# Patient Record
Sex: MALE | Race: WHITE | Employment: OTHER | URBAN - METROPOLITAN AREA
[De-identification: names, ages, dates, MRNs, and addresses within clinical notes are randomized per-mention and may not be internally consistent; named-entity substitution may affect disease eponyms.]

---

## 2018-09-17 ENCOUNTER — HOSPITAL ENCOUNTER (OUTPATIENT)
Age: 78
Discharge: HOME | End: 2018-09-19
Payer: MEDICARE

## 2018-09-17 ENCOUNTER — NURSE ONLY (OUTPATIENT)
Dept: FAMILY MEDICINE CLINIC | Age: 78
End: 2018-09-17

## 2018-09-17 DIAGNOSIS — I77.1: ICD-10-CM

## 2018-09-17 LAB
ALBUMIN SERPL-MCNC: 4.2 G/DL (ref 3.5–5.2)
ALP BLD-CCNC: 60 U/L (ref 40–129)
ALT SERPL-CCNC: 11 U/L (ref 0–40)
ANION GAP SERPL CALCULATED.3IONS-SCNC: 19 MMOL/L (ref 7–16)
AST SERPL-CCNC: 22 U/L (ref 0–39)
BILIRUB SERPL-MCNC: 0.4 MG/DL (ref 0–1.2)
BUN BLDV-MCNC: 27 MG/DL (ref 8–23)
CALCIUM SERPL-MCNC: 9.1 MG/DL (ref 8.6–10.2)
CHLORIDE BLD-SCNC: 101 MMOL/L (ref 98–107)
CHOLESTEROL, TOTAL: 148 MG/DL (ref 0–199)
CO2: 21 MMOL/L (ref 22–29)
CREAT SERPL-MCNC: 1.7 MG/DL (ref 0.7–1.2)
CREATININE URINE: 42 MG/DL (ref 40–278)
GFR AFRICAN AMERICAN: 47
GFR NON-AFRICAN AMERICAN: 39 ML/MIN/1.73
GLUCOSE BLD-MCNC: 163 MG/DL (ref 74–109)
HDLC SERPL-MCNC: 54 MG/DL
LDL CHOLESTEROL CALCULATED: 80 MG/DL (ref 0–99)
MICROALBUMIN UR-MCNC: 28.5 MG/L
MICROALBUMIN/CREAT UR-RTO: 67.9 (ref 0–30)
PARATHYROID HORMONE INTACT: 91 PG/ML (ref 15–65)
POTASSIUM SERPL-SCNC: 4.9 MMOL/L (ref 3.5–5)
SODIUM BLD-SCNC: 141 MMOL/L (ref 132–146)
TOTAL PROTEIN: 7.3 G/DL (ref 6.4–8.3)
TRIGL SERPL-MCNC: 68 MG/DL (ref 0–149)
VLDLC SERPL CALC-MCNC: 14 MG/DL

## 2018-09-17 PROCEDURE — 82044 UR ALBUMIN SEMIQUANTITATIVE: CPT

## 2018-09-17 PROCEDURE — 80053 COMPREHEN METABOLIC PANEL: CPT

## 2018-09-17 PROCEDURE — 83970 ASSAY OF PARATHORMONE: CPT

## 2018-09-17 PROCEDURE — 82570 ASSAY OF URINE CREATININE: CPT

## 2018-09-17 PROCEDURE — 80061 LIPID PANEL: CPT

## 2018-09-17 PROCEDURE — 83036 HEMOGLOBIN GLYCOSYLATED A1C: CPT

## 2018-09-21 LAB — HBA1C MFR BLD: 7.7 % (ref 4–5.6)

## 2019-05-13 ENCOUNTER — HOSPITAL ENCOUNTER (OUTPATIENT)
Age: 79
Discharge: HOME | End: 2019-05-15
Payer: MEDICARE

## 2019-05-13 ENCOUNTER — NURSE ONLY (OUTPATIENT)
Dept: FAMILY MEDICINE CLINIC | Age: 79
End: 2019-05-13
Payer: MEDICARE

## 2019-05-13 DIAGNOSIS — E13.8 DIABETES MELLITUS OF OTHER TYPE WITH COMPLICATION, UNSPECIFIED WHETHER LONG TERM INSULIN USE: ICD-10-CM

## 2019-05-13 DIAGNOSIS — I25.10 DISEASE OF CARDIOVASCULAR SYSTEM: ICD-10-CM

## 2019-05-13 DIAGNOSIS — I11.9 BENIGN HYPERTENSIVE HEART DISEASE WITHOUT HEART FAILURE: ICD-10-CM

## 2019-05-13 DIAGNOSIS — N18.30 CHRONIC KIDNEY DISEASE, STAGE III (MODERATE) (HCC): ICD-10-CM

## 2019-05-13 DIAGNOSIS — E78.41 ELEVATED LIPOPROTEIN A LEVEL: ICD-10-CM

## 2019-05-13 LAB
ALBUMIN SERPL-MCNC: 4.3 G/DL (ref 3.5–5.2)
ALP BLD-CCNC: 76 U/L (ref 40–129)
ALT SERPL-CCNC: 15 U/L (ref 0–40)
ANION GAP SERPL CALCULATED.3IONS-SCNC: 15 MMOL/L (ref 7–16)
AST SERPL-CCNC: 19 U/L (ref 0–39)
BILIRUB SERPL-MCNC: 0.3 MG/DL (ref 0–1.2)
BUN BLDV-MCNC: 40 MG/DL (ref 8–23)
CALCIUM SERPL-MCNC: 8.8 MG/DL (ref 8.6–10.2)
CHLORIDE BLD-SCNC: 98 MMOL/L (ref 98–107)
CHOLESTEROL, TOTAL: 140 MG/DL (ref 0–199)
CO2: 22 MMOL/L (ref 22–29)
CREAT SERPL-MCNC: 1.9 MG/DL (ref 0.7–1.2)
CREATININE URINE: 55 MG/DL (ref 40–278)
GFR AFRICAN AMERICAN: 42
GFR NON-AFRICAN AMERICAN: 34 ML/MIN/1.73
GLUCOSE BLD-MCNC: 290 MG/DL (ref 74–99)
HBA1C MFR BLD: 7.5 % (ref 4–5.6)
HDLC SERPL-MCNC: 54 MG/DL
LDL CHOLESTEROL CALCULATED: 72 MG/DL (ref 0–99)
MICROALBUMIN UR-MCNC: 78.6 MG/L
MICROALBUMIN/CREAT UR-RTO: 142.9 (ref 0–30)
PARATHYROID HORMONE INTACT: 93 PG/ML (ref 15–65)
PHOSPHORUS: 3.1 MG/DL (ref 2.5–4.5)
POTASSIUM SERPL-SCNC: 4.9 MMOL/L (ref 3.5–5)
PROSTATE SPECIFIC ANTIGEN: 3.05 NG/ML (ref 0–4)
SODIUM BLD-SCNC: 135 MMOL/L (ref 132–146)
TOTAL PROTEIN: 7.2 G/DL (ref 6.4–8.3)
TRIGL SERPL-MCNC: 69 MG/DL (ref 0–149)
VLDLC SERPL CALC-MCNC: 14 MG/DL

## 2019-05-13 PROCEDURE — 84100 ASSAY OF PHOSPHORUS: CPT

## 2019-05-13 PROCEDURE — 83970 ASSAY OF PARATHORMONE: CPT

## 2019-05-13 PROCEDURE — 80053 COMPREHEN METABOLIC PANEL: CPT

## 2019-05-13 PROCEDURE — 82044 UR ALBUMIN SEMIQUANTITATIVE: CPT

## 2019-05-13 PROCEDURE — 82570 ASSAY OF URINE CREATININE: CPT

## 2019-05-13 PROCEDURE — 80061 LIPID PANEL: CPT

## 2019-05-13 PROCEDURE — 83036 HEMOGLOBIN GLYCOSYLATED A1C: CPT

## 2019-05-13 PROCEDURE — 36415 COLL VENOUS BLD VENIPUNCTURE: CPT | Performed by: INTERNAL MEDICINE

## 2019-05-13 PROCEDURE — G0103 PSA SCREENING: HCPCS

## 2019-12-16 ENCOUNTER — HOSPITAL ENCOUNTER (OUTPATIENT)
Age: 79
Discharge: HOME | End: 2019-12-18
Payer: MEDICARE

## 2019-12-16 LAB
ALBUMIN SERPL-MCNC: 4.7 G/DL (ref 3.5–5.2)
ALP BLD-CCNC: 63 U/L (ref 40–129)
ALT SERPL-CCNC: 13 U/L (ref 0–40)
ANION GAP SERPL CALCULATED.3IONS-SCNC: 20 MMOL/L (ref 7–16)
AST SERPL-CCNC: 19 U/L (ref 0–39)
BILIRUB SERPL-MCNC: 0.4 MG/DL (ref 0–1.2)
BUN BLDV-MCNC: 36 MG/DL (ref 8–23)
CALCIUM SERPL-MCNC: 9.9 MG/DL (ref 8.6–10.2)
CHLORIDE BLD-SCNC: 102 MMOL/L (ref 98–107)
CHOLESTEROL, TOTAL: 164 MG/DL (ref 0–199)
CO2: 21 MMOL/L (ref 22–29)
CREAT SERPL-MCNC: 1.9 MG/DL (ref 0.7–1.2)
GFR AFRICAN AMERICAN: 42
GFR NON-AFRICAN AMERICAN: 34 ML/MIN/1.73
GLUCOSE BLD-MCNC: 148 MG/DL (ref 74–99)
HBA1C MFR BLD: 8.5 % (ref 4–5.6)
HDLC SERPL-MCNC: 62 MG/DL
LDL CHOLESTEROL CALCULATED: 87 MG/DL (ref 0–99)
POTASSIUM SERPL-SCNC: 4.5 MMOL/L (ref 3.5–5)
SODIUM BLD-SCNC: 143 MMOL/L (ref 132–146)
TOTAL PROTEIN: 8 G/DL (ref 6.4–8.3)
TRIGL SERPL-MCNC: 75 MG/DL (ref 0–149)
VLDLC SERPL CALC-MCNC: 15 MG/DL

## 2019-12-16 PROCEDURE — 80061 LIPID PANEL: CPT

## 2019-12-16 PROCEDURE — 83036 HEMOGLOBIN GLYCOSYLATED A1C: CPT

## 2019-12-16 PROCEDURE — 80053 COMPREHEN METABOLIC PANEL: CPT

## 2020-01-29 ENCOUNTER — HOSPITAL ENCOUNTER (OUTPATIENT)
Age: 80
Discharge: HOME | End: 2020-01-29
Payer: MEDICARE

## 2020-01-29 LAB — PROSTATE SPECIFIC ANTIGEN: 4.25 NG/ML (ref 0–4)

## 2020-01-29 PROCEDURE — 84153 ASSAY OF PSA TOTAL: CPT

## 2020-01-29 PROCEDURE — 36415 COLL VENOUS BLD VENIPUNCTURE: CPT

## 2020-02-11 ENCOUNTER — HOSPITAL ENCOUNTER (OUTPATIENT)
Age: 80
Discharge: HOME | End: 2020-02-13
Payer: MEDICARE

## 2020-02-11 LAB
ANION GAP SERPL CALCULATED.3IONS-SCNC: 19 MMOL/L (ref 7–16)
BUN BLDV-MCNC: 43 MG/DL (ref 8–23)
CALCIUM SERPL-MCNC: 9.5 MG/DL (ref 8.6–10.2)
CHLORIDE BLD-SCNC: 102 MMOL/L (ref 98–107)
CO2: 18 MMOL/L (ref 22–29)
CREAT SERPL-MCNC: 1.6 MG/DL (ref 0.7–1.2)
CREATININE URINE: 105 MG/DL (ref 40–278)
GFR AFRICAN AMERICAN: 51
GFR NON-AFRICAN AMERICAN: 42 ML/MIN/1.73
GLUCOSE BLD-MCNC: 284 MG/DL (ref 74–99)
MICROALBUMIN UR-MCNC: 322.5 MG/L
MICROALBUMIN/CREAT UR-RTO: 307.1 (ref 0–30)
PARATHYROID HORMONE INTACT: 103 PG/ML (ref 15–65)
POTASSIUM SERPL-SCNC: 5.1 MMOL/L (ref 3.5–5)
SODIUM BLD-SCNC: 139 MMOL/L (ref 132–146)
VITAMIN D 25-HYDROXY: 36 NG/ML (ref 30–100)

## 2020-02-11 PROCEDURE — 82570 ASSAY OF URINE CREATININE: CPT

## 2020-02-11 PROCEDURE — 87088 URINE BACTERIA CULTURE: CPT

## 2020-02-11 PROCEDURE — 83970 ASSAY OF PARATHORMONE: CPT

## 2020-02-11 PROCEDURE — 82044 UR ALBUMIN SEMIQUANTITATIVE: CPT

## 2020-02-11 PROCEDURE — 82306 VITAMIN D 25 HYDROXY: CPT

## 2020-02-11 PROCEDURE — 80048 BASIC METABOLIC PNL TOTAL CA: CPT

## 2020-02-13 LAB — URINE CULTURE, ROUTINE: NORMAL

## 2021-03-16 ENCOUNTER — OFFICE VISIT (OUTPATIENT)
Dept: NEUROSURGERY | Age: 81
End: 2021-03-16
Payer: MEDICARE

## 2021-03-16 VITALS
WEIGHT: 210 LBS | TEMPERATURE: 97.6 F | DIASTOLIC BLOOD PRESSURE: 74 MMHG | BODY MASS INDEX: 29.4 KG/M2 | HEART RATE: 104 BPM | SYSTOLIC BLOOD PRESSURE: 118 MMHG | HEIGHT: 71 IN

## 2021-03-16 DIAGNOSIS — Z98.1 HISTORY OF LUMBAR FUSION: ICD-10-CM

## 2021-03-16 DIAGNOSIS — M54.16 LUMBAR RADICULOPATHY: Primary | ICD-10-CM

## 2021-03-16 PROCEDURE — G8417 CALC BMI ABV UP PARAM F/U: HCPCS | Performed by: PHYSICIAN ASSISTANT

## 2021-03-16 PROCEDURE — 1036F TOBACCO NON-USER: CPT | Performed by: PHYSICIAN ASSISTANT

## 2021-03-16 PROCEDURE — G8427 DOCREV CUR MEDS BY ELIG CLIN: HCPCS | Performed by: PHYSICIAN ASSISTANT

## 2021-03-16 PROCEDURE — 99203 OFFICE O/P NEW LOW 30 MIN: CPT | Performed by: PHYSICIAN ASSISTANT

## 2021-03-16 PROCEDURE — 1123F ACP DISCUSS/DSCN MKR DOCD: CPT | Performed by: PHYSICIAN ASSISTANT

## 2021-03-16 PROCEDURE — G8484 FLU IMMUNIZE NO ADMIN: HCPCS | Performed by: PHYSICIAN ASSISTANT

## 2021-03-16 PROCEDURE — 4040F PNEUMOC VAC/ADMIN/RCVD: CPT | Performed by: PHYSICIAN ASSISTANT

## 2021-03-16 RX ORDER — TAMSULOSIN HYDROCHLORIDE 0.4 MG/1
0.4 CAPSULE ORAL DAILY
COMMUNITY

## 2021-03-16 ASSESSMENT — ENCOUNTER SYMPTOMS
SHORTNESS OF BREATH: 0
BACK PAIN: 1
ABDOMINAL PAIN: 0
TROUBLE SWALLOWING: 0
PHOTOPHOBIA: 0

## 2021-03-16 NOTE — PROGRESS NOTES
Subjective:      Patient ID: Curtis Schwartz is a [de-identified] y.o. male. Maty Amezquita is an [de-identified]year old male with a past medical history of DM, HTN, HLD, GERD, CAD, left parotid gland cancer, left sacral screw placement, and hx L2-L4 lumbar fusion 20 years ago by Dr. Isela Lara. He presents to the office today as a new patient c/o low back pain with radiation down his right leg. Describes the pain as sharp and burning. Pain has been presents for 10 years, but has recently been worsening this past year. Any type of movement and ambulation makes the pain worse. He has tried several sessions of physical therapy, lumbar epidural steroid injections, and SI joint injections without adequate relief. Denies loss of bowel or bladder, saddle anesthesia, numbness, tingling, fever, chills, N/V, SOB, or chest pain. Review of Systems   Constitutional: Negative for fever. HENT: Negative for trouble swallowing. Eyes: Negative for photophobia. Respiratory: Negative for shortness of breath. Cardiovascular: Negative for chest pain. Gastrointestinal: Negative for abdominal pain. Endocrine: Negative for heat intolerance. Genitourinary: Negative for flank pain. Musculoskeletal: Positive for arthralgias, back pain and gait problem. Skin: Negative for wound. Neurological: Negative for numbness and headaches. Psychiatric/Behavioral: Negative for confusion. Objective:   Physical Exam  Constitutional:       Appearance: Normal appearance. He is well-developed. HENT:      Head: Normocephalic and atraumatic. Eyes:      Extraocular Movements: Extraocular movements intact. Conjunctiva/sclera: Conjunctivae normal.      Pupils: Pupils are equal, round, and reactive to light. Neck:      Musculoskeletal: Normal range of motion and neck supple. Cardiovascular:      Rate and Rhythm: Normal rate. Pulmonary:      Effort: Pulmonary effort is normal.   Abdominal:      General: There is no distension.    Musculoskeletal: Comments: Tenderness to palpation of right sided lumbar spine   Skin:     General: Skin is warm and dry. Neurological:      Mental Status: He is alert. Comments: Alert and oriented x3  CN3-12 intact  Motor strength full  Sensation intact to light touch   Psychiatric:         Thought Content: Thought content normal.         Assessment: This is an [de-identified]year old male presenting with back pain and right leg pain. Hx L2-L4 lumbar fusion.        Plan:      -Obtain lumbar CT myelogram to further evaluate fusion and for stenosis  -Call/return to Neurosurgery clinic after completion of imaging to discuss results and further treatment plan  -Call/return sooner if symptoms worsen or new issues arise in the interim           Lynette Power PA-C

## 2021-04-13 DIAGNOSIS — Z01.818 PRE-OP TESTING: Primary | ICD-10-CM

## 2021-04-27 ENCOUNTER — HOSPITAL ENCOUNTER (OUTPATIENT)
Age: 81
Discharge: HOME | End: 2021-04-29
Payer: MEDICARE

## 2021-04-27 DIAGNOSIS — Z01.818 PRE-OP TESTING: ICD-10-CM

## 2021-04-27 PROCEDURE — U0003 INFECTIOUS AGENT DETECTION BY NUCLEIC ACID (DNA OR RNA); SEVERE ACUTE RESPIRATORY SYNDROME CORONAVIRUS 2 (SARS-COV-2) (CORONAVIRUS DISEASE [COVID-19]), AMPLIFIED PROBE TECHNIQUE, MAKING USE OF HIGH THROUGHPUT TECHNOLOGIES AS DESCRIBED BY CMS-2020-01-R: HCPCS

## 2021-04-27 PROCEDURE — U0005 INFEC AGEN DETEC AMPLI PROBE: HCPCS

## 2021-04-28 LAB — SARS-COV-2, PCR: NOT DETECTED

## 2021-05-07 ENCOUNTER — HOSPITAL ENCOUNTER (OUTPATIENT)
Dept: CT IMAGING | Age: 81
Discharge: HOME | End: 2021-05-09
Payer: MEDICARE

## 2021-05-07 ENCOUNTER — HOSPITAL ENCOUNTER (OUTPATIENT)
Dept: GENERAL RADIOLOGY | Age: 81
Discharge: HOME | End: 2021-05-09
Payer: MEDICARE

## 2021-05-07 VITALS
TEMPERATURE: 97.7 F | WEIGHT: 218 LBS | HEIGHT: 72 IN | SYSTOLIC BLOOD PRESSURE: 188 MMHG | BODY MASS INDEX: 29.53 KG/M2 | RESPIRATION RATE: 16 BRPM | DIASTOLIC BLOOD PRESSURE: 82 MMHG | HEART RATE: 80 BPM | OXYGEN SATURATION: 96 %

## 2021-05-07 DIAGNOSIS — Z98.1 HISTORY OF LUMBAR FUSION: ICD-10-CM

## 2021-05-07 DIAGNOSIS — M54.16 LUMBAR RADICULOPATHY: ICD-10-CM

## 2021-05-07 LAB
INR BLD: 1.1
PLATELET # BLD: 246 E9/L (ref 130–450)
PROTHROMBIN TIME: 11.8 SEC (ref 9.3–12.4)

## 2021-05-07 PROCEDURE — 7100000011 HC PHASE II RECOVERY - ADDTL 15 MIN

## 2021-05-07 PROCEDURE — 7100000010 HC PHASE II RECOVERY - FIRST 15 MIN

## 2021-05-07 PROCEDURE — 36415 COLL VENOUS BLD VENIPUNCTURE: CPT

## 2021-05-07 PROCEDURE — 85049 AUTOMATED PLATELET COUNT: CPT

## 2021-05-07 PROCEDURE — 85610 PROTHROMBIN TIME: CPT

## 2021-05-07 PROCEDURE — 6360000004 HC RX CONTRAST MEDICATION: Performed by: RADIOLOGY

## 2021-05-07 PROCEDURE — 72132 CT LUMBAR SPINE W/DYE: CPT

## 2021-05-07 PROCEDURE — 72265 MYELOGRAPHY L-S SPINE: CPT

## 2021-05-07 RX ADMIN — IOPAMIDOL 15 ML: 408 INJECTION, SOLUTION INTRATHECAL at 09:30

## 2021-05-07 ASSESSMENT — PAIN - FUNCTIONAL ASSESSMENT: PAIN_FUNCTIONAL_ASSESSMENT: 0-10

## 2021-05-07 ASSESSMENT — PAIN DESCRIPTION - DESCRIPTORS: DESCRIPTORS: ACHING;DISCOMFORT;NAGGING

## 2021-05-07 NOTE — PROGRESS NOTES
Patient tolerated procedure well, VSS, BP elevated but patient said he didn't take BP meds this am.  He will take them with food. Discharge instructions given, all questions answered and no further questions at this time.

## 2021-05-21 ENCOUNTER — TELEPHONE (OUTPATIENT)
Dept: NEUROSURGERY | Age: 81
End: 2021-05-21

## 2021-11-24 ENCOUNTER — INITIAL CONSULT (OUTPATIENT)
Dept: NEUROSURGERY | Age: 81
End: 2021-11-24
Payer: MEDICARE

## 2021-11-24 VITALS
TEMPERATURE: 97.7 F | DIASTOLIC BLOOD PRESSURE: 90 MMHG | BODY MASS INDEX: 29.53 KG/M2 | WEIGHT: 218 LBS | HEART RATE: 77 BPM | RESPIRATION RATE: 20 BRPM | HEIGHT: 72 IN | OXYGEN SATURATION: 97 % | SYSTOLIC BLOOD PRESSURE: 152 MMHG

## 2021-11-24 DIAGNOSIS — M54.41 CHRONIC MIDLINE LOW BACK PAIN WITH BILATERAL SCIATICA: Primary | ICD-10-CM

## 2021-11-24 DIAGNOSIS — M54.42 CHRONIC MIDLINE LOW BACK PAIN WITH BILATERAL SCIATICA: Primary | ICD-10-CM

## 2021-11-24 DIAGNOSIS — G89.29 CHRONIC MIDLINE LOW BACK PAIN WITH BILATERAL SCIATICA: Primary | ICD-10-CM

## 2021-11-24 PROCEDURE — G8417 CALC BMI ABV UP PARAM F/U: HCPCS | Performed by: NEUROLOGICAL SURGERY

## 2021-11-24 PROCEDURE — G8484 FLU IMMUNIZE NO ADMIN: HCPCS | Performed by: NEUROLOGICAL SURGERY

## 2021-11-24 PROCEDURE — 1123F ACP DISCUSS/DSCN MKR DOCD: CPT | Performed by: NEUROLOGICAL SURGERY

## 2021-11-24 PROCEDURE — 4040F PNEUMOC VAC/ADMIN/RCVD: CPT | Performed by: NEUROLOGICAL SURGERY

## 2021-11-24 PROCEDURE — 99212 OFFICE O/P EST SF 10 MIN: CPT | Performed by: NEUROLOGICAL SURGERY

## 2021-11-24 PROCEDURE — G8427 DOCREV CUR MEDS BY ELIG CLIN: HCPCS | Performed by: NEUROLOGICAL SURGERY

## 2021-11-24 PROCEDURE — 1036F TOBACCO NON-USER: CPT | Performed by: NEUROLOGICAL SURGERY

## 2022-12-15 ENCOUNTER — HOSPITAL ENCOUNTER (OUTPATIENT)
Age: 82
Discharge: HOME | End: 2022-12-17

## 2022-12-15 LAB
ABO/RH: NORMAL
ANTIBODY SCREEN: NORMAL
INR BLD: 1.1
PROTHROMBIN TIME: 11.9 SEC (ref 9.3–12.4)

## 2022-12-15 PROCEDURE — 86850 RBC ANTIBODY SCREEN: CPT

## 2022-12-15 PROCEDURE — 87081 CULTURE SCREEN ONLY: CPT

## 2022-12-15 PROCEDURE — 85610 PROTHROMBIN TIME: CPT

## 2022-12-15 PROCEDURE — 86901 BLOOD TYPING SEROLOGIC RH(D): CPT

## 2022-12-15 PROCEDURE — 86900 BLOOD TYPING SEROLOGIC ABO: CPT

## 2022-12-17 LAB — MRSA CULTURE ONLY: NORMAL

## 2022-12-19 ENCOUNTER — HOSPITAL ENCOUNTER (OUTPATIENT)
Age: 82
Discharge: HOME | End: 2022-12-21

## 2022-12-20 ENCOUNTER — HOSPITAL ENCOUNTER (OUTPATIENT)
Age: 82
Discharge: HOME | End: 2022-12-22

## 2022-12-20 LAB
ANION GAP SERPL CALCULATED.3IONS-SCNC: 10 MMOL/L (ref 7–16)
BUN BLDV-MCNC: 46 MG/DL (ref 6–23)
CALCIUM SERPL-MCNC: 8.4 MG/DL (ref 8.6–10.2)
CHLORIDE BLD-SCNC: 103 MMOL/L (ref 98–107)
CO2: 22 MMOL/L (ref 22–29)
CREAT SERPL-MCNC: 2 MG/DL (ref 0.7–1.2)
GFR SERPL CREATININE-BSD FRML MDRD: 33 ML/MIN/1.73
GLUCOSE BLD-MCNC: 250 MG/DL (ref 74–99)
HCT VFR BLD CALC: 29.4 % (ref 37–54)
HEMOGLOBIN: 9.4 G/DL (ref 12.5–16.5)
MCH RBC QN AUTO: 32.9 PG (ref 26–35)
MCHC RBC AUTO-ENTMCNC: 32 % (ref 32–34.5)
MCV RBC AUTO: 102.8 FL (ref 80–99.9)
PDW BLD-RTO: 12.5 FL (ref 11.5–15)
PLATELET # BLD: 234 E9/L (ref 130–450)
PMV BLD AUTO: 10.4 FL (ref 7–12)
POTASSIUM SERPL-SCNC: 4.9 MMOL/L (ref 3.5–5)
RBC # BLD: 2.86 E12/L (ref 3.8–5.8)
SODIUM BLD-SCNC: 135 MMOL/L (ref 132–146)
WBC # BLD: 9.9 E9/L (ref 4.5–11.5)

## 2022-12-20 PROCEDURE — 80048 BASIC METABOLIC PNL TOTAL CA: CPT

## 2022-12-20 PROCEDURE — 85027 COMPLETE CBC AUTOMATED: CPT

## 2022-12-21 ENCOUNTER — HOSPITAL ENCOUNTER (OUTPATIENT)
Age: 82
Discharge: HOME | End: 2022-12-23

## 2022-12-21 LAB
ANION GAP SERPL CALCULATED.3IONS-SCNC: 11 MMOL/L (ref 7–16)
BUN BLDV-MCNC: 37 MG/DL (ref 6–23)
CALCIUM SERPL-MCNC: 8.8 MG/DL (ref 8.6–10.2)
CHLORIDE BLD-SCNC: 103 MMOL/L (ref 98–107)
CO2: 21 MMOL/L (ref 22–29)
CREAT SERPL-MCNC: 1.8 MG/DL (ref 0.7–1.2)
GFR SERPL CREATININE-BSD FRML MDRD: 37 ML/MIN/1.73
GLUCOSE BLD-MCNC: 168 MG/DL (ref 74–99)
HCT VFR BLD CALC: 27.9 % (ref 37–54)
HEMOGLOBIN: 9.1 G/DL (ref 12.5–16.5)
MCH RBC QN AUTO: 32.9 PG (ref 26–35)
MCHC RBC AUTO-ENTMCNC: 32.6 % (ref 32–34.5)
MCV RBC AUTO: 100.7 FL (ref 80–99.9)
PDW BLD-RTO: 12.6 FL (ref 11.5–15)
PLATELET # BLD: 237 E9/L (ref 130–450)
PMV BLD AUTO: 10.6 FL (ref 7–12)
POTASSIUM SERPL-SCNC: 4.9 MMOL/L (ref 3.5–5)
RBC # BLD: 2.77 E12/L (ref 3.8–5.8)
SODIUM BLD-SCNC: 135 MMOL/L (ref 132–146)
WBC # BLD: 11 E9/L (ref 4.5–11.5)

## 2022-12-21 PROCEDURE — 80048 BASIC METABOLIC PNL TOTAL CA: CPT

## 2022-12-21 PROCEDURE — 85027 COMPLETE CBC AUTOMATED: CPT

## 2022-12-25 ENCOUNTER — APPOINTMENT (OUTPATIENT)
Dept: ULTRASOUND IMAGING | Age: 82
End: 2022-12-25
Payer: MEDICARE

## 2022-12-25 ENCOUNTER — HOSPITAL ENCOUNTER (INPATIENT)
Age: 82
LOS: 5 days | Discharge: NURSING, REHAB OR LONG-TERM CARE | End: 2022-12-30
Attending: STUDENT IN AN ORGANIZED HEALTH CARE EDUCATION/TRAINING PROGRAM | Admitting: INTERNAL MEDICINE
Payer: MEDICARE

## 2022-12-25 ENCOUNTER — APPOINTMENT (OUTPATIENT)
Dept: CT IMAGING | Age: 82
End: 2022-12-25
Payer: MEDICARE

## 2022-12-25 DIAGNOSIS — R55 SYNCOPE AND COLLAPSE: Primary | ICD-10-CM

## 2022-12-25 DIAGNOSIS — S22.49XA CLOSED FRACTURE OF MULTIPLE RIBS, UNSPECIFIED LATERALITY, INITIAL ENCOUNTER: ICD-10-CM

## 2022-12-25 DIAGNOSIS — R53.83 OTHER FATIGUE: ICD-10-CM

## 2022-12-25 DIAGNOSIS — U07.1 COVID: ICD-10-CM

## 2022-12-25 PROBLEM — S22.32XA CLOSED FRACTURE OF ONE RIB OF LEFT SIDE, INITIAL ENCOUNTER: Status: ACTIVE | Noted: 2022-12-25

## 2022-12-25 LAB
ALBUMIN SERPL-MCNC: 3.3 G/DL (ref 3.5–5.2)
ALBUMIN SERPL-MCNC: 3.6 G/DL (ref 3.5–5.2)
ALP BLD-CCNC: 66 U/L (ref 40–129)
ALP BLD-CCNC: 74 U/L (ref 40–129)
ALT SERPL-CCNC: 8 U/L (ref 0–40)
ALT SERPL-CCNC: 8 U/L (ref 0–40)
ANION GAP SERPL CALCULATED.3IONS-SCNC: 11 MMOL/L (ref 7–16)
ANION GAP SERPL CALCULATED.3IONS-SCNC: 13 MMOL/L (ref 7–16)
AST SERPL-CCNC: 19 U/L (ref 0–39)
AST SERPL-CCNC: 21 U/L (ref 0–39)
BASOPHILS ABSOLUTE: 0 E9/L (ref 0–0.2)
BASOPHILS RELATIVE PERCENT: 0 % (ref 0–2)
BILIRUB SERPL-MCNC: 0.4 MG/DL (ref 0–1.2)
BILIRUB SERPL-MCNC: 0.4 MG/DL (ref 0–1.2)
BUN BLDV-MCNC: 49 MG/DL (ref 6–23)
BUN BLDV-MCNC: 50 MG/DL (ref 6–23)
C-REACTIVE PROTEIN: 11.4 MG/DL (ref 0–0.4)
CALCIUM SERPL-MCNC: 8.8 MG/DL (ref 8.6–10.2)
CALCIUM SERPL-MCNC: 9.2 MG/DL (ref 8.6–10.2)
CHLORIDE BLD-SCNC: 101 MMOL/L (ref 98–107)
CHLORIDE BLD-SCNC: 98 MMOL/L (ref 98–107)
CHP ED QC CHECK: NORMAL
CO2: 20 MMOL/L (ref 22–29)
CO2: 21 MMOL/L (ref 22–29)
CREAT SERPL-MCNC: 2.1 MG/DL (ref 0.7–1.2)
CREAT SERPL-MCNC: 2.1 MG/DL (ref 0.7–1.2)
D DIMER: 1810 NG/ML DDU
EOSINOPHILS ABSOLUTE: 0.12 E9/L (ref 0.05–0.5)
EOSINOPHILS RELATIVE PERCENT: 0.9 % (ref 0–6)
GFR SERPL CREATININE-BSD FRML MDRD: 31 ML/MIN/1.73
GFR SERPL CREATININE-BSD FRML MDRD: 31 ML/MIN/1.73
GLUCOSE BLD-MCNC: 214 MG/DL (ref 74–99)
GLUCOSE BLD-MCNC: 247 MG/DL (ref 74–99)
GLUCOSE BLD-MCNC: 274 MG/DL
HCT VFR BLD CALC: 26.6 % (ref 37–54)
HEMOGLOBIN: 8.7 G/DL (ref 12.5–16.5)
LYMPHOCYTES ABSOLUTE: 0.4 E9/L (ref 1.5–4)
LYMPHOCYTES RELATIVE PERCENT: 2.6 % (ref 20–42)
MCH RBC QN AUTO: 32.8 PG (ref 26–35)
MCHC RBC AUTO-ENTMCNC: 32.7 % (ref 32–34.5)
MCV RBC AUTO: 100.4 FL (ref 80–99.9)
METER GLUCOSE: 208 MG/DL (ref 74–99)
METER GLUCOSE: 217 MG/DL (ref 74–99)
METER GLUCOSE: 274 MG/DL (ref 74–99)
MONOCYTES ABSOLUTE: 0.8 E9/L (ref 0.1–0.95)
MONOCYTES RELATIVE PERCENT: 6.1 % (ref 2–12)
MYELOCYTE PERCENT: 1.8 % (ref 0–0)
NEUTROPHILS ABSOLUTE: 12.06 E9/L (ref 1.8–7.3)
NEUTROPHILS RELATIVE PERCENT: 88.6 % (ref 43–80)
NUCLEATED RED BLOOD CELLS: 0 /100 WBC
PDW BLD-RTO: 12.5 FL (ref 11.5–15)
PLATELET # BLD: 284 E9/L (ref 130–450)
PMV BLD AUTO: 9.6 FL (ref 7–12)
POLYCHROMASIA: ABNORMAL
POTASSIUM REFLEX MAGNESIUM: 4.6 MMOL/L (ref 3.5–5)
POTASSIUM SERPL-SCNC: 5.4 MMOL/L (ref 3.5–5)
RBC # BLD: 2.65 E12/L (ref 3.8–5.8)
SARS-COV-2, NAAT: DETECTED
SODIUM BLD-SCNC: 131 MMOL/L (ref 132–146)
SODIUM BLD-SCNC: 133 MMOL/L (ref 132–146)
TOTAL PROTEIN: 6.6 G/DL (ref 6.4–8.3)
TOTAL PROTEIN: 7.2 G/DL (ref 6.4–8.3)
TROPONIN, HIGH SENSITIVITY: 74 NG/L (ref 0–11)
TROPONIN, HIGH SENSITIVITY: 87 NG/L (ref 0–11)
TROPONIN, HIGH SENSITIVITY: 90 NG/L (ref 0–11)
WBC # BLD: 13.4 E9/L (ref 4.5–11.5)

## 2022-12-25 PROCEDURE — 82962 GLUCOSE BLOOD TEST: CPT

## 2022-12-25 PROCEDURE — 51702 INSERT TEMP BLADDER CATH: CPT

## 2022-12-25 PROCEDURE — 6370000000 HC RX 637 (ALT 250 FOR IP): Performed by: INTERNAL MEDICINE

## 2022-12-25 PROCEDURE — 99285 EMERGENCY DEPT VISIT HI MDM: CPT

## 2022-12-25 PROCEDURE — 6360000002 HC RX W HCPCS: Performed by: INTERNAL MEDICINE

## 2022-12-25 PROCEDURE — 2060000000 HC ICU INTERMEDIATE R&B

## 2022-12-25 PROCEDURE — 2580000003 HC RX 258: Performed by: INTERNAL MEDICINE

## 2022-12-25 PROCEDURE — 85378 FIBRIN DEGRADE SEMIQUANT: CPT

## 2022-12-25 PROCEDURE — 85025 COMPLETE CBC W/AUTO DIFF WBC: CPT

## 2022-12-25 PROCEDURE — 86140 C-REACTIVE PROTEIN: CPT

## 2022-12-25 PROCEDURE — 71250 CT THORAX DX C-: CPT

## 2022-12-25 PROCEDURE — 36415 COLL VENOUS BLD VENIPUNCTURE: CPT

## 2022-12-25 PROCEDURE — 93005 ELECTROCARDIOGRAM TRACING: CPT | Performed by: STUDENT IN AN ORGANIZED HEALTH CARE EDUCATION/TRAINING PROGRAM

## 2022-12-25 PROCEDURE — 76705 ECHO EXAM OF ABDOMEN: CPT

## 2022-12-25 PROCEDURE — 70450 CT HEAD/BRAIN W/O DYE: CPT

## 2022-12-25 PROCEDURE — 87635 SARS-COV-2 COVID-19 AMP PRB: CPT

## 2022-12-25 PROCEDURE — 80053 COMPREHEN METABOLIC PANEL: CPT

## 2022-12-25 PROCEDURE — 84484 ASSAY OF TROPONIN QUANT: CPT

## 2022-12-25 RX ORDER — ASPIRIN 81 MG/1
81 TABLET, CHEWABLE ORAL DAILY
Status: DISCONTINUED | OUTPATIENT
Start: 2022-12-25 | End: 2022-12-30 | Stop reason: HOSPADM

## 2022-12-25 RX ORDER — SODIUM CHLORIDE 9 MG/ML
INJECTION, SOLUTION INTRAVENOUS CONTINUOUS
Status: DISCONTINUED | OUTPATIENT
Start: 2022-12-25 | End: 2022-12-29

## 2022-12-25 RX ORDER — TAMSULOSIN HYDROCHLORIDE 0.4 MG/1
0.4 CAPSULE ORAL DAILY
Status: DISCONTINUED | OUTPATIENT
Start: 2022-12-25 | End: 2022-12-30 | Stop reason: HOSPADM

## 2022-12-25 RX ORDER — LOSARTAN POTASSIUM 50 MG/1
100 TABLET ORAL DAILY
Status: DISCONTINUED | OUTPATIENT
Start: 2022-12-25 | End: 2022-12-29

## 2022-12-25 RX ORDER — ACETAMINOPHEN 500 MG
500 TABLET ORAL EVERY 6 HOURS PRN
Status: DISCONTINUED | OUTPATIENT
Start: 2022-12-25 | End: 2022-12-30 | Stop reason: HOSPADM

## 2022-12-25 RX ORDER — HEPARIN SODIUM 10000 [USP'U]/ML
5000 INJECTION, SOLUTION INTRAVENOUS; SUBCUTANEOUS EVERY 8 HOURS SCHEDULED
Status: DISCONTINUED | OUTPATIENT
Start: 2022-12-25 | End: 2022-12-30 | Stop reason: HOSPADM

## 2022-12-25 RX ORDER — PANTOPRAZOLE SODIUM 40 MG/1
40 TABLET, DELAYED RELEASE ORAL
Status: DISCONTINUED | OUTPATIENT
Start: 2022-12-26 | End: 2022-12-30 | Stop reason: HOSPADM

## 2022-12-25 RX ORDER — INSULIN LISPRO 100 [IU]/ML
0-4 INJECTION, SOLUTION INTRAVENOUS; SUBCUTANEOUS
Status: DISCONTINUED | OUTPATIENT
Start: 2022-12-25 | End: 2022-12-30 | Stop reason: HOSPADM

## 2022-12-25 RX ORDER — VITAMIN B COMPLEX
1000 TABLET ORAL DAILY
Status: DISCONTINUED | OUTPATIENT
Start: 2022-12-25 | End: 2022-12-30 | Stop reason: HOSPADM

## 2022-12-25 RX ORDER — VALSARTAN 320 MG/1
320 TABLET ORAL DAILY
Status: DISCONTINUED | OUTPATIENT
Start: 2022-12-25 | End: 2022-12-26

## 2022-12-25 RX ORDER — PILOCARPINE HYDROCHLORIDE 5 MG/1
5 TABLET, FILM COATED ORAL 3 TIMES DAILY
Status: DISCONTINUED | OUTPATIENT
Start: 2022-12-25 | End: 2022-12-30 | Stop reason: HOSPADM

## 2022-12-25 RX ORDER — ZINC SULFATE 50(220)MG
50 CAPSULE ORAL DAILY
Status: DISCONTINUED | OUTPATIENT
Start: 2022-12-25 | End: 2022-12-30 | Stop reason: HOSPADM

## 2022-12-25 RX ORDER — INSULIN LISPRO 100 [IU]/ML
0-4 INJECTION, SOLUTION INTRAVENOUS; SUBCUTANEOUS NIGHTLY
Status: DISCONTINUED | OUTPATIENT
Start: 2022-12-25 | End: 2022-12-30 | Stop reason: HOSPADM

## 2022-12-25 RX ADMIN — Medication 1000 UNITS: at 19:00

## 2022-12-25 RX ADMIN — PILOCARPINE HYDRCHLORIDE 5 MG: 5 TABLET, FILM COATED ORAL at 21:26

## 2022-12-25 RX ADMIN — TAMSULOSIN HYDROCHLORIDE 0.4 MG: 0.4 CAPSULE ORAL at 18:59

## 2022-12-25 RX ADMIN — ASPIRIN 81 MG 81 MG: 81 TABLET ORAL at 18:59

## 2022-12-25 RX ADMIN — HEPARIN SODIUM 5000 UNITS: 10000 INJECTION INTRAVENOUS; SUBCUTANEOUS at 21:30

## 2022-12-25 RX ADMIN — METOPROLOL TARTRATE 25 MG: 25 TABLET, FILM COATED ORAL at 21:26

## 2022-12-25 RX ADMIN — ZINC SULFATE 220 MG (50 MG) CAPSULE 50 MG: CAPSULE at 19:00

## 2022-12-25 RX ADMIN — SODIUM CHLORIDE: 9 INJECTION, SOLUTION INTRAVENOUS at 18:35

## 2022-12-25 ASSESSMENT — ENCOUNTER SYMPTOMS
SHORTNESS OF BREATH: 0
PHOTOPHOBIA: 0
NAUSEA: 0
ABDOMINAL DISTENTION: 0
ABDOMINAL PAIN: 0
CHEST TIGHTNESS: 0
DIARRHEA: 0
COUGH: 0
BACK PAIN: 0
VOMITING: 0

## 2022-12-25 ASSESSMENT — PAIN - FUNCTIONAL ASSESSMENT: PAIN_FUNCTIONAL_ASSESSMENT: NONE - DENIES PAIN

## 2022-12-25 NOTE — ED PROVIDER NOTES
Laila Veloz is an 80-year-old male with a past medical history of CAD, HTN, DM, HLD who presented from rehab due to syncope. Patient had several days of diffuse weakness, patient was helped to the restroom when he had a syncopal episode following large episode of diarrhea, nurse performed CPR after she suspected he was unresponsive, BG was found to be 66 patient was given IM glucagon and after brief CPR he regained consciousness. Patient was then awake, alert, oriented at time of arrival to ED. Patient is having pain over anterior chest wall that is reproducible, denies shortness of breath, nausea, vomiting. Patient's symptoms have resolved. The history is provided by the patient and medical records. Review of Systems   Constitutional:  Positive for fatigue. Negative for chills, diaphoresis and fever. Eyes:  Negative for photophobia and visual disturbance. Respiratory:  Negative for cough, chest tightness and shortness of breath. Cardiovascular:  Negative for chest pain, palpitations and leg swelling. Gastrointestinal:  Negative for abdominal distention, abdominal pain, diarrhea, nausea and vomiting. Genitourinary:  Negative for dysuria. Musculoskeletal:  Negative for back pain, neck pain and neck stiffness. Skin:  Negative for pallor and rash. Neurological:  Positive for syncope. Negative for headaches. Psychiatric/Behavioral:  Negative for confusion. Physical Exam  Vitals and nursing note reviewed. Constitutional:       General: He is not in acute distress. Appearance: Normal appearance. He is not ill-appearing. HENT:      Head: Normocephalic and atraumatic. Eyes:      General: No scleral icterus. Conjunctiva/sclera: Conjunctivae normal.      Pupils: Pupils are equal, round, and reactive to light. Cardiovascular:      Rate and Rhythm: Normal rate and regular rhythm. Pulmonary:      Effort: Pulmonary effort is normal.      Breath sounds: Normal breath sounds. Abdominal:      General: Bowel sounds are normal. There is no distension. Palpations: Abdomen is soft. Tenderness: There is no abdominal tenderness. There is no guarding or rebound. Musculoskeletal:      Cervical back: Normal range of motion and neck supple. No rigidity. No muscular tenderness. Right lower leg: No edema. Left lower leg: No edema. Skin:     General: Skin is warm and dry. Capillary Refill: Capillary refill takes less than 2 seconds. Coloration: Skin is not pale. Findings: No erythema or rash. Neurological:      Mental Status: He is alert and oriented to person, place, and time. Cranial Nerves: No cranial nerve deficit. Sensory: No sensory deficit. Motor: No weakness. Psychiatric:         Mood and Affect: Mood normal.        Procedures     MDM  Number of Diagnoses or Management Options  Diagnosis management comments: Bea Lucas is an 80year old male who presented to ED with concern for syncope and episode of unresponsiveness  Patient was alert and oriented at time of arrival to ED  Patient was having mild sternal tenderness  Found to have 3 anterior rib fx likely secondary to CPR  Troponin elevated in the setting of CKD  Patient was covid positive known to patient  Patient will be admitted   Cardiology consult ordered patient stable. ED Course as of 12/25/22 2331   Annie Pascual Dec 25, 2022   1700 EKG: This EKG is signed and interpreted by me. Rate: 84  Rhythm: Sinus  Interpretation: non-specific EKG, no ST elevation or depression, left axis  Comparison: changes compared to previous EKG   [SS]      ED Course User Index  [SS] Prudence MD Rocio        ED Course as of 12/25/22 2331   Sun Dec 25, 2022   1700 EKG: This EKG is signed and interpreted by me.     Rate: 84  Rhythm: Sinus  Interpretation: non-specific EKG, no ST elevation or depression, left axis  Comparison: changes compared to previous EKG   [SS]      ED Course User Index  [SS] July Johnson MD       --------------------------------------------- PAST HISTORY ---------------------------------------------  Past Medical History:  has a past medical history of Anemia, CAD (coronary artery disease), Cancer ([facility] Diabetes mellitus ([facility] Diverticulosis, GERD (gastroesophageal reflux disease), Hyperlipidemia, and Hypertension. Past Surgical History:  has a past surgical history that includes Colonoscopy; eye surgery; back surgery; and Parotidectomy (9/11/12). Social History:  reports that he has never smoked. He has never used smokeless tobacco. He reports that he does not drink alcohol and does not use drugs. Family History: family history includes Cancer in his brother and father. The patients home medications have been reviewed.     Allergies: Darvocet [propoxyphene n-acetaminophen] and Lisinopril    -------------------------------------------------- RESULTS -------------------------------------------------    LABS:  Results for orders placed or performed during the hospital encounter of 12/25/22   COVID-19, Rapid    Specimen: Nasopharyngeal Swab   Result Value Ref Range    SARS-CoV-2, NAAT DETECTED (A) Not Detected   CBC with Auto Differential   Result Value Ref Range    WBC 13.4 (H) 4.5 - 11.5 E9/L    RBC 2.65 (L) 3.80 - 5.80 E12/L    Hemoglobin 8.7 (L) 12.5 - 16.5 g/dL    Hematocrit 26.6 (L) 37.0 - 54.0 %    .4 (H) 80.0 - 99.9 fL    MCH 32.8 26.0 - 35.0 pg    MCHC 32.7 32.0 - 34.5 %    RDW 12.5 11.5 - 15.0 fL    Platelets 992 652 - 188 E9/L    MPV 9.6 7.0 - 12.0 fL    Neutrophils % 88.6 (H) 43.0 - 80.0 %    Lymphocytes % 2.6 (L) 20.0 - 42.0 %    Monocytes % 6.1 2.0 - 12.0 %    Eosinophils % 0.9 0.0 - 6.0 %    Basophils % 0.0 0.0 - 2.0 %    Neutrophils Absolute 12.06 (H) 1.80 - 7.30 E9/L    Lymphocytes Absolute 0.40 (L) 1.50 - 4.00 E9/L    Monocytes Absolute 0.80 0.10 - 0.95 E9/L    Eosinophils Absolute 0.12 0.05 - 0.50 E9/L    Basophils Absolute 0.00 0.00 - 0.20 E9/L    Myelocyte Percent 1.8 0 - 0 %    nRBC 0.0 /100 WBC    Polychromasia 1+    Comprehensive Metabolic Panel w/ Reflex to MG   Result Value Ref Range    Sodium 133 132 - 146 mmol/L    Potassium reflex Magnesium 4.6 3.5 - 5.0 mmol/L    Chloride 101 98 - 107 mmol/L    CO2 21 (L) 22 - 29 mmol/L    Anion Gap 11 7 - 16 mmol/L    Glucose 247 (H) 74 - 99 mg/dL    BUN 49 (H) 6 - 23 mg/dL    Creatinine 2.1 (H) 0.7 - 1.2 mg/dL    Est, Glom Filt Rate 31 >=60 mL/min/1.73    Calcium 8.8 8.6 - 10.2 mg/dL    Total Protein 6.6 6.4 - 8.3 g/dL    Albumin 3.3 (L) 3.5 - 5.2 g/dL    Total Bilirubin 0.4 0.0 - 1.2 mg/dL    Alkaline Phosphatase 66 40 - 129 U/L    ALT 8 0 - 40 U/L    AST 19 0 - 39 U/L   Troponin   Result Value Ref Range    Troponin, High Sensitivity 87 (H) 0 - 11 ng/L   Troponin   Result Value Ref Range    Troponin, High Sensitivity 90 (H) 0 - 11 ng/L   Troponin   Result Value Ref Range    Troponin, High Sensitivity 74 (H) 0 - 11 ng/L   CBC with Auto Differential   Result Value Ref Range    WBC 10.3 4.5 - 11.5 E9/L    RBC 2.84 (L) 3.80 - 5.80 E12/L    Hemoglobin 9.3 (L) 12.5 - 16.5 g/dL    Hematocrit 28.5 (L) 37.0 - 54.0 %    .4 (H) 80.0 - 99.9 fL    MCH 32.7 26.0 - 35.0 pg    MCHC 32.6 32.0 - 34.5 %    RDW 12.2 11.5 - 15.0 fL    Platelets 014 258 - 795 E9/L    MPV 9.5 7.0 - 12.0 fL    Neutrophils Absolute 9.06 (H) 1.80 - 7.30 E9/L    Promyelocytes Percent 0.9 0 - 0 %    nRBC 0.0 /100 WBC    Polychromasia 1+     Poikilocytes 1+     [place] Cells 1+    Comprehensive Metabolic Panel   Result Value Ref Range    Sodium 131 (L) 132 - 146 mmol/L    Potassium 5.4 (H) 3.5 - 5.0 mmol/L    Chloride 98 98 - 107 mmol/L    CO2 20 (L) 22 - 29 mmol/L    Anion Gap 13 7 - 16 mmol/L    Glucose 214 (H) 74 - 99 mg/dL    BUN 50 (H) 6 - 23 mg/dL    Creatinine 2.1 (H) 0.7 - 1.2 mg/dL    Est, Glom Filt Rate 31 >=60 mL/min/1.73    Calcium 9.2 8.6 - 10.2 mg/dL    Total Protein 7.2 6.4 - 8.3 g/dL    Albumin 3.6 3.5 - 5.2 g/dL Total Bilirubin 0.4 0.0 - 1.2 mg/dL    Alkaline Phosphatase 74 40 - 129 U/L    ALT 8 0 - 40 U/L    AST 21 0 - 39 U/L   C-Reactive Protein   Result Value Ref Range    CRP 11.4 (H) 0.0 - 0.4 mg/dL   D-Dimer, Quantitative   Result Value Ref Range    D-Dimer, Quant 1810 ng/mL DDU   POCT Glucose   Result Value Ref Range    Glucose 274 mg/dL    QC OK? ok    POCT Glucose   Result Value Ref Range    Meter Glucose 274 (H) 74 - 99 mg/dL   POCT Glucose   Result Value Ref Range    Meter Glucose 208 (H) 74 - 99 mg/dL   POCT Glucose   Result Value Ref Range    Meter Glucose 217 (H) 74 - 99 mg/dL   EKG 12 Lead   Result Value Ref Range    Ventricular Rate 84 BPM    Atrial Rate 84 BPM    P-R Interval 168 ms    QRS Duration 94 ms    Q-T Interval 362 ms    QTc Calculation (Bazett) 427 ms    P Axis 13 degrees    R Axis -6 degrees    T Axis 62 degrees       RADIOLOGY:  US ABDOMEN LIMITED   Final Result   No acute process in the right upper quadrant. CT HEAD WO CONTRAST   Final Result   No acute intracranial abnormality. CT CHEST WO CONTRAST   Final Result      Multiple right rib fractures of unknown age. There is no pneumothorax. Elevation of the right hemidiaphragm with atelectasis in the lung bases. Diffuse coronary artery calcification.               ------------------------- NURSING NOTES AND VITALS REVIEWED ---------------------------  Date / Time Roomed:  12/25/2022  1:33 PM  ED Bed Assignment:  [id number]    The nursing notes within the ED encounter and vital signs as below have been reviewed.      Patient Vitals for the past 24 hrs:   BP Temp Temp src Pulse Resp SpO2 Height Weight   12/25/22 1930 132/60 98.8 °F (37.1 °C) Oral (!) 116 18 91 % -- --   12/25/22 1819 (!) 144/69 98.2 °F (36.8 °C) Oral (!) 114 16 95 % -- --   12/25/22 1616 (!) 154/76 98.4 °F (36.9 °C) Oral 94 16 97 % -- --   12/25/22 1407 121/70 -- -- 88 21 -- -- --   12/25/22 1344 (!) 121/52 97.9 °F (36.6 °C) Oral 86 20 93 % 5' 11\" (1.803 m) 225 lb (102.1 kg)       Oxygen Saturation Interpretation: Normal    ------------------------------------------ PROGRESS NOTES ------------------------------------------  Re-evaluation(s):  Time: 11pm  Patients symptoms show no change  Repeat physical examination is not changed    Counseling:  I have spoken with the patient and discussed todays results, in addition to providing specific details for the plan of care and counseling regarding the diagnosis and prognosis. Their questions are answered at this time and they are agreeable with the plan of admission.    --------------------------------- ADDITIONAL PROVIDER NOTES ---------------------------------  Consultations:  Spoke with Dr. Rubén Gastelum. Discussed case. They will admit the patient. This patient's ED course included: a personal history and physicial examination, re-evaluation prior to disposition, multiple bedside re-evaluations, IV medications, cardiac monitoring, and complex medical decision making and emergency management    This patient has remained hemodynamically stable during their ED course. Diagnosis:  1. Syncope and collapse    2. COVID    3. Other fatigue    4. Closed fracture of multiple ribs, unspecified laterality, initial encounter        Disposition:  Patient's disposition: Admit to telemetry  Patient's condition is stable.          Anali Vidal MD  12/25/22 4369

## 2022-12-26 ENCOUNTER — APPOINTMENT (OUTPATIENT)
Dept: ULTRASOUND IMAGING | Age: 82
End: 2022-12-26
Payer: MEDICARE

## 2022-12-26 LAB
ALBUMIN SERPL-MCNC: 3.3 G/DL (ref 3.5–5.2)
ALP BLD-CCNC: 65 U/L (ref 40–129)
ALT SERPL-CCNC: 7 U/L (ref 0–40)
ANION GAP SERPL CALCULATED.3IONS-SCNC: 14 MMOL/L (ref 7–16)
AST SERPL-CCNC: 18 U/L (ref 0–39)
BASOPHILS ABSOLUTE: 0 E9/L (ref 0–0.2)
BASOPHILS ABSOLUTE: 0.03 E9/L (ref 0–0.2)
BASOPHILS RELATIVE PERCENT: 0 % (ref 0–2)
BASOPHILS RELATIVE PERCENT: 0.3 % (ref 0–2)
BILIRUB SERPL-MCNC: 0.4 MG/DL (ref 0–1.2)
BUN BLDV-MCNC: 53 MG/DL (ref 6–23)
BURR CELLS: ABNORMAL
CALCIUM SERPL-MCNC: 8.8 MG/DL (ref 8.6–10.2)
CHLORIDE BLD-SCNC: 103 MMOL/L (ref 98–107)
CHLORIDE URINE RANDOM: 60 MMOL/L
CO2: 17 MMOL/L (ref 22–29)
CREAT SERPL-MCNC: 2.4 MG/DL (ref 0.7–1.2)
CREATININE URINE: 101 MG/DL (ref 40–278)
EKG ATRIAL RATE: 84 BPM
EKG P AXIS: 13 DEGREES
EKG P-R INTERVAL: 168 MS
EKG Q-T INTERVAL: 362 MS
EKG QRS DURATION: 94 MS
EKG QTC CALCULATION (BAZETT): 427 MS
EKG R AXIS: -6 DEGREES
EKG T AXIS: 62 DEGREES
EKG VENTRICULAR RATE: 84 BPM
EOSINOPHILS ABSOLUTE: 0.02 E9/L (ref 0.05–0.5)
EOSINOPHILS ABSOLUTE: 0.19 E9/L (ref 0.05–0.5)
EOSINOPHILS RELATIVE PERCENT: 0.2 % (ref 0–6)
EOSINOPHILS RELATIVE PERCENT: 1.8 % (ref 0–6)
GFR SERPL CREATININE-BSD FRML MDRD: 26 ML/MIN/1.73
GLUCOSE BLD-MCNC: 212 MG/DL (ref 74–99)
HCT VFR BLD CALC: 26.5 % (ref 37–54)
HCT VFR BLD CALC: 28.5 % (ref 37–54)
HEMOGLOBIN: 8.6 G/DL (ref 12.5–16.5)
HEMOGLOBIN: 9.3 G/DL (ref 12.5–16.5)
IMMATURE GRANULOCYTES #: 0.08 E9/L
IMMATURE GRANULOCYTES %: 0.8 % (ref 0–5)
LYMPHOCYTES ABSOLUTE: 0.21 E9/L (ref 1.5–4)
LYMPHOCYTES ABSOLUTE: 0.69 E9/L (ref 1.5–4)
LYMPHOCYTES RELATIVE PERCENT: 1.7 % (ref 20–42)
LYMPHOCYTES RELATIVE PERCENT: 7 % (ref 20–42)
MCH RBC QN AUTO: 32.5 PG (ref 26–35)
MCH RBC QN AUTO: 32.7 PG (ref 26–35)
MCHC RBC AUTO-ENTMCNC: 32.5 % (ref 32–34.5)
MCHC RBC AUTO-ENTMCNC: 32.6 % (ref 32–34.5)
MCV RBC AUTO: 100 FL (ref 80–99.9)
MCV RBC AUTO: 100.4 FL (ref 80–99.9)
METER GLUCOSE: 223 MG/DL (ref 74–99)
METER GLUCOSE: 249 MG/DL (ref 74–99)
METER GLUCOSE: 296 MG/DL (ref 74–99)
METER GLUCOSE: 367 MG/DL (ref 74–99)
MONOCYTES ABSOLUTE: 0.82 E9/L (ref 0.1–0.95)
MONOCYTES ABSOLUTE: 1.11 E9/L (ref 0.1–0.95)
MONOCYTES RELATIVE PERCENT: 11.3 % (ref 2–12)
MONOCYTES RELATIVE PERCENT: 7.8 % (ref 2–12)
NEUTROPHILS ABSOLUTE: 7.92 E9/L (ref 1.8–7.3)
NEUTROPHILS ABSOLUTE: 9.06 E9/L (ref 1.8–7.3)
NEUTROPHILS RELATIVE PERCENT: 80.4 % (ref 43–80)
NEUTROPHILS RELATIVE PERCENT: 87.8 % (ref 43–80)
NUCLEATED RED BLOOD CELLS: 0 /100 WBC
OSMOLALITY URINE: 522 MOSM/KG (ref 300–900)
PDW BLD-RTO: 12.2 FL (ref 11.5–15)
PDW BLD-RTO: 12.5 FL (ref 11.5–15)
PLATELET # BLD: 291 E9/L (ref 130–450)
PLATELET # BLD: 303 E9/L (ref 130–450)
PMV BLD AUTO: 9.5 FL (ref 7–12)
PMV BLD AUTO: 9.6 FL (ref 7–12)
POIKILOCYTES: ABNORMAL
POLYCHROMASIA: ABNORMAL
POTASSIUM SERPL-SCNC: 5.3 MMOL/L (ref 3.5–5)
PROMYELOCYTES PERCENT: 0.9 % (ref 0–0)
PROTEIN PROTEIN: 56 MG/DL (ref 0–12)
PROTEIN/CREAT RATIO: 0.6
PROTEIN/CREAT RATIO: 0.6 (ref 0–0.2)
RBC # BLD: 2.65 E12/L (ref 3.8–5.8)
RBC # BLD: 2.84 E12/L (ref 3.8–5.8)
SODIUM BLD-SCNC: 134 MMOL/L (ref 132–146)
SODIUM URINE: 70 MMOL/L
TOTAL PROTEIN: 6.3 G/DL (ref 6.4–8.3)
WBC # BLD: 10.3 E9/L (ref 4.5–11.5)
WBC # BLD: 9.9 E9/L (ref 4.5–11.5)

## 2022-12-26 PROCEDURE — 6360000002 HC RX W HCPCS: Performed by: INTERNAL MEDICINE

## 2022-12-26 PROCEDURE — 93010 ELECTROCARDIOGRAM REPORT: CPT | Performed by: INTERNAL MEDICINE

## 2022-12-26 PROCEDURE — 84300 ASSAY OF URINE SODIUM: CPT

## 2022-12-26 PROCEDURE — 82436 ASSAY OF URINE CHLORIDE: CPT

## 2022-12-26 PROCEDURE — 51702 INSERT TEMP BLADDER CATH: CPT

## 2022-12-26 PROCEDURE — 83935 ASSAY OF URINE OSMOLALITY: CPT

## 2022-12-26 PROCEDURE — 2060000000 HC ICU INTERMEDIATE R&B

## 2022-12-26 PROCEDURE — 84156 ASSAY OF PROTEIN URINE: CPT

## 2022-12-26 PROCEDURE — 6370000000 HC RX 637 (ALT 250 FOR IP): Performed by: INTERNAL MEDICINE

## 2022-12-26 PROCEDURE — 93970 EXTREMITY STUDY: CPT

## 2022-12-26 PROCEDURE — 93306 TTE W/DOPPLER COMPLETE: CPT

## 2022-12-26 PROCEDURE — 82962 GLUCOSE BLOOD TEST: CPT

## 2022-12-26 PROCEDURE — 76770 US EXAM ABDO BACK WALL COMP: CPT

## 2022-12-26 PROCEDURE — 85025 COMPLETE CBC W/AUTO DIFF WBC: CPT

## 2022-12-26 PROCEDURE — 2580000003 HC RX 258: Performed by: INTERNAL MEDICINE

## 2022-12-26 PROCEDURE — 82570 ASSAY OF URINE CREATININE: CPT

## 2022-12-26 PROCEDURE — 80053 COMPREHEN METABOLIC PANEL: CPT

## 2022-12-26 PROCEDURE — 36415 COLL VENOUS BLD VENIPUNCTURE: CPT

## 2022-12-26 RX ORDER — INSULIN LISPRO 100 [IU]/ML
8 INJECTION, SOLUTION INTRAVENOUS; SUBCUTANEOUS ONCE
Status: COMPLETED | OUTPATIENT
Start: 2022-12-26 | End: 2022-12-26

## 2022-12-26 RX ORDER — DEXTROSE MONOHYDRATE 100 MG/ML
INJECTION, SOLUTION INTRAVENOUS CONTINUOUS PRN
Status: DISCONTINUED | OUTPATIENT
Start: 2022-12-26 | End: 2022-12-30 | Stop reason: HOSPADM

## 2022-12-26 RX ADMIN — PILOCARPINE HYDRCHLORIDE 5 MG: 5 TABLET, FILM COATED ORAL at 13:50

## 2022-12-26 RX ADMIN — HEPARIN SODIUM 5000 UNITS: 10000 INJECTION INTRAVENOUS; SUBCUTANEOUS at 13:50

## 2022-12-26 RX ADMIN — PILOCARPINE HYDRCHLORIDE 5 MG: 5 TABLET, FILM COATED ORAL at 20:49

## 2022-12-26 RX ADMIN — PANTOPRAZOLE SODIUM 40 MG: 40 TABLET, DELAYED RELEASE ORAL at 06:23

## 2022-12-26 RX ADMIN — HEPARIN SODIUM 5000 UNITS: 10000 INJECTION INTRAVENOUS; SUBCUTANEOUS at 20:49

## 2022-12-26 RX ADMIN — TAMSULOSIN HYDROCHLORIDE 0.4 MG: 0.4 CAPSULE ORAL at 07:45

## 2022-12-26 RX ADMIN — PILOCARPINE HYDRCHLORIDE 5 MG: 5 TABLET, FILM COATED ORAL at 07:45

## 2022-12-26 RX ADMIN — INSULIN LISPRO 1 UNITS: 100 INJECTION, SOLUTION INTRAVENOUS; SUBCUTANEOUS at 06:38

## 2022-12-26 RX ADMIN — ASPIRIN 81 MG 81 MG: 81 TABLET ORAL at 07:45

## 2022-12-26 RX ADMIN — HEPARIN SODIUM 5000 UNITS: 10000 INJECTION INTRAVENOUS; SUBCUTANEOUS at 06:22

## 2022-12-26 RX ADMIN — Medication 1000 UNITS: at 07:45

## 2022-12-26 RX ADMIN — LOSARTAN POTASSIUM 100 MG: 50 TABLET, FILM COATED ORAL at 07:48

## 2022-12-26 RX ADMIN — INSULIN LISPRO 8 UNITS: 100 INJECTION, SOLUTION INTRAVENOUS; SUBCUTANEOUS at 12:09

## 2022-12-26 RX ADMIN — ZINC SULFATE 220 MG (50 MG) CAPSULE 50 MG: CAPSULE at 07:44

## 2022-12-26 RX ADMIN — SODIUM CHLORIDE: 9 INJECTION, SOLUTION INTRAVENOUS at 20:57

## 2022-12-26 RX ADMIN — METOPROLOL TARTRATE 25 MG: 25 TABLET, FILM COATED ORAL at 20:49

## 2022-12-26 RX ADMIN — INSULIN LISPRO 2 UNITS: 100 INJECTION, SOLUTION INTRAVENOUS; SUBCUTANEOUS at 16:08

## 2022-12-26 RX ADMIN — METOPROLOL TARTRATE 25 MG: 25 TABLET, FILM COATED ORAL at 07:44

## 2022-12-26 NOTE — CONSULTS
[facility] Nephrology Consult       Patient's Name:  Lisa Heard  Date of Service:  12/26/2022  Requesting    Mallory Chapman MD    Reason for Consult:              ZION     Chief Complaint:                   Syncope     Information obtained from: Patient , wife , chart     History of Present Illness:82 yrs old       Recent rt Knee replacement , has been in rehab since     While walking to the bathroom patient had a syncopal episode needing CPR   Was also noted to be hypoglycemic at [facility] , given glucagon , regained consciousness  And hence was brought in     Stable vitals on arrival work up in er suggested , elevated BUN cr ( baseline cr 1.7 range )    Anemia , CT brain Nil acute , Renal sono , no obstruction , leg doppler no DVT , RUQ sono , Normal    CT chest - rib fractures , calcified coronaries , COVID +ve , high CRP and Troponin           Past Medical History:   Diagnosis Date    Anemia     CAD (coronary artery disease)     Cancer ([facility] 2012    left parotid    Diabetes mellitus ([facility]     Diverticulosis     mild    GERD (gastroesophageal reflux disease)     Hyperlipidemia     Hypertension          Past Surgical History:   Procedure Laterality Date    BACK SURGERY      COLONOSCOPY      EYE SURGERY      PAROTIDECTOMY  9/11/12    left superficial         Social History     Socioeconomic History    Marital status:      Spouse name: Not on file    Number of children: Not on file    Years of education: Not on file    Highest education level: Not on file   Occupational History    Not on file   Tobacco Use    Smoking status: Never    Smokeless tobacco: Never    Tobacco comments:     pipe  40 years ago  (smoked once daily)   Vaping Use    Vaping Use: Never used   Substance and Sexual Activity    Alcohol use: No    Drug use: No    Sexual activity: Not Currently   Other Topics Concern    Not on file   Social History Narrative    Not on file     Social Determinants of Health     Financial Resource Strain: Not on file   Food Insecurity: Not on file   Transportation Needs: Not on file   Physical Activity: Not on file   Stress: Not on file   Social Connections: Not on file   Intimate Partner Violence: Not on file   Housing Stability: Not on file       Family History  Family History   Problem Relation Age of Onset    Cancer Father         prostate    Cancer Brother         prostate       Scheduled Meds:   losartan  100 mg Oral Daily    metoprolol tartrate  25 mg Oral BID    pantoprazole  40 mg Oral QAM AC    pilocarpine  5 mg Oral TID    tamsulosin  0.4 mg Oral Daily    heparin (porcine)  5,000 Units SubCUTAneous 3 times per day    aspirin  81 mg Oral Daily    insulin lispro  0-4 Units SubCUTAneous TID WC    insulin lispro  0-4 Units SubCUTAneous Nightly    zinc sulfate  50 mg Oral Daily    Vitamin D  1,000 Units Oral Daily       Continuous Infusions:  [unfilled]    PRN meds  perflutren lipid microspheres, glucose, dextrose bolus **OR** dextrose bolus, glucagon (rDNA), dextrose, acetaminophen    Allergies:  Darvocet [propoxyphene n-acetaminophen] and Lisinopril    Review of Systems     Pertinent positives and negatives as in HPI. Other systems reviewed and were negative.      LAST 3 CMP  Recent Labs     12/25/22  1401 12/25/22  1855 12/26/22  0401    131* 134   K 4.6 5.4* 5.3*    98 103   CO2 21* 20* 17*   BUN 49* 50* 53*   CREATININE 2.1* 2.1* 2.4*   GLUCOSE 247* 214* 212*   CALCIUM 8.8 9.2 8.8   PROT 6.6 7.2 6.3*   LABALBU 3.3* 3.6 3.3*   BILITOT 0.4 0.4 0.4   ALKPHOS 66 74 65   AST 19 21 18       LAST 3 CBC:  Recent Labs     12/25/22  1401 12/25/22  1855 12/26/22  0401   WBC 13.4* 10.3 9.9   RBC 2.65* 2.84* 2.65*   HGB 8.7* 9.3* 8.6*   HCT 26.6* 28.5* 26.5*    303 291         Intake/Output Summary (Last 24 hours) at 12/26/2022 1233  Last data filed at 12/26/2022 4804  Gross per 24 hour   Intake 240 ml   Output 1150 ml   Net -910 ml     Patient Vitals for the past 24 hrs:   BP Temp Temp src Pulse Resp SpO2 Height Weight   12/26/22 0745 (!) 145/64 98 °F (36.7 °C) Oral (!) 103 18 96 % -- --   12/26/22 0705 -- -- -- 95 -- -- -- --   12/26/22 0630 138/78 98.8 °F (37.1 °C) Oral 99 18 98 % -- --   12/26/22 0108 -- -- -- -- -- -- -- 225 lb (102.1 kg)   12/25/22 2202 -- -- -- 96 -- 96 % -- --   12/25/22 1930 132/60 98.8 °F (37.1 °C) Oral (!) 116 18 91 % -- --   12/25/22 1819 (!) 144/69 98.2 °F (36.8 °C) Oral (!) 114 16 95 % -- --   12/25/22 1616 (!) 154/76 98.4 °F (36.9 °C) Oral 94 16 97 % -- --   12/25/22 1407 121/70 -- -- 88 21 -- -- --   12/25/22 1344 (!) 121/52 97.9 °F (36.6 °C) Oral 86 20 93 % 5' 11\" (1.803 m) 225 lb (102.1 kg)       General appearance:  Appears stated age  Skin: color, texture, turgor normal. No rashes or lesions. Neck: supple, no masses, no JVD, No carotid bruits, No thyromegaly  Lungs: respirations unlabored. Clear to auscultation. No rales, wheezes, no rhonchi. Equal chest excursion with respirations. Heart RRR. pmi not laterally displaced. No S3 or S4, no rub  Abdomen:  Soft, ND, NT. Bowel sounds present. No HSM. No epigastric bruit, no increased Ao pulsation,  Extremities: warm to touch. No LE edema or cyanosis. No fem bruit. 2+ upstrokes and equal bilaterally. PT/Pedal 2+ equal bilat  Neuro: Cr N 2-12 grossly intact. No focal motor neuro deficit. No alteration in recent remote memory. Assessment/Plan    1) ZION superimposed on CKD 3 , in the setting of syncopal episode , recent rt Knee replacement        Marginal po intake and diuretics ( hydrodiuril , losartan , Valsartan and Advil on board )        - will continue IVF , Renal ultrasound no obstruction .  Hols all above meds + hold metformin     2) Hypoglycemia / Syncopal episode / CT chest - diffuse coronary calcification     3) Recent Rt Knee replacement     4) COVID + ve , CT chest no pneumonia     5) HTN controlled     6) Type 2 DM , sugars high     Continue IV hydration , hold above meds for now , we will follow     Thank you for allowing us to participate in the care of Radha Owusu MD  12/26/2022  12:33 PM

## 2022-12-26 NOTE — H&P
CHIEF COMPLAINT:  loss of consciousness      HISTORY OF PRESENT ILLNESS:      The patient is a 80 y.o. male patient of presents with LOC. Was in nursing facility due to knee replacement. He had witnessed LOC. The preformed CPR. His rhythm was not documented. Had lower BS but was 66. No prior heart history he says but CAD listed. No chest pain but complains of pain in chest where CPR broke ribs. 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 pilocarpine (SALAGEN) 5 MG tablet, Take 1 tablet by mouth 3 times daily. pilocarpine (SALAGEN) 5 MG tablet, Take 5 mg by mouth 3 times daily. Insulin Isophane Human (HUMULIN N SC), Inject  into the skin. hydrochlorothiazide (HYDRODIURIL) 25 MG tablet, Take 25 mg by mouth daily. valsartan (DIOVAN) 320 MG tablet, Take 320 mg by mouth daily. omeprazole (PRILOSEC) 20 MG capsule, Take 20 mg by mouth daily. metformin (GLUCOPHAGE) 850 MG tablet, Take 850 mg by mouth 2 times daily (with meals). ibuprofen (ADVIL;MOTRIN) 200 MG tablet, Take 200 mg by mouth every 6 hours as needed. metoprolol (LOPRESSOR) 25 MG tablet, Take 25 mg by mouth 2 times daily. Multiple Vitamin (MULTI VITAMIN MENS PO), Take  by mouth.    vitamin D (CHOLECALCIFEROL) 400 UNITS TABS tablet, Take 500 Units by mouth daily.     acetaminophen (TYLENOL) 500 MG tablet, Take 500 mg by mouth every 6 hours as needed. Allergies:    Darvocet [propoxyphene n-acetaminophen] and Lisinopril    Social History:    reports that he has never smoked. He has never used smokeless tobacco. He reports that he does not drink alcohol and does not use drugs. Family History:   family history includes Cancer in his brother and father. REVIEW OF SYSTEMS:  As above in the HPI, otherwise negative    PHYSICAL EXAM:    Vitals:  /78   Pulse 99   Temp 98.8 °F (37.1 °C) (Oral)   Resp 18   Ht 5' 11\" (1.803 m)   Wt 225 lb (102.1 kg)   SpO2 98%   BMI 31.38 kg/m²     General:  Awake, alert, oriented X 3. Well developed, well nourished, well groomed. No apparent distress. HEENT:  Normocephalic, atraumatic. Pupils equal, round, reactive to light. No scleral icterus. No conjunctival injection. Normal lips, teeth, and gums. No nasal discharge. Neck:  Supple  Heart:  RRR, no murmurs, gallops, rubs  Lungs:  CTA bilaterally, bilat symmetrical expansion, no wheeze, rales, or rhonchi  Abdomen:   Bowel sounds present, soft, nontender, no masses, no organomegaly, no peritoneal signs  Extremities:  No clubbing, cyanosis, or edema  Skin:  Warm and dry, no open lesions or rash  Neuro:  Cranial nerves 2-12 intact, no focal deficits  Breast: deferred  Rectal: deferred  Genitalia:  deferred    LABS:  CBC with Differential:    Lab Results   Component Value Date/Time    WBC 9.9 12/26/2022 04:01 AM    RBC 2.65 12/26/2022 04:01 AM    HGB 8.6 12/26/2022 04:01 AM    HCT 26.5 12/26/2022 04:01 AM     12/26/2022 04:01 AM    .0 12/26/2022 04:01 AM    MCH 32.5 12/26/2022 04:01 AM    MCHC 32.5 12/26/2022 04:01 AM    RDW 12.5 12/26/2022 04:01 AM    NRBC 0.0 12/25/2022 06:55 PM    LYMPHOPCT 7.0 12/26/2022 04:01 AM    PROMYELOPCT 0.9 12/25/2022 06:55 PM    MONOPCT 11.3 12/26/2022 04:01 AM    MYELOPCT 1.8 12/25/2022 02:01 PM    BASOPCT 0.3 12/26/2022 04:01 AM    MONOSABS 1.11 12/26/2022 04:01 AM    LYMPHSABS 0.69 12/26/2022 04:01 AM    EOSABS 0.02 12/26/2022 04:01 AM    BASOSABS 0.03 12/26/2022 04:01 AM     CMP:    Lab Results   Component Value Date/Time     12/26/2022 04:01 AM    K 5.3 12/26/2022 04:01 AM    K 4.6 12/25/2022 02:01 PM     12/26/2022 04:01 AM    CO2 17 12/26/2022 04:01 AM    BUN 53 12/26/2022 04:01 AM    CREATININE 2.4 12/26/2022 04:01 AM    GFRAA 51 02/11/2020 08:00 AM    LABGLOM 26 12/26/2022 04:01 AM    GLUCOSE 212 12/26/2022 04:01 AM    PROT 6.3 12/26/2022 04:01 AM    LABALBU 3.3 12/26/2022 04:01 AM    CALCIUM 8.8 12/26/2022 04:01 AM    BILITOT 0.4 12/26/2022 04:01 AM    ALKPHOS 65 12/26/2022 04:01 AM    AST 18 12/26/2022 04:01 AM    ALT 7 12/26/2022 04:01 AM     PT/INR:    Lab Results   Component Value Date/Time    PROTIME 11.9 12/15/2022 08:35 AM    INR 1.1 12/15/2022 08:35 AM     @cktotal:3,ckmb:3,ckmbindex:3,troponini:3@  U/A:  No results found for: [place], PHUR, LABCAST, WBCUA, RBCUA, MUCUS, TRICHOMONAS, YEAST, BACTERIA, CLARITYU, SPECGRAV, UROBILINOGEN, BILIRUBINUR, BLOODU, GLUCOSEU, KETUA, AMORPHOUS       ASSESSMENT:    Principal Problem:    Closed fracture of one rib of left side, initial encounter  Resolved Problems:    * No resolved hospital problems.  *      PLAN:  Syncope-  Unlikely this was loss of circulation  Will ask cardiology to see  Troponin up from CKD  Would benefit from echo  Doubt PE    CKD stage 3b  Now with mild hyperkalemia  Ask nephrology to see    NIDDM-  Unclear evening dose of insulin    S/P TKR  Heparin d/t recent TKR    Covid 19  Not hypoxic  No victoriano for medication currently  Paxolivid not offered here    Imelda Guerra DO  7:06 AM  12/26/2022

## 2022-12-26 NOTE — PROGRESS NOTES
Thank you for the consult. Mr. Jaron Pham is followed longitudinally by [facility] as an outpatient. Will defer the consult to his service.

## 2022-12-26 NOTE — PROGRESS NOTES
INSULIN COVERAGE VERIFIED WITH WIFE LUIS    Humulin N 25 units SQ every morning  Humulin N 15 units SQ every evening    Humalog SS  0-100= No units  101-150= 10 units  151-200= 15 units  >201= 20 units

## 2022-12-26 NOTE — CONSULTS
[facility] [address] [place] [address]    Name: Wing Wright    Age: 80 y.o. Date of Admission: 12/25/2022  1:33 PM    Date of Service: 12/26/2022    Reason for Consultation: syncope    Referring Physician: Dr Eliza Rosario  Primary Care Physician: Clinton Russell MD    History of Present Illness: The patient is a 80y.o. year old male s/p recent right TKA at [facility] 12/19/22-whent to [facility] for rehab. Was getting up to go to the BR and that's all he remembers. Passed out and actually got a few compressions. Came to and brought to ED. Denies any prodrome. No sweats palpitations, chest pains or SOB priot to the event. Has felt ok since arrival. No prior syncope or cardiac issues. Normal ECG, enzymes and tele. H/o DM, HTN, CKD. 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 Musculoskeletal: No joint pain or muscle aches.  No claudication  Skin: No skin breakdown or rashes  Neuro: No headache, confusion, or seizures  Psych: No depression, anxiety    Family History:  Family History   Problem Relation Age of Onset    Cancer Father         prostate    Cancer Brother         prostate       Social History:  Social History     Socioeconomic History    Marital status:      Spouse name: Not on file    Number of children: Not on file    Years of education: Not on file    Highest education level: Not on file   Occupational History    Not on file   Tobacco Use    Smoking status: Never    Smokeless tobacco: Never    Tobacco comments:     pipe  40 years ago  (smoked once daily)   Vaping Use    Vaping Use: Never used   Substance and Sexual Activity    Alcohol use: No    Drug use: No    Sexual activity: Not Currently   Other Topics Concern    Not on file   Social History Narrative    Not on file     Social Determinants of Health     Financial Resource Strain: Not on file   Food Insecurity: Not on file   Transportation Needs: Not on file   Physical Activity: Not on file   Stress: Not on file   Social Connections: Not on file   Intimate Partner Violence: Not on file   Housing Stability: Not on file       Allergies: Allergies   Allergen Reactions    Darvocet [Propoxyphene N-Acetaminophen] Nausea And Vomiting    Lisinopril Palpitations       Home Medications:  Prior to Admission medications    Medication Sig Start Date End Date Taking? Authorizing Provider   tamsulosin (FLOMAX) 0.4 MG capsule Take 0.4 mg by mouth daily    Historical Provider, MD   losartan (COZAAR) 100 MG tablet Take 100 mg by mouth daily. Historical Provider, MD   pilocarpine (SALAGEN) 5 MG tablet Take 1 tablet by mouth 3 times daily. 11/16/12   Bakari TITO Howard CNP   pilocarpine (SALAGEN) 5 MG tablet Take 5 mg by mouth 3 times daily. Historical Provider, MD   Insulin Isophane Human (HUMULIN N SC) Inject  into the skin. Historical Provider, MD   hydrochlorothiazide (HYDRODIURIL) 25 MG tablet Take 25 mg by mouth daily. Historical Provider, MD   valsartan (DIOVAN) 320 MG tablet Take 320 mg by mouth daily. Historical Provider, MD   omeprazole (PRILOSEC) 20 MG capsule Take 20 mg by mouth daily. Historical Provider, MD   metformin (GLUCOPHAGE) 850 MG tablet Take 850 mg by mouth 2 times daily (with meals). Historical Provider, MD   ibuprofen (ADVIL;MOTRIN) 200 MG tablet Take 200 mg by mouth every 6 hours as needed.       Historical Provider, MD metoprolol (LOPRESSOR) 25 MG tablet Take 25 mg by mouth 2 times daily. Historical Provider, MD   Multiple Vitamin (MULTI VITAMIN MENS PO) Take  by mouth. Historical Provider, MD   vitamin D (CHOLECALCIFEROL) 400 UNITS TABS tablet Take 500 Units by mouth daily. Historical Provider, MD   acetaminophen (TYLENOL) 500 MG tablet Take 500 mg by mouth every 6 hours as needed.       Historical Provider, MD       Current Medications:  Current Facility-Administered Medications   Medication Dose Route Frequency Provider Last Rate Last Admin    perflutren lipid microspheres (DEFINITY) injection 1.5 mL  1.5 mL IntraVENous ONCE PRN Kaitlin Franklin MD        glucose chewable tablet 16 g  4 tablet Oral PRN Francine Venegaslla, DO        dextrose bolus 10% 125 mL  125 mL IntraVENous PRN Francine Loraa, DO        Or    dextrose bolus 10% 250 mL  250 mL IntraVENous PRN Francine Loraa, DO        glucagon (rDNA) injection 1 mg  1 mg SubCUTAneous PRN Francine Venegaslla, DO        dextrose 10 % infusion   IntraVENous Continuous PRN Francine Venegaslla, DO        acetaminophen (TYLENOL) tablet 500 mg  500 mg Oral Q6H PRN Francine Venegaslla, DO        losartan (COZAAR) tablet 100 mg  100 mg Oral Daily Francine Stapleschilla, DO   100 mg at 12/26/22 0748    metoprolol tartrate (LOPRESSOR) tablet 25 mg  25 mg Oral BID Francine Stapleschilla, DO   25 mg at 12/26/22 0744    pantoprazole (PROTONIX) tablet 40 mg  40 mg Oral QAM  Francine Dion, DO   40 mg at 12/26/22 4531    pilocarpine (SALAGEN) tablet 5 mg  5 mg Oral TID Francine Loraa, DO   5 mg at 12/26/22 0745    tamsulosin (FLOMAX) capsule 0.4 mg  0.4 mg Oral Daily Francine Stapleschilla, DO   0.4 mg at 12/26/22 0745    0.9 % sodium chloride infusion   IntraVENous Continuous Francine Loraa,  mL/hr at 12/25/22 1835 New Bag at 12/25/22 1835    heparin (porcine) injection 5,000 Units  5,000 Units SubCUTAneous 3 times per day Francine Loraa, DO   5,000 Units at 12/26/22 7529    aspirin chewable tablet 81 mg  81 mg Oral Daily Valaria Ao, DO   81 mg at 12/26/22 0745    insulin lispro (HUMALOG) injection vial 0-4 Units  0-4 Units SubCUTAneous TID WC Valaria Ao, DO   1 Units at 12/26/22 0202    insulin lispro (HUMALOG) injection vial 0-4 Units  0-4 Units SubCUTAneous Nightly Valaria Ao, DO        zinc sulfate (ZINCATE) capsule 50 mg  50 mg Oral Daily Valaria Ao, DO   50 mg at 12/26/22 3101    Vitamin D (CHOLECALCIFEROL) tablet 1,000 Units  1,000 Units Oral Daily Valaria Ao, DO   1,000 Units at 12/26/22 0745      dextrose      sodium chloride 100 mL/hr at 12/25/22 1835       Physical Exam:  BP (!) 145/64   Pulse (!) 103   Temp 98 °F (36.7 °C) (Oral)   Resp 18   Ht 5' 11\" (1.803 m)   Wt 225 lb (102.1 kg)   SpO2 96%   BMI 31.38 kg/m²   Weight change: Wt Readings from Last 3 Encounters:   12/26/22 225 lb (102.1 kg)   11/24/21 218 lb (98.9 kg)   05/07/21 218 lb (98.9 kg)     General: Awake, alert, oriented x3, no acute distress  HEENT: Normocephalic and atraumatic, extraocular movements intact, pupils equal and round, moist mucus membranes, sclera anicteric  Neck: No JVD, no carotid bruits, no thyromegaly, no adenopathy  Cardiac: Regular rate and rhythm, normal S1 and physiologically split S2, no S3, no S4. Apical impulse is nondisplaced. No murmurs, no pericardial rubs, no clicks. Carotid upstrokes brisk. Respiratory: Clear bilaterally; no wheezes, no rales, no rhonchi. Unlabored respirations  Abdomen: Soft, nontender, nondistended, bowel sounds+, no hepatomegaly, no masses, no abdominal bruits  Extremities: No edema, no cyanosis, no clubbing. Distal pulses intact  Skin: Intact, warm and dry, no rashes, no breakdown  Musculoskeletal: normal tone and strength in the upper and lower extremities bilaterally, recent rt knee surgery  Neuro: No focal deficits. Moves all extremities appropriately to command.   Normal sensation in the upper and lower extremities bilaterally  Psychiatric: Cooperative, and normal affect    Intake/Output:    Intake/Output Summary (Last 24 hours) at 12/26/2022 1241  Last data filed at 12/26/2022 4270  Gross per 24 hour   Intake 240 ml   Output 1150 ml   Net -910 ml     No intake/output data recorded. Laboratory Tests:  Last 3 CBC:  Recent Labs     12/25/22  1401 12/25/22 1855 12/26/22  0401   WBC 13.4* 10.3 9.9   RBC 2.65* 2.84* 2.65*   HGB 8.7* 9.3* 8.6*   HCT 26.6* 28.5* 26.5*   .4* 100.4* 100.0*   MCH 32.8 32.7 32.5   MCHC 32.7 32.6 32.5   RDW 12.5 12.2 12.5    303 291   MPV 9.6 9.5 9.6       Last 3 CMP:    Recent Labs     12/25/22  1401 12/25/22 1855 12/26/22  0401    131* 134   K 4.6 5.4* 5.3*    98 103   CO2 21* 20* 17*   BUN 49* 50* 53*   CREATININE 2.1* 2.1* 2.4*   GLUCOSE 247* 214* 212*   CALCIUM 8.8 9.2 8.8   PROT 6.6 7.2 6.3*   LABALBU 3.3* 3.6 3.3*   BILITOT 0.4 0.4 0.4   ALKPHOS 66 74 65   AST 19 21 18   ALT 8 8 7       Last 3 Mag/Phos:  No results for input(s): MG, PHOS in the last 72 hours. Last 3 CK, CKMB, Troponin  No results for input(s): CKTOTAL, CKMB, TROPONINI in the last 72 hours. Last 3 Pro-BNP:  No results for input(s): PROBNP in the last 72 hours. No results found for: PROBNP    Last 3 Glucose:     Recent Labs     12/25/22  1401 12/25/22 1855 12/26/22  0401   GLUCOSE 247* 214* 212*       Last 3 Coags:  No results for input(s): PROTIME, INR, PTT in the last 72 hours. Lab Results   Component Value Date/Time    PROTIME 11.9 12/15/2022 08:35 AM    INR 1.1 12/15/2022 08:35 AM       Last 3 Lipid Panel:  No results for input(s): LDLCALC, HDL, TRIG in the last 72 hours.     Invalid input(s): CHLPL  Lab Results   Component Value Date    LDLCALC 87 12/16/2019    LDLCALC 72 05/13/2019    [address] 80 09/17/2018     Lab Results   Component Value Date    HDL 62 12/16/2019    HDL 54 05/13/2019    HDL 54 09/17/2018     Lab Results   Component Value Date    TRIG 75 12/16/2019    TRIG 69 05/13/2019    TRIG 68 09/17/2018     No components found for: CHLPL    TSH:  No results for input(s): TSH in the last 72 hours. No results found for: TSH    ABGs:  No results for input(s): PH, PO2, PCO2, HCO3, BE, O2SAT in the last 72 hours. Lactic Acid:  No results for input(s): LACTA in the last 72 hours. Radiology:  RAD Results:  US RETROPERITONEAL COMPLETE   Final Result   No acute findings the kidneys or urinary bladder. No hydronephrosis. Simple   appearing right renal cortical cyst.         US DUP LOWER EXTREMITIES BILATERAL VENOUS   Final Result   No evidence of DVT in either lower extremity. RECOMMENDATIONS:   Unavailable         US ABDOMEN LIMITED   Final Result   No acute process in the right upper quadrant. CT HEAD WO CONTRAST   Final Result   No acute intracranial abnormality. CT CHEST WO CONTRAST   Final Result      Multiple right rib fractures of unknown age. There is no pneumothorax. Elevation of the right hemidiaphragm with atelectasis in the lung bases. Diffuse coronary artery calcification. EKG and Telemetry:  12-lead EKG personally reviewed and shows sinus 84, normal ECG    Telemetry personally reviewed and shows sinus rhythm        ASSESSMENT / PLAN:    1. Syncopal spell- likely vasomotor event. Nothing significant so far on tele or ECG. Is anemic and Azotemic- getting hydrated. Monitor on tele and will try to get echo. 2 CKD - Cr 2.4 BUN 53. Follows with nephrology, getting some gentle hydration  3 Troponin- mildly up, but trendiong down, in the face of CKD and compressions- no CP and normal ECG-type II release. 4 DM per PCP  5 Recent TKA- will likely go back to rehab soon. 6 Anemia Hg 8.6 watch for further drop. Thank you for consultation.     Darci Nguyen MD,  [facility]  The [address] at [facility]    Electronically signed by Darci Nguyen MD on 12/26/2022 at 12:41 PM

## 2022-12-27 ENCOUNTER — APPOINTMENT (OUTPATIENT)
Dept: ULTRASOUND IMAGING | Age: 82
End: 2022-12-27
Payer: MEDICARE

## 2022-12-27 LAB
ALBUMIN SERPL-MCNC: 3.3 G/DL (ref 3.5–5.2)
ALP BLD-CCNC: 68 U/L (ref 40–129)
ALT SERPL-CCNC: 9 U/L (ref 0–40)
ANION GAP SERPL CALCULATED.3IONS-SCNC: 14 MMOL/L (ref 7–16)
AST SERPL-CCNC: 27 U/L (ref 0–39)
BACTERIA: ABNORMAL /HPF
BASOPHILS ABSOLUTE: 0.03 E9/L (ref 0–0.2)
BASOPHILS RELATIVE PERCENT: 0.4 % (ref 0–2)
BILIRUB SERPL-MCNC: 0.3 MG/DL (ref 0–1.2)
BILIRUBIN URINE: NEGATIVE
BLOOD, URINE: ABNORMAL
BUN BLDV-MCNC: 58 MG/DL (ref 6–23)
CALCIUM SERPL-MCNC: 8.6 MG/DL (ref 8.6–10.2)
CHLORIDE BLD-SCNC: 102 MMOL/L (ref 98–107)
CLARITY: CLEAR
CO2: 18 MMOL/L (ref 22–29)
COLOR: ABNORMAL
CREAT SERPL-MCNC: 2.4 MG/DL (ref 0.7–1.2)
EOSINOPHILS ABSOLUTE: 0.15 E9/L (ref 0.05–0.5)
EOSINOPHILS RELATIVE PERCENT: 2 % (ref 0–6)
GFR SERPL CREATININE-BSD FRML MDRD: 26 ML/MIN/1.73
GLUCOSE BLD-MCNC: 220 MG/DL (ref 74–99)
GLUCOSE URINE: 100 MG/DL
HCT VFR BLD CALC: 25.2 % (ref 37–54)
HEMOGLOBIN: 7.9 G/DL (ref 12.5–16.5)
IMMATURE GRANULOCYTES #: 0.06 E9/L
IMMATURE GRANULOCYTES %: 0.8 % (ref 0–5)
KETONES, URINE: NEGATIVE MG/DL
LEUKOCYTE ESTERASE, URINE: NEGATIVE
LYMPHOCYTES ABSOLUTE: 0.62 E9/L (ref 1.5–4)
LYMPHOCYTES RELATIVE PERCENT: 8.1 % (ref 20–42)
MCH RBC QN AUTO: 32 PG (ref 26–35)
MCHC RBC AUTO-ENTMCNC: 31.3 % (ref 32–34.5)
MCV RBC AUTO: 102 FL (ref 80–99.9)
METER GLUCOSE: 239 MG/DL (ref 74–99)
METER GLUCOSE: 284 MG/DL (ref 74–99)
METER GLUCOSE: 292 MG/DL (ref 74–99)
METER GLUCOSE: 310 MG/DL (ref 74–99)
MONOCYTES ABSOLUTE: 0.91 E9/L (ref 0.1–0.95)
MONOCYTES RELATIVE PERCENT: 11.9 % (ref 2–12)
NEUTROPHILS ABSOLUTE: 5.86 E9/L (ref 1.8–7.3)
NEUTROPHILS RELATIVE PERCENT: 76.8 % (ref 43–80)
NITRITE, URINE: NEGATIVE
PDW BLD-RTO: 12.7 FL (ref 11.5–15)
PH UA: 6 (ref 5–9)
PLATELET # BLD: 312 E9/L (ref 130–450)
PMV BLD AUTO: 9.7 FL (ref 7–12)
POTASSIUM SERPL-SCNC: 5 MMOL/L (ref 3.5–5)
PROTEIN UA: 30 MG/DL
RBC # BLD: 2.47 E12/L (ref 3.8–5.8)
RBC UA: ABNORMAL /HPF (ref 0–2)
SODIUM BLD-SCNC: 134 MMOL/L (ref 132–146)
SPECIFIC GRAVITY UA: 1.02 (ref 1–1.03)
TOTAL PROTEIN: 6.4 G/DL (ref 6.4–8.3)
UROBILINOGEN, URINE: 0.2 E.U./DL
WBC # BLD: 7.6 E9/L (ref 4.5–11.5)
WBC UA: ABNORMAL /HPF (ref 0–5)

## 2022-12-27 PROCEDURE — 6370000000 HC RX 637 (ALT 250 FOR IP): Performed by: NURSE PRACTITIONER

## 2022-12-27 PROCEDURE — 81001 URINALYSIS AUTO W/SCOPE: CPT

## 2022-12-27 PROCEDURE — 82962 GLUCOSE BLOOD TEST: CPT

## 2022-12-27 PROCEDURE — 85025 COMPLETE CBC W/AUTO DIFF WBC: CPT

## 2022-12-27 PROCEDURE — 36415 COLL VENOUS BLD VENIPUNCTURE: CPT

## 2022-12-27 PROCEDURE — 51702 INSERT TEMP BLADDER CATH: CPT

## 2022-12-27 PROCEDURE — 6360000002 HC RX W HCPCS: Performed by: INTERNAL MEDICINE

## 2022-12-27 PROCEDURE — 2060000000 HC ICU INTERMEDIATE R&B

## 2022-12-27 PROCEDURE — 80053 COMPREHEN METABOLIC PANEL: CPT

## 2022-12-27 PROCEDURE — 2580000003 HC RX 258: Performed by: INTERNAL MEDICINE

## 2022-12-27 PROCEDURE — 6370000000 HC RX 637 (ALT 250 FOR IP): Performed by: INTERNAL MEDICINE

## 2022-12-27 RX ORDER — SODIUM BICARBONATE 650 MG/1
650 TABLET ORAL 2 TIMES DAILY
Status: DISCONTINUED | OUTPATIENT
Start: 2022-12-27 | End: 2022-12-29

## 2022-12-27 RX ADMIN — METOPROLOL TARTRATE 25 MG: 25 TABLET, FILM COATED ORAL at 20:29

## 2022-12-27 RX ADMIN — SODIUM BICARBONATE 650 MG: 650 TABLET ORAL at 15:39

## 2022-12-27 RX ADMIN — HEPARIN SODIUM 5000 UNITS: 10000 INJECTION INTRAVENOUS; SUBCUTANEOUS at 13:47

## 2022-12-27 RX ADMIN — INSULIN LISPRO 2 UNITS: 100 INJECTION, SOLUTION INTRAVENOUS; SUBCUTANEOUS at 10:49

## 2022-12-27 RX ADMIN — INSULIN LISPRO 3 UNITS: 100 INJECTION, SOLUTION INTRAVENOUS; SUBCUTANEOUS at 15:39

## 2022-12-27 RX ADMIN — ACETAMINOPHEN 500 MG: 500 TABLET ORAL at 20:28

## 2022-12-27 RX ADMIN — INSULIN HUMAN 15 UNITS: 100 INJECTION, SUSPENSION SUBCUTANEOUS at 08:09

## 2022-12-27 RX ADMIN — ASPIRIN 81 MG 81 MG: 81 TABLET ORAL at 08:03

## 2022-12-27 RX ADMIN — TAMSULOSIN HYDROCHLORIDE 0.4 MG: 0.4 CAPSULE ORAL at 08:03

## 2022-12-27 RX ADMIN — SODIUM CHLORIDE: 9 INJECTION, SOLUTION INTRAVENOUS at 06:08

## 2022-12-27 RX ADMIN — INSULIN HUMAN 10 UNITS: 100 INJECTION, SUSPENSION SUBCUTANEOUS at 15:39

## 2022-12-27 RX ADMIN — HEPARIN SODIUM 5000 UNITS: 10000 INJECTION INTRAVENOUS; SUBCUTANEOUS at 20:29

## 2022-12-27 RX ADMIN — PANTOPRAZOLE SODIUM 40 MG: 40 TABLET, DELAYED RELEASE ORAL at 06:07

## 2022-12-27 RX ADMIN — Medication 1000 UNITS: at 08:03

## 2022-12-27 RX ADMIN — SODIUM CHLORIDE: 9 INJECTION, SOLUTION INTRAVENOUS at 16:01

## 2022-12-27 RX ADMIN — METOPROLOL TARTRATE 25 MG: 25 TABLET, FILM COATED ORAL at 08:03

## 2022-12-27 RX ADMIN — PILOCARPINE HYDRCHLORIDE 5 MG: 5 TABLET, FILM COATED ORAL at 08:03

## 2022-12-27 RX ADMIN — HEPARIN SODIUM 5000 UNITS: 10000 INJECTION INTRAVENOUS; SUBCUTANEOUS at 06:07

## 2022-12-27 RX ADMIN — SODIUM BICARBONATE 650 MG: 650 TABLET ORAL at 20:29

## 2022-12-27 RX ADMIN — INSULIN LISPRO 2 UNITS: 100 INJECTION, SOLUTION INTRAVENOUS; SUBCUTANEOUS at 06:13

## 2022-12-27 RX ADMIN — PILOCARPINE HYDRCHLORIDE 5 MG: 5 TABLET, FILM COATED ORAL at 13:47

## 2022-12-27 RX ADMIN — ZINC SULFATE 220 MG (50 MG) CAPSULE 50 MG: CAPSULE at 08:03

## 2022-12-27 RX ADMIN — PILOCARPINE HYDRCHLORIDE 5 MG: 5 TABLET, FILM COATED ORAL at 20:29

## 2022-12-27 NOTE — CARE COORDINATION
Social work / Discharge Planning:         [address]. Patient is from [facility] and is not a bed hold. Social work spoke to liaison who is checking to see if facility can accept patient as covid positive. Awaiting return call. Electronically signed by MICK Barksdale on 12/27/2022 at 10:59 AM            Social work received return call from liaison. [address] [facility] can accept patient back as covid positive. ALEKS and transport form completed.     Electronically signed by MICK Barksdale on 12/27/2022 at 2:21 PM

## 2022-12-27 NOTE — PROGRESS NOTES
[facility]  Nephrology Attending Progress Note    SUBJECTIVE:     12/27/22-patient seen and examined in room. Discussed with nursing patient eating and drinking well. Patient has no active cough has no complaints. 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    Diabetes mellitus ([facility] [address]     Diverticulosis     mild    GERD (gastroesophageal reflux disease)     Hyperlipidemia     Hypertension        DIET:    ADULT DIET;  Regular; 4 carb choices (60 gm/meal)     PHYSICAL EXAM:     Patient Vitals for the past 24 hrs:   BP Temp Temp src Pulse Resp SpO2 Weight   12/27/22 0757 -- -- -- (!) 110 -- -- --   12/27/22 0730 (!) 161/71 98.9 °F (37.2 °C) Temporal 90 18 92 % --   12/27/22 0300 (!) 157/71 98.2 °F (36.8 °C) Temporal 96 16 96 % 230 lb (104.3 kg)   12/26/22 2046 (!) 170/78 97.7 °F (36.5 °C) Temporal 91 16 95 % --   12/26/22 1520 (!) 144/80 98.2 °F (36.8 °C) Oral 84 18 100 % --   @      Intake/Output Summary (Last 24 hours) at 12/27/2022 1346  Last data filed at 12/27/2022 7478  Gross per 24 hour   Intake 2978 ml   Output 2050 ml   Net 928 ml         Wt Readings from Last 3 Encounters:   12/27/22 230 lb (104.3 kg)   11/24/21 218 lb (98.9 kg)   05/07/21 218 lb (98.9 kg)       Constitutional:  Pt is in no acute distress  Head: normocephalic, atraumatic  Neck: no JVD  Cardiovascular: S1-S2 no S3 or rub  Respiratory: Clear upper diminished bases  Gastrointestinal:  Soft, nontender, nondistended, bowel sounds x 4  Ext: Trace edema  Skin: dry, no rash  Neuro: Awake, alert oriented with periods of confusion    MEDS (scheduled):    insulin NPH  15 Units SubCUTAneous QAM    insulin NPH  10 Units SubCUTAneous Daily before dinner    [Held by provider] losartan  100 mg Oral Daily    metoprolol tartrate  25 mg Oral BID    pantoprazole  40 mg Oral QAM AC    pilocarpine  5 mg Oral TID    tamsulosin  0.4 mg Oral Daily    heparin (porcine)  5,000 Units SubCUTAneous 3 times per day    aspirin  81 mg Oral Daily    insulin lispro  0-4 Units SubCUTAneous TID WC    insulin lispro  0-4 Units SubCUTAneous Nightly    zinc sulfate  50 mg Oral Daily    Vitamin D  1,000 Units Oral Daily       MEDS (infusions):   dextrose      sodium chloride 100 mL/hr at 12/27/22 7344       MEDS (prn):  perflutren lipid microspheres, glucose, dextrose bolus **OR** dextrose bolus, glucagon (rDNA), dextrose, acetaminophen    DATA:    Recent Labs     12/25/22  1855 12/26/22  0401 12/27/22  0318   WBC 10.3 9.9 7.6   HGB 9.3* 8.6* 7.9*   HCT 28.5* 26.5* 25.2*   .4* 100.0* 102.0*    291 312     Recent Labs     12/25/22  1855 12/26/22  0401 12/27/22  0318   * 134 134   K 5.4* 5.3* 5.0   CL 98 103 102   CO2 20* 17* 18*   BUN 50* 53* 58*   CREATININE 2.1* 2.4* 2.4*   LABGLOM 31 26 26   GLUCOSE 214* 212* 220*   CALCIUM 9.2 8.8 8.6   ALT 8 7 9   AST 21 18 27   BILITOT 0.4 0.4 0.3   ALKPHOS 74 65 68       Lab Results   Component Value Date    LABALBU 3.3 (L) 12/27/2022    LABALBU 3.3 (L) 12/26/2022    LABALBU 3.6 12/25/2022     No results found for: TSH    Iron Studies  No results found for: IRON, TIBC, FERRITIN  No results found for: HVHLLNDE19  No results found for: FOLATE    Vit D, 25-Hydroxy   Date Value Ref Range Status   02/11/2020 36 30 - 100 ng/mL Final     Comment:     <20 ng/mL. ........... Cloteal Almendarez Deficient  20-30 ng/mL. ......... Cloteal Almendarez Insufficient   ng/mL. ........ Cloteal Almendarez Sufficient  >100 ng/mL. .......... Cloteal Almendarez Toxic       PTH   Date Value Ref Range Status   02/11/2020 103 (H) 15 - 65 pg/mL Final       No components found for: URIC    No results found for: VOL, APPEARANCE, COLORU, LABSPEC, LABPH, LEUKBLD, NITRU, GLUCOSEU, KETUA, UROBILINOGEN, KETUA, UROBILINOGEN, BILIRUBINUR, OCBU    No results found for: LIPIDPAN      IMPRESSION/RECOMMENDATIONS:      Acute kidney injury-  In the setting of ineffective renal perfusion in spite of FeNa less than 1%  Likely compounded by occasional use of ARB and HCTZ both are currently being held. Initially had some diarrhea and is COVID-positive  There was also a component of urinary retention with placement of Gracia. Baseline creatinine 1.6 to 1.9 mg/dL  Urine output past 24 hours is recorded for 1 shift 2050 mL  Renal ultrasound shows no hydronephrosis simple appearing right renal cortical cyst.  Continue IV hydration as creatinine continues to be elevated above baseline. 2.  Hyperkalemia-  In the setting of acute kidney injury exacerbated by use of ARB  Potassium has been corrected    3. Metabolic acidosis-  In the setting of acute kidney injury  Will start patient on oral bicarbonate    4. Anemia of chronic disease-  Will check iron studies B12 and folate tomorrow    5. 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   Chronic kidney disease stage IIIb-  Presumed microvascular in the setting of diabetic nephropathy  Baseline serum creatinine 1.6 to 1.9 mg/dL      TITO Shook - KAYLIN    ===================    Renal Attending Addendum  Seen and examined  Agree with NP note above      Renal function worse than baseline but stable  Continue IVF for now  Stable bicarbonate for metabolic acidosis    Marsha Horner MD

## 2022-12-27 NOTE — PROGRESS NOTES
[facility] CARDIOLOGY PROGRESS NOTE  [facility]        Subjective: Status post right TKA December 19, 2022 and in rehab had syncopal episode in the bathroom without any prodromal palpitations or chest pain. EKG on admission with normal conduction intervals. Telemetry over the last 48 hours without arrhythmia or pause. I additionally reviewed his echocardiogram with him that was done this admission shows normal LVEF and no significant valve abnormality. Today he is pleasant conversant and appropriate. No acute distress. He denies any shortness of breath or chest pain this morning or overnight. Objective: Medications lab chart and telemetry all reviewed.     Patient Vitals for the past 24 hrs:   BP Temp Temp src Pulse Resp SpO2 Weight   12/27/22 0757 -- -- -- (!) 110 -- -- --   12/27/22 0730 (!) 161/71 98.9 °F (37.2 °C) Temporal 90 18 92 % --   12/27/22 0300 (!) 157/71 98.2 °F (36.8 °C) Temporal 96 16 96 % 230 lb (104.3 kg)   12/26/22 2046 (!) 170/78 97.7 °F (36.5 °C) Temporal 91 16 95 % --   12/26/22 1520 (!) 144/80 98.2 °F (36.8 °C) Oral 84 18 100 % --         Intake/Output Summary (Last 24 hours) at 12/27/2022 1244  Last data filed at 12/27/2022 0726  Gross per 24 hour   Intake 2978 ml   Output 2050 ml   Net 928 ml       Wt Readings from Last 3 Encounters:   12/27/22 230 lb (104.3 kg)   11/24/21 218 lb (98.9 kg)   05/07/21 218 lb (98.9 kg)       Telemetry: Personally reviewed and shows normal sinus rhythm heart rate around  no AV block or pauses    Current meds: Scheduled Meds:   insulin NPH  15 Units SubCUTAneous QAM    insulin NPH  10 Units SubCUTAneous Daily before dinner    [Held by provider] losartan  100 mg Oral Daily    metoprolol tartrate  25 mg Oral BID    pantoprazole  40 mg Oral QAM AC    pilocarpine  5 mg Oral TID    tamsulosin  0.4 mg Oral Daily    heparin (porcine)  5,000 Units SubCUTAneous 3 times per day    aspirin  81 mg Oral Daily    insulin lispro  0-4 Units SubCUTAneous TID WC    insulin lispro  0-4 Units SubCUTAneous Nightly    zinc sulfate  50 mg Oral Daily    Vitamin D  1,000 Units Oral Daily     Continuous Infusions:   dextrose      sodium chloride 100 mL/hr at 12/27/22 0618     PRN Meds:.perflutren lipid microspheres, glucose, dextrose bolus **OR** dextrose bolus, glucagon (rDNA), dextrose, acetaminophen    Allergies: Darvocet [propoxyphene n-acetaminophen] and Lisinopril      Labs:   Recent Labs     12/25/22 1855 12/26/22 0401 12/27/22 0318   WBC 10.3 9.9 7.6   HGB 9.3* 8.6* 7.9*   HCT 28.5* 26.5* 25.2*   .4* 100.0* 102.0*    291 312       Labs:   Recent Labs     12/25/22 1855 12/26/22 0401 12/27/22 0318   * 134 134   K 5.4* 5.3* 5.0   CL 98 103 102   CO2 20* 17* 18*   BUN 50* 53* 58*   CREATININE 2.1* 2.4* 2.4*       Labs: No results for input(s): CKTOTAL, CKMB, CKMBINDEX, TROPONINI in the last 72 hours. Labs: No results for input(s): BNP in the last 72 hours. Labs: No results for input(s): CHOL, HDL, TRIG in the last 72 hours. Invalid input(s): Waqas Beasley    Labs:   Recent Labs     12/25/22 1855 12/26/22 0401 12/27/22 0318   PROT 7.2 6.3* 6.4       Review of systems: No reported significant weight gain or weight loss. no dysuria or frequency, no dizziness, falls or trauma, no change in bowel or bladder habits, no hematochezia, hemoptysis or hematuria. No fevers, chills, nausea or vomiting reported. No significant wheezing or sputum production. No headache or visual changes. The remainder of the 10 review of systems otherwise negative. Exam      General: Patient comfortable in no distress and currently denies any chest pain. HEENT: Face symmetrical and no apparent cranial nerve deficit. Neck: No jugular venous distention, carotid bruit or thyromegaly. Lungs: Clear bilaterally without rales, wheezes or dullness. Cardiac: Regular rate and rhythm, no S3, S4, no rub or gallop. Abdomen: No rebound or guarding, no hepatosplenomegaly. Extremities: Without significant clubbing , cyanosis, or edema. Neuro:  No focal motor or sensory deficit apparent. Skin: No petechiae, no significant bruising. Assessment: See plan below        Plan: #1 syncopal episode when in the bathroom and by history suspect none cardiac in etiology and not arrhythmia in etiology. Telemetry without documented arrhythmia over the last 48 hours. #2 chronic kidney disease suspect he was a little dehydrated on admission. Encouraged him to make sure he is drinking plenty of fluid and stays well-hydrated. #3 diabetes and follow-up blood sugar. Prior to admission has been maintained on insulin. #4 anemia and hemoglobin trending downward 8.6 yesterday and today 7.9. Consider possible blood transfusion if hemoglobin continues to trend downward. #5 right total knee replacement and would suggest physical therapy evaluation to mobilize. #6 hypertension and follow-up blood pressure, avoid hypotension. Currently would run blood pressure little high than too low. Would stop hydrochlorothiazide for now. Continues on metoprolol tartrate 25 mg twice a day and losartan 100 mg daily and systolic blood pressure 8/48/0656 these are reasonable numbers at this time. Advance activity and could be discharged from cardiology viewpoint in the next day or 2.         Electronically signed by Brenda Lopez MD on 12/27/2022 at 12:44 PM

## 2022-12-27 NOTE — CONSULTS
12/27/227:50 AM  Service: Urology  Group: [facility] (Antelmo/Reshma/Maritza)    Rosalia Pittman  [id number]     Chief Complaint/reason for consultation     Urinary retention     History of Present Illness: The patient is a 80 y.o. male patient who has seen Dr Ghazal Felder in the distant past. Urology was asked to see for urinary retention. He has a hx of CKD. He was admitted from the nursing facility. He had a recent knee replacement and was up to the bathroom when he became unresponsive. CPR was performed. He regained consciousness. He is Covid positive. A harrison catheter was placed for urinary retention. An  unknown amount of urine was obtained. The pt does not remember feeling uncomfortable. The pt states he leaks urine at his baseline at night. He wears a brief at night only. He does well during the day. He does not take any  meds at home. He has not had any  surgery or kidney stones. Past Medical History:   Diagnosis Date    Anemia     CAD (coronary artery disease)     Cancer ([facility] [address] 2012    left parotid    Diabetes mellitus ([facility] [address]     Diverticulosis     mild    GERD (gastroesophageal reflux disease)     Hyperlipidemia     Hypertension          Past Surgical History:   Procedure Laterality Date    BACK SURGERY      COLONOSCOPY      EYE SURGERY      PAROTIDECTOMY  9/11/12    left superficial       Medications Prior to Admission:    Medications Prior to Admission: insulin NPH (HUMULIN N;NOVOLIN N) 100 UNIT/ML injection vial, Inject 15 Units into the skin nightly  insulin lispro (HUMALOG) 100 UNIT/ML injection vial, Inject into the skin 3 times daily (before meals) Humalog SS 0-100= No units 101-150= 10 units 151-200= 15 units >201= 20 units  tamsulosin (FLOMAX) 0.4 MG capsule, Take 0.4 mg by mouth daily  losartan (COZAAR) 100 MG tablet, Take 100 mg by mouth daily. pilocarpine (SALAGEN) 5 MG tablet, Take 1 tablet by mouth 3 times daily.   pilocarpine (SALAGEN) 5 MG tablet, Take 5 mg by mouth 3 times daily. Insulin Isophane Human (HUMULIN N SC), Inject 25 Units into the skin every morning  hydrochlorothiazide (HYDRODIURIL) 25 MG tablet, Take 25 mg by mouth daily. valsartan (DIOVAN) 320 MG tablet, Take 320 mg by mouth daily. omeprazole (PRILOSEC) 20 MG capsule, Take 20 mg by mouth daily. metformin (GLUCOPHAGE) 850 MG tablet, Take 850 mg by mouth 2 times daily (with meals). ibuprofen (ADVIL;MOTRIN) 200 MG tablet, Take 200 mg by mouth every 6 hours as needed. metoprolol (LOPRESSOR) 25 MG tablet, Take 25 mg by mouth 2 times daily. Multiple Vitamin (MULTI VITAMIN MENS PO), Take  by mouth.    vitamin D (CHOLECALCIFEROL) 400 UNITS TABS tablet, Take 500 Units by mouth daily. acetaminophen (TYLENOL) 500 MG tablet, Take 500 mg by mouth every 6 hours as needed. Allergies:    Darvocet [propoxyphene n-acetaminophen] and Lisinopril    Social History:    reports that he has never smoked. He has never used smokeless tobacco. He reports that he does not drink alcohol and does not use drugs. Family History:   Non-contributory to this Urological problem  family history includes Cancer in his brother and father.     Review of Systems:  Constitutional: + for fatigue   Respiratory: negative for cough and hemoptysis  Cardiovascular: negative for chest pain and dyspnea, + syncope   Gastrointestinal: negative for abdominal pain,+ diarrhea, -nausea and vomiting   Derm: negative for rash and skin lesion(s)  Neurological: negative for seizures and tremors  Musculoskeletal: recent right knee replacement   Endocrine: negative for diabetic symptoms including polydipsia and polyuria  Psychiatric: negative   : As above in the HPI, otherwise negative  All other reviews are negative    Physical Exam:     Vitals:  BP (!) 161/71   Pulse 90   Temp 98.9 °F (37.2 °C) (Temporal)   Resp 18   Ht 5' 11\" (1.803 m)   Wt 230 lb (104.3 kg)   SpO2 92%   BMI 32.08 kg/m²     General:  Awake, alert, oriented X 3. Well developed, well nourished, well groomed. No apparent distress. HEENT:  Normocephalic, atraumatic. Pupils equal, round. No scleral icterus. No conjunctival injection. Normal lips, teeth, and gums. No nasal discharge. Neck:  Supple, no masses. Heart:  Regular rate  Lungs:  No audible wheezing. Respirations symmetric and non-labored. Abdomen:  soft, nontender, obese  Extremities:  right knee edema with dressing   Skin:  Warm and dry  Neuro: Alert and conversing. No focal deficits  Rectal: deferred  Genitalia: harrison catheter well placed and draining yellow urine     Labs:     Recent Labs     12/25/22  1855 12/26/22  0401 12/27/22  0318   WBC 10.3 9.9 7.6   RBC 2.84* 2.65* 2.47*   HGB 9.3* 8.6* 7.9*   HCT 28.5* 26.5* 25.2*   .4* 100.0* 102.0*   MCH 32.7 32.5 32.0   MCHC 32.6 32.5 31.3*   RDW 12.2 12.5 12.7    291 312   MPV 9.5 9.6 9.7        Latest Reference Range & Units 12/27/22 03:18   Sodium 132 - 146 mmol/L 134   Potassium 3.5 - 5.0 mmol/L 5.0   Chloride 98 - 107 mmol/L 102   CO2 22 - 29 mmol/L 18 (L)   BUN,BUNPL 6 - 23 mg/dL 58 (H)   Creatinine 0.7 - 1.2 mg/dL 2.4 (H)   Anion Gap 7 - 16 mmol/L 14   Est, Glom Filt Rate >=60 mL/min/1.73 26   Glucose, Random 74 - 99 mg/dL 220 (H)   CALCIUM, SERUM, 855012 8.6 - 10.2 mg/dL 8.6   Total Protein 6.4 - 8.3 g/dL 6.4        Latest Reference Range & Units 5/13/19 10:40 1/29/20 08:21   Prostatic Specific Ag 0.00 - 4.00 ng/mL 3.05 4.25 (H)     EXAMINATION:   RETROPERITONEAL ULTRASOUND OF THE KIDNEYS AND URINARY BLADDER       12/26/2022       COMPARISON:   None       HISTORY:   ORDERING SYSTEM PROVIDED HISTORY: ben   TECHNOLOGIST PROVIDED HISTORY:       Reason for exam:->ben   What reading provider will be dictating this exam?->CRC       FINDINGS:       Kidneys: The right kidney measures 9.5 cm in length and the left kidney measures 9.9   cm in length. Kidneys demonstrate normal cortical echogenicity.   No evidence of hydronephrosis or intrarenal stones. Associated the right kidney is a an   anechoic well-defined 2.7 cm renal cortical cysts without complex features or   internal flow. Bladder:       Unremarkable appearance of the bladder decompressed with Harrison catheter in   place           Impression   No acute findings the kidneys or urinary bladder. No hydronephrosis.   Simple   appearing right renal cortical cyst.                     Assessment/plan:  AUR  BPH  Renal cyst  Urinary incontinence  Elevated PSA  Hx CKD  Covid 19    Urinalysis   Continue harrison catheter  VT prior to discharge if creatinine at tammie   Will need bladder scan prior to discharge to make sure pt is emptying  Would hold on Flomax d/t syncope   Will need out patient follow up for CORA and PSA   He can follow up with Dr. Noam Joyce or with [facility] if he prefers        Peggyann Bowels NP-C  [facility]                           Electronically signed by TITO Clifford CNP on 12/27/2022 at 7:50 AM

## 2022-12-27 NOTE — PROGRESS NOTES
Subjective: The patient is awake and alert. Much more awake and alert  Objective:    BP (!) 157/71   Pulse 96   Temp 98.2 °F (36.8 °C) (Temporal)   Resp 16   Ht 5' 11\" (1.803 m)   Wt 230 lb (104.3 kg)   SpO2 96%   BMI 32.08 kg/m²     Heart:  RRR, no murmurs, gallops, or rubs.   Lungs:  CTA bilaterally, no wheeze, rales or rhonchi  Abd: bowel sounds present, nontender, nondistended, no masses  Extrem:  No clubbing, cyanosis, or edema    CBC with Differential:    Lab Results   Component Value Date/Time    WBC 7.6 12/27/2022 03:18 AM    RBC 2.47 12/27/2022 03:18 AM    HGB 7.9 12/27/2022 03:18 AM    HCT 25.2 12/27/2022 03:18 AM     12/27/2022 03:18 AM    .0 12/27/2022 03:18 AM    MCH 32.0 12/27/2022 03:18 AM    MCHC 31.3 12/27/2022 03:18 AM    RDW 12.7 12/27/2022 03:18 AM    NRBC 0.0 12/25/2022 06:55 PM    LYMPHOPCT 8.1 12/27/2022 03:18 AM    PROMYELOPCT 0.9 12/25/2022 06:55 PM    MONOPCT 11.9 12/27/2022 03:18 AM    MYELOPCT 1.8 12/25/2022 02:01 PM    BASOPCT 0.4 12/27/2022 03:18 AM    MONOSABS 0.91 12/27/2022 03:18 AM    LYMPHSABS 0.62 12/27/2022 03:18 AM    EOSABS 0.15 12/27/2022 03:18 AM    BASOSABS 0.03 12/27/2022 03:18 AM     CMP:    Lab Results   Component Value Date/Time     12/27/2022 03:18 AM    K 5.0 12/27/2022 03:18 AM    K 4.6 12/25/2022 02:01 PM     12/27/2022 03:18 AM    CO2 18 12/27/2022 03:18 AM    BUN 58 12/27/2022 03:18 AM    CREATININE 2.4 12/27/2022 03:18 AM    GFRAA 51 02/11/2020 08:00 AM    LABGLOM 26 12/27/2022 03:18 AM    GLUCOSE 220 12/27/2022 03:18 AM    PROT 6.4 12/27/2022 03:18 AM    LABALBU 3.3 12/27/2022 03:18 AM    CALCIUM 8.6 12/27/2022 03:18 AM    BILITOT 0.3 12/27/2022 03:18 AM    ALKPHOS 68 12/27/2022 03:18 AM    AST 27 12/27/2022 03:18 AM    ALT 9 12/27/2022 03:18 AM     PT/INR:    Lab Results   Component Value Date/Time    PROTIME 11.9 12/15/2022 08:35 AM    INR 1.1 12/15/2022 08:35 AM     @cktotal:3,ckmb:3,ckmbindex:3,troponini:3@  U/A:  No results found for: Raman Guzman, PHUR, LABCAST, [address], RBCUA, MUCUS, TRICHOMONAS, YEAST, BACTERIA, CLARITYU, SPECGRAV, UROBILINOGEN, BILIRUBINUR, BLOODU, GLUCOSEU, KETUA, AMORPHOUS     Assessment:    Patient Active Problem List   Diagnosis    Closed fracture of one rib of left side, initial encounter       Plan:      Syncope-  Appears non cardiac  No evidence of LOC  Likely volume related    Right knee replacement  Post op care  DVT prop with heparin d/T kidney function    ZION with CKD stage 3b  Fluids per nephrology  Bit more acidotic today  Holding ARB, diuril and NSAIDS    Covid-19-  No evidence of desaturation or pneumonia  No steroids for now    Urinary retention  Continue catheter until kidney fxn imporved  Ask urology to see  Oral antiviral not available here    Marion Queen DO  6:05 AM  12/27/2022

## 2022-12-27 NOTE — DISCHARGE INSTR - COC
Continuity of Care Form    Patient Name: Unruly Le   :  [id number]  MRN:  [id number]    Admit date:  2022  Discharge date:  2022    Code Status Order: Full Code   Advance Directives:     Admitting Physician:  Mauri Barnes DO  PCP: Keyla Bernard MD    Discharging Nurse: Mulugeta Tapiaq 23 Unit/Room#: 3119/3058-F  Discharging Unit Phone Number: [phone]    Emergency Contact:   Extended Emergency Contact Information  Primary Emergency Contact: Lalo Yoo  Address: [address] [facility]           Residence [place], [place]  Home Phone: [phone]  Mobile Phone: [phone]  Relation: Spouse  Secondary Emergency Contact: Carmita Dixon, [address] [place] Phone: [phone]  Mobile Phone: [phone]  Relation: Child  Preferred language: English   needed?  No    Past Surgical History:  Past Surgical History:   Procedure Laterality Date    BACK SURGERY      COLONOSCOPY      EYE SURGERY      PAROTIDECTOMY  12    left superficial       Immunization History:   Immunization History   Administered Date(s) Administered    COVID-19, PFIZER PURPLE top, DILUTE for use, (age 15 y+), 30mcg/0.3mL 2021, 2021, 2021       Active Problems:  Patient Active Problem List   Diagnosis Code    Closed fracture of one rib of left side, initial encounter S22.32XA       Isolation/Infection:   Isolation            Droplet Plus          Patient Infection Status       Infection Onset Added Last Indicated Last Indicated By Review Planned Expiration Resolved Resolved By    COVID-19 22 COVID-19, Rapid 23      Resolved    COVID-19 (Rule Out) 22 COVID-19, Rapid (Ordered)   22 Rule-Out Test Resulted    COVID-19 (Rule Out) 21 COVID-19 Ambulatory (Ordered)   21 Rule-Out Test Resulted            Nurse Assessment:  Last Vital Signs: BP (!) 161/71   Pulse (!) 110   Temp 98.9 °F (37.2 °C) (Temporal) Resp 18   Ht 5' 11\" (1.803 m)   Wt 230 lb (104.3 kg)   SpO2 92%   BMI 32.08 kg/m²     Last documented pain score (0-10 scale):    Last Weight:   Wt Readings from Last 1 Encounters:   12/27/22 230 lb (104.3 kg)     Mental Status:  oriented    IV Access:  - None    Nursing Mobility/ADLs:  Walking   Assisted  Transfer  Assisted  Bathing  Assisted  Dressing  Assisted  Toileting  Assisted  Feeding  Independent  Med Admin  Assisted  Med Delivery   whole    Wound Care Documentation and Therapy:        Elimination:  Continence: Bowel: Yes  Bladder: Gracia  Urinary Catheter: Insertion Date: 12/28/2022 and Indication for Use of Catheter: Acute urinary retention/obstruction   Colostomy/Ileostomy/Ileal Conduit: No       Date of Last BM: 12/30/2022    Intake/Output Summary (Last 24 hours) at 12/27/2022 1431  Last data filed at 12/27/2022 0618  Gross per 24 hour   Intake 2978 ml   Output 2050 ml   Net 928 ml     I/O last 3 completed shifts: In: 3290 [P.O.:240; I.V.:2978]  Out: 3200 [Urine:3200]    Safety Concerns: At Risk for Falls    Impairments/Disabilities:      None    Nutrition Therapy:  Current Nutrition Therapy:   - Oral Diet:  General    Routes of Feeding: Oral  Liquids: no restriction  Daily Fluid Restriction: no  Last Modified Barium Swallow with Video (Video Swallowing Test): not done    Treatments at the Time of Hospital Discharge:   Respiratory Treatments:   Oxygen Therapy:  is not on home oxygen therapy.   Ventilator:    - No ventilator support    Rehab Therapies: Physical Therapy and Occupational Therapy  Weight Bearing Status/Restrictions: No weight bearing restrictions  Other Medical Equipment (for information only, NOT a DME order):  walker  Other Treatments:     Patient's personal belongings (please select all that are sent with patient):  None    RN SIGNATURE:  Electronically signed by Reg Sloan RN on 12/30/22 at 11:59 AM EST    CASE MANAGEMENT/SOCIAL WORK SECTION    Inpatient Status Date: Readmission Risk Assessment Score:  Readmission Risk              Risk of Unplanned Readmission:  11           Discharging to Facility/ Agency   Name: Jamie Johnson [address] [facility]  Phone:[phone]  Fax:[phone]    Dialysis Facility (if applicable)   Name:  Address:  Dialysis Schedule:  Phone:  Fax:    / signature: Electronically signed by MICK Burns on 12/27/22 at 2:32 PM EST    PHYSICIAN SECTION    Prognosis: Fair    Condition at Discharge: Stable    Rehab Potential (if transferring to Rehab): Fair    Recommended Labs or Other Treatments After Discharge:     Physician Certification: I certify the above information and transfer of Arsenio Hewitt  is necessary for the continuing treatment of the diagnosis listed and that he requires [facility] for less 30 days.      Update Admission H&P: No change in H&P    PHYSICIAN SIGNATURE:  Electronically signed by Carlene Corrigan DO on 12/30/22 at 6:46 AM EST

## 2022-12-28 LAB
ANION GAP SERPL CALCULATED.3IONS-SCNC: 13 MMOL/L (ref 7–16)
BUN BLDV-MCNC: 47 MG/DL (ref 6–23)
CALCIUM SERPL-MCNC: 8.8 MG/DL (ref 8.6–10.2)
CHLORIDE BLD-SCNC: 102 MMOL/L (ref 98–107)
CO2: 20 MMOL/L (ref 22–29)
CREAT SERPL-MCNC: 2.2 MG/DL (ref 0.7–1.2)
FERRITIN: 223 NG/ML
FOLATE: 15 NG/ML (ref 4.8–24.2)
GFR SERPL CREATININE-BSD FRML MDRD: 29 ML/MIN/1.73
GLUCOSE BLD-MCNC: 190 MG/DL (ref 74–99)
HCT VFR BLD CALC: 26 % (ref 37–54)
HEMOGLOBIN: 8.1 G/DL (ref 12.5–16.5)
IRON SATURATION: 13 % (ref 20–55)
IRON: 30 MCG/DL (ref 59–158)
MAGNESIUM: 1.2 MG/DL (ref 1.6–2.6)
MAGNESIUM: 1.6 MG/DL (ref 1.6–2.6)
MCH RBC QN AUTO: 31.8 PG (ref 26–35)
MCHC RBC AUTO-ENTMCNC: 31.2 % (ref 32–34.5)
MCV RBC AUTO: 102 FL (ref 80–99.9)
METER GLUCOSE: 208 MG/DL (ref 74–99)
METER GLUCOSE: 245 MG/DL (ref 74–99)
METER GLUCOSE: 277 MG/DL (ref 74–99)
METER GLUCOSE: 298 MG/DL (ref 74–99)
PDW BLD-RTO: 12.4 FL (ref 11.5–15)
PHOSPHORUS: 3 MG/DL (ref 2.5–4.5)
PLATELET # BLD: 297 E9/L (ref 130–450)
PMV BLD AUTO: 9.5 FL (ref 7–12)
POTASSIUM SERPL-SCNC: 4.8 MMOL/L (ref 3.5–5)
RBC # BLD: 2.55 E12/L (ref 3.8–5.8)
SODIUM BLD-SCNC: 135 MMOL/L (ref 132–146)
TOTAL IRON BINDING CAPACITY: 227 MCG/DL (ref 250–450)
VITAMIN B-12: 682 PG/ML (ref 211–946)
WBC # BLD: 6.5 E9/L (ref 4.5–11.5)

## 2022-12-28 PROCEDURE — 84100 ASSAY OF PHOSPHORUS: CPT

## 2022-12-28 PROCEDURE — 6360000002 HC RX W HCPCS: Performed by: INTERNAL MEDICINE

## 2022-12-28 PROCEDURE — 97110 THERAPEUTIC EXERCISES: CPT

## 2022-12-28 PROCEDURE — 97161 PT EVAL LOW COMPLEX 20 MIN: CPT

## 2022-12-28 PROCEDURE — 80048 BASIC METABOLIC PNL TOTAL CA: CPT

## 2022-12-28 PROCEDURE — 82728 ASSAY OF FERRITIN: CPT

## 2022-12-28 PROCEDURE — 36415 COLL VENOUS BLD VENIPUNCTURE: CPT

## 2022-12-28 PROCEDURE — 2060000000 HC ICU INTERMEDIATE R&B

## 2022-12-28 PROCEDURE — 82607 VITAMIN B-12: CPT

## 2022-12-28 PROCEDURE — 6360000002 HC RX W HCPCS: Performed by: NURSE PRACTITIONER

## 2022-12-28 PROCEDURE — 6370000000 HC RX 637 (ALT 250 FOR IP): Performed by: NURSE PRACTITIONER

## 2022-12-28 PROCEDURE — 82746 ASSAY OF FOLIC ACID SERUM: CPT

## 2022-12-28 PROCEDURE — 83550 IRON BINDING TEST: CPT

## 2022-12-28 PROCEDURE — 83540 ASSAY OF IRON: CPT

## 2022-12-28 PROCEDURE — 82962 GLUCOSE BLOOD TEST: CPT

## 2022-12-28 PROCEDURE — 83735 ASSAY OF MAGNESIUM: CPT

## 2022-12-28 PROCEDURE — 2580000003 HC RX 258: Performed by: INTERNAL MEDICINE

## 2022-12-28 PROCEDURE — 6370000000 HC RX 637 (ALT 250 FOR IP): Performed by: INTERNAL MEDICINE

## 2022-12-28 PROCEDURE — 85027 COMPLETE CBC AUTOMATED: CPT

## 2022-12-28 RX ORDER — MAGNESIUM SULFATE 1 G/100ML
1000 INJECTION INTRAVENOUS ONCE
Status: COMPLETED | OUTPATIENT
Start: 2022-12-28 | End: 2022-12-28

## 2022-12-28 RX ADMIN — TAMSULOSIN HYDROCHLORIDE 0.4 MG: 0.4 CAPSULE ORAL at 08:36

## 2022-12-28 RX ADMIN — Medication 1000 UNITS: at 08:36

## 2022-12-28 RX ADMIN — METOPROLOL TARTRATE 25 MG: 25 TABLET, FILM COATED ORAL at 20:16

## 2022-12-28 RX ADMIN — INSULIN LISPRO 2 UNITS: 100 INJECTION, SOLUTION INTRAVENOUS; SUBCUTANEOUS at 12:32

## 2022-12-28 RX ADMIN — ASPIRIN 81 MG 81 MG: 81 TABLET ORAL at 08:36

## 2022-12-28 RX ADMIN — INSULIN LISPRO 1 UNITS: 100 INJECTION, SOLUTION INTRAVENOUS; SUBCUTANEOUS at 06:12

## 2022-12-28 RX ADMIN — PANTOPRAZOLE SODIUM 40 MG: 40 TABLET, DELAYED RELEASE ORAL at 06:12

## 2022-12-28 RX ADMIN — PILOCARPINE HYDRCHLORIDE 5 MG: 5 TABLET, FILM COATED ORAL at 20:16

## 2022-12-28 RX ADMIN — PILOCARPINE HYDRCHLORIDE 5 MG: 5 TABLET, FILM COATED ORAL at 08:36

## 2022-12-28 RX ADMIN — METOPROLOL TARTRATE 25 MG: 25 TABLET, FILM COATED ORAL at 08:36

## 2022-12-28 RX ADMIN — SODIUM BICARBONATE 650 MG: 650 TABLET ORAL at 20:16

## 2022-12-28 RX ADMIN — HEPARIN SODIUM 5000 UNITS: 10000 INJECTION INTRAVENOUS; SUBCUTANEOUS at 20:21

## 2022-12-28 RX ADMIN — PILOCARPINE HYDRCHLORIDE 5 MG: 5 TABLET, FILM COATED ORAL at 15:16

## 2022-12-28 RX ADMIN — SODIUM CHLORIDE: 9 INJECTION, SOLUTION INTRAVENOUS at 16:21

## 2022-12-28 RX ADMIN — ZINC SULFATE 220 MG (50 MG) CAPSULE 50 MG: CAPSULE at 08:36

## 2022-12-28 RX ADMIN — SODIUM BICARBONATE 650 MG: 650 TABLET ORAL at 08:36

## 2022-12-28 RX ADMIN — INSULIN LISPRO 1 UNITS: 100 INJECTION, SOLUTION INTRAVENOUS; SUBCUTANEOUS at 16:44

## 2022-12-28 RX ADMIN — HEPARIN SODIUM 5000 UNITS: 10000 INJECTION INTRAVENOUS; SUBCUTANEOUS at 15:16

## 2022-12-28 RX ADMIN — MAGNESIUM SULFATE HEPTAHYDRATE 1000 MG: 1 INJECTION, SOLUTION INTRAVENOUS at 11:26

## 2022-12-28 RX ADMIN — MAGNESIUM SULFATE IN DEXTROSE 1000 MG: 10 INJECTION, SOLUTION INTRAVENOUS at 06:16

## 2022-12-28 RX ADMIN — HEPARIN SODIUM 5000 UNITS: 10000 INJECTION INTRAVENOUS; SUBCUTANEOUS at 06:12

## 2022-12-28 NOTE — PROGRESS NOTES
Subjective: The patient is awake and alert. No problems overnight. Denies chest pain, angina, and dyspnea. Denies abdominal pain. Tolerating diet. No nausea or vomiting. Objective:    BP (!) 163/64   Pulse 88   Temp 99.4 °F (37.4 °C) (Oral)   Resp 18   Ht 5' 11\" (1.803 m)   Wt 227 lb 11.2 oz (103.3 kg)   SpO2 95%   BMI 31.76 kg/m²     Heart:  RRR, no murmurs, gallops, or rubs.   Lungs:  CTA bilaterally, no wheeze, rales or rhonchi  Abd: bowel sounds present, nontender, nondistended, no masses  Extrem:  No clubbing, cyanosis, or edema    CBC with Differential:    Lab Results   Component Value Date/Time    WBC 6.5 12/28/2022 02:15 AM    RBC 2.55 12/28/2022 02:15 AM    HGB 8.1 12/28/2022 02:15 AM    HCT 26.0 12/28/2022 02:15 AM     12/28/2022 02:15 AM    .0 12/28/2022 02:15 AM    MCH 31.8 12/28/2022 02:15 AM    MCHC 31.2 12/28/2022 02:15 AM    RDW 12.4 12/28/2022 02:15 AM    NRBC 0.0 12/25/2022 06:55 PM    LYMPHOPCT 8.1 12/27/2022 03:18 AM    PROMYELOPCT 0.9 12/25/2022 06:55 PM    MONOPCT 11.9 12/27/2022 03:18 AM    MYELOPCT 1.8 12/25/2022 02:01 PM    BASOPCT 0.4 12/27/2022 03:18 AM    MONOSABS 0.91 12/27/2022 03:18 AM    LYMPHSABS 0.62 12/27/2022 03:18 AM    EOSABS 0.15 12/27/2022 03:18 AM    BASOSABS 0.03 12/27/2022 03:18 AM     CMP:    Lab Results   Component Value Date/Time     12/28/2022 02:15 AM    K 4.8 12/28/2022 02:15 AM    K 4.6 12/25/2022 02:01 PM     12/28/2022 02:15 AM    CO2 20 12/28/2022 02:15 AM    BUN 47 12/28/2022 02:15 AM    CREATININE 2.2 12/28/2022 02:15 AM    GFRAA 51 02/11/2020 08:00 AM    LABGLOM 29 12/28/2022 02:15 AM    GLUCOSE 190 12/28/2022 02:15 AM    PROT 6.4 12/27/2022 03:18 AM    LABALBU 3.3 12/27/2022 03:18 AM    CALCIUM 8.8 12/28/2022 02:15 AM    BILITOT 0.3 12/27/2022 03:18 AM    ALKPHOS 68 12/27/2022 03:18 AM    AST 27 12/27/2022 03:18 AM    ALT 9 12/27/2022 03:18 AM     PT/INR:    Lab Results   Component Value Date/Time    PROTIME 11.9 12/15/2022 08:35 AM    INR 1.1 12/15/2022 08:35 AM     @cktotal:3,ckmb:3,ckmbindex:3,troponini:3@  U/A:    Lab Results   Component Value Date/Time    NITRU Negative 12/27/2022 06:22 PM    COLORU Straw 12/27/2022 06:22 PM    PHUR 6.0 12/27/2022 06:22 PM    [address] NONE 12/27/2022 06:22 PM    RBCUA NONE 12/27/2022 06:22 PM    BACTERIA NONE SEEN 12/27/2022 06:22 PM    CLARITYU Clear 12/27/2022 06:22 PM    SPECGRAV 1.020 12/27/2022 06:22 PM    UROBILINOGEN 0.2 12/27/2022 06:22 PM    BILIRUBINUR Negative 12/27/2022 06:22 PM    BLOODU MODERATE 12/27/2022 06:22 PM    GLUCOSEU 100 12/27/2022 06:22 PM    KETUA Negative 12/27/2022 06:22 PM        Assessment:    Patient Active Problem List   Diagnosis    Closed fracture of one rib of left side, initial encounter       Plan:    Syncope  Rib fracture for CPR that was started  Continue supportive care  Work up normal    HTN-  Blood pressure high  Restart all med's when ok with renal service    S/p TKR-  Continue DVT prophylaxis    NIDDM-  Increase insulin    Covid 19  Discharge planning      Julius Redmond DO  6:26 AM  12/28/2022

## 2022-12-28 NOTE — CARE COORDINATION
Social work / Discharge Planning:      [facility]. Patient can return to [facility] when medically stable. No precert needed. Facility can accept patient as covid positive. ALEKS and transport form completed.     Electronically signed by MICK Portillo on 12/28/2022 at 8:24 AM

## 2022-12-28 NOTE — PROGRESS NOTES
[facility]  Nephrology Attending Progress Note    SUBJECTIVE:     12/28/22-patient awake and alert. He is seen and examined therapy is working with him continues to feel better each day. 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    Diabetes mellitus ([facility] [address]     Diverticulosis     mild    GERD (gastroesophageal reflux disease)     Hyperlipidemia     Hypertension        DIET:    ADULT DIET;  Regular; 4 carb choices (60 gm/meal)     PHYSICAL EXAM:     Patient Vitals for the past 24 hrs:   BP Temp Temp src Pulse Resp SpO2 Weight   12/28/22 0700 (!) 142/62 100.4 °F (38 °C) Axillary 84 18 92 % --   12/28/22 0233 -- -- -- -- -- -- 227 lb 11.2 oz (103.3 kg)   12/28/22 0200 (!) 163/64 99.4 °F (37.4 °C) Oral 88 18 95 % --   12/27/22 1911 (!) 163/74 100 °F (37.8 °C) Oral (!) 112 16 96 % --   12/27/22 1545 (!) 140/70 99.1 °F (37.3 °C) Oral (!) 108 18 92 % --   @      Intake/Output Summary (Last 24 hours) at 12/28/2022 0848  Last data filed at 12/28/2022 0700  Gross per 24 hour   Intake 1091.67 ml   Output 2825 ml   Net -1733.33 ml         Wt Readings from Last 3 Encounters:   12/28/22 227 lb 11.2 oz (103.3 kg)   11/24/21 218 lb (98.9 kg)   05/07/21 218 lb (98.9 kg)       Constitutional:  Pt is in no acute distress  Head: normocephalic, atraumatic  Neck: no JVD  Cardiovascular: S1-S2 no S3 or rub  Respiratory: Clear upper diminished bases  Gastrointestinal:  Soft, nontender, nondistended, bowel sounds x 4  Ext: Trace edema  Skin: dry, no rash  Neuro: Awake, alert oriented with periods of confusion    MEDS (scheduled):    insulin NPH  20 Units SubCUTAneous QAM    insulin NPH  15 Units SubCUTAneous Daily before dinner    sodium bicarbonate  650 mg Oral BID    [Held by provider] losartan  100 mg Oral Daily    metoprolol tartrate  25 mg Oral BID    pantoprazole  40 mg Oral QAM AC    pilocarpine  5 mg Oral TID    tamsulosin  0.4 mg Oral Daily    heparin (porcine)  5,000 Units SubCUTAneous 3 times per day    aspirin  81 mg Oral Daily    insulin lispro  0-4 Units SubCUTAneous TID WC    insulin lispro  0-4 Units SubCUTAneous Nightly    zinc sulfate  50 mg Oral Daily    Vitamin D  1,000 Units Oral Daily       MEDS (infusions):   dextrose      sodium chloride 100 mL/hr at 12/27/22 1601       MEDS (prn):  perflutren lipid microspheres, glucose, dextrose bolus **OR** dextrose bolus, glucagon (rDNA), dextrose, acetaminophen    DATA:    Recent Labs     12/26/22  0401 12/27/22  0318 12/28/22  0215   WBC 9.9 7.6 6.5   HGB 8.6* 7.9* 8.1*   HCT 26.5* 25.2* 26.0*   .0* 102.0* 102.0*    312 297     Recent Labs     12/25/22  1855 12/26/22  0401 12/27/22  0318 12/28/22  0215   * 134 134 135   K 5.4* 5.3* 5.0 4.8   CL 98 103 102 102   CO2 20* 17* 18* 20*   BUN 50* 53* 58* 47*   CREATININE 2.1* 2.4* 2.4* 2.2*   LABGLOM 31 26 26 29   GLUCOSE 214* 212* 220* 190*   CALCIUM 9.2 8.8 8.6 8.8   ALT 8 7 9  --    AST 21 18 27  --    BILITOT 0.4 0.4 0.3  --    ALKPHOS 74 65 68  --    MG  --   --   --  1.2*   PHOS  --   --   --  3.0       Lab Results   Component Value Date    LABALBU 3.3 (L) 12/27/2022    LABALBU 3.3 (L) 12/26/2022    LABALBU 3.6 12/25/2022     No results found for: TSH    Iron Studies  Lab Results   Component Value Date    IRON 30 (L) 12/28/2022    TIBC 227 (L) 12/28/2022    FERRITIN 223 12/28/2022     Vitamin B-12   Date Value Ref Range Status   12/28/2022 682 211 - 946 pg/mL Final     Folate   Date Value Ref Range Status   12/28/2022 15.0 4.8 - 24.2 ng/mL Final       Vit D, 25-Hydroxy   Date Value Ref Range Status   02/11/2020 36 30 - 100 ng/mL Final     Comment:     <20 ng/mL. ........... Stewart Wilberto Deficient  20-30 ng/mL. ......... Stewart Wilberto Insufficient   ng/mL. ........ Stewart Wilberto Sufficient  >100 ng/mL. .......... Stewart Wilberto Toxic PTH   Date Value Ref Range Status   02/11/2020 103 (H) 15 - 65 pg/mL Final       No components found for: URIC    Lab Results   Component Value Date/Time    COLORU Straw 12/27/2022 06:22 PM    NITRU Negative 12/27/2022 06:22 PM    GLUCOSEU 100 12/27/2022 06:22 PM    KETUA Negative 12/27/2022 06:22 PM    UROBILINOGEN 0.2 12/27/2022 06:22 PM    BILIRUBINUR Negative 12/27/2022 06:22 PM       No results found for: WZBZWOFW      IMPRESSION/RECOMMENDATIONS:      Acute kidney injury-  In the setting of ineffective renal perfusion in spite of FeNa less than 1%  Likely compounded by occasional use of ARB and HCTZ both are currently being held. Initially had some diarrhea and is COVID-positive  There was also a component of urinary retention with placement of Gracia. Baseline creatinine 1.6 to 1.9 mg/dL  Urine output past 24 hours is  2825 mL  Renal ultrasound shows no hydronephrosis simple appearing right renal cortical cyst.  Creatinine peaked at 2.4 mg/dL and is 2.2 mg/dL this morning  Continue IV hydration as creatinine continues to be elevated above baseline. 2.  Hyperkalemia-  In the setting of acute kidney injury exacerbated by use of ARB  Potassium has been corrected  Magnesium level 1.2 this morning was given 1 g magnesium sulfate IV will repeat for total of 2 g IV    3. Metabolic acidosis-  In the setting of acute kidney injury  Improved with oral bicarbonate    4. Anemia of chronic disease-  Iron sat 13%- would benefit from iron load post acute illness. B12 and folate WNL    5. 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 6.  Chronic kidney disease stage IIIb-  Presumed microvascular in the setting of diabetic nephropathy  Baseline serum creatinine 1.6 to 1.9 mg/dL      TITO Baez - CNP  Patient seen and examined. I had a face to face encounter with the patient. Pt states he is feeling better.   Agree with exam. Agree with  formulation, assessment and plan as outlined above and directed by me. Addition and corrections were done as deemed appropriate.    My exam and plan include:     Continue current treatment as outlined above    SHELL Torres MD

## 2022-12-28 NOTE — PROGRESS NOTES
[facility] at [address] [place] [address]    Name: Rosalia Pittman    Age: 80 y.o. Date of Admission: 12/25/2022  1:33 PM    Date of Service: 12/28/2022    Reason for Consultation: syncope    Referring Physician: Dr Clau Sims  Primary Care Physician: Carmita Noriega MD    History of Present Illness: The patient is a 80y.o. year old male s/p recent right TKA at [address] 12/19/22-whent to [facility] for rehab. Was getting up to go to the  and that's all he remembers. Passed out and actually got a few compressions. Came to and brought to ED. Denies any prodrome. No sweats palpitations, chest pains or SOB priot to the event. Has felt ok since arrival. No prior syncope or cardiac issues. Normal ECG, enzymes and tele. H/o DM, HTN, CKD. 12/28/22:   No new complaints. Reviewed echo again- essentially normal. No significant arrhythmia. No CP,SOB,palpitations. Tele sinus. 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 Musculoskeletal: No joint pain or muscle aches. No claudication  Skin: No skin breakdown or rashes  Neuro: No headache, confusion, or seizures  Psych: No depression, anxiety      Allergies: Allergies   Allergen Reactions    Darvocet [Propoxyphene N-Acetaminophen] Nausea And Vomiting    Lisinopril Palpitations       Home Medications:  Prior to Admission medications    Medication Sig Start Date End Date Taking?  Authorizing Provider   insulin NPH (HUMULIN N;NOVOLIN N) 100 UNIT/ML injection vial Inject 15 Units into the skin nightly   Yes Historical Provider, MD   insulin lispro (HUMALOG) 100 UNIT/ML injection vial Inject into the skin 3 times daily (before meals) Humalog SS  0-100= No units  101-150= 10 units  151-200= 15 units  >201= 20 units   Yes Historical Provider, MD   tamsulosin (FLOMAX) 0.4 MG capsule Take 0.4 mg by mouth daily    Historical Provider, MD   losartan (COZAAR) 100 MG tablet Take 100 mg by mouth daily. Historical Provider, MD   pilocarpine (SALAGEN) 5 MG tablet Take 1 tablet by mouth 3 times daily. 11/16/12   Jorje Romberg Mellington, APRN - CNP   pilocarpine (SALAGEN) 5 MG tablet Take 5 mg by mouth 3 times daily. Historical Provider, MD   Insulin Isophane Human (HUMULIN N SC) Inject 25 Units into the skin every morning    Historical Provider, MD   hydrochlorothiazide (HYDRODIURIL) 25 MG tablet Take 25 mg by mouth daily. Historical Provider, MD   valsartan (DIOVAN) 320 MG tablet Take 320 mg by mouth daily. Historical Provider, MD   omeprazole (PRILOSEC) 20 MG capsule Take 20 mg by mouth daily. Historical Provider, MD   metformin (GLUCOPHAGE) 850 MG tablet Take 850 mg by mouth 2 times daily (with meals). Historical Provider, MD   ibuprofen (ADVIL;MOTRIN) 200 MG tablet Take 200 mg by mouth every 6 hours as needed. Historical Provider, MD   metoprolol (LOPRESSOR) 25 MG tablet Take 25 mg by mouth 2 times daily. Historical Provider, MD   Multiple Vitamin (MULTI VITAMIN MENS PO) Take  by mouth. Historical Provider, MD   vitamin D (CHOLECALCIFEROL) 400 UNITS TABS tablet Take 500 Units by mouth daily. Historical Provider, MD   acetaminophen (TYLENOL) 500 MG tablet Take 500 mg by mouth every 6 hours as needed.       Historical Provider, MD       Current Medications:  Current Facility-Administered Medications   Medication Dose Route Frequency Provider Last Rate Last Admin    insulin NPH (HumuLIN N;NovoLIN N) injection vial 20 Units  20 Units SubCUTAneous QAM Rony JOSUE Ezequiel, DO        insulin NPH (HumuLIN N;NovoLIN N) injection vial 15 Units  15 Units SubCUTAneous Daily before dinner Brianna Sluder, DO        sodium bicarbonate tablet 650 mg  650 mg Oral BID Sarah Kerr APRN - CNP   650 mg at 12/27/22 2029    perflutren lipid microspheres (DEFINITY) injection 1.5 mL  1.5 mL IntraVENous ONCE PRN Luis Carlos Warren MD        glucose chewable tablet 16 g  4 tablet Oral PRN Brianna Sluder, DO        dextrose bolus 10% 125 mL  125 mL IntraVENous PRN Brianna Sluder, DO        Or    dextrose bolus 10% 250 mL  250 mL IntraVENous PRN Brianna Sluder, DO        glucagon (rDNA) injection 1 mg  1 mg SubCUTAneous PRN Brianna Sluder, DO        dextrose 10 % infusion   IntraVENous Continuous PRN Brianna Sluder, DO        acetaminophen (TYLENOL) tablet 500 mg  500 mg Oral Q6H PRN Brianna Sluder, DO   500 mg at 12/27/22 2028    [Held by provider] losartan (COZAAR) tablet 100 mg  100 mg Oral Daily Brianna Sluder, DO   100 mg at 12/26/22 0748    metoprolol tartrate (LOPRESSOR) tablet 25 mg  25 mg Oral BID Brianna Sluder, DO   25 mg at 12/27/22 2029    pantoprazole (PROTONIX) tablet 40 mg  40 mg Oral QAM AC Brianna Sluder, DO   40 mg at 12/28/22 0612    pilocarpine (SALAGEN) tablet 5 mg  5 mg Oral TID Brianna Sluder, DO   5 mg at 12/27/22 2029    tamsulosin (FLOMAX) capsule 0.4 mg  0.4 mg Oral Daily Brianna Sluder, DO   0.4 mg at 12/27/22 0803    0.9 % sodium chloride infusion   IntraVENous Continuous Brianna Sluder,  mL/hr at 12/27/22 1601 New Bag at 12/27/22 1601    heparin (porcine) injection 5,000 Units  5,000 Units SubCUTAneous 3 times per day Brianna Sluder, DO   5,000 Units at 12/28/22 4498    aspirin chewable tablet 81 mg  81 mg Oral Daily Brianna Sluder, DO   81 mg at 12/27/22 0803    insulin lispro (HUMALOG) injection vial 0-4 Units  0-4 Units SubCUTAneous TID DUANE Ackerman DO   1 Units at 12/28/22 0662 insulin lispro (HUMALOG) injection vial 0-4 Units  0-4 Units SubCUTAneous Nightly Deitra Damon, DO        zinc sulfate (ZINCATE) capsule 50 mg  50 mg Oral Daily Deitra Damon, DO   50 mg at 12/27/22 6922    Vitamin D (CHOLECALCIFEROL) tablet 1,000 Units  1,000 Units Oral Daily Deitra Damon, DO   1,000 Units at 12/27/22 0803      dextrose      sodium chloride 100 mL/hr at 12/27/22 1601       Physical Exam:  BP (!) 142/62   Pulse 84   Temp 100.4 °F (38 °C) (Axillary)   Resp 18   Ht 5' 11\" (1.803 m)   Wt 227 lb 11.2 oz (103.3 kg)   SpO2 92%   BMI 31.76 kg/m²   Weight change: -2 lb 4.8 oz (-1.043 kg)  Wt Readings from Last 3 Encounters:   12/28/22 227 lb 11.2 oz (103.3 kg)   11/24/21 218 lb (98.9 kg)   05/07/21 218 lb (98.9 kg)     General: Awake, alert, oriented x3, no acute distress  HEENT: Normocephalic and atraumatic, extraocular movements intact, pupils equal and round, moist mucus membranes, sclera anicteric  Neck: No JVD, no carotid bruits, no thyromegaly, no adenopathy  Cardiac: Regular rate and rhythm, normal S1 and physiologically split S2, no S3, no S4. Apical impulse is nondisplaced. No murmurs, no pericardial rubs, no clicks. Carotid upstrokes brisk. Respiratory: Clear bilaterally; no wheezes, no rales, no rhonchi. Unlabored respirations  Abdomen: Soft, nontender, nondistended, bowel sounds+, no hepatomegaly, no masses, no abdominal bruits  Extremities: No edema, no cyanosis, no clubbing. Distal pulses intact  Skin: Intact, warm and dry, no rashes, no breakdown  Musculoskeletal: normal tone and strength in the upper and lower extremities bilaterally, recent rt knee surgery  Neuro: No focal deficits. Moves all extremities appropriately to command.   Normal sensation in the upper and lower extremities bilaterally  Psychiatric: Cooperative, and normal affect    Intake/Output:    Intake/Output Summary (Last 24 hours) at 12/28/2022 0747  Last data filed at 12/28/2022 0700  Gross per 24 hour Intake 1091.67 ml   Output 2825 ml   Net -1733.33 ml     No intake/output data recorded. Laboratory Tests:  Last 3 CBC:  Recent Labs     12/26/22  0401 12/27/22 0318 12/28/22 0215   WBC 9.9 7.6 6.5   RBC 2.65* 2.47* 2.55*   HGB 8.6* 7.9* 8.1*   HCT 26.5* 25.2* 26.0*   .0* 102.0* 102.0*   MCH 32.5 32.0 31.8   MCHC 32.5 31.3* 31.2*   RDW 12.5 12.7 12.4    312 297   MPV 9.6 9.7 9.5       Last 3 CMP:    Recent Labs     12/25/22  1855 12/26/22 0401 12/27/22 0318 12/28/22 0215   * 134 134 135   K 5.4* 5.3* 5.0 4.8   CL 98 103 102 102   CO2 20* 17* 18* 20*   BUN 50* 53* 58* 47*   CREATININE 2.1* 2.4* 2.4* 2.2*   GLUCOSE 214* 212* 220* 190*   CALCIUM 9.2 8.8 8.6 8.8   PROT 7.2 6.3* 6.4  --    LABALBU 3.6 3.3* 3.3*  --    BILITOT 0.4 0.4 0.3  --    ALKPHOS 74 65 68  --    AST 21 18 27  --    ALT 8 7 9  --        Last 3 Mag/Phos:  Recent Labs     12/28/22 0215   MG 1.2*   PHOS 3.0       Last 3 CK, CKMB, Troponin  No results for input(s): CKTOTAL, CKMB, TROPONINI in the last 72 hours. Last 3 Pro-BNP:  No results for input(s): PROBNP in the last 72 hours. No results found for: PROBNP    Last 3 Glucose:     Recent Labs     12/26/22  0401 12/27/22 0318 12/28/22 0215   GLUCOSE 212* 220* 190*       Last 3 Coags:  No results for input(s): PROTIME, INR, PTT in the last 72 hours. Lab Results   Component Value Date/Time    PROTIME 11.9 12/15/2022 08:35 AM    INR 1.1 12/15/2022 08:35 AM       Last 3 Lipid Panel:  No results for input(s): LDLCALC, HDL, TRIG in the last 72 hours.     Invalid input(s): CHLPL  Lab Results   Component Value Date    LDLCALC 87 12/16/2019    LDLCALC 72 05/13/2019    [address] 09/17/2018     Lab Results   Component Value Date    HDL 62 12/16/2019    HDL 54 05/13/2019    HDL 54 09/17/2018     Lab Results   Component Value Date    TRIG 75 12/16/2019    TRIG 69 05/13/2019    TRIG 68 09/17/2018     No components found for: CHLPL    TSH:  No results for input(s): TSH in the last 72 hours. No results found for: TSH    ABGs:  No results for input(s): PH, PO2, PCO2, HCO3, BE, O2SAT in the last 72 hours. Lactic Acid:  No results for input(s): LACTA in the last 72 hours. Radiology:  RAD Results:  US RETROPERITONEAL COMPLETE   Final Result   No acute findings the kidneys or urinary bladder. No hydronephrosis. Simple   appearing right renal cortical cyst.         US DUP LOWER EXTREMITIES BILATERAL VENOUS   Final Result   No evidence of DVT in either lower extremity. RECOMMENDATIONS:   Unavailable         US ABDOMEN LIMITED   Final Result   No acute process in the right upper quadrant. CT HEAD WO CONTRAST   Final Result   No acute intracranial abnormality. CT CHEST WO CONTRAST   Final Result      Multiple right rib fractures of unknown age. There is no pneumothorax. Elevation of the right hemidiaphragm with atelectasis in the lung bases. Diffuse coronary artery calcification. EKG and Telemetry:  12-lead EKG personally reviewed and shows sinus 84, normal ECG    Telemetry personally reviewed and shows sinus rhythm        ASSESSMENT / PLAN:    1. Syncopal spell- likely vasomotor event. Nothing significant so far on tele or ECG. Echo essentially normal. Is anemic and Azotemic- getting hydrated. Stable from a cardiac standpoint - ok to go when you wish, will sign off.   2 CKD - Cr 2.2 BUN 47. Follows with nephrology, getting some gentle hydration  3 Troponin- mildly up, but trendiong down, in the face of CKD and compressions- no CP and normal ECG-type II release. 4 DM per PCP  5 Recent TKA- will likely go back to rehab soon. 6 Anemia Hg 8.1.         Jordan Sanders MD,  [facility]  [facility] [address] [place] at [facility]    Electronically signed by Jordan Sanders MD on 12/28/2022 at 7:47 AM

## 2022-12-28 NOTE — PLAN OF CARE
Problem: Discharge Planning  Goal: Discharge to home or other facility with appropriate resources  Outcome: Progressing  Flowsheets (Taken 12/28/2022 0834)  Discharge to home or other facility with appropriate resources: Identify barriers to discharge with patient and caregiver     Problem: Safety - Adult  Goal: Free from fall injury  Outcome: Progressing     Problem: Chronic Conditions and Co-morbidities  Goal: Patient's chronic conditions and co-morbidity symptoms are monitored and maintained or improved  Outcome: Progressing  Flowsheets (Taken 12/28/2022 0834)  Care Plan - Patient's Chronic Conditions and Co-Morbidity Symptoms are Monitored and Maintained or Improved: Monitor and assess patient's chronic conditions and comorbid symptoms for stability, deterioration, or improvement     Problem: ABCDS Injury Assessment  Goal: Absence of physical injury  Outcome: Progressing

## 2022-12-28 NOTE — PROGRESS NOTES
Physical Therapy  Facility/Department: [facility]  Physical Therapy Initial Assessment    Name: Sally Meyers  : 89  MRN: [id number]  Date of Service: 2022    Patient Diagnosis(es): The primary encounter diagnosis was Syncope and collapse. Diagnoses of COVID, Other fatigue, and Closed fracture of multiple ribs, unspecified laterality, initial encounter were also pertinent to this visit. Past Medical History:  has a past medical history of Anemia, CAD (coronary artery disease), Cancer ([facility] Diabetes mellitus ([facility] Diverticulosis, GERD (gastroesophageal reflux disease), Hyperlipidemia, and Hypertension. Past Surgical History:  has a past surgical history that includes Colonoscopy; eye surgery; back surgery; and Parotidectomy (12). Referring provider:  Julius Redmond DO    PT Order:  PT eval and treat     Evaluating PT:  Theresa Andino PT, DPT PT [id number]    Room #:  [facility]/6065  Diagnosis:  Closed fracture of one rib of left side, initial encounter [S22.32XA]  Procedure/Surgery:  recent right TKA  Precautions:  fall risk, O2, droplet +  Equipment Needs:  none. Pt reported owing a walker    SUBJECTIVE:    Pt lives with wife in a 1 story home with 0 stairs to enter and 0 rail. Bed and bath is on first floor. Pt reports he was independent with mobility prior to surgery. Pt admitted from nursing facility. Pt unclear about mobility level after surgery. Pt reported he wasn't able to stand up but he was walking to the bathroom. OBJECTIVE:   Initial Evaluation  Date:  Treatment Short Term/ Long Term   Goals   Was pt agreeable to Eval/treatment? yes     Does pt have pain? None reported     Bed Mobility  Rolling: NT  Supine to sit: Mod A  Sit to supine: Mod A  Scooting:  Min A to side scoot along the side of the bed. SBA   Transfers Sit to stand: Mod A. Pt unable to stand completely upright.    Stand to sit: Mod A  Stand pivot: Pt unable  Min A   Ambulation    Pt unable to take any steps  20+ feet with w/w Min A    Stair negotiation: ascended and descended  NT      ROM Left  LE: WFL  Right LE hip and knee WFL, knee 5-80 degrees  Increase right LE knee ROM 0 - 100 degrees   Strength Left LE:  grossly 4/5  Right LE:  hip and knee 4-/5, ankle 4+/5  Increase LE strength by 1/2 mm grade   Balance Sitting EOB:  Min A. Pt leaning to the right  Static standing:  Min A  Sitting EOB:  Supervision  Dynamic Standing:  Min A with w/w   AM-PAC 6 Clicks 9/74       Orientation:  impaired. Pt with confusion throughout session  Sensation:  Pt denies numbness and tingling to extremities      Therapeutic Exercises:  ankle pumps, quad sets, heel slides (AAROM), hip abd/add (AAROM), while pt supine in bed. LAQs while pt sitting EOB. Patient education  Pt educated on PT objectives during eval and while in the hospital, LE exercises while in bed. Hand placement during transfers. Patient response to education:   Pt verbalized understanding Pt demonstrated skill Pt requires further education in this area   Yes  With cueing yes     ASSESSMENT:    Conditions Requiring Skilled Therapeutic Intervention:    [x]Decreased strength     [x]Decreased ROM  [x]Decreased functional mobility  [x]Decreased balance   [x]Decreased endurance   [x]Decreased posture  []Decreased sensation  []Decreased coordination   []Decreased vision  []Decreased safety awareness   []Increased pain       Comments:  Pt found in bed. No report of dizziness during functional mobility. Pt required Min A for sitting balance due to a frequent lean to the right. Cueing require for hand placement during transfers. Pt able to get his bottom cleared from the bed to stand, but pt unable to lift his trunk to a complete standing position. Pt reported he has not been able to stand up since his surgery but also reported he has been ambulating to the restroom with assistance. Pt's standing tolerance was only 10 seconds.   At end of eval, pt left in bed with call light in reach and bed alarm on and nursing staff present. Treatment:  Patient practiced and was instructed in the following treatment:    Therapeutic exercises performed as documented above for knee ROM and strengthening. Pt's/ family goals   1. None stated    Prognosis is good for reaching above PT goals. Patient and or family understand(s) diagnosis, prognosis, and plan of care. yes    PHYSICAL THERAPY PLAN OF CARE:    PT POC is established based on physician order and patient diagnosis     Diagnosis:  Closed fracture of one rib of left side, initial encounter [S22.32XA]    Current Treatment Recommendations:     [x] Strengthening to improve independence with functional mobility   [x] ROM to improve independence with functional mobility   [x] Balance Training to improve static/dynamic balance and to reduce fall risk  [x] Endurance Training to improve activity tolerance during functional mobility   [x] Transfer Training to improve safety and independence with all functional transfers   [x] Gait Training to improve gait mechanics, endurance and assess need for appropriate assistive device  [] Stair Training in preparation for safe discharge home and/or into the community   [] Positioning to prevent skin breakdown and contractures  [x] Safety and Education Training   [x] Patient/Caregiver Education   [] HEP  [] Other     PT long term treatment goals are located in above grid    Frequency of treatments: 2-5x/week x 1-2 weeks. Time in  1050  Time out  1115    Total Treatment Time  10 minutes     Evaluation Time includes thorough review of current medical information, gathering information on past medical history/social history and prior level of function, completion of standardized testing/informal observation of tasks, assessment of data and education on plan of care and goals.     CPT codes:  [x] Low Complexity PT evaluation 98034  [] Moderate Complexity PT evaluation 05788  [] High Complexity PT evaluation [id number]  [] PT Re-evaluation [id number]  [] Therapeutic activities 45999 __minutes  [x] Therapeutic exercises 59182 10   minutes      Kita Frazier, PT, DPT  PT [id number]

## 2022-12-28 NOTE — PROGRESS NOTES
12/28/2022 9:22 AM  Service: Urology  Group: [facility] (Antelmo/Reshma/Maritza)    Chele Hamm  [id number]    Subjective:  Pt talking on phone  In no distress  Tolerating harrison catheter  Urine is yellow  TMax 100.4    Review of Systems  Constitutional: No chills or sweats  Respiratory: negative for cough and shortness of breath  Cardiovascular: negative for chest pain  Gastrointestinal: negative  Dermatologic: negative for rash   Hematologic/lymphatic: negative  Musculoskeletal:negative  Neurological: negative  Endocrine: hx DM   Psychiatric: negative    Scheduled Meds:   insulin NPH  20 Units SubCUTAneous QAM    insulin NPH  15 Units SubCUTAneous Daily before dinner    magnesium sulfate  1,000 mg IntraVENous Once    sodium bicarbonate  650 mg Oral BID    [Held by provider] losartan  100 mg Oral Daily    metoprolol tartrate  25 mg Oral BID    pantoprazole  40 mg Oral QAM AC    pilocarpine  5 mg Oral TID    tamsulosin  0.4 mg Oral Daily    heparin (porcine)  5,000 Units SubCUTAneous 3 times per day    aspirin  81 mg Oral Daily    insulin lispro  0-4 Units SubCUTAneous TID WC    insulin lispro  0-4 Units SubCUTAneous Nightly    zinc sulfate  50 mg Oral Daily    Vitamin D  1,000 Units Oral Daily       Objective:  Vitals:    12/28/22 0700   BP: (!) 142/62   Pulse: 84   Resp: 18   Temp: 100.4 °F (38 °C)   SpO2: 92%         Allergies: Darvocet [propoxyphene n-acetaminophen] and Lisinopril    General Appearance: alert and oriented to person, place and time and in no acute distress  Skin: no rash or erythema  Head: normocephalic and atraumatic  Pulmonary/Chest: normal air movement, no respiratory distress Abdomen: soft, non-tender, non-distended  Genitalia: harrison catheter in place and draining yellow urine      Labs:     Recent Labs     12/28/22  0215      K 4.8      CO2 20*   BUN 47*   CREATININE 2.2*   GLUCOSE 190*   CALCIUM 8.8       Lab Results   Component Value Date/Time    HGB 8.1 12/28/2022 02:15 AM    HCT 26.0 12/28/2022 02:15 AM          Latest Reference Range & Units 5/13/19 10:40 1/29/20 08:21   Prostatic Specific Ag 0.00 - 4.00 ng/mL 3.05 4.25 (H)          Assessment/Plan:    AUR  BPH  Renal cyst  Urinary incontinence  Elevated PSA  Hx CKD  Covid 19    Urinalysis   Continue harrison catheter  VT prior to discharge if creatinine at [facility]   Will need bladder scan prior to discharge to make sure pt is emptying  Would hold on Flomax d/t syncope   Will need out patient follow up for CORA and PSA   He can follow up with Dr. Agarwal Schools or with [facility] if he prefers      Mara Joya, APRKAUSHAL - CNP   [facility]    Agree with above  Seen and examined  Agree with the plan and treatment    [facility], DO

## 2022-12-29 LAB
ANION GAP SERPL CALCULATED.3IONS-SCNC: 12 MMOL/L (ref 7–16)
BUN BLDV-MCNC: 38 MG/DL (ref 6–23)
CALCIUM SERPL-MCNC: 8.4 MG/DL (ref 8.6–10.2)
CHLORIDE BLD-SCNC: 103 MMOL/L (ref 98–107)
CO2: 20 MMOL/L (ref 22–29)
CREAT SERPL-MCNC: 1.9 MG/DL (ref 0.7–1.2)
GFR SERPL CREATININE-BSD FRML MDRD: 35 ML/MIN/1.73
GLUCOSE BLD-MCNC: 131 MG/DL (ref 74–99)
HCT VFR BLD CALC: 25.6 % (ref 37–54)
HEMOGLOBIN: 8.2 G/DL (ref 12.5–16.5)
MAGNESIUM: 1.4 MG/DL (ref 1.6–2.6)
MCH RBC QN AUTO: 32.2 PG (ref 26–35)
MCHC RBC AUTO-ENTMCNC: 32 % (ref 32–34.5)
MCV RBC AUTO: 100.4 FL (ref 80–99.9)
METER GLUCOSE: 130 MG/DL (ref 74–99)
METER GLUCOSE: 141 MG/DL (ref 74–99)
METER GLUCOSE: 208 MG/DL (ref 74–99)
METER GLUCOSE: 279 MG/DL (ref 74–99)
PDW BLD-RTO: 12.6 FL (ref 11.5–15)
PHOSPHORUS: 3 MG/DL (ref 2.5–4.5)
PLATELET # BLD: 285 E9/L (ref 130–450)
PMV BLD AUTO: 9.5 FL (ref 7–12)
POTASSIUM SERPL-SCNC: 4.5 MMOL/L (ref 3.5–5)
RBC # BLD: 2.55 E12/L (ref 3.8–5.8)
SODIUM BLD-SCNC: 135 MMOL/L (ref 132–146)
WBC # BLD: 6.3 E9/L (ref 4.5–11.5)

## 2022-12-29 PROCEDURE — 83735 ASSAY OF MAGNESIUM: CPT

## 2022-12-29 PROCEDURE — 92526 ORAL FUNCTION THERAPY: CPT | Performed by: SPEECH-LANGUAGE PATHOLOGIST

## 2022-12-29 PROCEDURE — 97165 OT EVAL LOW COMPLEX 30 MIN: CPT

## 2022-12-29 PROCEDURE — 6370000000 HC RX 637 (ALT 250 FOR IP): Performed by: INTERNAL MEDICINE

## 2022-12-29 PROCEDURE — 82962 GLUCOSE BLOOD TEST: CPT

## 2022-12-29 PROCEDURE — 6370000000 HC RX 637 (ALT 250 FOR IP): Performed by: NURSE PRACTITIONER

## 2022-12-29 PROCEDURE — 36415 COLL VENOUS BLD VENIPUNCTURE: CPT

## 2022-12-29 PROCEDURE — 6360000002 HC RX W HCPCS: Performed by: NURSE PRACTITIONER

## 2022-12-29 PROCEDURE — 92610 EVALUATE SWALLOWING FUNCTION: CPT | Performed by: SPEECH-LANGUAGE PATHOLOGIST

## 2022-12-29 PROCEDURE — 97110 THERAPEUTIC EXERCISES: CPT

## 2022-12-29 PROCEDURE — 6360000002 HC RX W HCPCS: Performed by: INTERNAL MEDICINE

## 2022-12-29 PROCEDURE — 85027 COMPLETE CBC AUTOMATED: CPT

## 2022-12-29 PROCEDURE — 97530 THERAPEUTIC ACTIVITIES: CPT

## 2022-12-29 PROCEDURE — 80048 BASIC METABOLIC PNL TOTAL CA: CPT

## 2022-12-29 PROCEDURE — 84100 ASSAY OF PHOSPHORUS: CPT

## 2022-12-29 PROCEDURE — 6370000000 HC RX 637 (ALT 250 FOR IP)

## 2022-12-29 PROCEDURE — 2060000000 HC ICU INTERMEDIATE R&B

## 2022-12-29 RX ORDER — SODIUM BICARBONATE 650 MG/1
TABLET ORAL
Status: COMPLETED
Start: 2022-12-29 | End: 2022-12-29

## 2022-12-29 RX ORDER — LOSARTAN POTASSIUM 50 MG/1
50 TABLET ORAL DAILY
Status: DISCONTINUED | OUTPATIENT
Start: 2022-12-29 | End: 2022-12-30 | Stop reason: HOSPADM

## 2022-12-29 RX ORDER — SODIUM BICARBONATE 650 MG/1
1300 TABLET ORAL 2 TIMES DAILY
Status: DISCONTINUED | OUTPATIENT
Start: 2022-12-29 | End: 2022-12-30 | Stop reason: HOSPADM

## 2022-12-29 RX ORDER — MAGNESIUM SULFATE IN WATER 40 MG/ML
2000 INJECTION, SOLUTION INTRAVENOUS ONCE
Status: COMPLETED | OUTPATIENT
Start: 2022-12-29 | End: 2022-12-29

## 2022-12-29 RX ADMIN — HEPARIN SODIUM 5000 UNITS: 10000 INJECTION INTRAVENOUS; SUBCUTANEOUS at 14:20

## 2022-12-29 RX ADMIN — PILOCARPINE HYDRCHLORIDE 5 MG: 5 TABLET, FILM COATED ORAL at 14:20

## 2022-12-29 RX ADMIN — METOPROLOL TARTRATE 25 MG: 25 TABLET, FILM COATED ORAL at 09:48

## 2022-12-29 RX ADMIN — SODIUM BICARBONATE 650 MG: 650 TABLET ORAL at 09:47

## 2022-12-29 RX ADMIN — METOPROLOL TARTRATE 25 MG: 25 TABLET, FILM COATED ORAL at 21:20

## 2022-12-29 RX ADMIN — PILOCARPINE HYDRCHLORIDE 5 MG: 5 TABLET, FILM COATED ORAL at 09:48

## 2022-12-29 RX ADMIN — HEPARIN SODIUM 5000 UNITS: 10000 INJECTION INTRAVENOUS; SUBCUTANEOUS at 21:24

## 2022-12-29 RX ADMIN — TAMSULOSIN HYDROCHLORIDE 0.4 MG: 0.4 CAPSULE ORAL at 09:48

## 2022-12-29 RX ADMIN — MAGNESIUM SULFATE HEPTAHYDRATE 2000 MG: 40 INJECTION, SOLUTION INTRAVENOUS at 09:55

## 2022-12-29 RX ADMIN — ZINC SULFATE 220 MG (50 MG) CAPSULE 50 MG: CAPSULE at 09:48

## 2022-12-29 RX ADMIN — ASPIRIN 81 MG 81 MG: 81 TABLET ORAL at 09:48

## 2022-12-29 RX ADMIN — PANTOPRAZOLE SODIUM 40 MG: 40 TABLET, DELAYED RELEASE ORAL at 06:14

## 2022-12-29 RX ADMIN — HEPARIN SODIUM 5000 UNITS: 10000 INJECTION INTRAVENOUS; SUBCUTANEOUS at 06:14

## 2022-12-29 RX ADMIN — INSULIN LISPRO 2 UNITS: 100 INJECTION, SOLUTION INTRAVENOUS; SUBCUTANEOUS at 16:56

## 2022-12-29 RX ADMIN — Medication 1000 UNITS: at 09:48

## 2022-12-29 RX ADMIN — SODIUM BICARBONATE 1300 MG: 650 TABLET ORAL at 21:20

## 2022-12-29 RX ADMIN — PILOCARPINE HYDRCHLORIDE 5 MG: 5 TABLET, FILM COATED ORAL at 21:20

## 2022-12-29 RX ADMIN — INSULIN LISPRO 1 UNITS: 100 INJECTION, SOLUTION INTRAVENOUS; SUBCUTANEOUS at 11:59

## 2022-12-29 RX ADMIN — LOSARTAN POTASSIUM 50 MG: 50 TABLET, FILM COATED ORAL at 09:48

## 2022-12-29 ASSESSMENT — PAIN SCALES - GENERAL
PAINLEVEL_OUTOF10: 0
PAINLEVEL_OUTOF10: 0

## 2022-12-29 NOTE — CARE COORDINATION
Social work / Discharge Planning:          [address]. Social work spoke to patient's wife and confirmed plan is for patient to return to [facility]. No precert needed. Facility liaison updated. RN updated.    Electronically signed by MICK Wheeler on 12/29/2022 at 10:01 AM

## 2022-12-29 NOTE — PROGRESS NOTES
Occupational Therapy    OCCUPATIONAL THERAPY INITIAL EVALUATION     Ashley Valdes [facility], [place]         BLSF:                                                  Patient Name: Shira Romero    MRN: [id number]    : 1940    Room: [address] [place], [place] [address]      Evaluating OT: Kiley Campbell OTR/L   [id number]      Referring Sage Thompson DO    Specific Provider Orders/Date:OT eval and treat 2022      Diagnosis:  Closed fracture of one rib of left side, initial encounter Libby Higginbotham     presented from rehab due to syncope.  Patient had several days of diffuse weakness, patient was helped to the restroom when he had a syncopal episode     Pertinent Medical History: recent R TKA at [facility],       Precautions:  Fall Risk, WBAT R LE, alarm, DROPLET PLUS     Assessment of current deficits    [x] Functional mobility  [x]ADLs  [x] Strength               []Cognition    [x] Functional transfers   [x] IADLs         [x] Safety Awareness   [x]Endurance    [] Fine Coordination              [x] Balance      [] Vision/perception   [x]Sensation     []Gross Motor Coordination  [] ROM  [] Delirium                   [] Motor Control     OT PLAN OF CARE   OT POC based on physician orders, patient diagnosis and results of clinical assessment    Frequency/Duration  2-4 days/wk for 2 weeks PRN   Specific OT Treatment Interventions to include:   ADL retraining/adapted techniques and AE recommendations to increase functional independence within precautions                    Energy conservation techniques to improve tolerance for selfcare routine   Functional transfer/mobility training/DME recommendations for increased independence, safety and fall prevention         Patient/family education to increase safety and functional independence             Environmental modifications for safe mobility and completion of ADLs                             Therapeutic activity to improve functional performance during ADLs. Therapeutic exercise to improve tolerance and functional strength for ADLs    Balance retraining/tolerance tasks for facilitation of postural control with dynamic challenges during ADLs . Positioning to improve functional independence      Recommended Adaptive Equipment: TBD     SOCIAL: admitted from [facility]: Pt lives with wife, 1 story. Pain Level: no pain this session ;   Cognition: A&O:id he was in a hospital, the month and year    Memory:  forgetful    Sequencing:  fair+   Problem solving:  fair    Judgement/safety:  fair      Functional Assessment:  AM-PAC Daily Activity Raw Score: 15/24   Initial Eval Status  Date: 12/29/22 Treatment Status  Date: STGs = LTGs  Time frame: 10-14 days   Feeding Independent      Grooming SBA/set-up,seated    Supervision    UB Dressing Min A   Supervision    LB Dressing Max A   Min A    Bathing Mod A   Min A    Toileting Mod A    Min A    Bed Mobility  Min A  (HOB elevated)   SBA    Functional Transfers Mod A  Sit-stand from bed     Forward flexed posture   SBA    Functional Mobility Mod A,w/walker   Steps next to bed, to chair   SBA  with good tolerance    Balance Sitting:     Static:  CGA/SBA - EOB     Dynamic:Min A   Standing: Mod A   Independent/supervision - sitting   CGA/SBA- standing    Activity Tolerance No SOB   Good  with ADL activity    Visual/  Perceptual Glasses: none by bedside                 Hand Dominance right   AROM (PROM) Strength Additional Info:    RUE  WFL WFl good  and wfl FMC/dexterity noted during ADL tasks       LUE WFL WFL good  and wfl FMC/dexterity noted during ADL tasks       Hearing: WFL  Sensation:  No c/o numbness or tingling   Tone: WFL   Edema: none observed     Comments: Upon arrival patient lying in bed . At end of session, patient  sitting in chair  with call light and phone within reach, all lines and tubes intact.   *ALARM ON Overall patient demonstrated  decreased independence and safety during completion of ADL/functional transfer/mobility tasks. Pt would benefit from continued skilled OT to increase safety and independence with completion of ADL/IADL tasks for functional independence and quality of life. Rehab Potential: good for established goals     Patient / Family Goal: none stated       Patient and/or family were instructed on functional diagnosis, prognosis/goals and OT plan of care. Demonstrated fair understanding. Eval Complexity: Low    Time In: 1036  Time Out: 1055      Min Units   OT Eval Low 97165 x  1   OT Eval Medium 61471      OT Eval High 22948      OT Re-Eval [id number]       Therapeutic Ex 15841      Therapeutic Activities 14957       ADL/Self Care 74010       Orthotic Management 50510       Manual 21860     Neuro Re-Ed 62320       Non-Billable Time         Evaluation Time additionally includes thorough review of current medical information, gathering information on past medical history/social history and prior level of function, interpretation of standardized testing/informal observation of tasks, assessment of data and development of plan of care and goals.             Muna Peres  OTR/L  OT [id number]

## 2022-12-29 NOTE — PROGRESS NOTES
SPEECH/LANGUAGE PATHOLOGY  CLINICAL ASSESSMENT OF SWALLOWING FUNCTION   and PLAN OF CARE    PATIENT NAME:  Tj Jacinto  (male)     MRN:  [id number]    :  1940  (6 y.o.)  STATUS:  Inpatient: Room -A    TODAY'S DATE:  2022  REFERRING PROVIDER:   22 Avril    SLP swallowing-dysphagia evaluation and treatment  Start:  22 1015,   End:  22 1015,   ONE TIME,   Standing Count:  1 Occurrences,   R         Bridger Alvarez DO   REASON FOR REFERRAL: RN reports difficulty swallowing pill this AM   EVALUATING THERAPIST: JAKY Carrillo                 RESULTS:    DYSPHAGIA DIAGNOSIS:   Clinical indicators of functional swallow for age/premorbid functioning    RN reports pt demonstrated difficulty swallowing pills this AM and she also noted chewed up food in cups on bedside table. Pt stated that \"sometimes food just won't go down\". 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 Pt denies swallowing difficulty and tolerated presentations during today's clinical evaluation. SLP to continue to assess.        DIET RECOMMENDATIONS:  Regular consistency solids (IDDSI level 7) with  thin liquids (IDDSI level 0) as tolerated     FEEDING RECOMMENDATIONS:     Assistance level:  No assistance needed      Compensatory strategies recommended: Small bites/sips      Discussed recommendations with nursing and/or faxed report to referring provider: Yes    SPEECH THERAPY  PLAN OF CARE   The dysphagia POC is established based on physician order, dysphagia diagnosis and results of clinical assessment     Meal time assessment for 1-2 sessions to provide diet modification and compensatory strategy implementation due to staff report of dysphagia symptoms during meals     Conditions Requiring Skilled Therapeutic Intervention for dysphagia:    Patient is performing below functional baseline d/t  current acute condition, Multiple diagnoses, multiple medications, and increased dependency upon caregivers. Specific dysphagia interventions to include:     Meal time assessment for 1-2 sessions to provide diet modification and compensatory strategy implementation due to staff report of dysphagia symptoms during meals     Specific instructions for next treatment:  not applicable   Patient Treatment Goals:    Short Term Goals:  Pt will participate in meal time assessment for 1-2 sessions to provide diet modification and compensatory strategy implementation due to staff report of dysphagia symptoms during meals     Long Term Goals:   Pt will maintain adequate nutrition/hydration via PO intake of the least restrictive oral diet with implementation of safe swallow/ compensatory strategies and decrease signs/symptoms of aspiration to less than 1 x/day. Patient/family Goal:    Did not state. Will further assess during treatment. Plan of care discussed with Patient   The Patient understand(s) the diagnosis, prognosis and plan of care     Rehabilitation Potential/Prognosis: good                    ADMITTING DIAGNOSIS: Closed fracture of one rib of left side, initial encounter [S22.32XA]    VISIT DIAGNOSIS:   Visit Diagnoses         Codes    Syncope and collapse    -  Primary R55    COVID     U07.1    Other fatigue     R53.83    Closed fracture of multiple ribs, unspecified laterality, initial encounter     S22.49XA             PATIENT REPORT/COMPLAINT: denies difficulty swallowing  RN cleared patient for participation in assessment     yes     PRIOR LEVEL OF SWALLOW FUNCTION:    PAST HISTORY OF DYSPHAGIA?: none reported    Home diet: Regular consistency solids (IDDSI level 7) with  thin liquids (IDDSI level 0)  Current Diet Order:  ADULT DIET;  Regular; 4 carb choices (60 gm/meal)    PROCEDURE:  Consistencies Administered During the Evaluation   Liquids: thin liquid   Solids:  pureed foods and solid foods      Method of Intake:   cup, straw, spoon  Self fed      Position:   Seated, upright    CLINICAL ASSESSMENT:  Oral Stage: The oral stage of swallowing was within functional limits for consistencies administered      Pharyngeal Stage:    No signs of aspiration were noted during this evaluation however, silent aspiration cannot be ruled out at bedside. If silent aspiration is suspected, a Videofluoroscopic Study of Swallowing (MBS) is recommended and requires a physician order. Cognition:   Within functional limits for this exam    Oral Peripheral Examination   Adequate lingual/labial strength     Current Respiratory Status    room air     Parameters of Speech Production  Respiration:  Adequate for speech production  Quality:   Within functional limits  Intensity: Within functional limits    Volitional Swallow: present     Volitional Cough:   present     Pain: No pain reported. EDUCATION:   The Speech Language Pathologist (SLP) completed education regarding results of evaluation and that intervention is warranted at this time. Learner: Patient  Education: Reviewed results and recommendations of this evaluation  Evaluation of Education:  Eliza Orellana understanding    This plan may be re-evaluated and revised as warranted. Evaluation Time includes thorough review of current medical information, gathering information on past medical history/social history and prior level of function, completion of standardized testing/informal observation of tasks, assessment of data and education on plan of care and goals. INTERVENTION    Pt/family educated on above results and plan of care. Pt/family trained on compensatory strategies for safe swallow and signs and symptoms of aspiration (throat clearing, coughing/choking, wet vocal quality. , etc.) and encouraged to notify staff if observed. Handouts provided as warranted. Pt/family encouraged to engage in question/answer session. All questions answered and pt/family verbalized understanding of above.        [x]The admitting diagnosis and active problem list, have been reviewed prior to initiation of this evaluation. ACTIVE PROBLEM LIST:   Patient Active Problem List   Diagnosis    Closed fracture of one rib of left side, initial encounter         CPT code:  60347  bedside swallow eval, 79954 dysphagia therapy    Howie MONDRAGON CCC/SLP [id number]  Speech-Language Pathologist

## 2022-12-29 NOTE — PROGRESS NOTES
[facility]  Nephrology Attending Progress Note    SUBJECTIVE:     12/29/22-awake and alert. He offers no complaints and tells me he is feeling better each day. PROBLEM LIST:    Patient Active Problem List   Diagnosis    Closed fracture of one rib of left side, initial encounter        PAST MEDICAL HISTORY:    Past Medical History:   Diagnosis Date    Anemia     CAD (coronary artery disease)     Cancer ([facility] [address] 2012    left parotid    Diabetes mellitus ([facility] [address]     Diverticulosis     mild    GERD (gastroesophageal reflux disease)     Hyperlipidemia     Hypertension        DIET:    ADULT DIET; Regular; 4 carb choices (60 gm/meal)     PHYSICAL EXAM:     Patient Vitals for the past 24 hrs:   BP Temp Temp src Pulse Resp SpO2 Weight   12/29/22 0717 (!) 155/62 100 °F (37.8 °C) Axillary 79 18 91 % --   12/29/22 0559 (!) 152/76 98.2 °F (36.8 °C) Oral 84 18 91 % --   12/29/22 0132 (!) 150/83 98.9 °F (37.2 °C) Oral 83 16 91 % --   12/29/22 0104 -- -- -- -- -- -- 234 lb 12.8 oz (106.5 kg)   12/28/22 1925 (!) 154/68 100.4 °F (38 °C) Axillary 83 16 96 % --   12/28/22 1512 129/64 98.2 °F (36.8 °C) Oral 79 18 92 % --   @      Intake/Output Summary (Last 24 hours) at 12/29/2022 5672  Last data filed at 12/29/2022 0559  Gross per 24 hour   Intake 2466. 56 ml   Output 2600 ml   Net -133.44 ml         Wt Readings from Last 3 Encounters:   12/29/22 234 lb 12.8 oz (106.5 kg)   11/24/21 218 lb (98.9 kg)   05/07/21 218 lb (98.9 kg)       Constitutional:  Pt is in no acute distress  Head: normocephalic, atraumatic  Neck: no JVD  Cardiovascular: S1-S2 no S3 or rub  Respiratory: Clear upper diminished bases  Gastrointestinal:  Soft, nontender, nondistended, bowel sounds x 4  Ext: Trace edema  Skin: dry, no rash  Neuro: Awake, alert oriented with periods of confusion    MEDS (scheduled):    magnesium sulfate  2,000 mg IntraVENous Once    insulin NPH  20 Units SubCUTAneous QAM    insulin NPH  15 Units SubCUTAneous Daily before dinner sodium bicarbonate  650 mg Oral BID    [Held by provider] losartan  100 mg Oral Daily    metoprolol tartrate  25 mg Oral BID    pantoprazole  40 mg Oral QAM AC    pilocarpine  5 mg Oral TID    tamsulosin  0.4 mg Oral Daily    heparin (porcine)  5,000 Units SubCUTAneous 3 times per day    aspirin  81 mg Oral Daily    insulin lispro  0-4 Units SubCUTAneous TID WC    insulin lispro  0-4 Units SubCUTAneous Nightly    zinc sulfate  50 mg Oral Daily    Vitamin D  1,000 Units Oral Daily       MEDS (infusions):   dextrose      sodium chloride 100 mL/hr at 12/28/22 1640       MEDS (prn):  perflutren lipid microspheres, glucose, dextrose bolus **OR** dextrose bolus, glucagon (rDNA), dextrose, acetaminophen    DATA:    Recent Labs     12/27/22 0318 12/28/22 0215 12/29/22  0626   WBC 7.6 6.5 6.3   HGB 7.9* 8.1* 8.2*   HCT 25.2* 26.0* 25.6*   .0* 102.0* 100.4*    297 285     Recent Labs     12/27/22 0318 12/28/22 0215 12/28/22  1625 12/29/22  0626    135  --  135   K 5.0 4.8  --  4.5    102  --  103   CO2 18* 20*  --  20*   BUN 58* 47*  --  38*   CREATININE 2.4* 2.2*  --  1.9*   LABGLOM 26 29  --  35   GLUCOSE 220* 190*  --  131*   CALCIUM 8.6 8.8  --  8.4*   ALT 9  --   --   --    AST 27  --   --   --    BILITOT 0.3  --   --   --    ALKPHOS 68  --   --   --    MG  --  1.2* 1.6 1.4*   PHOS  --  3.0  --  3.0       Lab Results   Component Value Date    LABALBU 3.3 (L) 12/27/2022    LABALBU 3.3 (L) 12/26/2022    LABALBU 3.6 12/25/2022     No results found for: TSH    Iron Studies  Lab Results   Component Value Date    IRON 30 (L) 12/28/2022    TIBC 227 (L) 12/28/2022    FERRITIN 223 12/28/2022     Vitamin B-12   Date Value Ref Range Status   12/28/2022 682 211 - 946 pg/mL Final     Folate   Date Value Ref Range Status   12/28/2022 15.0 4.8 - 24.2 ng/mL Final       Vit D, 25-Hydroxy   Date Value Ref Range Status   02/11/2020 36 30 - 100 ng/mL Final     Comment:     <20 ng/mL. ........... Lisa Mederos Deficient  20-30 ng/mL.......... Namita Phillip Insufficient   ng/mL. ........ Namitarosette Fisher Sufficient  >100 ng/mL. .......... Namita Fisher Toxic       PTH   Date Value Ref Range Status   02/11/2020 103 (H) 15 - 65 pg/mL Final       No components found for: URIC    Lab Results   Component Value Date/Time    COLORU Straw 12/27/2022 06:22 PM    NITRU Negative 12/27/2022 06:22 PM    GLUCOSEU 100 12/27/2022 06:22 PM    KETUA Negative 12/27/2022 06:22 PM    UROBILINOGEN 0.2 12/27/2022 06:22 PM    BILIRUBINUR Negative 12/27/2022 06:22 PM       No results found for: JPLUGTTM      IMPRESSION/RECOMMENDATIONS:      Acute kidney injury-  In the setting of ineffective renal perfusion in spite of FeNa less than 1%  Likely compounded by occasional use of ARB and HCTZ both are currently being held. Initially had some diarrhea and is COVID-positive  There was also a component of urinary retention with placement of Gracia. Baseline creatinine 1.6 to 1.9 mg/dL  Urine output past 24 hours is  2600 mL  Renal ultrasound shows no hydronephrosis simple appearing right renal cortical cyst.  Creatinine peaked at 2.4 mg/dL and is 1.9 mg/dL this morning  Stop IV fluids patient back at baseline      2. Hyperkalemia-  In the setting of acute kidney injury exacerbated by use of ARB  Potassium has been corrected  Magnesium 1.4 supplement 2 g IV magnesium sulfate    3. Metabolic acidosis-  In the setting of acute kidney injury  CO2 goal >/= 22 mmol/l- below goal.   Oral sodium bicarbonate was increased 12/28    4. Anemia of chronic disease-  Iron sat 13%- would benefit from iron load post acute illness. B12 and folate WNL    5. 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   Chronic kidney disease stage IIIb-  Presumed microvascular in the setting of diabetic nephropathy  Baseline serum creatinine 1.6 to 1.9 mg/dL      TITO Kerns - CNP  Patient remains in [facility]  Agree with exam.    Agree with  formulation, assessment and plan as outlined above and directed by me. Addition and corrections were done as deemed appropriate.    My exam and plan include:    A/P:  Stage I ZION on CKD G3B-cr at/near baseline this AM-agree with resuming the losartan as BP above goal-follow labs     Agree with the remainder as above-Continue current treatment as outlined above    SHELL Torres MD

## 2022-12-29 NOTE — PROGRESS NOTES
Subjective: The patient is awake and alert. No problems overnight. Denies chest pain, angina, and dyspnea. Denies abdominal pain. Tolerating diet. No nausea or vomiting. Objective:    BP (!) 152/76   Pulse 84   Temp 98.2 °F (36.8 °C) (Oral)   Resp 18   Ht 5' 11\" (1.803 m)   Wt 234 lb 12.8 oz (106.5 kg)   SpO2 91%   BMI 32.75 kg/m²     Heart:  RRR, no murmurs, gallops, or rubs.   Lungs:  CTA bilaterally, no wheeze, rales or rhonchi  Abd: bowel sounds present, nontender, nondistended, no masses  Extrem:  No clubbing, cyanosis, or edema    CBC with Differential:    Lab Results   Component Value Date/Time    WBC 6.5 12/28/2022 02:15 AM    RBC 2.55 12/28/2022 02:15 AM    HGB 8.1 12/28/2022 02:15 AM    HCT 26.0 12/28/2022 02:15 AM     12/28/2022 02:15 AM    .0 12/28/2022 02:15 AM    MCH 31.8 12/28/2022 02:15 AM    MCHC 31.2 12/28/2022 02:15 AM    RDW 12.4 12/28/2022 02:15 AM    NRBC 0.0 12/25/2022 06:55 PM    LYMPHOPCT 8.1 12/27/2022 03:18 AM    PROMYELOPCT 0.9 12/25/2022 06:55 PM    MONOPCT 11.9 12/27/2022 03:18 AM    MYELOPCT 1.8 12/25/2022 02:01 PM    BASOPCT 0.4 12/27/2022 03:18 AM    MONOSABS 0.91 12/27/2022 03:18 AM    LYMPHSABS 0.62 12/27/2022 03:18 AM    EOSABS 0.15 12/27/2022 03:18 AM    BASOSABS 0.03 12/27/2022 03:18 AM     CMP:    Lab Results   Component Value Date/Time     12/28/2022 02:15 AM    K 4.8 12/28/2022 02:15 AM    K 4.6 12/25/2022 02:01 PM     12/28/2022 02:15 AM    CO2 20 12/28/2022 02:15 AM    BUN 47 12/28/2022 02:15 AM    CREATININE 2.2 12/28/2022 02:15 AM    GFRAA 51 02/11/2020 08:00 AM    LABGLOM 29 12/28/2022 02:15 AM    GLUCOSE 190 12/28/2022 02:15 AM    PROT 6.4 12/27/2022 03:18 AM    LABALBU 3.3 12/27/2022 03:18 AM    CALCIUM 8.8 12/28/2022 02:15 AM    BILITOT 0.3 12/27/2022 03:18 AM    ALKPHOS 68 12/27/2022 03:18 AM    AST 27 12/27/2022 03:18 AM    ALT 9 12/27/2022 03:18 AM     PT/INR:    Lab Results   Component Value Date/Time    PROTIME 11.9 12/15/2022 08:35 AM    INR 1.1 12/15/2022 08:35 AM     @cktotal:3,ckmb:3,ckmbindex:3,troponini:3@  U/A:    Lab Results   Component Value Date/Time    NITRU Negative 12/27/2022 06:22 PM    COLORU Straw 12/27/2022 06:22 PM    PHUR 6.0 12/27/2022 06:22 PM    [address] NONE 12/27/2022 06:22 PM    RBCUA NONE 12/27/2022 06:22 PM    BACTERIA NONE SEEN 12/27/2022 06:22 PM    CLARITYU Clear 12/27/2022 06:22 PM    SPECGRAV 1.020 12/27/2022 06:22 PM    UROBILINOGEN 0.2 12/27/2022 06:22 PM    BILIRUBINUR Negative 12/27/2022 06:22 PM    BLOODU MODERATE 12/27/2022 06:22 PM    GLUCOSEU 100 12/27/2022 06:22 PM    KETUA Negative 12/27/2022 06:22 PM        Assessment:    Patient Active Problem List   Diagnosis    Closed fracture of one rib of left side, initial encounter       Plan:    ZION  Still on fluids  Bicarb better  BP med's to be restarted as per nephology    S/p R TKR-  On heparin for DVT prophylaxis  PT  Discharge planning  Social service will need to speak to his wife        Duarte Jose, [place]  6:24 AM  12/29/2022

## 2022-12-29 NOTE — PROGRESS NOTES
Physical Therapy  Facility/Department: [facility]  Daily Treatment Note  NAME: Divya Paulino  : [id number]  MRN: [id number]    Date of Service: 2022    Patient Diagnosis(es): The primary encounter diagnosis was Syncope and collapse. Diagnoses of COVID, Other fatigue, and Closed fracture of multiple ribs, unspecified laterality, initial encounter were also pertinent to this visit. Referring provider:  Marion Queen DO     PT Order:  PT eval and treat      Evaluating PT:  Romayne Plumber, PT, DPT PT [id number]     Room #:  9424/6246-M  Diagnosis:  Closed fracture of one rib of left side, initial encounter [S22.32XA]  Procedure/Surgery:  recent right TKA  Precautions:  fall risk, O2, droplet +  Equipment Needs:  none. Pt reported owing a walker     SUBJECTIVE:     Pt lives with wife in a 1 story home with 0 stairs to enter and 0 rail. Bed and bath is on first floor. Pt reports he was independent with mobility prior to surgery. Pt admitted from nursing facility. Pt unclear about mobility level after surgery. Pt reported he wasn't able to stand up but he was walking to the bathroom. OBJECTIVE:    Initial Evaluation  Date:  Treatment   Short Term/ Long Term   Goals   Was pt agreeable to Eval/treatment? yes  yes     Does pt have pain? None reported  none reported     Bed Mobility  Rolling: NT  Supine to sit: Mod A  Sit to supine: Mod A  Scooting:  Min A to side scoot along the side of the bed.   rolling:  NT  Supine to sit:  Min A  Sit to supine:  NT  Scooting:  SBA to sitting EOB SBA   Transfers Sit to stand: Mod A. Pt unable to stand completely upright. Stand to sit: Mod A  Stand pivot: Pt unable  sit to stand: Mod A  Stand to sit:  Mod A  Stand pivot:   Mod A with w/w Min A   Ambulation    Pt unable to take any steps  4 side steps toward HOB with w/w Mod A 20+ feet with w/w Min A    Stair negotiation: ascended and descended  NT   NT     ROM Left  LE: WFL  Right LE hip and knee WFL, knee 5-80 degrees   Increase right LE knee ROM 0 - 100 degrees   Strength Left LE:  grossly 4/5  Right LE:  hip and knee 4-/5, ankle 4+/5   Increase LE strength by 1/2 mm grade   Balance Sitting EOB:  Min A. Pt leaning to the right  Static standing:  Min A  sitting EOB:  SBA  Standing with w/w Mod A with w/w Sitting EOB:  Supervision  Dynamic Standing:  Min A with w/w   AM-PAC 6 Clicks 9/57 78/60         Therapeutic Exercises:  quad sets, knee flexion/extension while in bed. LAQs while sitting EOB. Patient education  Pt educated on PT objectives during treatment session, hand placement during transfers, posture while standing, safety during mobility. Patient response to education:   Pt verbalized understanding Pt demonstrated skill Pt requires further education in this area   yes With cueing yes     ASSESSMENT:    Comments:  Pt found in bed and left sitting up in the chair with call light in reach and chair alarm on. Treatment:  Patient practiced and was instructed in the following treatment:   Functional mobility and therapeutic exercises performed as documented above. No report of dizziness during functional mobility. Cueing required for hand placement during transfers. Pt able to get to more upright position when standing but reports he is unable to stand up completely upright due to back pain. Pt with slow gait speed and labored gait. Pt with limited standing tolerance. PLAN:    Patient is making good progress towards established goals. Will continue with current POC.      Time in  1030  Time out  1055    Total Treatment Time 25 minutes     CPT codes:    [x] Therapeutic activities 34597  15 minutes  [x] Therapeutic exercises 09322 10 minutes      Laymon Finder, PT [id number]

## 2022-12-29 NOTE — PLAN OF CARE
Problem: Discharge Planning  Goal: Discharge to home or other facility with appropriate resources  12/29/2022 0411 by Brandy Calix RN  Outcome: Progressing  12/28/2022 1731 by Tara Mendoza RN  Outcome: Progressing  Flowsheets (Taken 12/28/2022 4411)  Discharge to home or other facility with appropriate resources: Identify barriers to discharge with patient and caregiver     Problem: Safety - Adult  Goal: Free from fall injury  12/29/2022 0411 by Brandy Calix RN  Outcome: Progressing  12/28/2022 1731 by Tara Mendoza RN  Outcome: Progressing     Problem: Chronic Conditions and Co-morbidities  Goal: Patient's chronic conditions and co-morbidity symptoms are monitored and maintained or improved  12/29/2022 0411 by Brandy Calix RN  Outcome: Progressing  12/28/2022 1731 by Tara Mendoza RN  Outcome: Progressing  Flowsheets (Taken 12/28/2022 8351)  Care Plan - Patient's Chronic Conditions and Co-Morbidity Symptoms are Monitored and Maintained or Improved: Monitor and assess patient's chronic conditions and comorbid symptoms for stability, deterioration, or improvement     Problem: ABCDS Injury Assessment  Goal: Absence of physical injury  12/29/2022 0411 by Brandy Calix RN  Outcome: Progressing  12/28/2022 1731 by Tara Mendoza RN  Outcome: Progressing

## 2022-12-30 VITALS
HEART RATE: 92 BPM | DIASTOLIC BLOOD PRESSURE: 92 MMHG | BODY MASS INDEX: 31.5 KG/M2 | OXYGEN SATURATION: 95 % | RESPIRATION RATE: 18 BRPM | SYSTOLIC BLOOD PRESSURE: 159 MMHG | WEIGHT: 225 LBS | HEIGHT: 71 IN | TEMPERATURE: 97.4 F

## 2022-12-30 LAB
ANION GAP SERPL CALCULATED.3IONS-SCNC: 13 MMOL/L (ref 7–16)
BUN BLDV-MCNC: 38 MG/DL (ref 6–23)
CALCIUM SERPL-MCNC: 8.4 MG/DL (ref 8.6–10.2)
CHLORIDE BLD-SCNC: 101 MMOL/L (ref 98–107)
CO2: 19 MMOL/L (ref 22–29)
CREAT SERPL-MCNC: 2 MG/DL (ref 0.7–1.2)
GFR SERPL CREATININE-BSD FRML MDRD: 33 ML/MIN/1.73
GLUCOSE BLD-MCNC: 94 MG/DL (ref 74–99)
HCT VFR BLD CALC: 25.8 % (ref 37–54)
HEMOGLOBIN: 8.1 G/DL (ref 12.5–16.5)
MAGNESIUM: 1.6 MG/DL (ref 1.6–2.6)
MCH RBC QN AUTO: 31.9 PG (ref 26–35)
MCHC RBC AUTO-ENTMCNC: 31.4 % (ref 32–34.5)
MCV RBC AUTO: 101.6 FL (ref 80–99.9)
METER GLUCOSE: 183 MG/DL (ref 74–99)
METER GLUCOSE: 97 MG/DL (ref 74–99)
PDW BLD-RTO: 12.2 FL (ref 11.5–15)
PHOSPHORUS: 3.1 MG/DL (ref 2.5–4.5)
PLATELET # BLD: 291 E9/L (ref 130–450)
PMV BLD AUTO: 9.4 FL (ref 7–12)
POTASSIUM SERPL-SCNC: 4.4 MMOL/L (ref 3.5–5)
RBC # BLD: 2.54 E12/L (ref 3.8–5.8)
SODIUM BLD-SCNC: 133 MMOL/L (ref 132–146)
WBC # BLD: 5.8 E9/L (ref 4.5–11.5)

## 2022-12-30 PROCEDURE — 6370000000 HC RX 637 (ALT 250 FOR IP): Performed by: INTERNAL MEDICINE

## 2022-12-30 PROCEDURE — 6370000000 HC RX 637 (ALT 250 FOR IP): Performed by: NURSE PRACTITIONER

## 2022-12-30 PROCEDURE — 82962 GLUCOSE BLOOD TEST: CPT

## 2022-12-30 PROCEDURE — 85027 COMPLETE CBC AUTOMATED: CPT

## 2022-12-30 PROCEDURE — 80048 BASIC METABOLIC PNL TOTAL CA: CPT

## 2022-12-30 PROCEDURE — 6360000002 HC RX W HCPCS: Performed by: INTERNAL MEDICINE

## 2022-12-30 PROCEDURE — 2580000003 HC RX 258: Performed by: INTERNAL MEDICINE

## 2022-12-30 PROCEDURE — 36415 COLL VENOUS BLD VENIPUNCTURE: CPT

## 2022-12-30 PROCEDURE — 84100 ASSAY OF PHOSPHORUS: CPT

## 2022-12-30 PROCEDURE — 83735 ASSAY OF MAGNESIUM: CPT

## 2022-12-30 RX ORDER — ASPIRIN 81 MG/1
81 TABLET, CHEWABLE ORAL DAILY
Qty: 30 TABLET | Refills: 3 | Status: ON HOLD | OUTPATIENT
Start: 2022-12-30

## 2022-12-30 RX ORDER — HEPARIN SODIUM 10000 [USP'U]/ML
5000 INJECTION, SOLUTION INTRAVENOUS; SUBCUTANEOUS EVERY 8 HOURS SCHEDULED
Qty: 10 ML | Refills: 0 | Status: ON HOLD | OUTPATIENT
Start: 2022-12-30 | End: 2023-01-20

## 2022-12-30 RX ORDER — ZINC SULFATE 50(220)MG
50 CAPSULE ORAL DAILY
Qty: 30 CAPSULE | Refills: 3 | Status: ON HOLD | COMMUNITY
Start: 2022-12-30

## 2022-12-30 RX ORDER — INSULIN LISPRO 100 [IU]/ML
0-4 INJECTION, SOLUTION INTRAVENOUS; SUBCUTANEOUS NIGHTLY
Qty: 10 ML | Refills: 0 | Status: ON HOLD | OUTPATIENT
Start: 2022-12-30

## 2022-12-30 RX ORDER — INSULIN LISPRO 100 [IU]/ML
0-4 INJECTION, SOLUTION INTRAVENOUS; SUBCUTANEOUS
Qty: 10 ML | Refills: 0 | Status: ON HOLD | OUTPATIENT
Start: 2022-12-30

## 2022-12-30 RX ADMIN — ZINC SULFATE 220 MG (50 MG) CAPSULE 50 MG: CAPSULE at 09:04

## 2022-12-30 RX ADMIN — METOPROLOL TARTRATE 25 MG: 25 TABLET, FILM COATED ORAL at 09:04

## 2022-12-30 RX ADMIN — ASPIRIN 81 MG 81 MG: 81 TABLET ORAL at 09:04

## 2022-12-30 RX ADMIN — PILOCARPINE HYDRCHLORIDE 5 MG: 5 TABLET, FILM COATED ORAL at 09:04

## 2022-12-30 RX ADMIN — TAMSULOSIN HYDROCHLORIDE 0.4 MG: 0.4 CAPSULE ORAL at 09:04

## 2022-12-30 RX ADMIN — IRON SUCROSE 100 MG: 20 INJECTION, SOLUTION INTRAVENOUS at 09:04

## 2022-12-30 RX ADMIN — HEPARIN SODIUM 5000 UNITS: 10000 INJECTION INTRAVENOUS; SUBCUTANEOUS at 06:01

## 2022-12-30 RX ADMIN — PANTOPRAZOLE SODIUM 40 MG: 40 TABLET, DELAYED RELEASE ORAL at 06:01

## 2022-12-30 RX ADMIN — LOSARTAN POTASSIUM 50 MG: 50 TABLET, FILM COATED ORAL at 09:04

## 2022-12-30 RX ADMIN — SODIUM BICARBONATE 1300 MG: 650 TABLET ORAL at 09:04

## 2022-12-30 RX ADMIN — Medication 1000 UNITS: at 09:04

## 2022-12-30 NOTE — PROGRESS NOTES
Subjective: The patient is awake and alert. No problems overnight. Denies chest pain, angina, and dyspnea. Denies abdominal pain. Tolerating diet. No nausea or vomiting. Objective:    BP (!) 164/95   Pulse 76   Temp 98.4 °F (36.9 °C) (Oral)   Resp 18   Ht 5' 11\" (1.803 m)   Wt 225 lb (102.1 kg)   SpO2 96%   BMI 31.38 kg/m²     Heart:  RRR, no murmurs, gallops, or rubs.   Lungs:  CTA bilaterally, no wheeze, rales or rhonchi  Abd: bowel sounds present, nontender, nondistended, no masses  Extrem:  No clubbing, cyanosis, or edema    CBC with Differential:    Lab Results   Component Value Date/Time    WBC 5.8 12/30/2022 06:16 AM    RBC 2.54 12/30/2022 06:16 AM    HGB 8.1 12/30/2022 06:16 AM    HCT 25.8 12/30/2022 06:16 AM     12/30/2022 06:16 AM    .6 12/30/2022 06:16 AM    MCH 31.9 12/30/2022 06:16 AM    MCHC 31.4 12/30/2022 06:16 AM    RDW 12.2 12/30/2022 06:16 AM    NRBC 0.0 12/25/2022 06:55 PM    LYMPHOPCT 8.1 12/27/2022 03:18 AM    PROMYELOPCT 0.9 12/25/2022 06:55 PM    MONOPCT 11.9 12/27/2022 03:18 AM    MYELOPCT 1.8 12/25/2022 02:01 PM    BASOPCT 0.4 12/27/2022 03:18 AM    MONOSABS 0.91 12/27/2022 03:18 AM    LYMPHSABS 0.62 12/27/2022 03:18 AM    EOSABS 0.15 12/27/2022 03:18 AM    BASOSABS 0.03 12/27/2022 03:18 AM     CMP:    Lab Results   Component Value Date/Time     12/29/2022 06:26 AM    K 4.5 12/29/2022 06:26 AM    K 4.6 12/25/2022 02:01 PM     12/29/2022 06:26 AM    CO2 20 12/29/2022 06:26 AM    BUN 38 12/29/2022 06:26 AM    CREATININE 1.9 12/29/2022 06:26 AM    GFRAA 51 02/11/2020 08:00 AM    LABGLOM 35 12/29/2022 06:26 AM    GLUCOSE 131 12/29/2022 06:26 AM    PROT 6.4 12/27/2022 03:18 AM    LABALBU 3.3 12/27/2022 03:18 AM    CALCIUM 8.4 12/29/2022 06:26 AM    BILITOT 0.3 12/27/2022 03:18 AM    ALKPHOS 68 12/27/2022 03:18 AM    AST 27 12/27/2022 03:18 AM    ALT 9 12/27/2022 03:18 AM     PT/INR:    Lab Results   Component Value Date/Time    PROTIME 11.9 12/15/2022 08:35 AM    INR 1.1 12/15/2022 08:35 AM     @cktotal:3,ckmb:3,ckmbindex:3,troponini:3@  U/A:    Lab Results   Component Value Date/Time    NITRU Negative 12/27/2022 06:22 PM    COLORU Straw 12/27/2022 06:22 PM    PHUR 6.0 12/27/2022 06:22 PM    [address] NONE 12/27/2022 06:22 PM    RBCUA NONE 12/27/2022 06:22 PM    BACTERIA NONE SEEN 12/27/2022 06:22 PM    CLARITYU Clear 12/27/2022 06:22 PM    SPECGRAV 1.020 12/27/2022 06:22 PM    UROBILINOGEN 0.2 12/27/2022 06:22 PM    BILIRUBINUR Negative 12/27/2022 06:22 PM    BLOODU MODERATE 12/27/2022 06:22 PM    GLUCOSEU 100 12/27/2022 06:22 PM    KETUA Negative 12/27/2022 06:22 PM        Assessment:    Patient Active Problem List   Diagnosis    Closed fracture of one rib of left side, initial encounter       Plan:    Acute blood loss anemia  Will give dose of iron  May need Epogen prior to discharge    ZION-  Back to baseline    Covid 19  No real symptoms doing better    HTN-  Back to home med's    TKR-  Would use one month of DVT prophylaxis with recent covid  Sq heparin in facility          Riley Sarmiento DO  6:39 AM  12/30/2022

## 2022-12-30 NOTE — CARE COORDINATION
Social work / Discharge Planning:           [address]. Discharge to [facility] at 1pm via [address]. Social work updated RN, facility liaison and patient's wife.   Electronically signed by MICK Portillo on 12/30/2022 at 10:57 AM

## 2022-12-30 NOTE — PLAN OF CARE
Problem: Discharge Planning  Goal: Discharge to home or other facility with appropriate resources  12/30/2022 0305 by Prudencio Malcolm RN  Outcome: Progressing  12/29/2022 1744 by Leonarda Mcgill RN  Outcome: Progressing  Flowsheets (Taken 12/29/2022 0800)  Discharge to home or other facility with appropriate resources: Identify barriers to discharge with patient and caregiver     Problem: Safety - Adult  Goal: Free from fall injury  12/30/2022 0305 by Prudencio Malcolm RN  Outcome: Progressing  12/29/2022 1744 by Leonarda Mcgill RN  Outcome: Progressing     Problem: Chronic Conditions and Co-morbidities  Goal: Patient's chronic conditions and co-morbidity symptoms are monitored and maintained or improved  12/30/2022 0305 by Prudencio Malcolm RN  Outcome: Progressing  12/29/2022 1744 by Leonarda Mcgill RN  Outcome: Progressing  Flowsheets (Taken 12/29/2022 0800)  Care Plan - Patient's Chronic Conditions and Co-Morbidity Symptoms are Monitored and Maintained or Improved: Monitor and assess patient's chronic conditions and comorbid symptoms for stability, deterioration, or improvement     Problem: ABCDS Injury Assessment  Goal: Absence of physical injury  12/30/2022 0305 by Prudencio Malcolm RN  Outcome: Progressing  12/29/2022 1744 by Leonarda Mcgill RN  Outcome: Progressing     Problem: Skin/Tissue Integrity  Goal: Absence of new skin breakdown  Description: 1. Monitor for areas of redness and/or skin breakdown  2. Assess vascular access sites hourly  3. Every 4-6 hours minimum:  Change oxygen saturation probe site  4. Every 4-6 hours:  If on nasal continuous positive airway pressure, respiratory therapy assess nares and determine need for appliance change or resting period.   12/30/2022 0305 by Prudencio Malcolm RN  Outcome: Progressing  12/29/2022 1744 by Leonarda Mcgill RN  Outcome: Progressing

## 2023-01-05 ENCOUNTER — HOSPITAL ENCOUNTER (INPATIENT)
Age: 83
LOS: 6 days | Discharge: NURSING, REHAB OR LONG-TERM CARE | DRG: 698 | End: 2023-01-11
Attending: EMERGENCY MEDICINE | Admitting: INTERNAL MEDICINE
Payer: MEDICARE

## 2023-01-05 ENCOUNTER — APPOINTMENT (OUTPATIENT)
Dept: CT IMAGING | Age: 83
DRG: 698 | End: 2023-01-05
Payer: MEDICARE

## 2023-01-05 ENCOUNTER — APPOINTMENT (OUTPATIENT)
Dept: GENERAL RADIOLOGY | Age: 83
DRG: 698 | End: 2023-01-05
Payer: MEDICARE

## 2023-01-05 ENCOUNTER — APPOINTMENT (OUTPATIENT)
Dept: ULTRASOUND IMAGING | Age: 83
DRG: 698 | End: 2023-01-05
Payer: MEDICARE

## 2023-01-05 DIAGNOSIS — N39.0 COMPLICATED UTI (URINARY TRACT INFECTION): Primary | ICD-10-CM

## 2023-01-05 DIAGNOSIS — U07.1 COVID-19: ICD-10-CM

## 2023-01-05 LAB
ALBUMIN SERPL-MCNC: 3.4 G/DL (ref 3.5–5.2)
ALP BLD-CCNC: 77 U/L (ref 40–129)
ALT SERPL-CCNC: 20 U/L (ref 0–40)
AMMONIA: 13.3 UMOL/L (ref 16–60)
ANION GAP SERPL CALCULATED.3IONS-SCNC: 15 MMOL/L (ref 7–16)
AST SERPL-CCNC: 27 U/L (ref 0–39)
B.E.: -4.3 MMOL/L (ref -3–3)
B.E.: -7.4 MMOL/L (ref -3–3)
BACTERIA: ABNORMAL /HPF
BILIRUB SERPL-MCNC: 0.5 MG/DL (ref 0–1.2)
BILIRUBIN URINE: NEGATIVE
BLOOD, URINE: ABNORMAL
BUN BLDV-MCNC: 41 MG/DL (ref 6–23)
CALCIUM SERPL-MCNC: 9.2 MG/DL (ref 8.6–10.2)
CHLORIDE BLD-SCNC: 99 MMOL/L (ref 98–107)
CLARITY: ABNORMAL
CO2: 21 MMOL/L (ref 22–29)
COHB: 0.8 % (ref 0–1.5)
COHB: 1 % (ref 0–1.5)
COLOR: YELLOW
COMMENT: ABNORMAL
CREAT SERPL-MCNC: 2.1 MG/DL (ref 0.7–1.2)
CRITICAL: ABNORMAL
CRITICAL: ABNORMAL
CRYSTALS, UA: ABNORMAL /HPF
DATE ANALYZED: ABNORMAL
DATE ANALYZED: ABNORMAL
DATE OF COLLECTION: ABNORMAL
DATE OF COLLECTION: ABNORMAL
GFR SERPL CREATININE-BSD FRML MDRD: 31 ML/MIN/1.73
GLUCOSE BLD-MCNC: 169 MG/DL (ref 74–99)
GLUCOSE URINE: NEGATIVE MG/DL
HCO3: 15.9 MMOL/L (ref 22–26)
HCO3: 20.5 MMOL/L (ref 22–26)
HHB: 7.6 % (ref 0–5)
HHB: 80.8 % (ref 0–5)
HYALINE CASTS: ABNORMAL /LPF (ref 0–2)
INR BLD: 1.1
KETONES, URINE: 15 MG/DL
LAB: ABNORMAL
LAB: ABNORMAL
LACTIC ACID: 1.3 MMOL/L (ref 0.5–2.2)
LEUKOCYTE ESTERASE, URINE: ABNORMAL
LIPASE: 22 U/L (ref 13–60)
Lab: ABNORMAL
Lab: ABNORMAL
METER GLUCOSE: 230 MG/DL (ref 74–99)
METHB: 0.3 % (ref 0–1.5)
METHB: 1.2 % (ref 0–1.5)
MODE: ABNORMAL
NITRITE, URINE: NEGATIVE
O2 CONTENT: 13.3 ML/DL
O2 SATURATION: 17.6 % (ref 92–98.5)
O2 SATURATION: 92.3 % (ref 92–98.5)
O2HB: 17.2 % (ref 94–97)
O2HB: 91.1 % (ref 94–97)
OPERATOR ID: 1115
OPERATOR ID: 789
PATIENT TEMP: 37 C
PATIENT TEMP: 37 C
PCO2: 25.9 MMHG (ref 35–45)
PCO2: 36.4 MMHG (ref 35–45)
PH BLOOD GAS: 7.37 (ref 7.35–7.45)
PH BLOOD GAS: 7.41 (ref 7.35–7.45)
PH UA: 6 (ref 5–9)
PO2: 66.6 MMHG (ref 75–100)
PO2: <20 MMHG (ref 75–100)
POTASSIUM REFLEX MAGNESIUM: 4.7 MMOL/L (ref 3.5–5)
PRO-BNP: 255 PG/ML (ref 0–450)
PROTEIN UA: 30 MG/DL
PROTHROMBIN TIME: 12.7 SEC (ref 9.3–12.4)
RBC UA: ABNORMAL /HPF (ref 0–2)
SODIUM BLD-SCNC: 135 MMOL/L (ref 132–146)
SOURCE, BLOOD GAS: ABNORMAL
SOURCE, BLOOD GAS: ABNORMAL
SPECIFIC GRAVITY UA: >=1.03 (ref 1–1.03)
THB: 10 G/DL (ref 11.5–16.5)
THB: 10.3 G/DL (ref 11.5–16.5)
TIME ANALYZED: 1736
TIME ANALYZED: 2105
TOTAL PROTEIN: 7.2 G/DL (ref 6.4–8.3)
TROPONIN, HIGH SENSITIVITY: 85 NG/L (ref 0–11)
TROPONIN, HIGH SENSITIVITY: 88 NG/L (ref 0–11)
UROBILINOGEN, URINE: 0.2 E.U./DL
WBC UA: >20 /HPF (ref 0–5)

## 2023-01-05 PROCEDURE — 2580000003 HC RX 258: Performed by: EMERGENCY MEDICINE

## 2023-01-05 PROCEDURE — 36415 COLL VENOUS BLD VENIPUNCTURE: CPT

## 2023-01-05 PROCEDURE — 84484 ASSAY OF TROPONIN QUANT: CPT

## 2023-01-05 PROCEDURE — 2060000000 HC ICU INTERMEDIATE R&B

## 2023-01-05 PROCEDURE — 83690 ASSAY OF LIPASE: CPT

## 2023-01-05 PROCEDURE — 96360 HYDRATION IV INFUSION INIT: CPT

## 2023-01-05 PROCEDURE — 82805 BLOOD GASES W/O2 SATURATION: CPT

## 2023-01-05 PROCEDURE — 82140 ASSAY OF AMMONIA: CPT

## 2023-01-05 PROCEDURE — 83605 ASSAY OF LACTIC ACID: CPT

## 2023-01-05 PROCEDURE — 80053 COMPREHEN METABOLIC PANEL: CPT

## 2023-01-05 PROCEDURE — 87186 SC STD MICRODIL/AGAR DIL: CPT

## 2023-01-05 PROCEDURE — 96361 HYDRATE IV INFUSION ADD-ON: CPT

## 2023-01-05 PROCEDURE — 6370000000 HC RX 637 (ALT 250 FOR IP): Performed by: INTERNAL MEDICINE

## 2023-01-05 PROCEDURE — 87040 BLOOD CULTURE FOR BACTERIA: CPT

## 2023-01-05 PROCEDURE — 87088 URINE BACTERIA CULTURE: CPT

## 2023-01-05 PROCEDURE — 81001 URINALYSIS AUTO W/SCOPE: CPT

## 2023-01-05 PROCEDURE — 99285 EMERGENCY DEPT VISIT HI MDM: CPT

## 2023-01-05 PROCEDURE — 82962 GLUCOSE BLOOD TEST: CPT

## 2023-01-05 PROCEDURE — 73030 X-RAY EXAM OF SHOULDER: CPT

## 2023-01-05 PROCEDURE — 70450 CT HEAD/BRAIN W/O DYE: CPT

## 2023-01-05 PROCEDURE — 71045 X-RAY EXAM CHEST 1 VIEW: CPT

## 2023-01-05 PROCEDURE — 83880 ASSAY OF NATRIURETIC PEPTIDE: CPT

## 2023-01-05 PROCEDURE — 6360000002 HC RX W HCPCS: Performed by: EMERGENCY MEDICINE

## 2023-01-05 PROCEDURE — 2580000003 HC RX 258: Performed by: INTERNAL MEDICINE

## 2023-01-05 PROCEDURE — 93970 EXTREMITY STUDY: CPT

## 2023-01-05 PROCEDURE — 85610 PROTHROMBIN TIME: CPT

## 2023-01-05 RX ORDER — PANTOPRAZOLE SODIUM 40 MG/1
40 TABLET, DELAYED RELEASE ORAL
Status: DISCONTINUED | OUTPATIENT
Start: 2023-01-06 | End: 2023-01-11 | Stop reason: HOSPADM

## 2023-01-05 RX ORDER — SODIUM CHLORIDE 0.9 % (FLUSH) 0.9 %
5-40 SYRINGE (ML) INJECTION PRN
Status: DISCONTINUED | OUTPATIENT
Start: 2023-01-05 | End: 2023-01-11 | Stop reason: HOSPADM

## 2023-01-05 RX ORDER — DEXTROSE MONOHYDRATE 100 MG/ML
INJECTION, SOLUTION INTRAVENOUS CONTINUOUS PRN
Status: DISCONTINUED | OUTPATIENT
Start: 2023-01-05 | End: 2023-01-11 | Stop reason: HOSPADM

## 2023-01-05 RX ORDER — ASPIRIN 81 MG/1
81 TABLET, CHEWABLE ORAL DAILY
Status: DISCONTINUED | OUTPATIENT
Start: 2023-01-06 | End: 2023-01-11 | Stop reason: HOSPADM

## 2023-01-05 RX ORDER — POLYETHYLENE GLYCOL 3350 17 G/17G
17 POWDER, FOR SOLUTION ORAL DAILY PRN
Status: DISCONTINUED | OUTPATIENT
Start: 2023-01-05 | End: 2023-01-11 | Stop reason: HOSPADM

## 2023-01-05 RX ORDER — ONDANSETRON 2 MG/ML
4 INJECTION INTRAMUSCULAR; INTRAVENOUS EVERY 6 HOURS PRN
Status: DISCONTINUED | OUTPATIENT
Start: 2023-01-05 | End: 2023-01-11 | Stop reason: HOSPADM

## 2023-01-05 RX ORDER — INSULIN LISPRO 100 [IU]/ML
0-4 INJECTION, SOLUTION INTRAVENOUS; SUBCUTANEOUS
Status: DISCONTINUED | OUTPATIENT
Start: 2023-01-06 | End: 2023-01-10

## 2023-01-05 RX ORDER — SODIUM CHLORIDE 0.9 % (FLUSH) 0.9 %
5-40 SYRINGE (ML) INJECTION EVERY 12 HOURS SCHEDULED
Status: DISCONTINUED | OUTPATIENT
Start: 2023-01-05 | End: 2023-01-11 | Stop reason: HOSPADM

## 2023-01-05 RX ORDER — TAMSULOSIN HYDROCHLORIDE 0.4 MG/1
0.4 CAPSULE ORAL DAILY
Status: DISCONTINUED | OUTPATIENT
Start: 2023-01-06 | End: 2023-01-11 | Stop reason: HOSPADM

## 2023-01-05 RX ORDER — ONDANSETRON 4 MG/1
4 TABLET, ORALLY DISINTEGRATING ORAL EVERY 8 HOURS PRN
Status: DISCONTINUED | OUTPATIENT
Start: 2023-01-05 | End: 2023-01-11 | Stop reason: HOSPADM

## 2023-01-05 RX ORDER — INSULIN LISPRO 100 [IU]/ML
0-4 INJECTION, SOLUTION INTRAVENOUS; SUBCUTANEOUS NIGHTLY
Status: DISCONTINUED | OUTPATIENT
Start: 2023-01-05 | End: 2023-01-10

## 2023-01-05 RX ORDER — ENOXAPARIN SODIUM 100 MG/ML
40 INJECTION SUBCUTANEOUS DAILY
Status: DISCONTINUED | OUTPATIENT
Start: 2023-01-06 | End: 2023-01-06

## 2023-01-05 RX ORDER — SODIUM CHLORIDE 9 MG/ML
INJECTION, SOLUTION INTRAVENOUS CONTINUOUS
Status: ACTIVE | OUTPATIENT
Start: 2023-01-05 | End: 2023-01-06

## 2023-01-05 RX ORDER — 0.9 % SODIUM CHLORIDE 0.9 %
500 INTRAVENOUS SOLUTION INTRAVENOUS ONCE
Status: COMPLETED | OUTPATIENT
Start: 2023-01-05 | End: 2023-01-05

## 2023-01-05 RX ORDER — ACETAMINOPHEN 650 MG/1
650 SUPPOSITORY RECTAL EVERY 6 HOURS PRN
Status: DISCONTINUED | OUTPATIENT
Start: 2023-01-05 | End: 2023-01-11 | Stop reason: HOSPADM

## 2023-01-05 RX ORDER — SODIUM CHLORIDE 9 MG/ML
INJECTION, SOLUTION INTRAVENOUS PRN
Status: DISCONTINUED | OUTPATIENT
Start: 2023-01-05 | End: 2023-01-11 | Stop reason: HOSPADM

## 2023-01-05 RX ORDER — LOSARTAN POTASSIUM 50 MG/1
100 TABLET ORAL DAILY
Status: DISCONTINUED | OUTPATIENT
Start: 2023-01-06 | End: 2023-01-11 | Stop reason: HOSPADM

## 2023-01-05 RX ORDER — ACETAMINOPHEN 325 MG/1
650 TABLET ORAL EVERY 6 HOURS PRN
Status: DISCONTINUED | OUTPATIENT
Start: 2023-01-05 | End: 2023-01-11 | Stop reason: HOSPADM

## 2023-01-05 RX ADMIN — SODIUM CHLORIDE 500 ML: 9 INJECTION, SOLUTION INTRAVENOUS at 13:11

## 2023-01-05 RX ADMIN — Medication 10 ML: at 22:34

## 2023-01-05 RX ADMIN — WATER 1000 MG: 1 INJECTION INTRAMUSCULAR; INTRAVENOUS; SUBCUTANEOUS at 21:05

## 2023-01-05 RX ADMIN — SODIUM CHLORIDE: 9 INJECTION, SOLUTION INTRAVENOUS at 22:28

## 2023-01-05 RX ADMIN — METOPROLOL TARTRATE 25 MG: 25 TABLET, FILM COATED ORAL at 22:34

## 2023-01-05 ASSESSMENT — LIFESTYLE VARIABLES
HOW OFTEN DO YOU HAVE A DRINK CONTAINING ALCOHOL: NEVER
HOW MANY STANDARD DRINKS CONTAINING ALCOHOL DO YOU HAVE ON A TYPICAL DAY: PATIENT DOES NOT DRINK

## 2023-01-05 ASSESSMENT — ENCOUNTER SYMPTOMS: SHORTNESS OF BREATH: 1

## 2023-01-05 NOTE — Clinical Note
Discharge Plan[de-identified] Other/[facility])   Telemetry/Cardiac Monitoring Required?: Yes

## 2023-01-05 NOTE — ED PROVIDER NOTES
This is an 45-year-old male with a past medical history of diabetes who presents to the ED for evaluation of altered mental status. History is help obtained from EMS as well as nursing staff and his wife who is at bedside. Patient was recently admitted for COVID and hypoxia ultimately was discharged back to a care facility. Over the past day the patient has been seeming increasingly confused according to staff and his oxygen blood pressure lower than they would like it. Patient states he feels overall fatigued and weak. He has no reported chest pain or shortness of breath. Patient does admit that he has been not eating or drinking well. There are no other reported mitigating or exascerbating factors. The history is provided by the patient and the EMS personnel. Review of Systems   Unable to perform ROS: Mental status change   Constitutional:  Positive for fatigue. Respiratory:  Positive for shortness of breath. Hematological:  Bruises/bleeds easily. Psychiatric/Behavioral:  Positive for confusion. Physical Exam  Vitals and nursing note reviewed. Constitutional:       General: He is not in acute distress. Appearance: He is ill-appearing. HENT:      Head: Normocephalic and atraumatic. Mouth/Throat:      Mouth: Mucous membranes are dry. Eyes:      Extraocular Movements: Extraocular movements intact. Pupils: Pupils are equal, round, and reactive to light. Neck:      Vascular: No JVD. Cardiovascular:      Rate and Rhythm: Normal rate and regular rhythm. Pulmonary:      Effort: Pulmonary effort is normal.      Breath sounds: No wheezing, rhonchi or rales. Chest:      Chest wall: No tenderness. Abdominal:      General: There is no distension. Palpations: Abdomen is soft. Musculoskeletal:      Cervical back: Normal range of motion and neck supple. Right lower leg: No edema. Left lower leg: No edema. Skin:     General: Skin is warm and dry. Capillary Refill: Capillary refill takes less than 2 seconds. Neurological:      General: No focal deficit present. Mental Status: He is alert. He is disoriented. Cranial Nerves: No cranial nerve deficit. Psychiatric:         Behavior: Behavior normal.        Procedures     MDM  Number of Diagnoses or Management Options  Complicated UTI (urinary tract infection)  COVID-19  Diagnosis management comments: This is an 80year old male who presented to the ED for evaluation of apparent hypoxia and confusion. Reviewed his previous chart at length it appears he was asymompatic from his covid. Initial ABG was likely venous repeat showed good oxygen saturation. CXR reassuring, patient was confused and discussed with Dr. Colleen Daniel we agreed on bilateral lower extremity US for evaluation of PE and no CTA given his renal function. Started on Abx and admitted for further evaluation and monitoring         Differential diagnosis: UTI, PE, COVID hypoxia, Intrcranial Bleed  Review of ED/ Outpatient Records: Previous note from Dr. Mike Hanna that case was discussed with:  Wife and EMS  Imaging interpretation by myself: CXR showed no signs of PTX, PNA or Effusion  Independent Interpretation of labs: UA was consistent with a UTI  Discussed with other providers: Dr. Colleen Daniel who agreed to admit patient  Tests Considered but not ordered: CTA due to patient's renal function  Decision making tools/risks stratification: None  Disposition decision making/shared decision making: Spoke with wife, we were agreeable with admission  Chronic Conditions affecting care: CKD  Social Determinants of health impacting treatment or disposition: Lives at SNF currently  CODE status and Discussions: Full      ED Course as of 01/06/23 1236   Thu Jan 05, 2023   1751 Reviewed patient's ABG, checked pulse ox patient is resting comfortably at 94% [BP]      ED Course User Index  [BP] Nyla Odell DO             ED Course as of 01/06/23 1236 Thu Jan 05, 2023 1751 Reviewed patient's ABG, checked pulse ox patient is resting comfortably at 94% [BP]      ED Course User Index  [BP] Luke Roland DO       --------------------------------------------- PAST HISTORY ---------------------------------------------  Past Medical History:  has a past medical history of Anemia, CAD (coronary artery disease), Cancer ([facility] [address] Diabetes mellitus ([facility] [address] Diverticulosis, GERD (gastroesophageal reflux disease), Hyperlipidemia, and Hypertension. Past Surgical History:  has a past surgical history that includes Colonoscopy; eye surgery; back surgery; and Parotidectomy (9/11/12). Social History:  reports that he has never smoked. He has never used smokeless tobacco. He reports that he does not drink alcohol and does not use drugs. Family History: family history includes Cancer in his brother and father. The patients home medications have been reviewed.     Allergies: Darvocet [propoxyphene n-acetaminophen] and Lisinopril    -------------------------------------------------- RESULTS -------------------------------------------------    LABS:  Results for orders placed or performed during the hospital encounter of 01/05/23   Comprehensive Metabolic Panel w/ Reflex to MG   Result Value Ref Range    Sodium 135 132 - 146 mmol/L    Potassium reflex Magnesium 4.7 3.5 - 5.0 mmol/L    Chloride 99 98 - 107 mmol/L    CO2 21 (L) 22 - 29 mmol/L    Anion Gap 15 7 - 16 mmol/L    Glucose 169 (H) 74 - 99 mg/dL    BUN 41 (H) 6 - 23 mg/dL    Creatinine 2.1 (H) 0.7 - 1.2 mg/dL    Est, Glom Filt Rate 31 >=60 mL/min/1.73    Calcium 9.2 8.6 - 10.2 mg/dL    Total Protein 7.2 6.4 - 8.3 g/dL    Albumin 3.4 (L) 3.5 - 5.2 g/dL    Total Bilirubin 0.5 0.0 - 1.2 mg/dL    Alkaline Phosphatase 77 40 - 129 U/L    ALT 20 0 - 40 U/L    AST 27 0 - 39 U/L   Lipase   Result Value Ref Range    Lipase 22 13 - 60 U/L   Lactic Acid   Result Value Ref Range    Lactic Acid 1.3 0.5 - 2.2 mmol/L   Troponin Result Value Ref Range    Troponin, High Sensitivity 88 (H) 0 - 11 ng/L   Brain Natriuretic Peptide   Result Value Ref Range    Pro- 0 - 450 pg/mL   Urinalysis with Microscopic   Result Value Ref Range    Color, UA Yellow Straw/Yellow    Clarity, UA SL CLOUDY Clear    Glucose, Ur Negative Negative mg/dL    Bilirubin Urine Negative Negative    Ketones, Urine 15 (A) Negative mg/dL    Specific Gravity, UA >=1.030 1.005 - 1.030    Blood, Urine SMALL (A) Negative    pH, UA 6.0 5.0 - 9.0    Protein, UA 30 (A) Negative mg/dL    Urobilinogen, Urine 0.2 <2.0 E.U./dL    Nitrite, Urine Negative Negative    Leukocyte Esterase, Urine MODERATE (A) Negative    Hyaline Casts, UA 0-2 0 - 2 /LPF    WBC, UA >20 (A) 0 - 5 /HPF    RBC, UA 0-1 0 - 2 /HPF    Bacteria, UA MANY (A) None Seen /HPF    Crystals, UA Few (A) None Seen /HPF   Protime-INR   Result Value Ref Range    Protime 12.7 (H) 9.3 - 12.4 sec    INR 1.1    Ammonia   Result Value Ref Range    Ammonia 13.3 (L) 16.0 - 60.0 umol/L   Troponin   Result Value Ref Range    Troponin, High Sensitivity 85 (H) 0 - 11 ng/L   Blood Gas, Arterial   Result Value Ref Range    Date Analyzed 20230105     Time Analyzed 1736     Source: Blood Arterial     pH, Blood Gas 7.368 7.350 - 7.450    PCO2 36.4 35.0 - 45.0 mmHg    PO2 <20.0 (LL) 75.0 - 100.0 mmHg    HCO3 20.5 (L) 22.0 - 26.0 mmol/L    B.E. -4.3 (L) -3.0 - 3.0 mmol/L    O2 Sat 17.6 (L) 92.0 - 98.5 %    O2Hb 17.2 (L) 94.0 - 97.0 %    COHb 0.8 0.0 - 1.5 %    MetHb 1.2 0.0 - 1.5 %    HHb 80.8 (H) 0.0 - 5.0 %    tHb (est) 10.0 (L) 11.5 - 16.5 g/dL    Comment Possible venous sample, RN notified     Date Of Collection      Time Collected      Pt Temp 37.0 C     ID [id number]     Lab [id number]     Critical(s) Notified Handed report to Dr/RN    Blood Gas, Arterial   Result Value Ref Range    Date Analyzed 20230105     Time Analyzed 2105     Source: Blood Arterial     pH, Blood Gas 7.407 7.350 - 7.450    PCO2 25.9 (L) 35.0 - 45.0 mmHg    PO2 66.6 (L) 75.0 - 100.0 mmHg    HCO3 15.9 (L) 22.0 - 26.0 mmol/L    B.E. -7.4 (L) -3.0 - 3.0 mmol/L    O2 Sat 92.3 92.0 - 98.5 %    O2Hb 91.1 (L) 94.0 - 97.0 %    COHb 1.0 0.0 - 1.5 %    MetHb 0.3 0.0 - 1.5 %    O2 Content 13.3 mL/dL    HHb 7.6 (H) 0.0 - 5.0 %    tHb (est) 10.3 (L) 11.5 - 16.5 g/dL    Mode RA     Date Of Collection      Time Collected      Pt Temp 37.0 C     ID [id number]     Lab [id number]     Critical(s) Notified .  No Critical Values    Basic Metabolic Panel w/ Reflex to MG   Result Value Ref Range    Sodium 134 132 - 146 mmol/L    Potassium reflex Magnesium 5.2 (H) 3.5 - 5.0 mmol/L    Chloride 101 98 - 107 mmol/L    CO2 17 (L) 22 - 29 mmol/L    Anion Gap 16 7 - 16 mmol/L    Glucose 228 (H) 74 - 99 mg/dL    BUN 42 (H) 6 - 23 mg/dL    Creatinine 1.9 (H) 0.7 - 1.2 mg/dL    Est, Glom Filt Rate 35 >=60 mL/min/1.73    Calcium 8.9 8.6 - 10.2 mg/dL   Lactic Acid   Result Value Ref Range    Lactic Acid 1.2 0.5 - 2.2 mmol/L   CBC with Auto Differential   Result Value Ref Range    WBC 10.1 4.5 - 11.5 E9/L    RBC 2.85 (L) 3.80 - 5.80 E12/L    Hemoglobin 9.2 (L) 12.5 - 16.5 g/dL    Hematocrit 29.1 (L) 37.0 - 54.0 %    .1 (H) 80.0 - 99.9 fL    MCH 32.3 26.0 - 35.0 pg    MCHC 31.6 (L) 32.0 - 34.5 %    RDW 12.7 11.5 - 15.0 fL    Platelets 249 863 - 011 E9/L    MPV 9.8 7.0 - 12.0 fL    Neutrophils % 77.7 43.0 - 80.0 %    Immature Granulocytes % 1.0 0.0 - 5.0 %    Lymphocytes % 8.3 (L) 20.0 - 42.0 %    Monocytes % 12.7 (H) 2.0 - 12.0 %    Eosinophils % 0.1 0.0 - 6.0 %    Basophils % 0.2 0.0 - 2.0 %    Neutrophils Absolute 7.87 (H) 1.80 - 7.30 E9/L    Immature Granulocytes # 0.10 E9/L    Lymphocytes Absolute 0.84 (L) 1.50 - 4.00 E9/L    Monocytes Absolute 1.29 (H) 0.10 - 0.95 E9/L    Eosinophils Absolute 0.01 (L) 0.05 - 0.50 E9/L    Basophils Absolute 0.02 0.00 - 0.20 E9/L   TSH   Result Value Ref Range    TSH 0.804 0.270 - 4.200 uIU/mL   POCT Glucose   Result Value Ref Range    Meter Glucose 230 (H) 74 - 99 mg/dL   POCT Glucose   Result Value Ref Range    Meter Glucose 227 (H) 74 - 99 mg/dL   POCT Glucose   Result Value Ref Range    Meter Glucose 243 (H) 74 - 99 mg/dL       RADIOLOGY:  NM LUNG SCAN PERFUSION ONLY   Final Result   Very low probability for pulmonary embolism. 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 XR CHEST PORTABLE   Final Result   No acute process. XR SHOULDER LEFT (MIN 2 VIEWS)   Final Result   Degenerative changes as described. CT HEAD WO CONTRAST   Final Result   1. No acute intracranial abnormality. 2. Chronic small vessel ischemic disease and generalized cerebral volume loss. 3. Mucosal disease of the paranasal sinuses. ------------------------- NURSING NOTES AND VITALS REVIEWED ---------------------------  Date / Time Roomed:  1/5/2023 11:21 AM  ED Bed Assignment:  [id number]    The nursing notes within the ED encounter and vital signs as below have been reviewed.      Patient Vitals for the past 24 hrs:   BP Temp Temp src Pulse Resp SpO2 Height Weight   01/06/23 0655 (!) 176/74 99.1 °F (37.3 °C) Axillary 99 18 -- -- --   01/06/23 0300 (!) 158/71 98.5 °F (36.9 °C) Axillary 96 17 95 % -- 213 lb 4.8 oz (96.8 kg)   01/05/23 2155 (!) 154/73 98.5 °F (36.9 °C) Axillary (!) 106 17 95 % 5' 11\" (1.803 m) 211 lb 14.4 oz (96.1 kg)   01/05/23 2103 (!) 148/67 98.9 °F (37.2 °C) Axillary 100 18 95 % -- --   01/05/23 1528 (!) 167/71 -- -- 95 18 95 % -- --   01/05/23 1308 -- -- -- 87 -- -- -- --   01/05/23 1300 (!) 151/69 -- -- 87 20 94 % -- --       Oxygen Saturation Interpretation: Normal    ------------------------------------------ PROGRESS NOTES ------------------------------------------  Re-evaluation(s):  Time: 530  Patients symptoms show no change  Repeat physical examination is not changed    Counseling:  I have spoken with the patient and discussed todays results, in addition to providing specific details for the plan of care and counseling regarding the diagnosis and prognosis. Their questions are answered at this time and they are agreeable with the plan of admission.    --------------------------------- ADDITIONAL PROVIDER NOTES ---------------------------------  Consultations:  Spoke with Dr. Kiel Salas  Discussed case. They will admit the patient. This patient's ED course included: a personal history and physicial examination, re-evaluation prior to disposition, multiple bedside re-evaluations, IV medications, cardiac monitoring, continuous pulse oximetry, and complex medical decision making and emergency management    This patient has remained hemodynamically stable during their ED course. Diagnosis:  1. Complicated UTI (urinary tract infection)    2. COVID-19        Disposition:  Patient's disposition: Admit to telemetry  Patient's condition is stable.           Hang Paz DO  01/06/23 1554

## 2023-01-06 ENCOUNTER — APPOINTMENT (OUTPATIENT)
Dept: NUCLEAR MEDICINE | Age: 83
DRG: 698 | End: 2023-01-06
Payer: MEDICARE

## 2023-01-06 LAB
ANION GAP SERPL CALCULATED.3IONS-SCNC: 16 MMOL/L (ref 7–16)
BASOPHILS ABSOLUTE: 0.02 E9/L (ref 0–0.2)
BASOPHILS RELATIVE PERCENT: 0.2 % (ref 0–2)
BUN BLDV-MCNC: 42 MG/DL (ref 6–23)
CALCIUM SERPL-MCNC: 8.9 MG/DL (ref 8.6–10.2)
CHLORIDE BLD-SCNC: 101 MMOL/L (ref 98–107)
CO2: 17 MMOL/L (ref 22–29)
CREAT SERPL-MCNC: 1.9 MG/DL (ref 0.7–1.2)
EOSINOPHILS ABSOLUTE: 0.01 E9/L (ref 0.05–0.5)
EOSINOPHILS RELATIVE PERCENT: 0.1 % (ref 0–6)
GFR SERPL CREATININE-BSD FRML MDRD: 35 ML/MIN/1.73
GLUCOSE BLD-MCNC: 228 MG/DL (ref 74–99)
HCT VFR BLD CALC: 29.1 % (ref 37–54)
HEMOGLOBIN: 9.2 G/DL (ref 12.5–16.5)
IMMATURE GRANULOCYTES #: 0.1 E9/L
IMMATURE GRANULOCYTES %: 1 % (ref 0–5)
LACTIC ACID: 1.2 MMOL/L (ref 0.5–2.2)
LYMPHOCYTES ABSOLUTE: 0.84 E9/L (ref 1.5–4)
LYMPHOCYTES RELATIVE PERCENT: 8.3 % (ref 20–42)
MCH RBC QN AUTO: 32.3 PG (ref 26–35)
MCHC RBC AUTO-ENTMCNC: 31.6 % (ref 32–34.5)
MCV RBC AUTO: 102.1 FL (ref 80–99.9)
METER GLUCOSE: 191 MG/DL (ref 74–99)
METER GLUCOSE: 227 MG/DL (ref 74–99)
METER GLUCOSE: 231 MG/DL (ref 74–99)
METER GLUCOSE: 243 MG/DL (ref 74–99)
MONOCYTES ABSOLUTE: 1.29 E9/L (ref 0.1–0.95)
MONOCYTES RELATIVE PERCENT: 12.7 % (ref 2–12)
NEUTROPHILS ABSOLUTE: 7.87 E9/L (ref 1.8–7.3)
NEUTROPHILS RELATIVE PERCENT: 77.7 % (ref 43–80)
PDW BLD-RTO: 12.7 FL (ref 11.5–15)
PLATELET # BLD: 418 E9/L (ref 130–450)
PMV BLD AUTO: 9.8 FL (ref 7–12)
POTASSIUM REFLEX MAGNESIUM: 5.2 MMOL/L (ref 3.5–5)
RBC # BLD: 2.85 E12/L (ref 3.8–5.8)
SODIUM BLD-SCNC: 134 MMOL/L (ref 132–146)
TSH SERPL DL<=0.05 MIU/L-ACNC: 0.8 UIU/ML (ref 0.27–4.2)
WBC # BLD: 10.1 E9/L (ref 4.5–11.5)

## 2023-01-06 PROCEDURE — 6370000000 HC RX 637 (ALT 250 FOR IP): Performed by: INTERNAL MEDICINE

## 2023-01-06 PROCEDURE — 2060000000 HC ICU INTERMEDIATE R&B

## 2023-01-06 PROCEDURE — 84443 ASSAY THYROID STIM HORMONE: CPT

## 2023-01-06 PROCEDURE — 83605 ASSAY OF LACTIC ACID: CPT

## 2023-01-06 PROCEDURE — 6360000002 HC RX W HCPCS: Performed by: INTERNAL MEDICINE

## 2023-01-06 PROCEDURE — 2580000003 HC RX 258: Performed by: INTERNAL MEDICINE

## 2023-01-06 PROCEDURE — 97161 PT EVAL LOW COMPLEX 20 MIN: CPT

## 2023-01-06 PROCEDURE — 78580 LUNG PERFUSION IMAGING: CPT

## 2023-01-06 PROCEDURE — 85025 COMPLETE CBC W/AUTO DIFF WBC: CPT

## 2023-01-06 PROCEDURE — 80048 BASIC METABOLIC PNL TOTAL CA: CPT

## 2023-01-06 PROCEDURE — 51702 INSERT TEMP BLADDER CATH: CPT

## 2023-01-06 PROCEDURE — 36415 COLL VENOUS BLD VENIPUNCTURE: CPT

## 2023-01-06 PROCEDURE — A9540 TC99M MAA: HCPCS | Performed by: RADIOLOGY

## 2023-01-06 PROCEDURE — 97165 OT EVAL LOW COMPLEX 30 MIN: CPT

## 2023-01-06 PROCEDURE — 3430000000 HC RX DIAGNOSTIC RADIOPHARMACEUTICAL: Performed by: RADIOLOGY

## 2023-01-06 PROCEDURE — 82962 GLUCOSE BLOOD TEST: CPT

## 2023-01-06 RX ORDER — TRAMADOL HYDROCHLORIDE 50 MG/1
50 TABLET ORAL EVERY 4 HOURS PRN
Status: DISCONTINUED | OUTPATIENT
Start: 2023-01-06 | End: 2023-01-11 | Stop reason: HOSPADM

## 2023-01-06 RX ORDER — ENOXAPARIN SODIUM 100 MG/ML
1 INJECTION SUBCUTANEOUS 2 TIMES DAILY
Status: DISCONTINUED | OUTPATIENT
Start: 2023-01-06 | End: 2023-01-07

## 2023-01-06 RX ADMIN — TAMSULOSIN HYDROCHLORIDE 0.4 MG: 0.4 CAPSULE ORAL at 09:01

## 2023-01-06 RX ADMIN — Medication 6 MILLICURIE: at 08:17

## 2023-01-06 RX ADMIN — PANTOPRAZOLE SODIUM 40 MG: 40 TABLET, DELAYED RELEASE ORAL at 06:22

## 2023-01-06 RX ADMIN — INSULIN LISPRO 1 UNITS: 100 INJECTION, SOLUTION INTRAVENOUS; SUBCUTANEOUS at 10:36

## 2023-01-06 RX ADMIN — WATER 1000 MG: 1 INJECTION INTRAMUSCULAR; INTRAVENOUS; SUBCUTANEOUS at 19:57

## 2023-01-06 RX ADMIN — Medication 10 ML: at 09:04

## 2023-01-06 RX ADMIN — METOPROLOL TARTRATE 25 MG: 25 TABLET, FILM COATED ORAL at 09:01

## 2023-01-06 RX ADMIN — METOPROLOL TARTRATE 25 MG: 25 TABLET, FILM COATED ORAL at 19:59

## 2023-01-06 RX ADMIN — INSULIN HUMAN 15 UNITS: 100 INJECTION, SUSPENSION SUBCUTANEOUS at 15:39

## 2023-01-06 RX ADMIN — INSULIN LISPRO 1 UNITS: 100 INJECTION, SOLUTION INTRAVENOUS; SUBCUTANEOUS at 06:42

## 2023-01-06 RX ADMIN — ENOXAPARIN SODIUM 100 MG: 100 INJECTION SUBCUTANEOUS at 09:01

## 2023-01-06 RX ADMIN — Medication 10 ML: at 20:07

## 2023-01-06 RX ADMIN — TRAMADOL HYDROCHLORIDE 50 MG: 50 TABLET, COATED ORAL at 09:09

## 2023-01-06 RX ADMIN — ENOXAPARIN SODIUM 100 MG: 100 INJECTION SUBCUTANEOUS at 20:02

## 2023-01-06 RX ADMIN — ASPIRIN 81 MG CHEWABLE TABLET 81 MG: 81 TABLET CHEWABLE at 09:01

## 2023-01-06 RX ADMIN — LOSARTAN POTASSIUM 100 MG: 50 TABLET, FILM COATED ORAL at 09:01

## 2023-01-06 RX ADMIN — ACETAMINOPHEN 650 MG: 325 TABLET ORAL at 19:04

## 2023-01-06 RX ADMIN — INSULIN LISPRO 1 UNITS: 100 INJECTION, SOLUTION INTRAVENOUS; SUBCUTANEOUS at 15:43

## 2023-01-06 RX ADMIN — INSULIN HUMAN 20 UNITS: 100 INJECTION, SUSPENSION SUBCUTANEOUS at 09:06

## 2023-01-06 ASSESSMENT — PAIN SCALES - GENERAL
PAINLEVEL_OUTOF10: 10
PAINLEVEL_OUTOF10: 0
PAINLEVEL_OUTOF10: 2

## 2023-01-06 ASSESSMENT — PAIN DESCRIPTION - LOCATION: LOCATION: KNEE

## 2023-01-06 ASSESSMENT — PAIN DESCRIPTION - DESCRIPTORS: DESCRIPTORS: DULL;DISCOMFORT

## 2023-01-06 ASSESSMENT — PAIN DESCRIPTION - ORIENTATION: ORIENTATION: RIGHT

## 2023-01-06 NOTE — H&P
History & Physical        Reason for admission:   Sent from rehab: \"hypoxia\" and mental status change    History Obtained From:  ER, patient though confused    HISTORY OF PRESENT ILLNESS:    The patient is a 80 y.o. male who presents after R TKA and subsequent hositalization here post syncope/covid 19. Gracia catheter placed for urine retention  He was sent from rehab due to reported hypxia (not verified here) and altered mental status. He is indeed only oriented to person.   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 [address]     Diverticulosis     mild    GERD (gastroesophageal reflux disease)     Hyperlipidemia     Hypertension        Past Surgical History:        Procedure Laterality Date    BACK SURGERY      COLONOSCOPY      EYE SURGERY      PAROTIDECTOMY  9/11/12    left superficial       Medications Prior to Admission:    Medications Prior to Admission: aspirin 81 MG chewable tablet, Take 1 tablet by mouth daily  Heparin Sodium, Porcine, (HEPARIN, PORCINE,) 85370 UNIT/ML injection, Inject 0.5 mLs into the skin every 8 hours for 21 days  insulin NPH (HUMULIN N;NOVOLIN N) 100 UNIT/ML injection vial, Inject 20 Units into the skin every morning  insulin NPH (HUMULIN N;NOVOLIN N) 100 UNIT/ML injection vial, Inject 15 Units into the skin daily (before dinner)  insulin lispro (HUMALOG) 100 UNIT/ML SOLN injection vial, Inject 0-4 Units into the skin 3 times daily (with meals)  insulin lispro (HUMALOG) 100 UNIT/ML SOLN injection vial, Inject 0-4 Units into the skin nightly  zinc sulfate (ZINCATE) 220 (50 Zn) MG capsule, Take 1 capsule by mouth daily  tamsulosin (FLOMAX) 0.4 MG capsule, Take 0.4 mg by mouth daily  losartan (COZAAR) 100 MG tablet, Take 100 mg by mouth daily.  pilocarpine (SALAGEN) 5 MG tablet, Take 1 tablet by mouth 3 times daily. omeprazole (PRILOSEC) 20 MG capsule, Take 20 mg by mouth daily. metoprolol (LOPRESSOR) 25 MG tablet, Take 25 mg by mouth 2 times daily. Multiple Vitamin (MULTI VITAMIN MENS PO), Take  by mouth.    vitamin D (CHOLECALCIFEROL) 400 UNITS TABS tablet, Take 500 Units by mouth daily. acetaminophen (TYLENOL) 500 MG tablet, Take 500 mg by mouth every 6 hours as needed. Allergies:  Darvocet [propoxyphene n-acetaminophen] and Lisinopril    Social History:   TOBACCO:   reports that he has never smoked. He has never used smokeless tobacco.  ETOH:   reports no history of alcohol use. Family History:       Problem Relation Age of Onset    Cancer Father         prostate    Cancer Brother         prostate       REVIEW OF SYSTEMS:  CONSTITUTIONAL:  Neg   Recent weight changes,fatigue,fever,chills or night sweats  EYES:  Neg  blurriness,tearing,itching or acute change in vision  NOSE:  Neg  rhinorrhea,sneezing,itching,allergy or epistaxis  MOUTH/THROAT:  Neg  bleeding gums,hoarseness or sore throat. RESPIRATORY:   Neg SOB,wheeze,cough,sputum,hemoptysis or bronochitis  CARDIOVASCULAR   Neg : chest pain,palpitations,dyspnea on exertion,orthopnea,paroxysmal nocturnal dyspnea or edema  GASTROINTESTINAL:  Neg   Appetite changes,nausea,vomiting,or diarrhea,indigestion,dysphagia,change in bowel movements, or abdominal pain. GENITOURINARY:  Neg  Urinary frequency,hesitancy,urgency,polyuria,dysuria,hematuria,or incontinence. HEMATOLOGIC/LYMPHATIC:  Neg  Anemia,bleeding tendency  MUSCULOSKELETAL:  Neg   New myalgias,bone pain,joint pain,swelling or stiffness and has had no change in gait. NEUROLOGICAL:  Neg  Loss of Consciousnessdecline in intellect,nervousness,insomina,aphasia or dysarthria. Pos: Confusion  SKIN :  Neg  skin or hair changes,and has no itching,rashes,sores.     PHYSICAL EXAM:  BP (!) 176/74   Pulse 99   Temp 99.1 °F (37.3 °C) (Axillary)   Resp 18   Ht 5' 11\" (1.803 m)   Wt 213 lb 4.8 oz (96.8 kg)   SpO2 95%   BMI 29.75 kg/m²   General appearance: alert, appears stated age, cooperative and no distress  Head: Normocephalic, without obvious abnormality, atraumatic  Eyes: conjunctivae/corneas clear. PERRL, EOM's intact. Ears: normal external ear canals both ears  Neck: no adenopathy, no carotid bruit, no JVD, supple, symmetrical, trachea midline and thyroid not enlarged, no tenderness/mass/nodules  Lungs: Clear to A and P  Heart: regular rate and rhythm, S1, S2 normal, no murmur, regular rate and rhythm ,no precordial heave  Abdomen:soft, non-tender; non-distended normal bowel sounds no masses, no organomegaly  Extremities: post of change R knee. No calf tenderness. Edema 1+ RLE, trace edema LLE. Palpable pulses  Skin: Skin color, texture, turgor normal. No rashes or lesions  Neurologic:Mental status: Awake , oriented only to person  Cranial nerves:II-XII Grossly intact  Sensory: normal  Motor:grossly normal    DATA:  Recent Labs     01/06/23  0315   WBC 10.1   HGB 9.2*   HCT 29.1*   .1*        Recent Labs     01/05/23  1256 01/06/23  0315    134   K 4.7 5.2*   CL 99 101   CO2 21* 17*   BUN 41* 42*   CREATININE 2.1* 1.9*   GLUCOSE 169* 228*     Recent Labs     01/05/23  1256   AST 27   ALT 20   BILITOT 0.5   ALKPHOS 77     No results for input(s): CKTOTAL, CKMB, TROPONINI in the last 72 hours.   Lab Results   Component Value Date    INR 1.1 01/05/2023    INR 1.1 12/15/2022    INR 1.1 05/07/2021    PROTIME 12.7 (H) 01/05/2023    PROTIME 11.9 12/15/2022    PROTIME 11.8 05/07/2021        CBC with Differential:    Lab Results   Component Value Date/Time    WBC 10.1 01/06/2023 03:15 AM    RBC 2.85 01/06/2023 03:15 AM    HGB 9.2 01/06/2023 03:15 AM    HCT 29.1 01/06/2023 03:15 AM     01/06/2023 03:15 AM    .1 01/06/2023 03:15 AM    MCH 32.3 01/06/2023 03:15 AM    MCHC 31.6 01/06/2023 03:15 AM    RDW 12.7 01/06/2023 03:15 AM    NRBC 0.0 12/25/2022 06:55 PM    LYMPHOPCT 8.3 01/06/2023 03:15 AM    PROMYELOPCT 0.9 12/25/2022 06:55 PM    MONOPCT 12.7 01/06/2023 03:15 AM    MYELOPCT 1.8 12/25/2022 02:01 PM    BASOPCT 0.2 01/06/2023 03:15 AM    MONOSABS 1.29 01/06/2023 03:15 AM    LYMPHSABS 0.84 01/06/2023 03:15 AM    EOSABS 0.01 01/06/2023 03:15 AM    BASOSABS 0.02 01/06/2023 03:15 AM     CMP:    Lab Results   Component Value Date/Time     01/06/2023 03:15 AM    K 5.2 01/06/2023 03:15 AM     01/06/2023 03:15 AM    CO2 17 01/06/2023 03:15 AM    BUN 42 01/06/2023 03:15 AM    CREATININE 1.9 01/06/2023 03:15 AM    GFRAA 51 02/11/2020 08:00 AM    LABGLOM 35 01/06/2023 03:15 AM    GLUCOSE 228 01/06/2023 03:15 AM    PROT 7.2 01/05/2023 12:56 PM    LABALBU 3.4 01/05/2023 12:56 PM    CALCIUM 8.9 01/06/2023 03:15 AM    BILITOT 0.5 01/05/2023 12:56 PM    ALKPHOS 77 01/05/2023 12:56 PM    AST 27 01/05/2023 12:56 PM    ALT 20 01/05/2023 12:56 PM     Magnesium:    Lab Results   Component Value Date/Time    MG 1.6 12/30/2022 06:16 AM     Phosphorus:    Lab Results   Component Value Date/Time    PHOS 3.1 12/30/2022 06:16 AM     Troponin:  No results found for: TROPONINI  U/A:    Lab Results   Component Value Date/Time    COLORU Yellow 01/05/2023 12:55 PM    PHUR 6.0 01/05/2023 12:55 PM    WBCUA >20 01/05/2023 12:55 PM    RBCUA 0-1 01/05/2023 12:55 PM    BACTERIA MANY 01/05/2023 12:55 PM    CLARITYU SL CLOUDY 01/05/2023 12:55 PM    SPECGRAV >=1.030 01/05/2023 12:55 PM    LEUKOCYTESUR MODERATE 01/05/2023 12:55 PM    UROBILINOGEN 0.2 01/05/2023 12:55 PM    BILIRUBINUR Negative 01/05/2023 12:55 PM    BLOODU SMALL 01/05/2023 12:55 PM    GLUCOSEU Negative 01/05/2023 12:55 PM     ABG:  No results found for: PHART, QQC6JVB, PO2ART, PXR4HGG, BEART, THGBART, VJC3AJH, X0VEIBUH       RADIOLOGY:   US DUP LOWER EXTREMITIES BILATERAL VENOUS   Final Result   Findings are compatible with 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         XR CHEST PORTABLE   Final Result   No acute process. XR SHOULDER LEFT (MIN 2 VIEWS)   Final Result   Degenerative changes as described. CT HEAD WO CONTRAST   Final Result   1. No acute intracranial abnormality. 2. Chronic small vessel ischemic disease and generalized cerebral volume loss. 3. Mucosal disease of the paranasal sinuses. 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 Urology to see. Venous US noted. Will start lovenox 1 mg/kg q 12 hrs (calculated Cr Cl 40.7)  Will get VQ scan. Renal fxn would preclude CTA  Appropriate premorbid meds resumed  Pain control  Sliding scale BS coverage + maintain novolog. Follow labs. Disposition:  Back to rehab.       Electronically signed by Rodriguez Villarreal MD on 1/6/2023 at 7:03 AM

## 2023-01-06 NOTE — CONSULTS
1/6/23   2:05 PM  Service: Urology  Group: [facility] ([facility]elmo/Reshma[facility]it[facility])    Derek Bailey  [id number]     Chief Complaint:    Recent visit for urinary retention, indwelling harrison catheter    History of Present Illness: The patient is a 80 y.o. male patient of Dr. Leona Kaufman. He has a history of UR after recent knee surgery, elevated PSA, CKD, renal cysts who was seen by [facility] on 12/27/22 for urinary retention of unknown amount. A harrison catheter had been placed and he was discharged with the catheter to follow up with Dr. Leona Kaufman or our practice for VT. He was not placed on Flomax d/t recent syncope. He is on Flomax now. He presented to the ED yesterday for reported AMS and hypoxia. Urology is asked to see d/t hx UR, indwelling harrison. Pt does not give much hx. No family is present. History was obtained from the medical records. He does appear ill. Urine was obtained from the harrison catheter.          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    Diabetes mellitus ([facility] [address]     Diverticulosis     mild    GERD (gastroesophageal reflux disease)     Hyperlipidemia     Hypertension          Past Surgical History:   Procedure Laterality Date    BACK SURGERY      COLONOSCOPY      EYE SURGERY      PAROTIDECTOMY  9/11/12    left superficial       Medications Prior to Admission:    Medications Prior to Admission: aspirin 81 MG chewable tablet, Take 1 tablet by mouth daily  Heparin Sodium, Porcine, (HEPARIN, PORCINE,) 37036 UNIT/ML injection, Inject 0.5 mLs into the skin every 8 hours for 21 days  insulin NPH (HUMULIN N;NOVOLIN N) 100 UNIT/ML injection vial, Inject 20 Units into the skin every morning  insulin NPH (HUMULIN N;NOVOLIN N) 100 UNIT/ML injection vial, Inject 15 Units into the skin daily (before dinner)  insulin lispro (HUMALOG) 100 UNIT/ML SOLN injection vial, Inject 0-4 Units into the skin 3 times daily (with meals)  insulin lispro (HUMALOG) 100 UNIT/ML SOLN injection vial, Inject 0-4 Units into the skin nightly  zinc sulfate (ZINCATE) 220 (50 Zn) MG capsule, Take 1 capsule by mouth daily  tamsulosin (FLOMAX) 0.4 MG capsule, Take 0.4 mg by mouth daily  losartan (COZAAR) 100 MG tablet, Take 100 mg by mouth daily. pilocarpine (SALAGEN) 5 MG tablet, Take 1 tablet by mouth 3 times daily. omeprazole (PRILOSEC) 20 MG capsule, Take 20 mg by mouth daily. metoprolol (LOPRESSOR) 25 MG tablet, Take 25 mg by mouth 2 times daily. Multiple Vitamin (MULTI VITAMIN MENS PO), Take  by mouth.    vitamin D (CHOLECALCIFEROL) 400 UNITS TABS tablet, Take 500 Units by mouth daily. acetaminophen (TYLENOL) 500 MG tablet, Take 500 mg by mouth every 6 hours as needed. Allergies:    Darvocet [propoxyphene n-acetaminophen] and Lisinopril    Social History:    reports that he has never smoked. He has never used smokeless tobacco. He reports that he does not drink alcohol and does not use drugs. Family History:   Non-contributory to this Urological problem  family history includes Cancer in his brother and father. Review of Systems:  Unable to obtain     Physical Exam:     Vitals:  BP (!) 158/80   Pulse 91   Temp 98.6 °F (37 °C) (Axillary)   Resp 18   Ht 5' 11\" (1.803 m)   Wt 213 lb 4.8 oz (96.8 kg)   SpO2 95%   BMI 29.75 kg/m²     General:  Awake, alert, oriented X 3. But flat affect and little conversation. Well developed, well nourished, weak. HEENT:  Normocephalic, atraumatic. Pupils equal, round. No scleral icterus. No conjunctival injection. Normal lips, teeth, and gums. No nasal discharge. Neck:  Supple  Heart:  Regular rate  Lungs:  No audible wheezing. Respirations symmetric and non-labored.   Abdomen:  soft, nontender,   Extremities:  No clubbing, cyanosis, or edema, right knee incision healed   Skin:  Warm and dry  Neuro: Alert, oriented to person and place, moves extremities  Rectal: deferred  Genitalia: harrison catheter appears  in position and draining yellow urine. Labs:     Recent Labs     01/06/23  0315   WBC 10.1   RBC 2.85*   HGB 9.2*   HCT 29.1*   .1*   MCH 32.3   MCHC 31.6*   RDW 12.7      MPV 9.8         Recent Labs     01/05/23  1256 01/06/23  0315   CREATININE 2.1* 1.9*        Latest Reference Range & Units 5/13/19 10:40 1/29/20 08:21   Prostatic Specific Ag 0.00 - 4.00 ng/mL 3.05 4.25 (H)   (H): Data is abnormally high     Latest Reference Range & Units 1/5/23 12:55   Color, UA Straw/Yellow  Yellow   Clarity, UA Clear  SL CLOUDY   Glucose, UA Negative mg/dL Negative   Bilirubin, Urine Negative  Negative   Ketones, Urine Negative mg/dL 15 ! Specific Gravity, UA 1.005 - 1.030  >=1.030   Blood, Urine Negative  SMALL !   pH, UA 5.0 - 9.0  6.0   Protein, UA Negative mg/dL 30 ! Urobilinogen, Urine <2.0 E.U./dL 0.2   Nitrite, Urine Negative  Negative   Leukocyte Esterase, Urine Negative  MODERATE ! Hyaline Casts, UA 0 - 2 /LPF 0-2   WBC, UA 0 - 5 /HPF >20 ! RBC, UA 0 - 2 /HPF 0-1   Bacteria, UA None Seen /HPF MANY ! Crystals, UA None Seen /HPF Few !     1/5/23 1255     Organism Escherichia coli Abnormal  P    Urine Culture, Routine >100,000 CFU/ml   Sensitivity to follow          Assessment/plan:  UR   CAUTI   CKD   Renal cysts  Hx elevated PSA    Continue the Flomax  Monitor for orthostatic hypotension  Antibiotics per primary  Change harrison catheter  VT when medically stable and OOB prior to discharge  Will need OP follow up with either Dr. Sherley Riedel or [facility] for CORA and repeat PSA once catheter free    New Wiley NP-C  [facility]     The patient was seen and examined today by me. I have reviewed the entire note and agree with the assessment and plan of TITO Steward CNP.           Electronically signed by TITO Steward CNP on 1/6/2023 at 2:05 PM

## 2023-01-06 NOTE — PROGRESS NOTES
Physical Therapy  Facility/Department: [address] INTERMEDIATE 1  Physical Therapy Initial Assessment    Name: Nathalie Marroquin  : [birth year]  MRN: [id number]  Date of Service: 2023      Patient Diagnosis(es): The primary encounter diagnosis was Complicated UTI (urinary tract infection). A diagnosis of COVID-19 was also pertinent to this visit. Past Medical History:  has a past medical history of Anemia, CAD (coronary artery disease), Cancer ([facility] [address] Diabetes mellitus ([facility] Diverticulosis, GERD (gastroesophageal reflux disease), Hyperlipidemia, and Hypertension. Past Surgical History:  has a past surgical history that includes Colonoscopy; eye surgery; back surgery; and Parotidectomy (12). Evaluating Therapist: Pio Atwood PT    Room #:  1650/7957-I  Diagnosis:  UTI (urinary tract infection) [J45.3]  Complicated UTI (urinary tract infection) [N39.0]  COVID-19 [U07.1]  PMHx/PSHx:  CAD, DM, Recent R TKA  Precautions:  WBAT, falls, alarm      Social:  Pt admitted from [place] [address]. Unable to report prior level of function. Initial Evaluation  Date: 23 Treatment      Short Term/ Long Term   Goals   Was pt agreeable to Eval/treatment? yes     Does pt have pain? R knee and L shoulder     Bed Mobility  Rolling: NT  Supine to sit: max assist of 2  Sit to supine: NT  Scooting: max assist of 2  Mod assist   Transfers Sit to stand: max assist of 2  Stand to sit: max assist of 2  Stand pivot: Pt unable  Mod assist   Ambulation    NT  TBA   Stair Negotiation  Ascended and descended  NT   N/A   LE strength     3/5    4-/5   balance      Fair sitting Pt leans to right     AM-PAC Raw score                        Pt is alert and Oriented to person. Inconsistent following commands. LE ROM: R knee 0-90  Endurance: fair-       ASSESSMENT:    Pt displays functional ability as noted in the objective portion of this evaluation.       Patient education  Pt educated on transfer technique    Patient response to education:   Pt verbalized understanding Pt demonstrated skill Pt requires further education in this area   no no yes       Comments:  Pt unable to fully stand. Multiple attempts made to stand. Pt returned to bed at end of session    Pt's/ family goals   1. Pt unable to state    Conditions Requiring Skilled Therapeutic Intervention:    [x]Decreased strength     []Decreased ROM  [x]Decreased functional mobility  [x]Decreased balance   [x]Decreased endurance   []Decreased posture  []Decreased sensation  []Decreased coordination   []Decreased vision  [x]Decreased safety awareness   [x]Increased pain       Patient and or family understand(s) diagnosis, prognosis, and plan of care. no  Prognosis is fair for reaching above PT goals    PHYSICAL THERAPY PLAN OF CARE:    PT POC is established based on physician order and patient diagnosis     Referring provider/PT Order: Rosalinda Muñiz MD/ PT eval and treat      Current Treatment Recommendations:     [x] Strengthening to improve independence with functional mobility   [] ROM to improve independence with functional mobility   [x] Balance Training to improve static/dynamic balance and to reduce fall risk  [x] Endurance Training to improve activity tolerance during functional mobility   [x] Transfer Training to improve safety and independence with all functional transfers   [] Gait Training to improve gait mechanics, endurance and assess need for appropriate assistive device  [] Stair Training in preparation for safe discharge home and/or into the community   [] Positioning to prevent skin breakdown and contractures  [x] Safety and Education Training   [x] Patient/Caregiver Education   [] HEP  [] Other     PT long term treatment goals are located in above grid    Frequency of treatments: 2-5x/week x 5 days.     Time in  1120  Time out  1137      Evaluation Time includes thorough review of current medical information, gathering information on past medical history/social history and prior level of function, completion of standardized testing/informal observation of tasks, assessment of data and education on plan of care and goals.       CPT codes:  [x] Low Complexity PT evaluation 52715  [] Moderate Complexity PT evaluation 59927  [] High Complexity PT evaluation 49285  [] PT Re-evaluation 90934  [] Gait training 16653 minutes  [] Manual therapy 35163 minutes  [] Therapeutic activities 62069 minutes  [] Therapeutic exercises 90731 minutes  [] Neuromuscular reeducation 27385 minutes     [facility] PT [id number]

## 2023-01-06 NOTE — PROGRESS NOTES
Occupational Therapy  OCCUPATIONAL THERAPY INITIAL EVALUATION  [facility] [address]    Date: 2023     Patient Name: Daniela Reyes  MRN: [id number]  : 1940  Room: [address] [place] [address]    Evaluating OT: Noemí Fernández Spine, OTR/L - [id number]    Referring Provider: Rosio Mccormack MD  Specific Provider Orders/Date: \"OT eval and treat\" - 2023    Diagnosis: UTI (urinary tract infection) [X73.0]  Complicated UTI (urinary tract infection) [N39.0]  COVID-19 [U07.1]     Pertinent Medical History: recent R TKA, CAD, DM, HTN, cancer    Precautions: fall risk, WBAT, bed alarm, skin integrity, confusion, harrison catheter    Assessment of Current Deficits:    [x] Functional mobility   [x]ADLs  [x] Strength               [x]Cognition   [x] Functional transfers   [x] IADLs         [x] Safety Awareness   [x]Endurance   [] Fine Coordination              [x] Balance      [] Vision/perception   [x]Sensation    []Gross Motor Coordination  [] ROM  [] Delirium                   [] Motor Control     OT PLAN OF CARE   OT POC is based on physician orders, patient diagnosis, and results of clinical assessment.   Frequency/Duration 2-5 days/week for 2 weeks PRN   Specific OT Treatment Interventions to Include:   * Instruction/training on adapted ADL techniques and AE recommendations to increase functional independence within precautions       * Training on energy conservation strategies, correct breathing pattern and techniques to improve independence/tolerance for self-care routine  * Functional transfer/mobility training/DME recommendations for increased independence, safety, and fall prevention  * Patient/Family education to increase follow through with safety techniques and functional independence  * Recommendation of environmental modifications for increased safety with functional transfers/mobility and ADLs  * Therapeutic exercise to improve motor endurance, ROM, and functional strength for ADLs/functional transfers  * Therapeutic activities to facilitate/challenge dynamic balance, stand tolerance for increased safety and independence with ADLs  * Neuro-muscular re-education: facilitation of righting/equilibrium reactions, midline orientation, scapular stability/mobility, normalization of muscle tone, and facilitation of volitional active controled movement  * Positioning to improve skin integrity, interaction with environment and functional independence    Recommended Adaptive Equipment: TBD     Home Living: Patient admitted from [facility]. Prior Level of Function (PLOF): Assistance needed with ADLs at SNF/[facility]; patient reported that he had not been walking at the [facility]; patient is a questionable historian. No family/caregiver present to verify information gathered. Pain Level: Patient reported experiencing pain in his R knee and L shoulder, but did not rate his pain. Cognition: Patient alert and oriented to person; confusion demonstrated. . Fair command follow demonstrated. Inconsistent command follow. Memory: Impaired  Sequencing: Impaired  Problem Solving: Impaired  Judgement/Safety: Impaired    Functional Assessment:  AM-PAC Daily Activity Raw Score: 12/24   Initial Eval Status  Date: 1/6/2023 Treatment Status  Date:  Short Term Goals = Long Term Goals   Feeding SBA after setup and increased time; verbal cues needed for initiation. Setup   Grooming Mod A  SBA (seated)   UB Dressing Mod A  SBA (seated)   LB Dressing Max A  Mod A - with use of AE, as needed/appropriate   Bathing Max A  Mod A - with use of AE/DME, as needed/appropriate   Toileting Dependent  Patient with harrison catheter currently. Mod A   Bed Mobility  Supine-to-Sit: Max Ax2  Sit-to-Supine: Max Ax2   Min A in order to maximize patient's independence/participation with ADLs, re-positioning, and other functional tasks. Functional Transfers Sit-to-Stand:  Max Ax2 for three sit-to-stand attempts from EOB. Patient unable to clear buttocks from bed; limited effort demonstrated by patient with these attempts. Mod A   Functional Mobility Not assessed. Mod A with functional mobility (with device, as needed/appropriate) in order to maximize independence with ADLs/IADLs and other functional tasks. Balance Sitting: Fair (at EOB)    Good dynamic sitting balance during completion of ADLs/IADLs and other functional tasks. Activity Tolerance Limited  Patient will demonstrate Good understanding and consistent implementation of energy conservation techniques and work simplification techniques into ADL/IADL routines. Visual/  Perceptual Impaired, but unable to formally assess secondary to impaired command follow and confusion. Patient demonstrated difficulty with locating items on his lunch plate (e.g. pieces of white fish on a while plate). N/A   B UE Strength R UE: 3+/5 grossly  L Shoulder: 2-/5  Distal L UE: 3+/5  Patient will demonstrate 4/5 B UE strength in order to maximize independence with ADLs and functional transfers. Additional Long-Term Goal: Patient will increase functional independence to PLOF in order to allow patient to live in least restrictive environment. ROM: Additional Information:    R UE  WFL grossly    L UE 0 - 45 degrees of active-assisted shoulder flexion; distal AROM WFL      Hearing: WFL  Sensation: No complaints of numbness/tingling in B UEs. Tone: WFL  Edema: No    Comments: RN approved patient's participation in [address] [place] [address] activities. Upon arrival, patient supine in bed. At end of session, patient supine in bed with call light and phone within reach, bed alarm activated, lunch tray items setup and within reach on tray table, and all lines and tubes intact. Patient would benefit from continued skilled OT to increase safety and independence with completion of ADL/IADL tasks for functional independence and quality of life. Rehab Potential: Good for established goals.   Patient / Family Goal: No goal stated/indicated. Patient and/or family were instructed on functional diagnosis, prognosis/goals, and OT plan of care. Demonstrated limited understanding. Eval Complexity: Low    Time In: 1115  Time Out: 1135  Total Treatment Time: 0 minutes      Minutes Units   OT Eval Low 58222 20 1   OT Eval Medium 13844     OT Eval High 78337     OT Re-Eval [id number]     Therapeutic Ex 56086     Therapeutic Activities 50918     ADL/Self Care 74363     Orthotic Management 30801     Neuro Re-Ed 94661     Non-Billable Time N/A ---     Evaluation time includes thorough review of current medical information, gathering information on past medical history/social history and prior level of function, completion of standardized testing/informal observation of tasks, assessment of data, and education on plan of care and goals. GIACOMO Stafford/L  License Number: [id number]

## 2023-01-06 NOTE — DISCHARGE INSTR - COC
Continuity of Care Form    Patient Name: Karuna Norton   :    MRN:  [id number]    Admit date:  2023  Discharge date:  2023    Code Status Order: Full Code   Advance Directives:     Admitting Physician:  Royal Rob MD  PCP: Hetal Moreland MD    Discharging Nurse: [facility] Unit/Room#: 3767/4701-L  Discharging Unit Phone Number: [phone]    Emergency Contact:   Extended Emergency Contact Information  Primary Emergency Contact: Thelma Frazier  Address: [address] [place]           Residence [place]  Home Phone: [phone]  Mobile Phone: [phone]  Relation: Spouse  Secondary Emergency Contact:  [facility] Phone: [phone]  Mobile Phone: [phone]  Relation: Child  Preferred language: English   needed?  No    Past Surgical History:  Past Surgical History:   Procedure Laterality Date    BACK SURGERY      COLONOSCOPY      EYE SURGERY      PAROTIDECTOMY  12    left superficial       Immunization History:   Immunization History   Administered Date(s) Administered    COVID-19, PFIZER PURPLE top, DILUTE for use, (age 15 y+), 30mcg/0.3mL 2021, 2021, 2021       Active Problems:  Patient Active Problem List   Diagnosis Code    Closed fracture of one rib of left side, initial encounter X95.69MR    Complicated UTI (urinary tract infection) N39.0    UTI (urinary tract infection) N39.0       Isolation/Infection:   Isolation            No Isolation          Patient Infection Status       Infection Onset Added Last Indicated Last Indicated By Review Planned Expiration Resolved Resolved By    COVID-19 22 COVID-19, Rapid 23      Resolved    COVID-19 (Rule Out) 22 COVID-19, Rapid (Ordered)   22 Rule-Out Test Resulted    COVID-19 (Rule Out) 21 COVID-19 Ambulatory (Ordered)   21 Rule-Out Test Resulted            Nurse Assessment:  Last Vital Signs: BP (!) 176/74   Pulse 99   Temp 99.1 °F (37.3 °C) (Axillary)   Resp 18   Ht 5' 11\" (1.803 m)   Wt 213 lb 4.8 oz (96.8 kg)   SpO2 95%   BMI 29.75 kg/m²     Last documented pain score (0-10 scale): Pain Level: 10  Last Weight:   Wt Readings from Last 1 Encounters:   01/06/23 213 lb 4.8 oz (96.8 kg)     Mental Status:  disoriented and alert    IV Access:  - None    Nursing Mobility/ADLs:  Walking   Dependent  Transfer  Assisted  Bathing  Dependent  Dressing  Dependent  Toileting  Assisted  Feeding  Independent  Med Admin  Assisted  Med Delivery   crushed and prefers mixed with pudding    Wound Care Documentation and Therapy:  Incision 12/28/22 Knee Anterior;Right (Active)   Dressing Status Other (Comment) 01/06/23 0847   Incision Assessment Dry 01/06/23 0847   Drainage Amount None 01/06/23 0847   Carmen-incision Assessment Intact 01/06/23 0847   Number of days: 9        Elimination:  Continence:   Bowel: No  Bladder: harrison  Urinary Catheter: Insertion Date: 1/11/2023    Colostomy/Ileostomy/Ileal Conduit: No       Date of Last BM: 1/9/2023    Intake/Output Summary (Last 24 hours) at 1/6/2023 0939  Last data filed at 1/6/2023 0305  Gross per 24 hour   Intake --   Output 850 ml   Net -850 ml     I/O last 3 completed shifts:  In: -   Out: 850 [Urine:850]    Safety Concerns:     At Risk for Falls and Aspiration Risk    Impairments/Disabilities:      None    Nutrition Therapy:  Current Nutrition Therapy:   - Oral Diet:  Carb Control 4 carbs/meal (1800kcals/day)    Routes of Feeding: Oral  Liquids: Thin Liquids  Daily Fluid Restriction: no  Last Modified Barium Swallow with Video (Video Swallowing Test): not donw    Treatments at the Time of Hospital Discharge:   Respiratory Treatments: ***  Oxygen Therapy:  is on oxygen at 2 L/min per nasal cannula.  Ventilator:    - No ventilator support    Rehab Therapies: Physical Therapy, Occupational Therapy, and Speech/Language Therapy  Weight Bearing Status/Restrictions: No  weight bearing restrictions  Other Medical Equipment (for information only, NOT a DME order):  bedside commode  Other Treatments: ***    Patient's personal belongings (please select all that are sent with patient):  None    RN SIGNATURE:  Electronically signed by Bijal Hernandez RN on 1/11/23 at 7:27 AM EST    CASE MANAGEMENT/SOCIAL WORK SECTION    Inpatient Status Date: 1/5/2023    Readmission Risk Assessment Score:  Readmission Risk              Risk of Unplanned Readmission:  17           Discharging to Facility/ Agency   Name: [facility]   Address: [address]  Phone: [phone]  Fax: [phone]    Dialysis Facility (if applicable)   Name:  Address:  Dialysis Schedule:  Phone:  Fax:    / signature: Electronically signed by MICK Rodriguez on 1/6/23 at 9:40 AM EST    PHYSICIAN SECTION    Prognosis: Good    Condition at Discharge: Stable    Rehab Potential (if transferring to Rehab): Good    Recommended Labs or Other Treatments After Discharge: PT/OT. CBC/BMP twice weekly    Physician Certification: I certify the above information and transfer of Sofia Valentine  is necessary for the continuing treatment of the diagnosis listed and that he requires [facility] for less 30 days.      Update Admission H&P: No change in H&P    PHYSICIAN SIGNATURE:  Electronically signed by Zoila Gleason MD on 1/11/23 at 5:25 AM EST

## 2023-01-06 NOTE — PLAN OF CARE
Problem: Discharge Planning  Goal: Discharge to home or other facility with appropriate resources  Outcome: Progressing  Flowsheets (Taken 1/5/2023 2220)  Discharge to home or other facility with appropriate resources: Identify discharge learning needs (meds, wound care, etc)     Problem: Pain  Goal: Verbalizes/displays adequate comfort level or baseline comfort level  Outcome: Progressing     Problem: Safety - Adult  Goal: Free from fall injury  Outcome: Progressing     Problem: ABCDS Injury Assessment  Goal: Absence of physical injury  Outcome: Progressing     Problem: Skin/Tissue Integrity  Goal: Absence of new skin breakdown  Description: 1. Monitor for areas of redness and/or skin breakdown  2. Assess vascular access sites hourly  3. Every 4-6 hours minimum:  Change oxygen saturation probe site  4. Every 4-6 hours:  If on nasal continuous positive airway pressure, respiratory therapy assess nares and determine need for appliance change or resting period. Outcome: Progressing     Problem: Confusion  Goal: Confusion, delirium, dementia, or psychosis is improved or at baseline  Description: INTERVENTIONS:  1. Assess for possible contributors to thought disturbance, including medications, impaired vision or hearing, underlying metabolic abnormalities, dehydration, psychiatric diagnoses, and notify attending LIP  2. Pisek high risk fall precautions, as indicated  3. Provide frequent short contacts to provide reality reorientation, refocusing and direction  4. Decrease environmental stimuli, including noise as appropriate  5. Monitor and intervene to maintain adequate nutrition, hydration, elimination, sleep and activity  6. If unable to ensure safety without constant attention obtain sitter and review sitter guidelines with assigned personnel  7.  Initiate Psychosocial CNS and Spiritual Care consult, as indicated  Outcome: Progressing

## 2023-01-07 LAB
ANION GAP SERPL CALCULATED.3IONS-SCNC: 13 MMOL/L (ref 7–16)
BASOPHILS ABSOLUTE: 0.04 E9/L (ref 0–0.2)
BASOPHILS RELATIVE PERCENT: 0.3 % (ref 0–2)
BUN BLDV-MCNC: 41 MG/DL (ref 6–23)
CALCIUM SERPL-MCNC: 9.2 MG/DL (ref 8.6–10.2)
CHLORIDE BLD-SCNC: 103 MMOL/L (ref 98–107)
CO2: 20 MMOL/L (ref 22–29)
CREAT SERPL-MCNC: 1.9 MG/DL (ref 0.7–1.2)
EOSINOPHILS ABSOLUTE: 0.03 E9/L (ref 0.05–0.5)
EOSINOPHILS RELATIVE PERCENT: 0.3 % (ref 0–6)
GFR SERPL CREATININE-BSD FRML MDRD: 35 ML/MIN/1.73
GLUCOSE BLD-MCNC: 180 MG/DL (ref 74–99)
HCT VFR BLD CALC: 27.5 % (ref 37–54)
HEMOGLOBIN: 8.7 G/DL (ref 12.5–16.5)
IMMATURE GRANULOCYTES #: 0.1 E9/L
IMMATURE GRANULOCYTES %: 0.9 % (ref 0–5)
LYMPHOCYTES ABSOLUTE: 0.82 E9/L (ref 1.5–4)
LYMPHOCYTES RELATIVE PERCENT: 7.2 % (ref 20–42)
MCH RBC QN AUTO: 31.5 PG (ref 26–35)
MCHC RBC AUTO-ENTMCNC: 31.6 % (ref 32–34.5)
MCV RBC AUTO: 99.6 FL (ref 80–99.9)
METER GLUCOSE: 213 MG/DL (ref 74–99)
METER GLUCOSE: 233 MG/DL (ref 74–99)
METER GLUCOSE: 233 MG/DL (ref 74–99)
METER GLUCOSE: 297 MG/DL (ref 74–99)
MONOCYTES ABSOLUTE: 1.36 E9/L (ref 0.1–0.95)
MONOCYTES RELATIVE PERCENT: 11.9 % (ref 2–12)
NEUTROPHILS ABSOLUTE: 9.09 E9/L (ref 1.8–7.3)
NEUTROPHILS RELATIVE PERCENT: 79.4 % (ref 43–80)
ORGANISM: ABNORMAL
PDW BLD-RTO: 13 FL (ref 11.5–15)
PLATELET # BLD: 405 E9/L (ref 130–450)
PMV BLD AUTO: 9.6 FL (ref 7–12)
POTASSIUM REFLEX MAGNESIUM: 4.9 MMOL/L (ref 3.5–5)
RBC # BLD: 2.76 E12/L (ref 3.8–5.8)
SODIUM BLD-SCNC: 136 MMOL/L (ref 132–146)
URINE CULTURE, ROUTINE: ABNORMAL
WBC # BLD: 11.4 E9/L (ref 4.5–11.5)

## 2023-01-07 PROCEDURE — 6370000000 HC RX 637 (ALT 250 FOR IP): Performed by: INTERNAL MEDICINE

## 2023-01-07 PROCEDURE — 6360000002 HC RX W HCPCS: Performed by: INTERNAL MEDICINE

## 2023-01-07 PROCEDURE — 82962 GLUCOSE BLOOD TEST: CPT

## 2023-01-07 PROCEDURE — 80048 BASIC METABOLIC PNL TOTAL CA: CPT

## 2023-01-07 PROCEDURE — 2060000000 HC ICU INTERMEDIATE R&B

## 2023-01-07 PROCEDURE — 2580000003 HC RX 258: Performed by: INTERNAL MEDICINE

## 2023-01-07 PROCEDURE — 36415 COLL VENOUS BLD VENIPUNCTURE: CPT

## 2023-01-07 PROCEDURE — 85025 COMPLETE CBC W/AUTO DIFF WBC: CPT

## 2023-01-07 RX ORDER — SODIUM CHLORIDE 9 MG/ML
INJECTION, SOLUTION INTRAVENOUS CONTINUOUS
Status: ACTIVE | OUTPATIENT
Start: 2023-01-07 | End: 2023-01-07

## 2023-01-07 RX ADMIN — INSULIN HUMAN 15 UNITS: 100 INJECTION, SUSPENSION SUBCUTANEOUS at 16:17

## 2023-01-07 RX ADMIN — LOSARTAN POTASSIUM 100 MG: 50 TABLET, FILM COATED ORAL at 08:34

## 2023-01-07 RX ADMIN — Medication 10 ML: at 08:41

## 2023-01-07 RX ADMIN — PANTOPRAZOLE SODIUM 40 MG: 40 TABLET, DELAYED RELEASE ORAL at 06:39

## 2023-01-07 RX ADMIN — ASPIRIN 81 MG CHEWABLE TABLET 81 MG: 81 TABLET CHEWABLE at 08:34

## 2023-01-07 RX ADMIN — INSULIN LISPRO 1 UNITS: 100 INJECTION, SOLUTION INTRAVENOUS; SUBCUTANEOUS at 16:17

## 2023-01-07 RX ADMIN — METOPROLOL TARTRATE 25 MG: 25 TABLET, FILM COATED ORAL at 08:34

## 2023-01-07 RX ADMIN — SODIUM CHLORIDE: 9 INJECTION, SOLUTION INTRAVENOUS at 08:45

## 2023-01-07 RX ADMIN — METOPROLOL TARTRATE 25 MG: 25 TABLET, FILM COATED ORAL at 21:07

## 2023-01-07 RX ADMIN — INSULIN LISPRO 2 UNITS: 100 INJECTION, SOLUTION INTRAVENOUS; SUBCUTANEOUS at 10:53

## 2023-01-07 RX ADMIN — WATER 1000 MG: 1 INJECTION INTRAMUSCULAR; INTRAVENOUS; SUBCUTANEOUS at 21:00

## 2023-01-07 RX ADMIN — APIXABAN 10 MG: 5 TABLET, FILM COATED ORAL at 08:34

## 2023-01-07 RX ADMIN — Medication 10 ML: at 21:00

## 2023-01-07 RX ADMIN — APIXABAN 10 MG: 5 TABLET, FILM COATED ORAL at 21:06

## 2023-01-07 RX ADMIN — TAMSULOSIN HYDROCHLORIDE 0.4 MG: 0.4 CAPSULE ORAL at 08:34

## 2023-01-07 RX ADMIN — INSULIN LISPRO 1 UNITS: 100 INJECTION, SOLUTION INTRAVENOUS; SUBCUTANEOUS at 06:39

## 2023-01-07 RX ADMIN — INSULIN HUMAN 20 UNITS: 100 INJECTION, SUSPENSION SUBCUTANEOUS at 08:33

## 2023-01-07 ASSESSMENT — PAIN SCALES - GENERAL
PAINLEVEL_OUTOF10: 0

## 2023-01-07 NOTE — PROGRESS NOTES
[facility]  [address] [place] [address] [address], [address] [facility]  [phone]  FAX [phone]      CHIEF UROLOGIC COMPLAINT: Urinary retention, catheter associated UTI    HISTORY OF PRESENT ILLNESS:  Patient without new complaints. Still having knee pain from his knee surgery. States that he feels \"so-so \"today. No fevers, chills, nausea, vomiting.     REVIEW OF SYSTEMS:   CONSTITUTIONAL: Weakness, malaise  HEENT: negative  HEMATOLOGIC: negative  ENDOCRINE: negative  RESPIRATORY: negative  CV: negative  GI: negative  NEURO: negative  ORTHOPEDICS: As above  PSYCHIATRIC: negative  : as above    PAST FAMILY HISTORY:    Family History   Problem Relation Age of Onset    Cancer Father         prostate    Cancer Brother         prostate     PAST SOCIAL HISTORY:    Social History     Socioeconomic History    Marital status:    Tobacco Use    Smoking status: Never    Smokeless tobacco: Never    Tobacco comments:     pipe  40 years ago  (smoked once daily)   Vaping Use    Vaping Use: Never used   Substance and Sexual Activity    Alcohol use: No    Drug use: No    Sexual activity: Not Currently       Scheduled Meds:   apixaban  10 mg Oral BID    Followed by    Elizabet Chávez ON 1/14/2023] apixaban  5 mg Oral BID    cefTRIAXone (ROCEPHIN) IV  1,000 mg IntraVENous Q24H    aspirin  81 mg Oral Daily    insulin NPH  20 Units SubCUTAneous QAM    insulin NPH  15 Units SubCUTAneous Daily before dinner    losartan  100 mg Oral Daily    metoprolol tartrate  25 mg Oral BID    pantoprazole  40 mg Oral QAM AC    tamsulosin  0.4 mg Oral Daily    sodium chloride flush  5-40 mL IntraVENous 2 times per day    insulin lispro  0-4 Units SubCUTAneous TID     insulin lispro  0-4 Units SubCUTAneous Nightly     Continuous Infusions:   sodium chloride 100 mL/hr at 01/07/23 0845    sodium chloride      dextrose       PRN Meds:.traMADol, sodium chloride flush, sodium chloride, ondansetron **OR** ondansetron, acetaminophen **OR** acetaminophen, polyethylene glycol, glucose, dextrose bolus **OR** dextrose bolus, glucagon (rDNA), dextrose    BP (!) 156/77   Pulse (!) 101   Temp 98.2 °F (36.8 °C) (Oral)   Resp 21   Ht 5' 11\" (1.803 m)   Wt 212 lb (96.2 kg)   SpO2 94%   BMI 29.57 kg/m²     Lab Results   Component Value Date    WBC 11.4 01/07/2023    HGB 8.7 (L) 01/07/2023    HCT 27.5 (L) 01/07/2023    MCV 99.6 01/07/2023     01/07/2023       Lab Results   Component Value Date    CREATININE 1.9 (H) 01/07/2023       Lab Results   Component Value Date    PSA 4.25 (H) 01/29/2020    PSA 3.05 05/13/2019       Lab Results   Component Value Date    LABURIN >100,000 CFU/ml 01/05/2023    LABURIN Growth not present 02/11/2020       Lab Results   Component Value Date    BC 24 Hours no growth 01/05/2023       Lab Results   Component Value Date    BLOODCULT2 24 Hours no growth 01/05/2023       PHYSICAL EXAMINATION:  Skin dry, without rashes  Respirations non-labored, intact  Abdomen soft, non-tender, non-distended  Alert and oriented x3  Gracia draining clear urine      ASSESSMENT AND PLAN:  1. Urinary retention with catheter associated UTI. Urine culture growing E. coli. Recommend treatment with antibiotics based on final culture and sensitivity results. Patient is known to Dr. Edwin Ornelas. Recommend discharge with Gracia catheter and follow-up with Dr. Tigre Rose for definitive, long-term management. No need for further urological intervention at this time.     Arnaldo Cui MD, M.D.  1/7/2023  9:22 AM

## 2023-01-07 NOTE — PROGRESS NOTES
Admit Date: 1/5/2023    Subjective:     Patient seen this am. More alert/oriented. Still not baseline. Denies complaint  Appreciate nephrology input. Scheduled Meds:   apixaban  10 mg Oral BID    Followed by    Elizabet Chávez ON 1/14/2023] apixaban  5 mg Oral BID    cefTRIAXone (ROCEPHIN) IV  1,000 mg IntraVENous Q24H    aspirin  81 mg Oral Daily    insulin NPH  20 Units SubCUTAneous QAM    insulin NPH  15 Units SubCUTAneous Daily before dinner    losartan  100 mg Oral Daily    metoprolol tartrate  25 mg Oral BID    pantoprazole  40 mg Oral QAM AC    tamsulosin  0.4 mg Oral Daily    sodium chloride flush  5-40 mL IntraVENous 2 times per day    insulin lispro  0-4 Units SubCUTAneous TID WC    insulin lispro  0-4 Units SubCUTAneous Nightly     Continuous Infusions:   sodium chloride      dextrose       PRN Meds:traMADol, sodium chloride flush, sodium chloride, ondansetron **OR** ondansetron, acetaminophen **OR** acetaminophen, polyethylene glycol, glucose, dextrose bolus **OR** dextrose bolus, glucagon (rDNA), dextrose      Objective:     I/O last 3 completed shifts: In: 0.5 [I.V.:0.5]  Out: 2150 [Urine:2150]  No intake/output data recorded. BP (!) 156/77   Pulse (!) 101   Temp 98.2 °F (36.8 °C) (Oral)   Resp 21   Ht 5' 11\" (1.803 m)   Wt 212 lb (96.2 kg)   SpO2 94%   BMI 29.57 kg/m²   Neck: no adenopathy, no carotid bruit, no JVD, supple, symmetrical, trachea midline and thyroid not enlarged, no tenderness/mass/nodules  Lungs: Clear to A and P  Heart: regular rate and rhythm, S1, S2 normal, no murmur, regular rate and rhythm ,no precordial heave  Abdomen:soft, non-tender; non-distended normal bowel sounds no masses, no organomegaly  Extremities: post of change R knee. No calf tenderness. Edema 1+ RLE, trace edema LLE.  Palpable pulses  Data Review    CBC with Differential:    Lab Results   Component Value Date/Time    WBC 11.4 01/07/2023 03:18 AM    RBC 2.76 01/07/2023 03:18 AM    HGB 8.7 01/07/2023 03:18 AM HCT 27.5 01/07/2023 03:18 AM     01/07/2023 03:18 AM    MCV 99.6 01/07/2023 03:18 AM    MCH 31.5 01/07/2023 03:18 AM    MCHC 31.6 01/07/2023 03:18 AM    RDW 13.0 01/07/2023 03:18 AM    NRBC 0.0 12/25/2022 06:55 PM    LYMPHOPCT 7.2 01/07/2023 03:18 AM    PROMYELOPCT 0.9 12/25/2022 06:55 PM    MONOPCT 11.9 01/07/2023 03:18 AM    MYELOPCT 1.8 12/25/2022 02:01 PM    BASOPCT 0.3 01/07/2023 03:18 AM    MONOSABS 1.36 01/07/2023 03:18 AM    LYMPHSABS 0.82 01/07/2023 03:18 AM    EOSABS 0.03 01/07/2023 03:18 AM    BASOSABS 0.04 01/07/2023 03:18 AM     CMP:    Lab Results   Component Value Date/Time     01/07/2023 03:18 AM    K 4.9 01/07/2023 03:18 AM     01/07/2023 03:18 AM    CO2 20 01/07/2023 03:18 AM    BUN 41 01/07/2023 03:18 AM    CREATININE 1.9 01/07/2023 03:18 AM    GFRAA 51 02/11/2020 08:00 AM    LABGLOM 35 01/07/2023 03:18 AM    GLUCOSE 180 01/07/2023 03:18 AM    PROT 7.2 01/05/2023 12:56 PM    LABALBU 3.4 01/05/2023 12:56 PM    CALCIUM 9.2 01/07/2023 03:18 AM    BILITOT 0.5 01/05/2023 12:56 PM    ALKPHOS 77 01/05/2023 12:56 PM    AST 27 01/05/2023 12:56 PM    ALT 20 01/05/2023 12:56 PM     U/A:    Lab Results   Component Value Date/Time    COLORU Yellow 01/05/2023 12:55 PM    PROTEINU 30 01/05/2023 12:55 PM    PHUR 6.0 01/05/2023 12:55 PM    WBCUA >20 01/05/2023 12:55 PM    RBCUA 0-1 01/05/2023 12:55 PM    BACTERIA MANY 01/05/2023 12:55 PM    CLARITYU SL CLOUDY 01/05/2023 12:55 PM    SPECGRAV >=1.030 01/05/2023 12:55 PM    LEUKOCYTESUR MODERATE 01/05/2023 12:55 PM    UROBILINOGEN 0.2 01/05/2023 12:55 PM    BILIRUBINUR Negative 01/05/2023 12:55 PM    BLOODU SMALL 01/05/2023 12:55 PM    GLUCOSEU Negative 01/05/2023 12:55 PM     Urine cx : Pansensitie E. Coli    NM LUNG SCAN PERFUSION ONLY   Final Result   Very low probability for pulmonary embolism. 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 XR CHEST PORTABLE   Final Result   No acute process. XR SHOULDER LEFT (MIN 2 VIEWS)   Final Result   Degenerative changes as described. CT HEAD WO CONTRAST   Final Result   1. No acute intracranial abnormality. 2. Chronic small vessel ischemic disease and generalized cerebral volume loss. 3. Mucosal disease of the paranasal sinuses. ASSESSMENT   Altered mental status-improving  Pyuria/harrison cath : E.coli UTI/pansensitive. Partial DVT RLE in high risk patient. Low risk VQ scan  NIDDM  Hypertension  Post op R TKA  Post COVID 19  Post op anemia          Patient Active Problem List   Diagnosis Code    Closed fracture of one rib of left side, initial encounter O78.05BP    Complicated UTI (urinary tract infection) N39.0    UTI (urinary tract infection) N39.0         PLAN:  IVF X 1 L  Continue ceftriaxone: day 3. Will d/c lovenox to Eliquis. Continue sliding scale BS coverage + maintain novolog. Ambulate/PT/OT. Follow labs. Possible voiding trial per urology        Disposition:  Back to rehab.      Electronically signed by Ruma Drummond MD on 1/7/2023 at 7:36 AM

## 2023-01-08 LAB
ANION GAP SERPL CALCULATED.3IONS-SCNC: 14 MMOL/L (ref 7–16)
BASOPHILS ABSOLUTE: 0.02 E9/L (ref 0–0.2)
BASOPHILS RELATIVE PERCENT: 0.2 % (ref 0–2)
BUN BLDV-MCNC: 39 MG/DL (ref 6–23)
CALCIUM SERPL-MCNC: 9.2 MG/DL (ref 8.6–10.2)
CHLORIDE BLD-SCNC: 104 MMOL/L (ref 98–107)
CO2: 19 MMOL/L (ref 22–29)
CREAT SERPL-MCNC: 1.8 MG/DL (ref 0.7–1.2)
EOSINOPHILS ABSOLUTE: 0.01 E9/L (ref 0.05–0.5)
EOSINOPHILS RELATIVE PERCENT: 0.1 % (ref 0–6)
GFR SERPL CREATININE-BSD FRML MDRD: 37 ML/MIN/1.73
GLUCOSE BLD-MCNC: 253 MG/DL (ref 74–99)
HCT VFR BLD CALC: 27.1 % (ref 37–54)
HEMOGLOBIN: 8.4 G/DL (ref 12.5–16.5)
IMMATURE GRANULOCYTES #: 0.1 E9/L
IMMATURE GRANULOCYTES %: 0.8 % (ref 0–5)
LYMPHOCYTES ABSOLUTE: 0.69 E9/L (ref 1.5–4)
LYMPHOCYTES RELATIVE PERCENT: 5.5 % (ref 20–42)
MCH RBC QN AUTO: 31 PG (ref 26–35)
MCHC RBC AUTO-ENTMCNC: 31 % (ref 32–34.5)
MCV RBC AUTO: 100 FL (ref 80–99.9)
METER GLUCOSE: 227 MG/DL (ref 74–99)
METER GLUCOSE: 244 MG/DL (ref 74–99)
METER GLUCOSE: 276 MG/DL (ref 74–99)
METER GLUCOSE: 385 MG/DL (ref 74–99)
MONOCYTES ABSOLUTE: 1.28 E9/L (ref 0.1–0.95)
MONOCYTES RELATIVE PERCENT: 10.1 % (ref 2–12)
NEUTROPHILS ABSOLUTE: 10.55 E9/L (ref 1.8–7.3)
NEUTROPHILS RELATIVE PERCENT: 83.3 % (ref 43–80)
PDW BLD-RTO: 12.9 FL (ref 11.5–15)
PLATELET # BLD: 428 E9/L (ref 130–450)
PMV BLD AUTO: 9.8 FL (ref 7–12)
POTASSIUM REFLEX MAGNESIUM: 4.8 MMOL/L (ref 3.5–5)
RBC # BLD: 2.71 E12/L (ref 3.8–5.8)
SODIUM BLD-SCNC: 137 MMOL/L (ref 132–146)
WBC # BLD: 12.7 E9/L (ref 4.5–11.5)

## 2023-01-08 PROCEDURE — 80048 BASIC METABOLIC PNL TOTAL CA: CPT

## 2023-01-08 PROCEDURE — 2580000003 HC RX 258: Performed by: INTERNAL MEDICINE

## 2023-01-08 PROCEDURE — 85025 COMPLETE CBC W/AUTO DIFF WBC: CPT

## 2023-01-08 PROCEDURE — 6370000000 HC RX 637 (ALT 250 FOR IP): Performed by: INTERNAL MEDICINE

## 2023-01-08 PROCEDURE — 2060000000 HC ICU INTERMEDIATE R&B

## 2023-01-08 PROCEDURE — 82962 GLUCOSE BLOOD TEST: CPT

## 2023-01-08 PROCEDURE — 6360000002 HC RX W HCPCS: Performed by: INTERNAL MEDICINE

## 2023-01-08 PROCEDURE — 36415 COLL VENOUS BLD VENIPUNCTURE: CPT

## 2023-01-08 RX ORDER — FERROUS SULFATE 325(65) MG
325 TABLET ORAL 2 TIMES DAILY WITH MEALS
Status: DISCONTINUED | OUTPATIENT
Start: 2023-01-08 | End: 2023-01-11 | Stop reason: HOSPADM

## 2023-01-08 RX ORDER — DOCUSATE SODIUM 100 MG/1
100 CAPSULE, LIQUID FILLED ORAL 2 TIMES DAILY
Status: DISCONTINUED | OUTPATIENT
Start: 2023-01-08 | End: 2023-01-11 | Stop reason: HOSPADM

## 2023-01-08 RX ADMIN — Medication 10 ML: at 09:36

## 2023-01-08 RX ADMIN — INSULIN LISPRO 4 UNITS: 100 INJECTION, SOLUTION INTRAVENOUS; SUBCUTANEOUS at 16:05

## 2023-01-08 RX ADMIN — APIXABAN 10 MG: 5 TABLET, FILM COATED ORAL at 09:35

## 2023-01-08 RX ADMIN — APIXABAN 10 MG: 5 TABLET, FILM COATED ORAL at 20:11

## 2023-01-08 RX ADMIN — INSULIN LISPRO 1 UNITS: 100 INJECTION, SOLUTION INTRAVENOUS; SUBCUTANEOUS at 06:20

## 2023-01-08 RX ADMIN — TAMSULOSIN HYDROCHLORIDE 0.4 MG: 0.4 CAPSULE ORAL at 09:35

## 2023-01-08 RX ADMIN — TRAMADOL HYDROCHLORIDE 50 MG: 50 TABLET, COATED ORAL at 02:50

## 2023-01-08 RX ADMIN — Medication 10 ML: at 20:12

## 2023-01-08 RX ADMIN — LOSARTAN POTASSIUM 100 MG: 50 TABLET, FILM COATED ORAL at 09:36

## 2023-01-08 RX ADMIN — WATER 1000 MG: 1 INJECTION INTRAMUSCULAR; INTRAVENOUS; SUBCUTANEOUS at 20:11

## 2023-01-08 RX ADMIN — FERROUS SULFATE TAB 325 MG (65 MG ELEMENTAL FE) 325 MG: 325 (65 FE) TAB at 16:27

## 2023-01-08 RX ADMIN — ASPIRIN 81 MG CHEWABLE TABLET 81 MG: 81 TABLET CHEWABLE at 09:35

## 2023-01-08 RX ADMIN — TRAMADOL HYDROCHLORIDE 50 MG: 50 TABLET, COATED ORAL at 09:35

## 2023-01-08 RX ADMIN — METOPROLOL TARTRATE 25 MG: 25 TABLET, FILM COATED ORAL at 09:35

## 2023-01-08 RX ADMIN — INSULIN LISPRO 2 UNITS: 100 INJECTION, SOLUTION INTRAVENOUS; SUBCUTANEOUS at 11:26

## 2023-01-08 RX ADMIN — DOCUSATE SODIUM 100 MG: 100 CAPSULE, LIQUID FILLED ORAL at 12:34

## 2023-01-08 RX ADMIN — INSULIN HUMAN 20 UNITS: 100 INJECTION, SUSPENSION SUBCUTANEOUS at 09:41

## 2023-01-08 RX ADMIN — FERROUS SULFATE TAB 325 MG (65 MG ELEMENTAL FE) 325 MG: 325 (65 FE) TAB at 10:34

## 2023-01-08 RX ADMIN — METOPROLOL TARTRATE 25 MG: 25 TABLET, FILM COATED ORAL at 20:11

## 2023-01-08 RX ADMIN — PANTOPRAZOLE SODIUM 40 MG: 40 TABLET, DELAYED RELEASE ORAL at 06:20

## 2023-01-08 ASSESSMENT — PAIN SCALES - GENERAL
PAINLEVEL_OUTOF10: 6
PAINLEVEL_OUTOF10: 0
PAINLEVEL_OUTOF10: 5

## 2023-01-08 ASSESSMENT — PAIN DESCRIPTION - LOCATION: LOCATION: GENERALIZED

## 2023-01-08 NOTE — PROGRESS NOTES
Spoke to Dr. Duarte Leavitt regarding patients difficulty swallowing, barium swallow ordered and patient's diet changed to Dysphagia.

## 2023-01-08 NOTE — PROGRESS NOTES
Admit Date: 1/5/2023    Subjective:     Patient seen. More alert but still not at baseline. Does note pain with inspiration. Scheduled Meds:   apixaban  10 mg Oral BID    Followed by    Cathy Castrejon ON 1/14/2023] apixaban  5 mg Oral BID    cefTRIAXone (ROCEPHIN) IV  1,000 mg IntraVENous Q24H    aspirin  81 mg Oral Daily    insulin NPH  20 Units SubCUTAneous QAM    insulin NPH  15 Units SubCUTAneous Daily before dinner    losartan  100 mg Oral Daily    metoprolol tartrate  25 mg Oral BID    pantoprazole  40 mg Oral QAM AC    tamsulosin  0.4 mg Oral Daily    sodium chloride flush  5-40 mL IntraVENous 2 times per day    insulin lispro  0-4 Units SubCUTAneous TID WC    insulin lispro  0-4 Units SubCUTAneous Nightly     Continuous Infusions:   sodium chloride      dextrose       PRN Meds:traMADol, sodium chloride flush, sodium chloride, ondansetron **OR** ondansetron, acetaminophen **OR** acetaminophen, polyethylene glycol, glucose, dextrose bolus **OR** dextrose bolus, glucagon (rDNA), dextrose      Objective:     I/O last 3 completed shifts: In: 307 [I.V.:986]  Out: 2000 [Urine:2000]  No intake/output data recorded. BP (!) 122/90   Pulse (!) 101   Temp 98.6 °F (37 °C) (Oral)   Resp 18   Ht 5' 11\" (1.803 m)   Wt 211 lb 12.8 oz (96.1 kg)   SpO2 92%   BMI 29.54 kg/m²     Neck: no adenopathy, no carotid bruit, no JVD, supple, symmetrical, trachea midline and thyroid not enlarged, no tenderness/mass/nodules  Lungs: Clear to A and P  Heart: regular rate and rhythm, S1, S2 normal, no murmur, regular rate and rhythm ,no precordial heave  Abdomen:soft, non-tender; non-distended normal bowel sounds no masses, no organomegaly  Extremities: post of change R knee. No calf tenderness. Edema 1+ RLE, trace edema LLE.  Palpable pulses    Data Review    CBC:   Lab Results   Component Value Date/Time    WBC 12.7 01/08/2023 02:54 AM    RBC 2.71 01/08/2023 02:54 AM    HGB 8.4 01/08/2023 02:54 AM    HCT 27.1 01/08/2023 02:54 AM .0 01/08/2023 02:54 AM    MCH 31.0 01/08/2023 02:54 AM    MCHC 31.0 01/08/2023 02:54 AM    RDW 12.9 01/08/2023 02:54 AM     01/08/2023 02:54 AM    MPV 9.8 01/08/2023 02:54 AM     CMP:    Lab Results   Component Value Date/Time     01/08/2023 02:54 AM    K 4.8 01/08/2023 02:54 AM     01/08/2023 02:54 AM    CO2 19 01/08/2023 02:54 AM    BUN 39 01/08/2023 02:54 AM    CREATININE 1.8 01/08/2023 02:54 AM    GFRAA 51 02/11/2020 08:00 AM    LABGLOM 37 01/08/2023 02:54 AM    GLUCOSE 253 01/08/2023 02:54 AM    PROT 7.2 01/05/2023 12:56 PM    LABALBU 3.4 01/05/2023 12:56 PM    CALCIUM 9.2 01/08/2023 02:54 AM    BILITOT 0.5 01/05/2023 12:56 PM    ALKPHOS 77 01/05/2023 12:56 PM    AST 27 01/05/2023 12:56 PM    ALT 20 01/05/2023 12:56 PM     U/A:    Lab Results   Component Value Date/Time    COLORU Yellow 01/05/2023 12:55 PM    PROTEINU 30 01/05/2023 12:55 PM    PHUR 6.0 01/05/2023 12:55 PM    WBCUA >20 01/05/2023 12:55 PM    RBCUA 0-1 01/05/2023 12:55 PM    BACTERIA MANY 01/05/2023 12:55 PM    CLARITYU SL CLOUDY 01/05/2023 12:55 PM    SPECGRAV >=1.030 01/05/2023 12:55 PM    LEUKOCYTESUR MODERATE 01/05/2023 12:55 PM    UROBILINOGEN 0.2 01/05/2023 12:55 PM    BILIRUBINUR Negative 01/05/2023 12:55 PM    BLOODU SMALL 01/05/2023 12:55 PM    GLUCOSEU Negative 01/05/2023 12:55 PM     Urine cx: Pansensitive E.coli    NM LUNG SCAN PERFUSION ONLY   Final Result   Very low probability for pulmonary embolism. 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 XR CHEST PORTABLE   Final Result   No acute process. XR SHOULDER LEFT (MIN 2 VIEWS)   Final Result   Degenerative changes as described. CT HEAD WO CONTRAST   Final Result   1. No acute intracranial abnormality. 2. Chronic small vessel ischemic disease and generalized cerebral volume loss. 3. Mucosal disease of the paranasal sinuses. ASSESSMENT   Altered mental status-improving  Pyuria/harrison cath : E.coli UTI/pansensitive. Partial DVT RLE in high risk patient. Low risk VQ scan  NIDDM  Hypertension  Post op R TKA  Post COVID 19  Post op anemia       PLAN:  Continue ceftriaxone: day 4. Will change to oral atb in am  Changed lovenox to Eliquis. Noting elevated BS, continue sliding scale BS coverage + increase novolog. Ambulate/PT/OT. Will add iron bid/colace  IS. Follow labs. Urology plans to maintain harrison at d/c with outpatient f/u. Should be ok for d/c back to rehab tomorrow. Disposition:  Back to rehab.     Electronically signed by Harrison Handy MD on 1/8/2023 at 7:19 AM

## 2023-01-08 NOTE — PROGRESS NOTES
Physician Progress Note      Claudia Screen  CSN #:                  [id number]  :                       1940  ADMIT DATE:       2023 11:21 AM  [address] [place] [place] DATE:  RESPONDING  PROVIDER #:        Hetal Moreland MD          QUERY TEXT:    Dear Attending Physician,    Pt admitted with UTI ,AMS. Pt noted to have chronic indwelling urinary   catheter. If possible, please document in the progress notes and discharge   summary if you are evaluating and/or treating any of the following: The medical record reflects the following:  Risk Factors:  indwelling urinary catheter, UTI, AMS, Hx CKD 3b  Clinical Indicators: Per H/P,\". .. Altered mental status  2. Pyuria/harrison cath :   Supected UTI . Apple Quiver Apple Quiver \"  Per URO ,\". ..  UR  CAUTI change harrison . Apple Quiver Apple Quiver \"  Treatment: IV ATB, change harrison catheter    Thank you,  Gilda Chilel RN CCDS  Clinical Documentation Improvement Specialist  Options provided:  -- UTI due to the chronic indwelling urinary catheter  -- UTI not due to the chronic indwelling urinary catheter  -- Other - I will add my own diagnosis  -- Disagree - Not applicable / Not valid  -- Disagree - Clinically unable to determine / Unknown  -- Refer to Clinical Documentation Reviewer    PROVIDER RESPONSE TEXT:    UTI is due to the chronic indwelling urinary catheter. Query created by: Nancy Salas on 2023 5:33 PM      QUERY TEXT:    Dear Attending Physician,    Pt admitted with UTI . Pt noted to have altered mental status. If possible,   please document in the progress notes and discharge summary if you are   evaluating and / or treating any of the following: The medical record reflects the following:  Risk Factors:  indwelling urinary catheter, UTI, AMS, Hx CKD 3b  Clinical Indicators: Per H/P,\". .. Altered mental status  2. Pyuria/harrison cath :   Supected UTI . Apple Quiver Apple Quiver \"  Per URO ,\". ..  UR  CAUTI change harrison . Apple Quiver Apple Quiver \"  Treatment: IV ATB, change harrison catheter ,monitor mental status    Thank you,  Anthony Beard Vincent WOOD CCDS  Clinical Documentation Improvement Specialist  Options provided:  -- Metabolic encephalopathy  -- Other - I will add my own diagnosis  -- Disagree - Not applicable / Not valid  -- Disagree - Clinically unable to determine / Unknown  -- Refer to Clinical Documentation Reviewer    PROVIDER RESPONSE TEXT:    This patient has metabolic encephalopathy. Query created by: Sanju Brian on 1/6/2023 5:35 PM      QUERY TEXT:    Dear Attending Physician,    Pt admitted with UTI and AMS, noted to have BUN/CR 1/5  41/2.1 GFR 31 , 1/6   42/1.9 GFR 35. If possible, please document in progress notes and discharge   summary if you are evaluating and/or treating any of the following: The medical record reflects the following:  Risk Factors:  indwelling urinary catheter, UTI, AMS, Hx CKD 3b  Clinical Indicators: Per H/P,\". .. Altered mental status  2. Pyuria/harrison cath :   Supected UTI . Gracie [place] Gracie [place] \"  Per URO 1/6,\". ..  UR  CAUTI change harrison . Gracie Leaf Gracie [place] \"  Labs -   BUN/CR 1/5  41/2.1 GFR 31 , 1/6 42/1.9 GFR 35  Treatment: IV ATB, IVF    Thank you,  Perla Payan RN CCDS  Clinical Documentation Improvement Specialist  Options provided:  -- CKD Stage 3b GFR 30-44  -- Other - I will add my own diagnosis  -- Disagree - Not applicable / Not valid  -- Disagree - Clinically unable to determine / Unknown  -- Refer to Clinical Documentation Reviewer    PROVIDER RESPONSE TEXT:    This patient has CKD Stage 3b.     Query created by: Sanju Brian on 1/6/2023 5:48 PM      Electronically signed by:  Vivienne Ding MD 1/8/2023 6:27 AM

## 2023-01-09 LAB
ANION GAP SERPL CALCULATED.3IONS-SCNC: 13 MMOL/L (ref 7–16)
BASOPHILS ABSOLUTE: 0.03 E9/L (ref 0–0.2)
BASOPHILS RELATIVE PERCENT: 0.3 % (ref 0–2)
BUN BLDV-MCNC: 44 MG/DL (ref 6–23)
CALCIUM SERPL-MCNC: 9.5 MG/DL (ref 8.6–10.2)
CHLORIDE BLD-SCNC: 103 MMOL/L (ref 98–107)
CO2: 21 MMOL/L (ref 22–29)
CREAT SERPL-MCNC: 2 MG/DL (ref 0.7–1.2)
EKG ATRIAL RATE: 93 BPM
EKG P AXIS: 46 DEGREES
EKG P-R INTERVAL: 170 MS
EKG Q-T INTERVAL: 354 MS
EKG QRS DURATION: 96 MS
EKG QTC CALCULATION (BAZETT): 440 MS
EKG R AXIS: -3 DEGREES
EKG T AXIS: 53 DEGREES
EKG VENTRICULAR RATE: 93 BPM
EOSINOPHILS ABSOLUTE: 0.03 E9/L (ref 0.05–0.5)
EOSINOPHILS RELATIVE PERCENT: 0.3 % (ref 0–6)
GFR SERPL CREATININE-BSD FRML MDRD: 33 ML/MIN/1.73
GLUCOSE BLD-MCNC: 250 MG/DL (ref 74–99)
HCT VFR BLD CALC: 28.2 % (ref 37–54)
HEMOGLOBIN: 8.8 G/DL (ref 12.5–16.5)
IMMATURE GRANULOCYTES #: 0.07 E9/L
IMMATURE GRANULOCYTES %: 0.6 % (ref 0–5)
LYMPHOCYTES ABSOLUTE: 0.68 E9/L (ref 1.5–4)
LYMPHOCYTES RELATIVE PERCENT: 6.2 % (ref 20–42)
MCH RBC QN AUTO: 32.5 PG (ref 26–35)
MCHC RBC AUTO-ENTMCNC: 31.2 % (ref 32–34.5)
MCV RBC AUTO: 104.1 FL (ref 80–99.9)
METER GLUCOSE: 225 MG/DL (ref 74–99)
METER GLUCOSE: 295 MG/DL (ref 74–99)
METER GLUCOSE: 298 MG/DL (ref 74–99)
METER GLUCOSE: 314 MG/DL (ref 74–99)
METER GLUCOSE: 336 MG/DL (ref 74–99)
MONOCYTES ABSOLUTE: 1.01 E9/L (ref 0.1–0.95)
MONOCYTES RELATIVE PERCENT: 9.2 % (ref 2–12)
NEUTROPHILS ABSOLUTE: 9.14 E9/L (ref 1.8–7.3)
NEUTROPHILS RELATIVE PERCENT: 83.4 % (ref 43–80)
PDW BLD-RTO: 12.7 FL (ref 11.5–15)
PLATELET # BLD: 429 E9/L (ref 130–450)
PMV BLD AUTO: 10.2 FL (ref 7–12)
POTASSIUM REFLEX MAGNESIUM: 4.8 MMOL/L (ref 3.5–5)
RBC # BLD: 2.71 E12/L (ref 3.8–5.8)
SODIUM BLD-SCNC: 137 MMOL/L (ref 132–146)
TROPONIN, HIGH SENSITIVITY: 80 NG/L (ref 0–11)
WBC # BLD: 11 E9/L (ref 4.5–11.5)

## 2023-01-09 PROCEDURE — 36415 COLL VENOUS BLD VENIPUNCTURE: CPT

## 2023-01-09 PROCEDURE — 2580000003 HC RX 258: Performed by: INTERNAL MEDICINE

## 2023-01-09 PROCEDURE — 2060000000 HC ICU INTERMEDIATE R&B

## 2023-01-09 PROCEDURE — 80048 BASIC METABOLIC PNL TOTAL CA: CPT

## 2023-01-09 PROCEDURE — 82962 GLUCOSE BLOOD TEST: CPT

## 2023-01-09 PROCEDURE — 93005 ELECTROCARDIOGRAM TRACING: CPT | Performed by: INTERNAL MEDICINE

## 2023-01-09 PROCEDURE — 6370000000 HC RX 637 (ALT 250 FOR IP): Performed by: INTERNAL MEDICINE

## 2023-01-09 PROCEDURE — 85025 COMPLETE CBC W/AUTO DIFF WBC: CPT

## 2023-01-09 PROCEDURE — 84484 ASSAY OF TROPONIN QUANT: CPT

## 2023-01-09 RX ORDER — SODIUM CHLORIDE 9 MG/ML
INJECTION, SOLUTION INTRAVENOUS CONTINUOUS
Status: DISCONTINUED | OUTPATIENT
Start: 2023-01-09 | End: 2023-01-10

## 2023-01-09 RX ORDER — CEPHALEXIN 500 MG/1
500 CAPSULE ORAL EVERY 12 HOURS SCHEDULED
Status: DISCONTINUED | OUTPATIENT
Start: 2023-01-09 | End: 2023-01-11 | Stop reason: HOSPADM

## 2023-01-09 RX ADMIN — FERROUS SULFATE TAB 325 MG (65 MG ELEMENTAL FE) 325 MG: 325 (65 FE) TAB at 09:37

## 2023-01-09 RX ADMIN — DOCUSATE SODIUM 100 MG: 100 CAPSULE, LIQUID FILLED ORAL at 21:18

## 2023-01-09 RX ADMIN — CEPHALEXIN 500 MG: 500 CAPSULE ORAL at 09:41

## 2023-01-09 RX ADMIN — INSULIN HUMAN 20 UNITS: 100 INJECTION, SUSPENSION SUBCUTANEOUS at 09:38

## 2023-01-09 RX ADMIN — FERROUS SULFATE TAB 325 MG (65 MG ELEMENTAL FE) 325 MG: 325 (65 FE) TAB at 15:59

## 2023-01-09 RX ADMIN — METOPROLOL TARTRATE 25 MG: 25 TABLET, FILM COATED ORAL at 21:18

## 2023-01-09 RX ADMIN — METOPROLOL TARTRATE 25 MG: 25 TABLET, FILM COATED ORAL at 09:38

## 2023-01-09 RX ADMIN — APIXABAN 10 MG: 5 TABLET, FILM COATED ORAL at 09:37

## 2023-01-09 RX ADMIN — INSULIN LISPRO 3 UNITS: 100 INJECTION, SOLUTION INTRAVENOUS; SUBCUTANEOUS at 11:12

## 2023-01-09 RX ADMIN — SODIUM CHLORIDE: 9 INJECTION, SOLUTION INTRAVENOUS at 10:17

## 2023-01-09 RX ADMIN — APIXABAN 10 MG: 5 TABLET, FILM COATED ORAL at 21:18

## 2023-01-09 RX ADMIN — DOCUSATE SODIUM 100 MG: 100 CAPSULE, LIQUID FILLED ORAL at 09:37

## 2023-01-09 RX ADMIN — TAMSULOSIN HYDROCHLORIDE 0.4 MG: 0.4 CAPSULE ORAL at 09:41

## 2023-01-09 RX ADMIN — Medication 10 ML: at 09:41

## 2023-01-09 RX ADMIN — INSULIN LISPRO 3 UNITS: 100 INJECTION, SOLUTION INTRAVENOUS; SUBCUTANEOUS at 16:00

## 2023-01-09 RX ADMIN — LOSARTAN POTASSIUM 100 MG: 50 TABLET, FILM COATED ORAL at 09:37

## 2023-01-09 RX ADMIN — ASPIRIN 81 MG CHEWABLE TABLET 81 MG: 81 TABLET CHEWABLE at 09:37

## 2023-01-09 RX ADMIN — CEPHALEXIN 500 MG: 500 CAPSULE ORAL at 21:39

## 2023-01-09 RX ADMIN — INSULIN LISPRO 1 UNITS: 100 INJECTION, SOLUTION INTRAVENOUS; SUBCUTANEOUS at 06:33

## 2023-01-09 RX ADMIN — PANTOPRAZOLE SODIUM 40 MG: 40 TABLET, DELAYED RELEASE ORAL at 06:33

## 2023-01-09 ASSESSMENT — PAIN SCALES - GENERAL
PAINLEVEL_OUTOF10: 0
PAINLEVEL_OUTOF10: 0

## 2023-01-09 NOTE — PROGRESS NOTES
Admit Date: 1/5/2023    Subjective:     Patient seen. Wife in room.  Updated on condition  Remaining issues holding d/c:  Swallowing difficulties  Gracia cathter. Urology suggested leaving in to outpatient urology consult  Wife and patient are asking if we can try another voiding trial.  I have no objection      Scheduled Meds:   insulin NPH  20 Units SubCUTAneous Daily before dinner    ferrous sulfate  325 mg Oral BID WC    docusate sodium  100 mg Oral BID    apixaban  10 mg Oral BID    Followed by    [START ON 1/14/2023] apixaban  5 mg Oral BID    cefTRIAXone (ROCEPHIN) IV  1,000 mg IntraVENous Q24H    aspirin  81 mg Oral Daily    insulin NPH  20 Units SubCUTAneous QAM    losartan  100 mg Oral Daily    metoprolol tartrate  25 mg Oral BID    pantoprazole  40 mg Oral QAM AC    tamsulosin  0.4 mg Oral Daily    sodium chloride flush  5-40 mL IntraVENous 2 times per day    insulin lispro  0-4 Units SubCUTAneous TID WC    insulin lispro  0-4 Units SubCUTAneous Nightly     Continuous Infusions:   sodium chloride      dextrose       PRN Meds:traMADol, sodium chloride flush, sodium chloride, ondansetron **OR** ondansetron, acetaminophen **OR** acetaminophen, polyethylene glycol, glucose, dextrose bolus **OR** dextrose bolus, glucagon (rDNA), dextrose      Objective:     I/O last 3 completed shifts:  In: -   Out: 1600 [Urine:1600]  No intake/output data recorded.  BP (!) 136/99   Pulse 89   Temp 98.2 °F (36.8 °C) (Oral)   Resp 17   Ht 5' 11\" (1.803 m)   Wt 209 lb 1.6 oz (94.8 kg)   SpO2 92%   BMI 29.16 kg/m²        Neck: no adenopathy, no carotid bruit, no JVD, supple, symmetrical, trachea midline and thyroid not enlarged, no tenderness/mass/nodules  Lungs: Clear to A and P  Heart: regular rate and rhythm, S1, S2 normal, no murmur, regular rate and rhythm ,no precordial heave  Abdomen:soft, non-tender; non-distended normal bowel sounds no masses, no organomegaly  Extremities: post of change R knee. No calf tenderness.  Edema 1+ RLE, trace edema LLE. Palpable pulses    Data Review    CBC with Differential:    Lab Results   Component Value Date/Time    WBC 11.0 01/09/2023 02:25 AM    RBC 2.71 01/09/2023 02:25 AM    HGB 8.8 01/09/2023 02:25 AM    HCT 28.2 01/09/2023 02:25 AM     01/09/2023 02:25 AM    .1 01/09/2023 02:25 AM    MCH 32.5 01/09/2023 02:25 AM    MCHC 31.2 01/09/2023 02:25 AM    RDW 12.7 01/09/2023 02:25 AM    NRBC 0.0 12/25/2022 06:55 PM    LYMPHOPCT 6.2 01/09/2023 02:25 AM    PROMYELOPCT 0.9 12/25/2022 06:55 PM    MONOPCT 9.2 01/09/2023 02:25 AM    MYELOPCT 1.8 12/25/2022 02:01 PM    BASOPCT 0.3 01/09/2023 02:25 AM    MONOSABS 1.01 01/09/2023 02:25 AM    LYMPHSABS 0.68 01/09/2023 02:25 AM    EOSABS 0.03 01/09/2023 02:25 AM    BASOSABS 0.03 01/09/2023 02:25 AM     CMP:    Lab Results   Component Value Date/Time     01/09/2023 02:25 AM    K 4.8 01/09/2023 02:25 AM     01/09/2023 02:25 AM    CO2 21 01/09/2023 02:25 AM    BUN 44 01/09/2023 02:25 AM    CREATININE 2.0 01/09/2023 02:25 AM    GFRAA 51 02/11/2020 08:00 AM    LABGLOM 33 01/09/2023 02:25 AM    GLUCOSE 250 01/09/2023 02:25 AM    PROT 7.2 01/05/2023 12:56 PM    LABALBU 3.4 01/05/2023 12:56 PM    CALCIUM 9.5 01/09/2023 02:25 AM    BILITOT 0.5 01/05/2023 12:56 PM    ALKPHOS 77 01/05/2023 12:56 PM    AST 27 01/05/2023 12:56 PM    ALT 20 01/05/2023 12:56 PM       NM LUNG SCAN PERFUSION ONLY   Final Result   Very low probability for pulmonary embolism. 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 XR CHEST PORTABLE   Final Result   No acute process. XR SHOULDER LEFT (MIN 2 VIEWS)   Final Result   Degenerative changes as described. CT HEAD WO CONTRAST   Final Result   1. No acute intracranial abnormality. 2. Chronic small vessel ischemic disease and generalized cerebral volume loss.    3. Mucosal disease of the paranasal sinuses. FL MODIFIED BARIUM SWALLOW W VIDEO    (Results Pending)          ASSESSMENT   Altered mental status-improving  Pyuria/harrison cath : E.coli UTI/pansensitive. Partial DVT RLE in high risk patient. Low risk VQ scan  NIDDM  Hypertension  Post op R TKA  Post COVID 19  Post op anemia  Dysphagia        PLAN:  D/C ceftriaxone to keflex 500 mg bid. Ambulate/PT/OT. Will give voiding trial while awaiting barium swallow/speech tx eval.  Follow labs. .Should be ok for d/c back to rehab within 24 hrs. Disposition:  Back to rehab.       Electronically signed by Nick Purcell MD on 1/9/2023 at 7:06 AM

## 2023-01-09 NOTE — PROGRESS NOTES
Patient up to bedside commode. Homar blackman for patient to wake up. Upon entering room, patient unresponsive. Called for help and to initiate RRT. Patient returned to bed, 4LNC applied. RRT at bedside. Patient slowly started to respond. Wife at bedside and reports this had [facility]. Vitals documented. Patient became alert x4. ST on monitor . Oxygen on RA 87%, 2L NC maintained, oxygen 93%. Dr. Drew Contreras to call Dr. Jordana Guillory to update on RRT.   ISRAEL Burton  8:54 AM

## 2023-01-09 NOTE — PROGRESS NOTES
Speech Language Pathology      NAME:  Daniele Forman  :    DATE: 2023  ROOM:  [address] [place] [address]         Order received. Chart reviewed. Attempted to complete a video swallow eval in AM.    Pt unavailable at this time due to:  [x] HOLD per RN due to current medical status, RRT today  [] Off unit for testing/ procedure    [] With medical staff   [] Declined intervention  [] Sleeping/ Lethargic   [] Other:       Will re-attempt as able. Thank you. UTI (urinary tract infection) [P17.6]  Complicated UTI (urinary tract infection) [N39.0]  COVID-19 [U07.1]            Cony MONDRAGON CCC/SLP [id number]  Speech-Language Pathologist

## 2023-01-09 NOTE — PLAN OF CARE
Problem: Discharge Planning  Goal: Discharge to home or other facility with appropriate resources  Outcome: Progressing  Flowsheets (Taken 1/9/2023 1735)  Discharge to home or other facility with appropriate resources: Identify barriers to discharge with patient and caregiver     Problem: Pain  Goal: Verbalizes/displays adequate comfort level or baseline comfort level  Outcome: Progressing  Flowsheets (Taken 1/9/2023 1735)  Verbalizes/displays adequate comfort level or baseline comfort level:   Encourage patient to monitor pain and request assistance   Assess pain using appropriate pain scale     Problem: Safety - Adult  Goal: Free from fall injury  Outcome: Progressing  Flowsheets (Taken 1/9/2023 1735)  Free From Fall Injury: Instruct family/caregiver on patient safety     Problem: ABCDS Injury Assessment  Goal: Absence of physical injury  Outcome: Progressing  Flowsheets (Taken 1/9/2023 1735)  Absence of Physical Injury: Implement safety measures based on patient assessment     Problem: Skin/Tissue Integrity  Goal: Absence of new skin breakdown  Description: 1. Monitor for areas of redness and/or skin breakdown  2. Assess vascular access sites hourly  3. Every 4-6 hours minimum:  Change oxygen saturation probe site  4. Every 4-6 hours:  If on nasal continuous positive airway pressure, respiratory therapy assess nares and determine need for appliance change or resting period. Outcome: Progressing     Problem: Confusion  Goal: Confusion, delirium, dementia, or psychosis is improved or at baseline  Description: INTERVENTIONS:  1. Assess for possible contributors to thought disturbance, including medications, impaired vision or hearing, underlying metabolic abnormalities, dehydration, psychiatric diagnoses, and notify attending LIP  2. [place] high risk fall precautions, as indicated  3. Provide frequent short contacts to provide reality reorientation, refocusing and direction  4.  Decrease environmental stimuli, including noise as appropriate  5. Monitor and intervene to maintain adequate nutrition, hydration, elimination, sleep and activity  6. If unable to ensure safety without constant attention obtain sitter and review sitter guidelines with assigned personnel  7. Initiate Psychosocial CNS and Spiritual Care consult, as indicated  Outcome: Progressing  Flowsheets (Taken 1/9/2023 1735)  Effect of thought disturbance (confusion, delirium, dementia, or psychosis) are managed with adequate functional status: Monitor and intervene to maintain adequate nutrition, hydration, elimination, sleep and activity     Problem: Chronic Conditions and Co-morbidities  Goal: Patient's chronic conditions and co-morbidity symptoms are monitored and maintained or improved  Outcome: Progressing  Flowsheets (Taken 1/9/2023 1735)  Care Plan - Patient's Chronic Conditions and Co-Morbidity Symptoms are Monitored and Maintained or Improved:   Monitor and assess patient's chronic conditions and comorbid symptoms for stability, deterioration, or improvement   Collaborate with multidisciplinary team to address chronic and comorbid conditions and prevent exacerbation or deterioration

## 2023-01-09 NOTE — CONSULTS
INPATIENT CONSULT-[facility] CARDIOLOGY    Name: Sherlyn Diggs    Age: 80 y.o. Date of Admission: 1/5/2023 11:21 AM    Date of Service: 1/9/2023    Reason for Consultation: Syncope    Referring Physician: Dr. Roslyn Carbajal    History of Present Illness: The patient is a 80y.o. year old male. Does not currently remember anything and is extremely fatigued but appears cogent. All information obtained from the H&P. After medical treatment described below, he was on the bedpan having a bowel movement and became presyncopal.  Briefly lost consciousness and after awakening vomited. ECG was normal.  Currently extremely drowsy in bed and in no distress. Sinus rhythm. Admitted after right TKA and recent COVID-19 infection. Urinary retention and he had a Gracia catheter placed. Reportedly was hypoxic and had altered mental status and rehabilitation and is on ceftriaxone for UTI. 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 Past Medical History:  Hypertension, hyperlipidemia, non-insulin-requiring diabetes mellitus, chronic kidney disease of unknown severity. Creatinine this admission 2.  Echo December 2022 showed normal left ventricular size and systolic function without significant valvular abnormalities and with severe LVH, no LVOT. Review of Systems: Unable to obtain due to mental status.     Family History:  Family History   Problem Relation Age of Onset    Cancer Father         prostate    Cancer Brother         prostate       Social History:  Social History     Socioeconomic History    Marital status:      Spouse name: Not on file    Number of children: Not on file    Years of education: Not on file    Highest education level: Not on file   Occupational History    Not on file   Tobacco Use    Smoking status: Never    Smokeless tobacco: Never    Tobacco comments:     pipe  40 years ago  (smoked once daily)   Vaping Use    Vaping Use: Never used   Substance and Sexual Activity Alcohol use: No    Drug use: No    Sexual activity: Not Currently   Other Topics Concern    Not on file   Social History Narrative    Not on file     Social Determinants of Health     Financial Resource Strain: Not on file   Food Insecurity: Not on file   Transportation Needs: Not on file   Physical Activity: Not on file   Stress: Not on file   Social Connections: Not on file   Intimate Partner Violence: Not on file   Housing Stability: Not on file       Allergies:   Allergies   Allergen Reactions    Darvocet [Propoxyphene N-Acetaminophen] Nausea And Vomiting    Lisinopril Palpitations       Current Medications:  Current Facility-Administered Medications   Medication Dose Route Frequency Provider Last Rate Last Admin    cephALEXin (KEFLEX) capsule 500 mg  500 mg Oral 2 times per day Rosio Mccormack MD   500 mg at 01/09/23 0941    0.9 % sodium chloride infusion   IntraVENous Continuous Rosio Mccormack  mL/hr at 01/09/23 1017 New Bag at 01/09/23 1017    insulin NPH (HumuLIN N;NovoLIN N) injection vial 20 Units  20 Units SubCUTAneous Daily before dinner Rosio Mccormack MD   20 Units at 01/08/23 1605    ferrous sulfate (IRON 325) tablet 325 mg  325 mg Oral BID WC Rosio Mccormack MD   325 mg at 01/09/23 3598    docusate sodium (COLACE) capsule 100 mg  100 mg Oral BID Rosio Mccormack MD   100 mg at 01/09/23 6759    apixaban (ELIQUIS) tablet 10 mg  10 mg Oral BID Rosio Mccormack MD   10 mg at 01/09/23 0016    Followed by    Michelle Joser ON 1/14/2023] apixaban (ELIQUIS) tablet 5 mg  5 mg Oral BID Rosio Mccormack MD        traMADol Chauncey Howard) tablet 50 mg  50 mg Oral Q4H PRN Rosio Mccormack MD   50 mg at 01/08/23 0935    aspirin chewable tablet 81 mg  81 mg Oral Daily Rosio Mccormack MD   81 mg at 01/09/23 0937    insulin NPH (HumuLIN N;NovoLIN N) injection vial 20 Units  20 Units SubCUTAneous QAM Rosio Mccormack MD   20 Units at 01/09/23 0938    losartan (COZAAR) tablet 100 mg  100 mg Oral Daily Rosio Mccormack MD   100 mg at 01/09/23 6526    metoprolol tartrate (LOPRESSOR) tablet 25 mg  25 mg Oral BID Milo Cranker, MD   25 mg at 01/09/23 0938    pantoprazole (PROTONIX) tablet 40 mg  40 mg Oral QAM AC Milo Cranker, MD   40 mg at 01/09/23 9174    tamsulosin (FLOMAX) capsule 0.4 mg  0.4 mg Oral Daily Milo Cranker, MD   0.4 mg at 01/09/23 0941    sodium chloride flush 0.9 % injection 5-40 mL  5-40 mL IntraVENous 2 times per day Milo Cranker, MD   10 mL at 01/09/23 0941    sodium chloride flush 0.9 % injection 5-40 mL  5-40 mL IntraVENous PRN Milo Cranker, MD        0.9 % sodium chloride infusion   IntraVENous PRN Milo Cranker, MD        ondansetron (ZOFRAN-ODT) disintegrating tablet 4 mg  4 mg Oral Q8H PRN Milo Cranker, MD        Or    [facility]ansetron [facility] injection 4 mg  4 mg IntraVENous Q6H PRN Milo Cranker, MD        acetaminophen (TYLENOL) tablet 650 mg  650 mg Oral Q6H PRN Milo Cranker, MD   650 mg at 01/06/23 1904    Or    acetaminophen (TYLENOL) suppository 650 mg  650 mg Rectal Q6H PRN Milo Cranker, MD        polyethylene glycol (GLYCOLAX) packet 17 g  17 g Oral Daily PRN Milo Cranker, MD        insulin lispro (HUMALOG) injection vial 0-4 Units  0-4 Units SubCUTAneous TID WC Milo Cranker, MD   1 Units at 01/09/23 9302    insulin lispro (HUMALOG) injection vial 0-4 Units  0-4 Units SubCUTAneous Nightly Milo Cranker, MD        glucose chewable tablet 16 g  4 tablet Oral PRN Milo Cranker, MD        dextrose bolus 10% 125 mL  125 mL IntraVENous PRN Milo Cranker, MD        Or    dextrose bolus 10% 250 mL  250 mL IntraVENous PRN Milo Cranker, MD        glucagon (rDNA) injection 1 mg  1 mg SubCUTAneous PRN Milo Cranker, MD        dextrose 10 % infusion   IntraVENous Continuous PRN Milo Cranker, MD          sodium chloride 100 mL/hr at 01/09/23 1017    sodium chloride      dextrose         Physical Exam:  /64   Pulse 92   Temp 97.8 °F (36.6 °C) (Oral)   Resp 18   Ht 5' 11\" (1.803 m)   Wt 209 lb 1.6 oz (94.8 kg)   SpO2 97%   BMI 29.16 kg/m²   Weight change: -2 lb 11.2 oz (-1.225 kg)  Wt Readings from Last 3 Encounters:   01/09/23 209 lb 1.6 oz (94.8 kg)   12/30/22 225 lb (102.1 kg)   11/24/21 218 lb (98.9 kg)         General: Awake, alert, oriented x3, no acute distress  HEENT: Unremarkable  Neck: No JVD or bruits. Cardiac: Regular rate and rhythm, normal S1 and S2, no extra heart sounds, murmurs, heaves, thrills  Resp: Lungs clear without wheezing or crackles. No accessory muscle use or retraction  Abdomen: soft, nontender, nondistended, no gross organomegaly or mass  Skin: Warm and dry, no cyanosis. Musculoskeletal: No identified joint deformity  Neuro: Grossly unremarkable  Psych: Cooperative, and normal affect    Intake/Output:    Intake/Output Summary (Last 24 hours) at 1/9/2023 1109  Last data filed at 1/9/2023 0250  Gross per 24 hour   Intake --   Output 800 ml   Net -800 ml     No intake/output data recorded. Laboratory Tests:  Lab Results   Component Value Date    CREATININE 2.0 (H) 01/09/2023    BUN 44 (H) 01/09/2023     01/09/2023    K 4.8 01/09/2023     01/09/2023    CO2 21 (L) 01/09/2023     No results for input(s): CKTOTAL, CKMB in the last 72 hours. Invalid input(s): TROPONONI  No results found for: BNP  Lab Results   Component Value Date/Time    WBC 11.0 01/09/2023 02:25 AM    RBC 2.71 01/09/2023 02:25 AM    HGB 8.8 01/09/2023 02:25 AM    HCT 28.2 01/09/2023 02:25 AM    .1 01/09/2023 02:25 AM    MCH 32.5 01/09/2023 02:25 AM    MCHC 31.2 01/09/2023 02:25 AM    RDW 12.7 01/09/2023 02:25 AM     01/09/2023 02:25 AM    MPV 10.2 01/09/2023 02:25 AM     No results for input(s): ALKPHOS, ALT, AST, PROT, BILITOT, BILIDIR, LABALBU in the last 72 hours.   Lab Results   Component Value Date/Time    MG 1.6 12/30/2022 06:16 AM     Lab Results   Component Value Date/Time    PROTIME 12.7 01/05/2023 12:56 PM    INR 1.1 01/05/2023 12:56 PM     Lab Results   Component Value Date/Time    TSH 0.804 01/06/2023 03:15 AM     No components found for: CHLPL  Lab Results   Component Value Date    TRIG 75 12/16/2019    TRIG 69 05/13/2019    TRIG 68 09/17/2018     Lab Results   Component Value Date    HDL 62 12/16/2019    HDL 54 05/13/2019    HDL 54 09/17/2018     Lab Results   Component Value Date    LDLCALC 87 12/16/2019    LDLCALC 72 05/13/2019    [address] 80 09/17/2018     Lab Results   Component Value Date    INR 1.1 01/05/2023       ECG obtained after syncopal episode and reviewed by me was normal.  Not significantly different than previous. ASSESSMENT / PLAN:    1. Brief episode of syncope-highly likely vasovagal and that it occurred as he was having a bowel movement and it was followed by vomiting. Also may be exacerbated by the underlying urinary issues. Does not need repeat echocardiogram or stress testing. 2.  Hypertension-blood pressure 130/70 immediately after the episode. No new antihypertensives added. 3.  Urinary incontinence and retention-would note that tamsulosin can exacerbate syncope but it normally is associated with orthostatic hypotension. 4.  Diabetes-per PCP. 5.  No further inpatient cardiac recommendations currently pending unless symptoms continue to recur. Sign off.       Electronically signed by Alida Park MD on 1/9/2023 at 11:09 AM

## 2023-01-09 NOTE — CARE COORDINATION
Social Work/Discharge Planning:  Chart reviewed. Patient had an RRT today. He is on two liters oxygen. Plan remains to return to [facility] when stable. No pre-cert and no covid needed. Will continue to follow.   Electronically signed by MICK Goldberg on 1/9/2023 at 10:46 AM

## 2023-01-09 NOTE — PLAN OF CARE
Problem: Discharge Planning  Goal: Discharge to home or other facility with appropriate resources  Outcome: Progressing     Problem: Pain  Goal: Verbalizes/displays adequate comfort level or baseline comfort level  1/9/2023 0210 by Adan Camara RN  Outcome: Progressing     Problem: Safety - Adult  Goal: Free from fall injury  1/9/2023 0210 by Adan Camara RN  Outcome: Progressing     Problem: ABCDS Injury Assessment  Goal: Absence of physical injury  1/9/2023 0210 by Adan Camara RN  Outcome: Progressing     Problem: Skin/Tissue Integrity  Goal: Absence of new skin breakdown  Description: 1. Monitor for areas of redness and/or skin breakdown  2. Assess vascular access sites hourly  3. Every 4-6 hours minimum:  Change oxygen saturation probe site  4. Every 4-6 hours:  If on nasal continuous positive airway pressure, respiratory therapy assess nares and determine need for appliance change or resting period. 1/9/2023 0210 by Adan Camara RN  Outcome: Progressing     Problem: Confusion  Goal: Confusion, delirium, dementia, or psychosis is improved or at baseline  Description: INTERVENTIONS:  1. Assess for possible contributors to thought disturbance, including medications, impaired vision or hearing, underlying metabolic abnormalities, dehydration, psychiatric diagnoses, and notify attending LIP  2. [place] high risk fall precautions, as indicated  3. Provide frequent short contacts to provide reality reorientation, refocusing and direction  4. Decrease environmental stimuli, including noise as appropriate  5. Monitor and intervene to maintain adequate nutrition, hydration, elimination, sleep and activity  6. If unable to ensure safety without constant attention obtain sitter and review sitter guidelines with assigned personnel  7.  Initiate Psychosocial CNS and Spiritual Care consult, as indicated  Outcome: Progressing     Problem: Chronic Conditions and Co-morbidities  Goal: Patient's chronic conditions and co-morbidity symptoms are monitored and maintained or improved  Outcome: Progressing

## 2023-01-09 NOTE — PROGRESS NOTES
Occupational Therapy  Patient treatment attempted this AM.  Patient laying in the bed. Nurse reports pt had RRT earlier today however is okay to participate. Pt declined to participate at this time. Will attempt at a later time.   Vera Bumpers OTA/LISA [id number]

## 2023-01-10 LAB
ALBUMIN SERPL-MCNC: 2.7 G/DL (ref 3.5–5.2)
ALP BLD-CCNC: 76 U/L (ref 40–129)
ALT SERPL-CCNC: 40 U/L (ref 0–40)
ANION GAP SERPL CALCULATED.3IONS-SCNC: 12 MMOL/L (ref 7–16)
AST SERPL-CCNC: 46 U/L (ref 0–39)
BASOPHILS ABSOLUTE: 0.03 E9/L (ref 0–0.2)
BASOPHILS RELATIVE PERCENT: 0.4 % (ref 0–2)
BILIRUB SERPL-MCNC: 0.3 MG/DL (ref 0–1.2)
BLOOD CULTURE, ROUTINE: NORMAL
BUN BLDV-MCNC: 47 MG/DL (ref 6–23)
CALCIUM SERPL-MCNC: 9 MG/DL (ref 8.6–10.2)
CHLORIDE BLD-SCNC: 106 MMOL/L (ref 98–107)
CO2: 20 MMOL/L (ref 22–29)
CREAT SERPL-MCNC: 1.8 MG/DL (ref 0.7–1.2)
CULTURE, BLOOD 2: NORMAL
EOSINOPHILS ABSOLUTE: 0.17 E9/L (ref 0.05–0.5)
EOSINOPHILS RELATIVE PERCENT: 2.3 % (ref 0–6)
GFR SERPL CREATININE-BSD FRML MDRD: 37 ML/MIN/1.73
GLUCOSE BLD-MCNC: 218 MG/DL (ref 74–99)
HCT VFR BLD CALC: 24 % (ref 37–54)
HCT VFR BLD CALC: 26.9 % (ref 37–54)
HEMOGLOBIN: 7.4 G/DL (ref 12.5–16.5)
HEMOGLOBIN: 8.2 G/DL (ref 12.5–16.5)
IMMATURE GRANULOCYTES #: 0.05 E9/L
IMMATURE GRANULOCYTES %: 0.7 % (ref 0–5)
LYMPHOCYTES ABSOLUTE: 0.76 E9/L (ref 1.5–4)
LYMPHOCYTES RELATIVE PERCENT: 10.1 % (ref 20–42)
MCH RBC QN AUTO: 31 PG (ref 26–35)
MCHC RBC AUTO-ENTMCNC: 30.8 % (ref 32–34.5)
MCV RBC AUTO: 100.4 FL (ref 80–99.9)
METER GLUCOSE: 214 MG/DL (ref 74–99)
METER GLUCOSE: 269 MG/DL (ref 74–99)
METER GLUCOSE: 285 MG/DL (ref 74–99)
METER GLUCOSE: 323 MG/DL (ref 74–99)
MONOCYTES ABSOLUTE: 0.7 E9/L (ref 0.1–0.95)
MONOCYTES RELATIVE PERCENT: 9.3 % (ref 2–12)
NEUTROPHILS ABSOLUTE: 5.83 E9/L (ref 1.8–7.3)
NEUTROPHILS RELATIVE PERCENT: 77.2 % (ref 43–80)
PDW BLD-RTO: 12.7 FL (ref 11.5–15)
PLATELET # BLD: 384 E9/L (ref 130–450)
PMV BLD AUTO: 9.9 FL (ref 7–12)
POTASSIUM REFLEX MAGNESIUM: 4.3 MMOL/L (ref 3.5–5)
RBC # BLD: 2.39 E12/L (ref 3.8–5.8)
SODIUM BLD-SCNC: 138 MMOL/L (ref 132–146)
TOTAL PROTEIN: 6.4 G/DL (ref 6.4–8.3)
WBC # BLD: 7.5 E9/L (ref 4.5–11.5)

## 2023-01-10 PROCEDURE — 85025 COMPLETE CBC W/AUTO DIFF WBC: CPT

## 2023-01-10 PROCEDURE — 85018 HEMOGLOBIN: CPT

## 2023-01-10 PROCEDURE — 2580000003 HC RX 258: Performed by: INTERNAL MEDICINE

## 2023-01-10 PROCEDURE — 82962 GLUCOSE BLOOD TEST: CPT

## 2023-01-10 PROCEDURE — 92610 EVALUATE SWALLOWING FUNCTION: CPT | Performed by: SPEECH-LANGUAGE PATHOLOGIST

## 2023-01-10 PROCEDURE — 36415 COLL VENOUS BLD VENIPUNCTURE: CPT

## 2023-01-10 PROCEDURE — 6370000000 HC RX 637 (ALT 250 FOR IP): Performed by: INTERNAL MEDICINE

## 2023-01-10 PROCEDURE — 97535 SELF CARE MNGMENT TRAINING: CPT

## 2023-01-10 PROCEDURE — 92526 ORAL FUNCTION THERAPY: CPT | Performed by: SPEECH-LANGUAGE PATHOLOGIST

## 2023-01-10 PROCEDURE — 80053 COMPREHEN METABOLIC PANEL: CPT

## 2023-01-10 PROCEDURE — 85014 HEMATOCRIT: CPT

## 2023-01-10 PROCEDURE — 2060000000 HC ICU INTERMEDIATE R&B

## 2023-01-10 PROCEDURE — 97530 THERAPEUTIC ACTIVITIES: CPT

## 2023-01-10 RX ORDER — INSULIN LISPRO 100 [IU]/ML
0-8 INJECTION, SOLUTION INTRAVENOUS; SUBCUTANEOUS
Status: DISCONTINUED | OUTPATIENT
Start: 2023-01-10 | End: 2023-01-11 | Stop reason: HOSPADM

## 2023-01-10 RX ORDER — INSULIN LISPRO 100 [IU]/ML
0-4 INJECTION, SOLUTION INTRAVENOUS; SUBCUTANEOUS NIGHTLY
Status: DISCONTINUED | OUTPATIENT
Start: 2023-01-10 | End: 2023-01-11 | Stop reason: HOSPADM

## 2023-01-10 RX ADMIN — Medication 10 ML: at 21:15

## 2023-01-10 RX ADMIN — METOPROLOL TARTRATE 25 MG: 25 TABLET, FILM COATED ORAL at 21:14

## 2023-01-10 RX ADMIN — DOCUSATE SODIUM 100 MG: 100 CAPSULE, LIQUID FILLED ORAL at 21:14

## 2023-01-10 RX ADMIN — Medication 10 ML: at 09:29

## 2023-01-10 RX ADMIN — TAMSULOSIN HYDROCHLORIDE 0.4 MG: 0.4 CAPSULE ORAL at 09:28

## 2023-01-10 RX ADMIN — PANTOPRAZOLE SODIUM 40 MG: 40 TABLET, DELAYED RELEASE ORAL at 06:22

## 2023-01-10 RX ADMIN — LOSARTAN POTASSIUM 100 MG: 50 TABLET, FILM COATED ORAL at 09:28

## 2023-01-10 RX ADMIN — DOCUSATE SODIUM 100 MG: 100 CAPSULE, LIQUID FILLED ORAL at 09:28

## 2023-01-10 RX ADMIN — INSULIN LISPRO 1 UNITS: 100 INJECTION, SOLUTION INTRAVENOUS; SUBCUTANEOUS at 06:22

## 2023-01-10 RX ADMIN — ASPIRIN 81 MG CHEWABLE TABLET 81 MG: 81 TABLET CHEWABLE at 09:28

## 2023-01-10 RX ADMIN — FERROUS SULFATE TAB 325 MG (65 MG ELEMENTAL FE) 325 MG: 325 (65 FE) TAB at 16:37

## 2023-01-10 RX ADMIN — APIXABAN 10 MG: 5 TABLET, FILM COATED ORAL at 09:28

## 2023-01-10 RX ADMIN — METOPROLOL TARTRATE 25 MG: 25 TABLET, FILM COATED ORAL at 09:29

## 2023-01-10 RX ADMIN — INSULIN LISPRO 2 UNITS: 100 INJECTION, SOLUTION INTRAVENOUS; SUBCUTANEOUS at 10:37

## 2023-01-10 RX ADMIN — INSULIN LISPRO 3 UNITS: 100 INJECTION, SOLUTION INTRAVENOUS; SUBCUTANEOUS at 16:29

## 2023-01-10 RX ADMIN — FERROUS SULFATE TAB 325 MG (65 MG ELEMENTAL FE) 325 MG: 325 (65 FE) TAB at 09:29

## 2023-01-10 RX ADMIN — CEPHALEXIN 500 MG: 500 CAPSULE ORAL at 09:28

## 2023-01-10 RX ADMIN — APIXABAN 10 MG: 5 TABLET, FILM COATED ORAL at 21:14

## 2023-01-10 RX ADMIN — CEPHALEXIN 500 MG: 500 CAPSULE ORAL at 21:14

## 2023-01-10 NOTE — PROGRESS NOTES
Clarified order to remove harrison with Dr. Connie Beck, orders received to remove ahrrison to attempt voiding trial prior to discharge.

## 2023-01-10 NOTE — PROGRESS NOTES
Physical Therapy  Facility/Department: [address]  Daily Treatment Note  NAME: Janice Huynh  :   MRN: [id number]    Date of Service: 1/10/2023    Patient Diagnosis(es): The primary encounter diagnosis was Complicated UTI (urinary tract infection). A diagnosis of COVID-19 was also pertinent to this visit. Evaluating Therapist: Isabelle Delatorre PT     Room #:  2281/0098-P  Diagnosis:  UTI (urinary tract infection) [W25.3]  Complicated UTI (urinary tract infection) [N39.0]  COVID-19 [U07.1]  PMHx/PSHx:  CAD, DM, Recent R TKA  Precautions:  WBAT, falls, alarm        Social:  Pt admitted from [place] [address]. Unable to report prior level of function. Initial Evaluation  Date: 23 Treatment    1/10  Short Term/ Long Term   Goals   Was pt agreeable to Eval/treatment? yes yes      Does pt have pain? R knee and L shoulder  back pain     Bed Mobility  Rolling: NT  Supine to sit: max assist of 2  Sit to supine: NT  Scooting: max assist of 2  rolling:  Min A  Supine to sit:  Mod A   Sit to supine:  Max A  Scooting:  Min A to sitting EOB Mod assist   Transfers Sit to stand: max assist of 2  Stand to sit: max assist of 2  Stand pivot: Pt unable  sit to stand: Max A  Stand to sit:  Mod A  Stand pivot:  NT Mod assist   Ambulation    NT  2 side steps toward HOB with w/w Mod A 10+ feet with w/w Min A (1/10/23)   Stair Negotiation  Ascended and descended  NT    N/A   LE strength     3/5     4-/5   balance      Fair sitting Pt leans to right  sitting EOB:  SBA  Standing with w/w:  Mod A     AM-PAC Raw score                     Patient education  Pt educated on PT objectives during treatment session, hand placement during transfers, posture when standing. Patient response to education:   Pt verbalized understanding Pt demonstrated skill Pt requires further education in this area   yes With cueing yes     ASSESSMENT:    Comments:  Pt found and left in bed with call light in reach and bed alarm on. Treatment:  Patient practiced and was instructed in the following treatment:   Functional mobility performed as documented above. Pt reported feeling lightheaded once sitting EOB. Cueing required for hand placement during transfers. Pt with very forward flexed posture when standing and reported he is unable to complete stand upright due to chronic back pain. PLAN:    Patient is making good progress towards established goals. Will continue with current POC.      Time in  1323  Time out  1335    Total Treatment Time  12 minutes     CPT codes:    [x] Therapeutic activities 23750 12 minutes  [] Therapeutic exercises 97248  minutes      Taya Gates, [phone]

## 2023-01-10 NOTE — PROGRESS NOTES
Occupational Therapy  OT BEDSIDE TREATMENT NOTE      Date:1/10/2023  Patient Name: Daniele Forman  MRN: [id number]  : 1940  Room: [address] [place] [address]        Evaluating OT: Ethyl Prime L. Marylen Diones, OTR/L - [id number]     Referring Provider: Martha Whitaker MD  Specific Provider Orders/Date: \"OT eval and treat\" - 2023     Diagnosis: UTI (urinary tract infection) [R41.3]  Complicated UTI (urinary tract infection) [N39.0]  COVID-19 [U07.1]      Pertinent Medical History: recent R TKA, CAD, DM, HTN, cancer     Precautions: fall risk, WBAT     Assessment of Current Deficits:    [x] Functional mobility             [x]ADLs           [x] Strength                  [x]Cognition   [x] Functional transfers           [x] IADLs         [x] Safety Awareness   [x]Endurance   [] Fine Coordination              [x] Balance      [] Vision/perception   [x]Sensation     []Gross Motor Coordination  [] ROM           [] Delirium                   [] Motor Control      OT PLAN OF CARE   OT POC is based on physician orders, patient diagnosis, and results of clinical assessment.   Frequency/Duration 2-5 days/week for 2 weeks PRN   Specific OT Treatment Interventions to Include:   * Instruction/training on adapted ADL techniques and AE recommendations to increase functional independence within precautions       * Training on energy conservation strategies, correct breathing pattern and techniques to improve independence/tolerance for self-care routine  * Functional transfer/mobility training/DME recommendations for increased independence, safety, and fall prevention  * Patient/Family education to increase follow through with safety techniques and functional independence  * Recommendation of environmental modifications for increased safety with functional transfers/mobility and ADLs  * Therapeutic exercise to improve motor endurance, ROM, and functional strength for ADLs/functional transfers  * Therapeutic activities to facilitate/challenge dynamic balance, stand tolerance for increased safety and independence with ADLs  * Neuro-muscular re-education: facilitation of righting/equilibrium reactions, midline orientation, scapular stability/mobility, normalization of muscle tone, and facilitation of volitional active controled movement  * Positioning to improve skin integrity, interaction with environment and functional independence     Recommended Adaptive Equipment: TBD      Home Living: Patient admitted from SNF/[facility]. Prior Level of Function (PLOF): Assistance needed with ADLs at SNF/[facility]; patient reported that he had not been walking at the SNF/[facility]; patient is a questionable historian. No family/caregiver present to verify information gathered. Pain Level: Pt reports back pain with movement. Cognition: awake and alert. Cues for safety. Grossly oriented however some confusion noted. Functional Assessment:                  AM-PAC Daily Activity Raw Score: 12/24    Initial Eval Status  Date: 1/6/2023 Treatment Status  Date: 1/10/23  Short Term Goals = Long Term Goals   Feeding SBA after setup and increased time; verbal cues needed for initiation. Setup   Grooming Mod A Min A   SBA (seated)   UB Dressing Mod A  mod A SBA (seated)   LB Dressing Max A  max A  Mod A - with use of AE, as needed/appropriate   Bathing Max A   Mod A - with use of AE/DME, as needed/appropriate   Toileting Dependent  Patient with harrison catheter currently. Incontinent of bowel   Max A toilet hygiene   Mod A   Bed Mobility  Supine-to-Sit: Max Ax2  Sit-to-Supine: Max Ax2  Mod A supine <>sit   Min A in order to maximize patient's independence/participation with ADLs, re-positioning, and other functional tasks. Functional Transfers Sit-to-Stand: Max Ax2 for three sit-to-stand attempts from EOB. Patient unable to clear buttocks from bed; limited effort demonstrated by patient with these attempts. Max A sit to stand from bed.   Cues for hand placement   Mod A   Functional Mobility Not assessed. Max A side step to the [address] [facility] [address]. Mod A with functional mobility (with device, as needed/appropriate) in order to maximize independence with ADLs/IADLs and other functional tasks. Balance Sitting: Fair (at EOB)    Sit balance fair   Stand balance poor +   Good dynamic sitting balance during completion of ADLs/IADLs and other functional tasks. Activity Tolerance Limited Fatigue noted with all activity. Pt with dizziness while seated. Patient will demonstrate Good understanding and consistent implementation of energy conservation techniques and work simplification techniques into ADL/IADL routines. Visual/  Perceptual Impaired, but unable to formally assess secondary to impaired command follow and confusion. Patient demonstrated difficulty with locating items on his lunch plate (e.g. pieces of white fish on a while plate). N/A   B UE Strength R UE: 3+/5 grossly  L Shoulder: 2-/5  Distal L UE: 3+/5   Patient will demonstrate 4/5 B UE strength in order to maximize independence with ADLs and      Comments:  pt sat onto the side of the bed. Complaint of dizziness while seated. Stood briefly for kassandra hygiene. Increased dizziness. Returned to the bed at the end of the session. Education/treatment:  ADL retraining with facilitation of movement to increase self care skills. Therapeutic activity to address balance and endurance for ADL and transfers. Pt education of daily orientation and transfer safety. Pt has made  progress towards set goals.        Time In: 1:25  Time Out: 1:40      Min Units   Therapeutic Ex [id number]     Therapeutic Activities [id number] 7    ADL/Self Care 98817 8 1   Orthotic Management 89013     Neuro Re-Ed [id number]     Non-Billable Time     TOTAL TIMED TREATMENT 15 [address] [facility] ABY/L [address]26

## 2023-01-10 NOTE — PROGRESS NOTES
Admit Date: 1/5/2023    Subjective:     Patient seen, wife in room. Discussed yesterday's event, RRT, cardiology consult. Determined likely vagal episode. Hgb down to 7.4 this am.  Will need to evaluate for GI blood loss. May need transfusion  After RRT, harrison was not removed, swallow evaluation not pursued. Appetitie improving. No complaints      Scheduled Meds:   insulin NPH  25 Units SubCUTAneous QAM    cephALEXin  500 mg Oral 2 times per day    insulin NPH  20 Units SubCUTAneous Daily before dinner    ferrous sulfate  325 mg Oral BID WC    docusate sodium  100 mg Oral BID    apixaban  10 mg Oral BID    Followed by    Amada [place] ON 1/14/2023] apixaban  5 mg Oral BID    aspirin  81 mg Oral Daily    losartan  100 mg Oral Daily    metoprolol tartrate  25 mg Oral BID    pantoprazole  40 mg Oral QAM AC    tamsulosin  0.4 mg Oral Daily    sodium chloride flush  5-40 mL IntraVENous 2 times per day    insulin lispro  0-4 Units SubCUTAneous TID WC    insulin lispro  0-4 Units SubCUTAneous Nightly     Continuous Infusions:   sodium chloride      dextrose       PRN Meds:traMADol, sodium chloride flush, sodium chloride, ondansetron **OR** ondansetron, acetaminophen **OR** acetaminophen, polyethylene glycol, glucose, dextrose bolus **OR** dextrose bolus, glucagon (rDNA), dextrose      Objective:     I/O last 3 completed shifts: In: 370 [I.V.:370]  Out: 1550 [Urine:1550]  No intake/output data recorded.   BP (!) 156/76   Pulse 85   Temp 97.8 °F (36.6 °C) (Oral)   Resp 18   Ht 5' 11\" (1.803 m)   Wt 213 lb (96.6 kg)   SpO2 97%   BMI 29.71 kg/m²     Neck: no adenopathy, no carotid bruit, no JVD, supple, symmetrical, trachea midline and thyroid not enlarged, no tenderness/mass/nodules  Lungs: Clear to A and P  Heart: regular rate and rhythm, S1, S2 normal, no murmur, regular rate and rhythm ,no precordial heave  Abdomen:soft, non-tender; non-distended normal bowel sounds no masses, no organomegaly  Extremities: post of change R knee. No calf tenderness. Edema 1+ RLE, trace edema LLE. Palpable pulses    Data Review    CBC with Differential:    Lab Results   Component Value Date/Time    WBC 7.5 01/10/2023 04:35 AM    RBC 2.39 01/10/2023 04:35 AM    HGB 7.4 01/10/2023 04:35 AM    HCT 24.0 01/10/2023 04:35 AM     01/10/2023 04:35 AM    .4 01/10/2023 04:35 AM    MCH 31.0 01/10/2023 04:35 AM    MCHC 30.8 01/10/2023 04:35 AM    RDW 12.7 01/10/2023 04:35 AM    NRBC 0.0 12/25/2022 06:55 PM    LYMPHOPCT 10.1 01/10/2023 04:35 AM    PROMYELOPCT 0.9 12/25/2022 06:55 PM    MONOPCT 9.3 01/10/2023 04:35 AM    MYELOPCT 1.8 12/25/2022 02:01 PM    BASOPCT 0.4 01/10/2023 04:35 AM    MONOSABS 0.70 01/10/2023 04:35 AM    LYMPHSABS 0.76 01/10/2023 04:35 AM    EOSABS 0.17 01/10/2023 04:35 AM    BASOSABS 0.03 01/10/2023 04:35 AM     CMP:    Lab Results   Component Value Date/Time     01/10/2023 04:35 AM    K 4.3 01/10/2023 04:35 AM     01/10/2023 04:35 AM    CO2 20 01/10/2023 04:35 AM    BUN 47 01/10/2023 04:35 AM    CREATININE 1.8 01/10/2023 04:35 AM    GFRAA 51 02/11/2020 08:00 AM    LABGLOM 37 01/10/2023 04:35 AM    GLUCOSE 218 01/10/2023 04:35 AM    PROT 6.4 01/10/2023 04:35 AM    LABALBU 2.7 01/10/2023 04:35 AM    CALCIUM 9.0 01/10/2023 04:35 AM    BILITOT 0.3 01/10/2023 04:35 AM    ALKPHOS 76 01/10/2023 04:35 AM    AST 46 01/10/2023 04:35 AM    ALT 40 01/10/2023 04:35 AM       NM LUNG SCAN PERFUSION ONLY   Final Result   Very low probability for pulmonary embolism. 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 XR CHEST PORTABLE   Final Result   No acute process. XR SHOULDER LEFT (MIN 2 VIEWS)   Final Result   Degenerative changes as described. CT HEAD WO CONTRAST   Final Result   1. No acute intracranial abnormality.    2. Chronic small vessel ischemic disease and generalized cerebral volume loss.   3. Mucosal disease of the paranasal sinuses. FL MODIFIED BARIUM SWALLOW W VIDEO    (Results Pending)        ASSESSMENT   Altered mental status-improving  Pyuria/harrison cath : E.coli UTI/pansensitive. Partial DVT RLE in high risk patient. Low risk VQ scan  NIDDM  Hypertension  Post op R TKA  Post COVID 19  Post op anemia, R/O blood loss  Dysphagia        PLAN:  Now on po Keflex for UTI  Remove harrison/voiding trial prior to d/c to rehab. Await speech/swallow eval. Will defer barium swallow until speech tx input. Remains on pureed diet in the interim  Follow labs. .Should be ok for d/c back to rehab within 24-48  hrs. Disposition:  Back to rehab.   Electronically signed by Ruma Drummond MD on 1/10/2023 at 7:08 AM

## 2023-01-10 NOTE — CARE COORDINATION
Social Work/Discharge Planning:  Chart reviewed. Patient on room air. Plan remains to return to [facility] at discharge. No pre-cert and no covid test needed. Will continue to follow.   Electronically signed by MICK Marshall on 1/10/2023 at 10:00 AM

## 2023-01-10 NOTE — PROGRESS NOTES
SPEECH/LANGUAGE PATHOLOGY  CLINICAL ASSESSMENT OF SWALLOWING FUNCTION   and PLAN OF CARE    PATIENT NAME:  Elmer Siegel  (male)     MRN:  [id number]    :  1940  (51 y.o.)  STATUS:  Inpatient: Room 0428/0428-A    TODAY'S DATE:  1/10/2023  REFERRING PROVIDER:   01/10/23 Manas    SLP swallowing-dysphagia evaluation and treatment  Start:  01/10/23 0715,   End:  01/10/23 0715,   ONE TIME,   Standing Count:  1 Occurrences,   R       Comments:  Reconsult to see today. Was u. .. Christian Nash MD   REASON FOR REFERRAL: RN reports difficulty swallowing and diet changed, details not provided   EVALUATING THERAPIST: JAKY Silver                 RESULTS:    DYSPHAGIA DIAGNOSIS:      A clinical swallow evaluation was completed on 2022. Today's results are the same as previous results. RN reports pt demonstrated difficulty swallowing on 22 and diet was modified. Details were not provided/charted. Pt does not recall swallowing difficulty however does admit to trouble swallowing large pills. 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 If silent aspiration suspected or staff continues to observed difficulty with meals, recommend a video swallow eval to further assess. DIET RECOMMENDATIONS:  Regular consistency solids (IDDSI level 7) with  thin liquids (IDDSI level 0) as tolerated    Recommend crush LARGE pills and present in pudding/applesauce.                  FEEDING RECOMMENDATIONS:                         Assistance level:  No assistance needed                           Compensatory strategies recommended: Small bites/sips                               Discussed recommendations with nursing and/or faxed report to referring provider: Yes     SPEECH THERAPY  PLAN OF CARE   The dysphagia POC is established based on physician order, dysphagia diagnosis and results of clinical assessment      Meal time assessment for 1-2 sessions to provide diet modification and compensatory strategy implementation due to staff report of dysphagia symptoms during meals      Conditions Requiring Skilled Therapeutic Intervention for dysphagia:     Patient is performing below functional baseline d/t  current acute condition, Multiple diagnoses, multiple medications, and increased dependency upon caregivers. Specific dysphagia interventions to include:      Meal time assessment for 1-2 sessions to provide diet modification and compensatory strategy implementation due to staff report of dysphagia symptoms during meals      Specific instructions for next treatment:  not applicable   Patient Treatment Goals:     Short Term Goals:  Pt will participate in meal time assessment for 1-2 sessions to provide diet modification and compensatory strategy implementation due to staff report of dysphagia symptoms during meals      Long Term Goals:               Pt will maintain adequate nutrition/hydration via PO intake of the least restrictive oral diet with implementation of safe swallow/ compensatory strategies and decrease signs/symptoms of aspiration to less than 1 x/day. Patient/family Goal:    Did not state. Will further assess during treatment.      Plan of care discussed with Patient   The Patient understand(s) the diagnosis, prognosis and plan of care      Rehabilitation Potential/Prognosis: good                                                                                                                                                                                     PATIENT REPORT/COMPLAINT: denies difficulty swallowing, only pills  RN cleared patient for participation in assessment     yes      PRIOR LEVEL OF SWALLOW FUNCTION:     PAST HISTORY OF DYSPHAGIA?: none reported     Home diet: Regular consistency solids (IDDSI level 7) with  thin liquids (IDDSI level 0)  Current Diet Order:  ADULT DIET; Regular; 4 carb choices (60 gm/meal)     PROCEDURE:  Consistencies Administered During the Evaluation   Liquids: thin liquid             Solids:   pureed foods and solid foods            Method of Intake:   cup, straw, spoon  Self fed       Position:   Seated, upright     CLINICAL ASSESSMENT:  Oral Stage: The oral stage of swallowing was within functional limits for consistencies administered           Pharyngeal Stage:          No signs of aspiration were noted during this evaluation however, silent aspiration cannot be ruled out at bedside. If silent aspiration is suspected, a Videofluoroscopic Study of Swallowing (MBS) is recommended and requires a physician order. Cognition:   Poor STM noted     Oral Peripheral Examination   Adequate lingual/labial strength      Current Respiratory Status                            room air                 Parameters of Speech Production  Respiration:       Adequate for speech production  Quality:              Within functional limits  Intensity:            Within functional limits     Volitional Swallow: present      Volitional Cough:   present      Pain: No pain reported. EDUCATION:               The Speech Language Pathologist (SLP) completed education regarding results of evaluation and that intervention is warranted at this time. Learner: Patient  Education: Reviewed results and recommendations of this evaluation  Evaluation of Education:  Adam Kapoor understanding     This plan may be re-evaluated and revised as warranted. Evaluation Time includes thorough review of current medical information, gathering information on past medical history/social history and prior level of function, completion of standardized testing/informal observation of tasks, assessment of data and education on plan of care and goals. INTERVENTION     Pt/family educated on above results and plan of care. Pt/family trained on compensatory strategies for safe swallow and signs and symptoms of aspiration (throat clearing, coughing/choking, wet vocal quality. , etc.) and encouraged to notify staff if observed. Handouts provided as warranted. Pt/family encouraged to engage in question/answer session. All questions answered and pt/family verbalized understanding of above. [x]The admitting diagnosis and active problem list, have been reviewed prior to initiation of this evaluation. ACTIVE PROBLEM LIST:   Patient Active Problem List   Diagnosis    Closed fracture of one rib of left side, initial encounter    Complicated UTI (urinary tract infection)    UTI (urinary tract infection)         CPT code:  82764  bedside swallow eval, 77627 dysphagia therapy    Adelfo MONDRAGON CCC/SLP [id number]  Speech-Language Pathologist

## 2023-01-11 VITALS
DIASTOLIC BLOOD PRESSURE: 76 MMHG | RESPIRATION RATE: 18 BRPM | HEART RATE: 107 BPM | SYSTOLIC BLOOD PRESSURE: 101 MMHG | WEIGHT: 214.7 LBS | OXYGEN SATURATION: 97 % | TEMPERATURE: 97.3 F | BODY MASS INDEX: 30.06 KG/M2 | HEIGHT: 71 IN

## 2023-01-11 LAB
ALBUMIN SERPL-MCNC: 2.8 G/DL (ref 3.5–5.2)
ALP BLD-CCNC: 86 U/L (ref 40–129)
ALT SERPL-CCNC: 50 U/L (ref 0–40)
ANION GAP SERPL CALCULATED.3IONS-SCNC: 10 MMOL/L (ref 7–16)
AST SERPL-CCNC: 51 U/L (ref 0–39)
BASOPHILS ABSOLUTE: 0.03 E9/L (ref 0–0.2)
BASOPHILS RELATIVE PERCENT: 0.5 % (ref 0–2)
BILIRUB SERPL-MCNC: 0.4 MG/DL (ref 0–1.2)
BUN BLDV-MCNC: 40 MG/DL (ref 6–23)
CALCIUM SERPL-MCNC: 9.4 MG/DL (ref 8.6–10.2)
CHLORIDE BLD-SCNC: 105 MMOL/L (ref 98–107)
CO2: 21 MMOL/L (ref 22–29)
CREAT SERPL-MCNC: 1.6 MG/DL (ref 0.7–1.2)
EOSINOPHILS ABSOLUTE: 0.23 E9/L (ref 0.05–0.5)
EOSINOPHILS RELATIVE PERCENT: 3.5 % (ref 0–6)
GFR SERPL CREATININE-BSD FRML MDRD: 43 ML/MIN/1.73
GLUCOSE BLD-MCNC: 132 MG/DL (ref 74–99)
HCT VFR BLD CALC: 26.5 % (ref 37–54)
HEMOGLOBIN: 8.2 G/DL (ref 12.5–16.5)
IMMATURE GRANULOCYTES #: 0.04 E9/L
IMMATURE GRANULOCYTES %: 0.6 % (ref 0–5)
LYMPHOCYTES ABSOLUTE: 0.94 E9/L (ref 1.5–4)
LYMPHOCYTES RELATIVE PERCENT: 14.5 % (ref 20–42)
MCH RBC QN AUTO: 31.3 PG (ref 26–35)
MCHC RBC AUTO-ENTMCNC: 30.9 % (ref 32–34.5)
MCV RBC AUTO: 101.1 FL (ref 80–99.9)
METER GLUCOSE: 126 MG/DL (ref 74–99)
METER GLUCOSE: 245 MG/DL (ref 74–99)
MONOCYTES ABSOLUTE: 0.65 E9/L (ref 0.1–0.95)
MONOCYTES RELATIVE PERCENT: 10 % (ref 2–12)
NEUTROPHILS ABSOLUTE: 4.59 E9/L (ref 1.8–7.3)
NEUTROPHILS RELATIVE PERCENT: 70.9 % (ref 43–80)
PDW BLD-RTO: 12.3 FL (ref 11.5–15)
PLATELET # BLD: 401 E9/L (ref 130–450)
PMV BLD AUTO: 9.9 FL (ref 7–12)
POTASSIUM REFLEX MAGNESIUM: 4.2 MMOL/L (ref 3.5–5)
RBC # BLD: 2.62 E12/L (ref 3.8–5.8)
SODIUM BLD-SCNC: 136 MMOL/L (ref 132–146)
TOTAL PROTEIN: 6.7 G/DL (ref 6.4–8.3)
WBC # BLD: 6.5 E9/L (ref 4.5–11.5)

## 2023-01-11 PROCEDURE — 6370000000 HC RX 637 (ALT 250 FOR IP): Performed by: INTERNAL MEDICINE

## 2023-01-11 PROCEDURE — 85025 COMPLETE CBC W/AUTO DIFF WBC: CPT

## 2023-01-11 PROCEDURE — 82962 GLUCOSE BLOOD TEST: CPT

## 2023-01-11 PROCEDURE — 80053 COMPREHEN METABOLIC PANEL: CPT

## 2023-01-11 PROCEDURE — 2580000003 HC RX 258: Performed by: INTERNAL MEDICINE

## 2023-01-11 PROCEDURE — 36415 COLL VENOUS BLD VENIPUNCTURE: CPT

## 2023-01-11 RX ORDER — CEPHALEXIN 500 MG/1
500 CAPSULE ORAL EVERY 12 HOURS SCHEDULED
Qty: 4 CAPSULE | Refills: 0 | Status: ON HOLD | OUTPATIENT
Start: 2023-01-11 | End: 2023-01-15 | Stop reason: HOSPADM

## 2023-01-11 RX ORDER — FERROUS SULFATE 325(65) MG
325 TABLET ORAL 2 TIMES DAILY WITH MEALS
Qty: 30 TABLET | Refills: 3 | Status: ON HOLD | OUTPATIENT
Start: 2023-01-11

## 2023-01-11 RX ORDER — INSULIN LISPRO 100 [IU]/ML
0-4 INJECTION, SOLUTION INTRAVENOUS; SUBCUTANEOUS NIGHTLY
Qty: 100 ML | Refills: 0 | Status: SHIPPED | OUTPATIENT
Start: 2023-01-11 | End: 2023-01-13 | Stop reason: ALTCHOICE

## 2023-01-11 RX ORDER — TAMSULOSIN HYDROCHLORIDE 0.4 MG/1
0.4 CAPSULE ORAL DAILY
Qty: 30 CAPSULE | Refills: 3 | Status: ON HOLD | OUTPATIENT
Start: 2023-01-11

## 2023-01-11 RX ORDER — INSULIN LISPRO 100 [IU]/ML
0-8 INJECTION, SOLUTION INTRAVENOUS; SUBCUTANEOUS
Qty: 15 ML | Refills: 0 | Status: SHIPPED | OUTPATIENT
Start: 2023-01-11 | End: 2023-01-13 | Stop reason: ALTCHOICE

## 2023-01-11 RX ORDER — PSEUDOEPHEDRINE HCL 30 MG
100 TABLET ORAL 2 TIMES DAILY
Qty: 60 CAPSULE | Refills: 0 | Status: ON HOLD | OUTPATIENT
Start: 2023-01-11 | End: 2023-02-10

## 2023-01-11 RX ADMIN — APIXABAN 10 MG: 5 TABLET, FILM COATED ORAL at 08:10

## 2023-01-11 RX ADMIN — INSULIN LISPRO 2 UNITS: 100 INJECTION, SOLUTION INTRAVENOUS; SUBCUTANEOUS at 11:03

## 2023-01-11 RX ADMIN — PANTOPRAZOLE SODIUM 40 MG: 40 TABLET, DELAYED RELEASE ORAL at 06:32

## 2023-01-11 RX ADMIN — ASPIRIN 81 MG CHEWABLE TABLET 81 MG: 81 TABLET CHEWABLE at 08:09

## 2023-01-11 RX ADMIN — TAMSULOSIN HYDROCHLORIDE 0.4 MG: 0.4 CAPSULE ORAL at 08:11

## 2023-01-11 RX ADMIN — CEPHALEXIN 500 MG: 500 CAPSULE ORAL at 08:09

## 2023-01-11 RX ADMIN — LOSARTAN POTASSIUM 100 MG: 50 TABLET, FILM COATED ORAL at 08:09

## 2023-01-11 RX ADMIN — METOPROLOL TARTRATE 25 MG: 25 TABLET, FILM COATED ORAL at 08:10

## 2023-01-11 RX ADMIN — DOCUSATE SODIUM 100 MG: 100 CAPSULE, LIQUID FILLED ORAL at 08:09

## 2023-01-11 RX ADMIN — FERROUS SULFATE TAB 325 MG (65 MG ELEMENTAL FE) 325 MG: 325 (65 FE) TAB at 08:10

## 2023-01-11 RX ADMIN — Medication 10 ML: at 08:10

## 2023-01-11 ASSESSMENT — ENCOUNTER SYMPTOMS
VOMITING: 0
NAUSEA: 0
DIARRHEA: 0
SHORTNESS OF BREATH: 0
COUGH: 0

## 2023-01-11 ASSESSMENT — PAIN SCALES - GENERAL: PAINLEVEL_OUTOF10: 0

## 2023-01-11 NOTE — DISCHARGE SUMMARY
Discharge Summary     Patient ID:  Saud Eckert  [id number]  80 y.o. 1940 male  Ivory Lopez MD        Admit date: 1/5/2023    Discharge date and time:  1/11/2023  7:09 AM    Admitting provider: Dr. Christian Nash    Discharge provider: Dr. Jeison Sosa     Activity level: As tolerated  Diet:Diabetic  Labs: None  Disposition:SNF for [facility]KA  Condition on Discharge:Stable  DME: None     Admit Diagnoses:   Patient Active Problem List   Diagnosis    Closed fracture of one rib of left side, initial encounter    Complicated UTI (urinary tract infection)    UTI (urinary tract infection)       Discharge Diagnoses: Principal Problem:    Complicated UTI (urinary tract infection)  Active Problems:    UTI (urinary tract infection)  Resolved Problems:    * No resolved hospital problems. *      Consults:  IP CONSULT TO INTERNAL MEDICINE  IP CONSULT TO UROLOGY  IP CONSULT TO SOCIAL WORK  IP CONSULT TO CARDIOLOGY    Procedures: None     Hospital Course: Patient is an 80year old male who presented to [facility] ER due to reported hypoxia and altered mental status. Patient was not found to be hypoxic in ER. He was confused and found to have a UTI due to [facility]. He had a harrison catheter that was removed and he attempted a voiding trial but he failed. He was seen by urology and will need to follow up with his urologist at discharge. Patient was treated with ceftriaxone and transitioned to oral keflex. He was found to have Right lower extremity DVT. He was started on lovenox and transitioned to oral eliquis. His blood sugars were elevated and insulin was adjusted. Mentation improved. Cardiology was consulted due to syncope. He was thought to have had vasovagal syncope as event happened with a bowel movement. EKG was normal and sinus rhythm on monitor. Patient had improvement with no episodes of dizziness or lightheadedness on discharge. Patient had recent R TKA. Incision well healed.  He was discharged to rehab in stable condition. Discharge Exam:  General Appearance:  Comfortable, well-appearing and in no acute distress. Vital signs: (most recent): Blood pressure 138/72, pulse 76, temperature 97.7 °F (36.5 °C), temperature source Axillary, resp. rate 18, height 5' 11\" (1.803 m), weight 214 lb 11.2 oz (97.4 kg), SpO2 97 %. Lungs:  Normal effort and normal respiratory rate. Breath sounds clear to auscultation. No rales, decreased breath sounds or rhonchi. Heart: Normal rate. Regular rhythm. S1 normal and S2 normal.  No gallop. Abdomen: Abdomen is soft and non-distended. Bowel sounds are normal.   There is no abdominal tenderness. There is no rebound tenderness. There is no guarding. Extremities: There is no dependent edema. (Right knee incision clean and well healed. )  Skin:  Warm and dry. I/O last 3 completed shifts:  In: -   Out: 2200 [Urine:2200]  No intake/output data recorded. LABS:  Recent Labs     01/09/23  0225 01/10/23  0435 01/11/23  0430    138 136   K 4.8 4.3 4.2    106 105   CO2 21* 20* 21*   BUN 44* 47* 40*   CREATININE 2.0* 1.8* 1.6*   GLUCOSE 250* 218* 132*   CALCIUM 9.5 9.0 9.4       Recent Labs     01/09/23  0225 01/10/23  0435 01/10/23  1423 01/11/23  0430   WBC 11.0 7.5  --  6.5   RBC 2.71* 2.39*  --  2.62*   HGB 8.8* 7.4* 8.2* 8.2*   HCT 28.2* 24.0* 26.9* 26.5*   .1* 100.4*  --  101.1*   MCH 32.5 31.0  --  31.3   MCHC 31.2* 30.8*  --  30.9*   RDW 12.7 12.7  --  12.3    384  --  401   MPV 10.2 9.9  --  9.9       Recent Labs     01/11/23  0630   GLUMET 126*         Imaging:  CT HEAD WO CONTRAST    Result Date: 1/5/2023  EXAMINATION: CT OF THE HEAD WITHOUT CONTRAST  1/5/2023 12:14 pm TECHNIQUE: CT of the head was performed without the administration of intravenous contrast. Automated exposure control, iterative reconstruction, and/or weight based adjustment of the mA/kV was utilized to reduce the radiation dose to as low as reasonably achievable. COMPARISON: CT head 12/25/2022 HISTORY: ORDERING SYSTEM PROVIDED HISTORY: mental status change TECHNOLOGIST PROVIDED HISTORY: Has a \"code stroke\" or \"stroke alert\" been called? ->No Reason for exam:->mental status change Decision Support Exception - unselect if not a suspected or confirmed emergency medical condition->Emergency Medical Condition (MA) FINDINGS: There is patchy hypoattenuation within the periventricular white matter of the bilateral cerebral hemispheres. There is no evidence of mass, mass effect, or midline shift. There is enlargement of the ventricles and sulci suggesting generalized cerebral volume loss. Incidental note is made of normal variant cavum septum pellucidum. No extra-axial fluid collections or acute hemorrhage. The gray-white differentiation appears preserved without evidence of acute cortical ischemia. The calvarium is intact. There is near complete opacification of the right maxillary sinus. There is minimal mucosal thickening or fluid within the left maxillary sinus. There is mucosal thickening or fluid within the bilateral sphenoid and ethmoid sinuses. The mastoid air cells are clear. There are bilateral lens replacements. 1. No acute intracranial abnormality. 2. Chronic small vessel ischemic disease and generalized cerebral volume loss. 3. Mucosal disease of the paranasal sinuses. NM LUNG SCAN PERFUSION ONLY    Result Date: 1/6/2023  EXAMINATION: NUCLEAR MEDICINE PERFUSION SCAN. 1/6/2023 TECHNIQUE: Ventilation not performed as part of COVID-19 safety precautions. 5.7 millicuries of Tc 03G MAA was administered intravenously prior to planar imaging of the lungs in multiple projections. COMPARISON: Chest radiograph 01/05/2023.  HISTORY: ORDERING SYSTEM PROVIDED HISTORY: Post op knee, post covid, partial DVT R thigh, elevated D dimer, hypoxia reported outpatient TECHNOLOGIST PROVIDED HISTORY: Reason for exam:->Post op knee, post covid, partial DVT R thigh, elevated D dimer, hypoxia reported outpatient What reading provider will be dictating this exam?->CRC FINDINGS: PERFUSION: Mildly heterogeneous distribution of radiotracer. No segmental perfusion defects. CHEST RADIOGRAPH: No focal areas of consolidation or significant effusions on recent chest radiograph. Very low probability for pulmonary embolism. XR SHOULDER LEFT (MIN 2 VIEWS)    Result Date: 1/5/2023  EXAMINATION: THREE XRAY VIEWS OF THE LEFT SHOULDER 1/5/2023 12:26 pm COMPARISON: None. HISTORY: ORDERING SYSTEM PROVIDED HISTORY: right shoulder pain TECHNOLOGIST PROVIDED HISTORY: Reason for exam:->right shoulder pain FINDINGS: No acute fracture or dislocation is identified. There are advanced degenerative changes at the glenohumeral joint. Findings include joint space narrowing, subchondral sclerosis, and bony remodeling. There are periarticular calcific or ossific densities. Relatively mild degenerative changes are seen at the acromioclavicular joint. No bony erosion or destruction is visualized. Degenerative changes as described. XR CHEST PORTABLE    Result Date: 1/5/2023  EXAMINATION: ONE XRAY VIEW OF THE CHEST 1/5/2023 11:26 am COMPARISON: Chest CT 25 December 2022 HISTORY: ORDERING SYSTEM PROVIDED HISTORY: SOB TECHNOLOGIST PROVIDED HISTORY: Reason for exam:->SOB FINDINGS: The lungs are without acute focal process. There is no effusion or pneumothorax. The cardiomediastinal silhouette is without acute process. The osseous structures are without acute process. No acute process. US DUP LOWER EXTREMITIES BILATERAL VENOUS    Result Date: 1/5/2023  EXAMINATION: DUPLEX VENOUS ULTRASOUND OF THE BILATERAL LOWER EXTREMITIES1/5/2023 7:45 pm TECHNIQUE: Duplex ultrasound using B-mode/gray scaled imaging, Doppler spectral analysis and color flow Doppler was obtained of the deep venous structures of the lower bilateral extremities. COMPARISON: None.  HISTORY: ORDERING SYSTEM PROVIDED HISTORY: Recent surgery rule out DVT TECHNOLOGIST PROVIDED HISTORY: Reason for exam:->Recent surgery rule out DVT What reading provider will be dictating this exam?->CRC FINDINGS: Right profundal femoral vein shows increased echogenicity within the lumen with incomplete color flow through this segment. The remainder of the lower extremity veins bilaterally are patent through the maneuvers of study. 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 Patient Instructions:   Current Discharge Medication List        START taking these medications    Details   !! apixaban (ELIQUIS) 5 MG TABS tablet Take 2 tablets by mouth 2 times daily for 3 days  Qty: 12 tablet, Refills: 0      !! apixaban (ELIQUIS) 5 MG TABS tablet Take 1 tablet by mouth 2 times daily  Qty: 60 tablet, Refills: 2      cephALEXin (KEFLEX) 500 MG capsule Take 1 capsule by mouth every 12 hours for 4 doses  Qty: 4 capsule, Refills: 0      ferrous sulfate (IRON 325) 325 (65 Fe) MG tablet Take 1 tablet by mouth 2 times daily (with meals)  Qty: 30 tablet, Refills: 3      docusate sodium (COLACE, DULCOLAX) 100 MG CAPS Take 100 mg by mouth 2 times daily  Qty: 60 capsule, Refills: 0       !! - Potential duplicate medications found. Please discuss with provider.         CONTINUE these medications which have CHANGED    Details   !! insulin lispro (HUMALOG) 100 UNIT/ML SOLN injection vial Inject 0-8 Units into the skin 3 times daily (with meals)  Qty: 15 mL, Refills: 0      !! insulin lispro (HUMALOG) 100 UNIT/ML SOLN injection vial Inject 0-4 Units into the skin nightly  Qty: 100 mL, Refills: 0      !! insulin NPH (HUMULIN N;NOVOLIN N) 100 UNIT/ML injection vial Inject 25 Units into the skin every morning  Qty: 100 mL, Refills: 0      !! insulin NPH (HUMULIN N;NOVOLIN N) 100 UNIT/ML injection vial Inject 25 Units into the skin daily (before dinner)  Qty: 15 mL, Refills: 3      !! tamsulosin (FLOMAX) 0.4 MG capsule Take 1 capsule by mouth daily  Qty: 30 capsule, Refills: 3       !! - Potential duplicate medications found. Please discuss with provider. CONTINUE these medications which have NOT CHANGED    Details   aspirin 81 MG chewable tablet Take 1 tablet by mouth daily  Qty: 30 tablet, Refills: 3      zinc sulfate (ZINCATE) 220 (50 Zn) MG capsule Take 1 capsule by mouth daily  Qty: 30 capsule, Refills: 3      !! tamsulosin (FLOMAX) 0.4 MG capsule Take 0.4 mg by mouth daily      losartan (COZAAR) 100 MG tablet Take 100 mg by mouth daily. omeprazole (PRILOSEC) 20 MG capsule Take 20 mg by mouth daily. metoprolol (LOPRESSOR) 25 MG tablet Take 25 mg by mouth 2 times daily. Multiple Vitamin (MULTI VITAMIN MENS PO) Take  by mouth.        vitamin D (CHOLECALCIFEROL) 400 UNITS TABS tablet Take 500 Units by mouth daily. acetaminophen (TYLENOL) 500 MG tablet Take 500 mg by mouth every 6 hours as needed. !! - Potential duplicate medications found. Please discuss with provider.         STOP taking these medications       Heparin Sodium, Porcine, (HEPARIN, PORCINE,) 36413 UNIT/ML injection Comments:   Reason for Stopping:         pilocarpine (SALAGEN) 5 MG tablet Comments:   Reason for Stopping:                 Note that more than 30 minutes was spent in preparing discharge papers, discussing discharge with patient, medication review, etc.      Signed:    Blanca Candelario DO    Electronically signed by Blanca Candelario DO on 1/11/2023 at 7:09 AM

## 2023-01-11 NOTE — PROGRESS NOTES
Spoke with Dr. Jeanne Lam about patient not urinating since harrison catheter removed and bladder scan of 440mL. New orders received.

## 2023-01-11 NOTE — CARE COORDINATION
CASE MANAGEMENT. ... Discharge order on chart. Ambulance transport arranged for 10:30 am with [facility] to [facility]. Nursing, facility and patient updated. N 16 in epic. Forms in chart.

## 2023-01-11 NOTE — PROGRESS NOTES
Progress Note  Date:2023       Room:0428/0428  Patient Name:Sunny Schwartz     Date of Birth:     Age:82 y.o. Patient seen for follow up of vasovagal syncope. Patient this am laying in bed. He states he is feeling well. He denies any lightheadedness or dizziness. He had to have harrison catheter placed again last evening due to urinary retention. Subjective    Subjective:  Symptoms:  No shortness of breath, cough, chest pain, headache, chest pressure or diarrhea. Diet:  No nausea or vomiting. Review of Systems   Respiratory:  Negative for cough and shortness of breath. Cardiovascular:  Negative for chest pain. Gastrointestinal:  Negative for diarrhea, nausea and vomiting. Objective         Vitals Last 24 Hours:  TEMPERATURE:  Temp  Av.7 °F (36.5 °C)  Min: 97.5 °F (36.4 °C)  Max: 97.9 °F (36.6 °C)  RESPIRATIONS RANGE: Resp  Av  Min: 18  Max: 18  PULSE OXIMETRY RANGE: SpO2  Av %  Min: 96 %  Max: 98 %  PULSE RANGE: Pulse  Av.6  Min: 76  Max: 107  BLOOD PRESSURE RANGE: Systolic (18GRX), PAL:049 , Min:135 , KKA:032   ; Diastolic (86MZO), UXK:12, Min:70, Max:83    I/O (24Hr): Intake/Output Summary (Last 24 hours) at 2023 0703  Last data filed at 2023 0443  Gross per 24 hour   Intake --   Output 1450 ml   Net -1450 ml     Objective:  General Appearance:  Comfortable, well-appearing and in no acute distress. Vital signs: (most recent): Blood pressure 138/72, pulse 76, temperature 97.7 °F (36.5 °C), temperature source Axillary, resp. rate 18, height 5' 11\" (1.803 m), weight 214 lb 11.2 oz (97.4 kg), SpO2 97 %. Lungs:  Normal effort and normal respiratory rate. Breath sounds clear to auscultation. No rales, decreased breath sounds or rhonchi. Heart: Normal rate. Regular rhythm. S1 normal and S2 normal.  No gallop. Abdomen: Abdomen is soft and non-distended. Bowel sounds are normal.   There is no abdominal tenderness.   There is no rebound tenderness. There is no guarding. Extremities: There is no dependent edema. (Right knee incision clean and well healed. )  Skin:  Warm and dry. Labs/Imaging/Diagnostics    Labs:  CBC:  Recent Labs     01/09/23  0225 01/10/23  0435 01/10/23  1423 01/11/23 0430   WBC 11.0 7.5  --  6.5   RBC 2.71* 2.39*  --  2.62*   HGB 8.8* 7.4* 8.2* 8.2*   HCT 28.2* 24.0* 26.9* 26.5*   .1* 100.4*  --  101.1*   RDW 12.7 12.7  --  12.3    384  --  401     CHEMISTRIES:  Recent Labs     01/09/23  0225 01/10/23  0435 01/11/23  0430    138 136   K 4.8 4.3 4.2    106 105   CO2 21* 20* 21*   BUN 44* 47* 40*   CREATININE 2.0* 1.8* 1.6*   GLUCOSE 250* 218* 132*     PT/INR:No results for input(s): PROTIME, INR in the last 72 hours. APTT:No results for input(s): APTT in the last 72 hours. LIVER PROFILE:  Recent Labs     01/10/23  0435 01/11/23  0430   AST 46* 51*   ALT 40 50*   BILITOT 0.3 0.4   ALKPHOS 76 86       Imaging Last 24 Hours:  No results found. Assessment//Plan           Hospital Problems             Last Modified POA    * (Principal) Complicated UTI (urinary tract infection) 1/5/2023 Yes    UTI (urinary tract infection) 1/5/2023 Yes     Assessment & Plan  Ecoli UTI  DVT in RLE  NIDDM  Hypertension  Post operative anemia      Continue on keflex. Continue with harrison catheter on discharge and follow up with Dr. Lance Rodriguez for this. Reviewed speech therapy recommendations. Continue on diet per their recs. Blood sugar better controlled. Hb stable. Ok to discharge to rehab.      Mir Garcia DO    Electronically signed by Mir Garcia DO on 1/11/23 at 7:03 AM EST

## 2023-01-11 NOTE — PROGRESS NOTES
[facility] notified her that transport is arranged for 10:30am

## 2023-01-12 ENCOUNTER — HOSPITAL ENCOUNTER (INPATIENT)
Age: 83
LOS: 2 days | Discharge: ANOTHER HOSPITAL | DRG: 312 | End: 2023-01-15
Attending: EMERGENCY MEDICINE | Admitting: INTERNAL MEDICINE
Payer: MEDICARE

## 2023-01-12 ENCOUNTER — APPOINTMENT (OUTPATIENT)
Dept: GENERAL RADIOLOGY | Age: 83
DRG: 312 | End: 2023-01-12
Payer: MEDICARE

## 2023-01-12 DIAGNOSIS — R55 SYNCOPE AND COLLAPSE: Primary | ICD-10-CM

## 2023-01-12 LAB
ALBUMIN SERPL-MCNC: 2.9 G/DL (ref 3.5–5.2)
ALP BLD-CCNC: 87 U/L (ref 40–129)
ALT SERPL-CCNC: 38 U/L (ref 0–40)
ANION GAP SERPL CALCULATED.3IONS-SCNC: 14 MMOL/L (ref 7–16)
AST SERPL-CCNC: 30 U/L (ref 0–39)
BASOPHILS ABSOLUTE: 0.04 E9/L (ref 0–0.2)
BASOPHILS RELATIVE PERCENT: 0.5 % (ref 0–2)
BILIRUB SERPL-MCNC: 0.4 MG/DL (ref 0–1.2)
BUN BLDV-MCNC: 33 MG/DL (ref 6–23)
CALCIUM SERPL-MCNC: 9.3 MG/DL (ref 8.6–10.2)
CHLORIDE BLD-SCNC: 99 MMOL/L (ref 98–107)
CO2: 21 MMOL/L (ref 22–29)
CREAT SERPL-MCNC: 1.7 MG/DL (ref 0.7–1.2)
EKG ATRIAL RATE: 101 BPM
EKG P AXIS: 31 DEGREES
EKG P-R INTERVAL: 138 MS
EKG Q-T INTERVAL: 354 MS
EKG QRS DURATION: 86 MS
EKG QTC CALCULATION (BAZETT): 459 MS
EKG R AXIS: -17 DEGREES
EKG T AXIS: 40 DEGREES
EKG VENTRICULAR RATE: 101 BPM
EOSINOPHILS ABSOLUTE: 0.13 E9/L (ref 0.05–0.5)
EOSINOPHILS RELATIVE PERCENT: 1.7 % (ref 0–6)
GFR SERPL CREATININE-BSD FRML MDRD: 40 ML/MIN/1.73
GLUCOSE BLD-MCNC: 225 MG/DL (ref 74–99)
HCT VFR BLD CALC: 29.6 % (ref 37–54)
HEMOGLOBIN: 9.3 G/DL (ref 12.5–16.5)
IMMATURE GRANULOCYTES #: 0.04 E9/L
IMMATURE GRANULOCYTES %: 0.5 % (ref 0–5)
LYMPHOCYTES ABSOLUTE: 0.63 E9/L (ref 1.5–4)
LYMPHOCYTES RELATIVE PERCENT: 8.5 % (ref 20–42)
MCH RBC QN AUTO: 31.3 PG (ref 26–35)
MCHC RBC AUTO-ENTMCNC: 31.4 % (ref 32–34.5)
MCV RBC AUTO: 99.7 FL (ref 80–99.9)
MONOCYTES ABSOLUTE: 0.66 E9/L (ref 0.1–0.95)
MONOCYTES RELATIVE PERCENT: 8.9 % (ref 2–12)
NEUTROPHILS ABSOLUTE: 5.95 E9/L (ref 1.8–7.3)
NEUTROPHILS RELATIVE PERCENT: 79.9 % (ref 43–80)
PDW BLD-RTO: 12.4 FL (ref 11.5–15)
PLATELET # BLD: 448 E9/L (ref 130–450)
PMV BLD AUTO: 9.8 FL (ref 7–12)
POTASSIUM SERPL-SCNC: 4.5 MMOL/L (ref 3.5–5)
PRO-BNP: 462 PG/ML (ref 0–450)
RBC # BLD: 2.97 E12/L (ref 3.8–5.8)
SODIUM BLD-SCNC: 134 MMOL/L (ref 132–146)
TOTAL PROTEIN: 7.1 G/DL (ref 6.4–8.3)
TROPONIN, HIGH SENSITIVITY: 81 NG/L (ref 0–11)
WBC # BLD: 7.5 E9/L (ref 4.5–11.5)

## 2023-01-12 PROCEDURE — 2580000003 HC RX 258: Performed by: STUDENT IN AN ORGANIZED HEALTH CARE EDUCATION/TRAINING PROGRAM

## 2023-01-12 PROCEDURE — 93010 ELECTROCARDIOGRAM REPORT: CPT | Performed by: INTERNAL MEDICINE

## 2023-01-12 PROCEDURE — 83880 ASSAY OF NATRIURETIC PEPTIDE: CPT

## 2023-01-12 PROCEDURE — 71045 X-RAY EXAM CHEST 1 VIEW: CPT

## 2023-01-12 PROCEDURE — 93005 ELECTROCARDIOGRAM TRACING: CPT | Performed by: STUDENT IN AN ORGANIZED HEALTH CARE EDUCATION/TRAINING PROGRAM

## 2023-01-12 PROCEDURE — 80053 COMPREHEN METABOLIC PANEL: CPT

## 2023-01-12 PROCEDURE — 99285 EMERGENCY DEPT VISIT HI MDM: CPT

## 2023-01-12 PROCEDURE — 84484 ASSAY OF TROPONIN QUANT: CPT

## 2023-01-12 PROCEDURE — 85025 COMPLETE CBC W/AUTO DIFF WBC: CPT

## 2023-01-12 RX ORDER — 0.9 % SODIUM CHLORIDE 0.9 %
500 INTRAVENOUS SOLUTION INTRAVENOUS ONCE
Status: COMPLETED | OUTPATIENT
Start: 2023-01-12 | End: 2023-01-12

## 2023-01-12 RX ADMIN — SODIUM CHLORIDE 500 ML: 9 INJECTION, SOLUTION INTRAVENOUS at 13:21

## 2023-01-12 ASSESSMENT — LIFESTYLE VARIABLES: HOW OFTEN DO YOU HAVE A DRINK CONTAINING ALCOHOL: NEVER

## 2023-01-12 ASSESSMENT — PAIN - FUNCTIONAL ASSESSMENT: PAIN_FUNCTIONAL_ASSESSMENT: NONE - DENIES PAIN

## 2023-01-12 NOTE — ED NOTES
Report called to Marivel at facility, [facility], [phone]      Gumaro Naylor, [place]  01/12/23 2996

## 2023-01-12 NOTE — ED PROVIDER NOTES
[facility] ENCOUNTER        Pt Name: Juany Castañeda  MRN: [id number]  [place] [birth year]  Date of evaluation: 1/12/2023  Provider: Leah Stevens DO  PCP: José Luis Mccarthy MD  Note Started: 12:53 PM EST 1/12/23    CHIEF COMPLAINT       Chief Complaint   Patient presents with    Loss of Consciousness     Sent in from [facility] home with syncopal episode       HISTORY OF PRESENT ILLNESS: 1 or more Elements   History From: Patient     Limitations to history : None    Juany Castañeda is a 80 y.o. male with past medical history of hyperlipidemia, hypertension, diabetes, CAD, anemia and unspecified cancer who presents to the emergency department from skilled nursing facility for evaluation of syncopal episode. Patient was apparently discharged to SNF from the hospital yesterday. Today, patient noted that he was supposed to take his medication with applesauce and apparently vomited up his medication. Patient had a syncopal episode after this per EMS who spoke with nursing facility staff. No head trauma noted however. Patient was apparently sitting in his chair when this happened. Unknown how long patient was out for. On initial assessment in the emergency department he is awake, alert and oriented x3. Patient is denying any associated symptoms including chest pain, shortness of breath, nausea, vomiting, fever, chills, fatigue, generalized weakness, cough, congestion, sore throat, myalgias, lightheadedness, dizziness, urinary symptoms, abdominal pain, urinary symptoms, constipation or diarrhea. Patient states that he has been in and out of the hospital for the same problem over 3 times. He states that he is frustrated at this point and does not understand why he keeps getting discharge and taken back to the ED. On initial evaluation he is nontoxic-appearing and in no acute distress.   No recent sick contacts or illnesses noted although patient is coming from a nursing facility after a recent hospitalization. Nursing Notes were all reviewed and agreed with or any disagreements were addressed in the HPI.    ROS:   Pertinent positives and negatives are stated within HPI, all other systems reviewed and are negative.    --------------------------------------------- PAST HISTORY ---------------------------------------------  Past Medical History:  has a past medical history of Anemia, CAD (coronary artery disease), Cancer ([facility] [address] Diabetes mellitus ([facility] Diverticulosis, GERD (gastroesophageal reflux disease), Hyperlipidemia, and Hypertension. Past Surgical History:  has a past surgical history that includes Colonoscopy; eye surgery; back surgery; and Parotidectomy (9/11/12). Social History:  reports that he has never smoked. He has never used smokeless tobacco. He reports that he does not drink alcohol and does not use drugs. Family History: family history includes Cancer in his brother and father. The patients home medications have been reviewed. Allergies: Darvocet [propoxyphene n-acetaminophen] and Lisinopril    ---------------------------------------------------PHYSICAL EXAM--------------------------------------    Constitutional/General: Alert and oriented x3, well appearing, non toxic in NAD, elderly and frail-appearing  Head: Normocephalic and atraumatic  Mouth: Oropharynx clear, handling secretions, no trismus  Neck: Supple, full ROM,  Pulmonary: Lungs clear to auscultation bilaterally, no wheezes, rales, or rhonchi. Not in respiratory distress  Cardiovascular:  Regular rate. Regular rhythm. No murmurs  Chest: no chest wall tenderness  Abdomen: Soft. Non tender. Non distended. No rebound, guarding, or rigidity. No pulsatile masses appreciated. Musculoskeletal: Moves all extremities x 4. Warm and well perfused, no clubbing, cyanosis, or edema. Capillary refill <3 seconds  Skin: warm and dry. No rashes.    Neurologic: GCS 15, no gross focal neurologic deficits  Psych: Normal Affect    -------------------------------------------------- RESULTS -------------------------------------------------  I have personally reviewed all laboratory and imaging results for this patient. Results are listed below.      LABS:  Results for orders placed or performed during the hospital encounter of 01/12/23   CBC with Auto Differential   Result Value Ref Range    WBC 7.5 4.5 - 11.5 E9/L    RBC 2.97 (L) 3.80 - 5.80 E12/L    Hemoglobin 9.3 (L) 12.5 - 16.5 g/dL    Hematocrit 29.6 (L) 37.0 - 54.0 %    MCV 99.7 80.0 - 99.9 fL    MCH 31.3 26.0 - 35.0 pg    MCHC 31.4 (L) 32.0 - 34.5 %    RDW 12.4 11.5 - 15.0 fL    Platelets 727 168 - 321 E9/L    MPV 9.8 7.0 - 12.0 fL    Neutrophils % 79.9 43.0 - 80.0 %    Immature Granulocytes % 0.5 0.0 - 5.0 %    Lymphocytes % 8.5 (L) 20.0 - 42.0 %    Monocytes % 8.9 2.0 - 12.0 %    Eosinophils % 1.7 0.0 - 6.0 %    Basophils % 0.5 0.0 - 2.0 %    Neutrophils Absolute 5.95 1.80 - 7.30 E9/L    Immature Granulocytes # 0.04 E9/L    Lymphocytes Absolute 0.63 (L) 1.50 - 4.00 E9/L    Monocytes Absolute 0.66 0.10 - 0.95 E9/L    Eosinophils Absolute 0.13 0.05 - 0.50 E9/L    Basophils Absolute 0.04 0.00 - 0.20 E9/L   CMP   Result Value Ref Range    Sodium 134 132 - 146 mmol/L    Potassium 4.5 3.5 - 5.0 mmol/L    Chloride 99 98 - 107 mmol/L    CO2 21 (L) 22 - 29 mmol/L    Anion Gap 14 7 - 16 mmol/L    Glucose 225 (H) 74 - 99 mg/dL    BUN 33 (H) 6 - 23 mg/dL    Creatinine 1.7 (H) 0.7 - 1.2 mg/dL    Est, Glom Filt Rate 40 >=60 mL/min/1.73    Calcium 9.3 8.6 - 10.2 mg/dL    Total Protein 7.1 6.4 - 8.3 g/dL    Albumin 2.9 (L) 3.5 - 5.2 g/dL    Total Bilirubin 0.4 0.0 - 1.2 mg/dL    Alkaline Phosphatase 87 40 - 129 U/L    ALT 38 0 - 40 U/L    AST 30 0 - 39 U/L   Troponin   Result Value Ref Range    Troponin, High Sensitivity 81 (H) 0 - 11 ng/L   Brain Natriuretic Peptide   Result Value Ref Range    Pro- (H) 0 - 450 pg/mL   EKG 12 Lead Result Value Ref Range    Ventricular Rate 101 BPM    Atrial Rate 101 BPM    P-R Interval 138 ms    QRS Duration 86 ms    Q-T Interval 354 ms    QTc Calculation (Bazett) 459 ms    P Axis 31 degrees    R Axis -17 degrees    T Axis 40 degrees       RADIOLOGY:   Interpretation per the Radiologist below, if available at the time of this note:    XR CHEST PORTABLE   Final Result   No evidence of acute cardiopulmonary disease is seen. NM LUNG SCAN PERFUSION ONLY    Result Date: 1/6/2023  EXAMINATION: NUCLEAR MEDICINE PERFUSION SCAN. 1/6/2023 TECHNIQUE: Ventilation not performed as part of COVID-19 safety precautions. 5.7 millicuries of Tc 00X MAA was administered intravenously prior to planar imaging of the lungs in multiple projections. COMPARISON: Chest radiograph 01/05/2023. HISTORY: ORDERING SYSTEM PROVIDED HISTORY: Post op knee, post covid, partial DVT R thigh, elevated D dimer, hypoxia reported outpatient TECHNOLOGIST PROVIDED HISTORY: Reason for exam:->Post op knee, post covid, partial DVT R thigh, elevated D dimer, hypoxia reported outpatient What reading provider will be dictating this exam?->CRC FINDINGS: PERFUSION: Mildly heterogeneous distribution of radiotracer. No segmental perfusion defects. CHEST RADIOGRAPH: No focal areas of consolidation or significant effusions on recent chest radiograph. Very low probability for pulmonary embolism. US DUP LOWER EXTREMITIES BILATERAL VENOUS    Result Date: 1/5/2023  EXAMINATION: DUPLEX VENOUS ULTRASOUND OF THE BILATERAL LOWER EXTREMITIES1/5/2023 7:45 pm TECHNIQUE: Duplex ultrasound using B-mode/gray scaled imaging, Doppler spectral analysis and color flow Doppler was obtained of the deep venous structures of the lower bilateral extremities. COMPARISON: None.  HISTORY: ORDERING SYSTEM PROVIDED HISTORY: Recent surgery rule out DVT TECHNOLOGIST PROVIDED HISTORY: Reason for exam:->Recent surgery rule out DVT What reading provider will be dictating this exam?->CRC FINDINGS: Right profundal femoral vein shows increased echogenicity within the lumen with incomplete color flow through this segment. The remainder of the lower extremity veins bilaterally are patent through the maneuvers of study. 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 No results found. See preliminary interpretation by me below. EKG: This EKG is signed and interpreted by me. Sinus tachycardia with ventricular rate of 101 bpm.  Left axis deviation. LA interval normal.  QTc not prolonged. No evidence of acute ST elevation MI. Poor quality EKG with shaky baseline. Nonspecific T wave abnormalities. No significant changes from previous EKG on 1/9/2023.      ------------------------- NURSING NOTES AND VITALS REVIEWED ---------------------------   The nursing notes within the ED encounter and vital signs as below have been reviewed by myself. /64   Pulse 67   Temp 97.6 °F (36.4 °C) (Oral)   Resp 20   Ht 5' 11\" (1.803 m)   Wt 214 lb (97.1 kg)   SpO2 98%   BMI 29.85 kg/m²   Oxygen Saturation Interpretation: Normal    The patients available past medical records and past encounters were reviewed. ------------------------------ ED COURSE/MEDICAL DECISION MAKING----------------------  Medications   0.9 % sodium chloride bolus (0 mLs IntraVENous Stopped 1/12/23 1513)       Medical Decision Making/Differential Diagnosis:    CC/HPI Summary, Pertinent Physical Exam Findings, Social Determinants of health, Records Reviewed, DDx, testing done/not done, ED Course, Reassessment, disposition considerations/shared decision making with patient, consults, disposition:        Medical Decision Making:   I, Dr. Amanda Bailey am the resident physician of record. History From: Patient     Limitations to history : None     Ezekiel Matos is a 80 y.o. male who presents to the ED for syncopal episode.   Vital signs upon arrival /64 Pulse 67   Temp 97.6 °F (36.4 °C) (Oral)   Resp 20   Ht 5' 11\" (1.803 m)   Wt 214 lb (97.1 kg)   SpO2 98%   BMI 29.85 kg/m²     On initial evaluation, patient is nontoxic-appearing, afebrile, hemodynamically stable and in no acute distress. Initial vitals significant for mild tachycardia at 101 but remaining vitals within appropriate limits. Working diagnoses include but not limited to vertigo, orthostatic hypotension, acute vestibular syndrome, hypertensive urgency/emergency, cardiac arrhythmia, dehydration, CVA, TIA and ACS. Physical exam essentially benign. Lungs are clear to auscultation with no wheezes, rales or rhonchi. Abdomen soft, nontender nondistended without rebound, guarding or rigidity. Heart mildly tachycardic but regular rhythm. No chest wall tenderness or crepitus on palpation. Work-up in the emergency department as interpreted by me include CBC with no leukocytosis or left shift. WBC 7.5. Patient does have anemia with hemoglobin 9.3 although this is at patient's baseline compared to previous labs. Baseline hemoglobin around 8. Patient denying any active bleeding. CMP with poor but stable renal function, creatinine 1.7/BUN 33. Baseline creatinine appears to be 1.6. No major electrolyte abnormalities found including normal potassium of 4.5. LFTs within normal limits. Cardiac work-up in the ED essentially unremarkable. Patient had elevated troponin of 81 although this appears chronically elevated compared to his recent labs. EKG sinus and nonspecific with no acute ischemic changes. Chest x-ray is clear. Patient was given 1 L of IV fluids, 0.9 NS in the ED. Vitals did improve on recheck, specifically heart rate. Initial plan was for discharge however PCP contacted the emergency department and was concerned that patient was having recurrent syncopal episodes over short period of time. Patient would benefit from evaluation by EP at [facility] as recommended by his PCP.   Spoke with admitting team at [facility] who accepted the patient for transfer and admission. Work-up, results and plan for today were discussed with the patient and after shared decision plans for transfer. Patient remained hemodynamically stable ED. Non-plain film images such as CT, Ultrasound and MRI are read by the radiologist. [facility] radiographic images are visualized and preliminarily interpreted by the ED Provider with the below findings:    Chest x-ray with no obvious focal consolidation suggestive of pneumonia, large pleural effusions or pneumothorax. Normal cardiac silhouette. Discussion with Other Profesionals : Admitting Team who accepted the patient for transfer and admission at [facility]    Social Determinants : None    Records Reviewed : Inpatient Notes most recent inpatient note reviewed. Patient was admitted from 1/5/2023 to 7/36/3889 for complicated UTI. Patient was treated with IV Rocephin also found to have right lower extremity DVT and was started on Lovenox and transition to Eliquis. and Other NM lung scan perfusion completed on last admission on 1/6/2023 and results were reviewed. Impression notes very low probability of pulmonary embolism. Chronic conditions: Hypertension, hyperlipidemia, diabetes, CAD, anemia and unspecified cancer    CONSULTS: Admitting team at transfer facility who accepted patient for admission      Disposition:   Transfer      Consultation with admitting team at [facility] who accepted the patient for transfer and admission. The patient will be transferred for further treatment and evaluation for   1. Syncope and collapse          Re-Evaluations/Consultations:             ED Course as of 01/13/23 0057   u Jan 12, 2023   7941 I agree with the EKG findings as dictated by the resident. ATTENDING PROVIDER ATTESTATION:     I have personally performed and/or participated in the history, exam, medical decision making, and procedures and agree with all pertinent clinical information. I have also reviewed and agree with the past medical, family and social history unless otherwise noted. I have discussed this patient in detail with the resident, and provided the instruction and education regarding recurrent syncope plan patient currently has no symptoms. [RM]   3104 Patient being discharged, I was contacted by his primary care physician. There was concern that the patient's had 4 episodes of syncope in the past 3 weeks. He consulted cardiology who stated the patient would best be served by being evaluated by electrophysiology if possible. He is concerned the patient's had multiple episodes of syncope with no significant abnormality being found by standard work-ups, and requested the patient be transferred to [facility] to be seen by EP. Patient is agreeable. [RM]      ED Course User Index  [RM] Nixon Fraction, DO         This patient's ED course included: History, physical examination, reevaluation prior to disposition    This patient has remained hemodynamically stable during their ED course. Counseling: The emergency provider has spoken with the patient and discussed todays results, in addition to providing specific details for the plan of care and counseling regarding the diagnosis and prognosis. Questions are answered at this time and they are agreeable with the plan.       --------------------------------- IMPRESSION AND DISPOSITION ---------------------------------    IMPRESSION  1. Syncope and collapse        DISPOSITION  Disposition: Transfer from [facility] to [facility]    Patient condition is stable        NOTE: This report was transcribed using voice recognition software.  Every effort was made to ensure accuracy; however, inadvertent computerized transcription errors may be present         Last Hatch DO  Resident  01/13/23 8819

## 2023-01-13 PROBLEM — R55 RECURRENT SYNCOPE: Status: ACTIVE | Noted: 2023-01-13

## 2023-01-13 PROBLEM — R55 SYNCOPE AND COLLAPSE: Status: ACTIVE | Noted: 2023-01-13

## 2023-01-13 LAB
METER GLUCOSE: 306 MG/DL (ref 74–99)
METER GLUCOSE: 320 MG/DL (ref 74–99)

## 2023-01-13 PROCEDURE — 6370000000 HC RX 637 (ALT 250 FOR IP): Performed by: INTERNAL MEDICINE

## 2023-01-13 PROCEDURE — 2060000000 HC ICU INTERMEDIATE R&B

## 2023-01-13 PROCEDURE — 82962 GLUCOSE BLOOD TEST: CPT

## 2023-01-13 RX ORDER — INSULIN LISPRO 100 [IU]/ML
0-8 INJECTION, SOLUTION INTRAVENOUS; SUBCUTANEOUS 3 TIMES DAILY
COMMUNITY

## 2023-01-13 RX ORDER — DOCUSATE SODIUM 100 MG/1
100 CAPSULE, LIQUID FILLED ORAL 2 TIMES DAILY
Status: DISCONTINUED | OUTPATIENT
Start: 2023-01-13 | End: 2023-01-15 | Stop reason: HOSPADM

## 2023-01-13 RX ORDER — DEXTROSE MONOHYDRATE 100 MG/ML
INJECTION, SOLUTION INTRAVENOUS CONTINUOUS PRN
Status: DISCONTINUED | OUTPATIENT
Start: 2023-01-13 | End: 2023-01-15 | Stop reason: HOSPADM

## 2023-01-13 RX ORDER — ONDANSETRON 4 MG/1
4 TABLET, ORALLY DISINTEGRATING ORAL EVERY 8 HOURS PRN
Status: DISCONTINUED | OUTPATIENT
Start: 2023-01-13 | End: 2023-01-15 | Stop reason: HOSPADM

## 2023-01-13 RX ORDER — CEPHALEXIN 500 MG/1
500 CAPSULE ORAL EVERY 12 HOURS SCHEDULED
Status: COMPLETED | OUTPATIENT
Start: 2023-01-13 | End: 2023-01-14

## 2023-01-13 RX ORDER — ACETAMINOPHEN 325 MG/1
650 TABLET ORAL EVERY 6 HOURS PRN
Status: DISCONTINUED | OUTPATIENT
Start: 2023-01-13 | End: 2023-01-15 | Stop reason: HOSPADM

## 2023-01-13 RX ORDER — POLYETHYLENE GLYCOL 3350 17 G/17G
17 POWDER, FOR SOLUTION ORAL DAILY PRN
Status: DISCONTINUED | OUTPATIENT
Start: 2023-01-13 | End: 2023-01-15 | Stop reason: HOSPADM

## 2023-01-13 RX ORDER — INSULIN LISPRO 100 [IU]/ML
0-8 INJECTION, SOLUTION INTRAVENOUS; SUBCUTANEOUS
Status: DISCONTINUED | OUTPATIENT
Start: 2023-01-13 | End: 2023-01-15 | Stop reason: HOSPADM

## 2023-01-13 RX ORDER — FERROUS SULFATE 325(65) MG
325 TABLET ORAL 2 TIMES DAILY WITH MEALS
Status: DISCONTINUED | OUTPATIENT
Start: 2023-01-13 | End: 2023-01-15 | Stop reason: HOSPADM

## 2023-01-13 RX ORDER — SODIUM CHLORIDE 0.9 % (FLUSH) 0.9 %
5-40 SYRINGE (ML) INJECTION PRN
Status: DISCONTINUED | OUTPATIENT
Start: 2023-01-13 | End: 2023-01-15 | Stop reason: HOSPADM

## 2023-01-13 RX ORDER — SODIUM CHLORIDE 0.9 % (FLUSH) 0.9 %
5-40 SYRINGE (ML) INJECTION EVERY 12 HOURS SCHEDULED
Status: DISCONTINUED | OUTPATIENT
Start: 2023-01-13 | End: 2023-01-15 | Stop reason: HOSPADM

## 2023-01-13 RX ORDER — INSULIN LISPRO 100 [IU]/ML
0-4 INJECTION, SOLUTION INTRAVENOUS; SUBCUTANEOUS NIGHTLY
Status: DISCONTINUED | OUTPATIENT
Start: 2023-01-13 | End: 2023-01-15 | Stop reason: HOSPADM

## 2023-01-13 RX ORDER — TAMSULOSIN HYDROCHLORIDE 0.4 MG/1
0.4 CAPSULE ORAL DAILY
Status: DISCONTINUED | OUTPATIENT
Start: 2023-01-13 | End: 2023-01-15 | Stop reason: HOSPADM

## 2023-01-13 RX ORDER — PANTOPRAZOLE SODIUM 40 MG/1
40 TABLET, DELAYED RELEASE ORAL
Status: DISCONTINUED | OUTPATIENT
Start: 2023-01-14 | End: 2023-01-15 | Stop reason: HOSPADM

## 2023-01-13 RX ORDER — ONDANSETRON 2 MG/ML
4 INJECTION INTRAMUSCULAR; INTRAVENOUS EVERY 6 HOURS PRN
Status: DISCONTINUED | OUTPATIENT
Start: 2023-01-13 | End: 2023-01-15 | Stop reason: HOSPADM

## 2023-01-13 RX ORDER — ASPIRIN 81 MG/1
81 TABLET, CHEWABLE ORAL DAILY
Status: DISCONTINUED | OUTPATIENT
Start: 2023-01-13 | End: 2023-01-15

## 2023-01-13 RX ORDER — ACETAMINOPHEN 650 MG/1
650 SUPPOSITORY RECTAL EVERY 6 HOURS PRN
Status: DISCONTINUED | OUTPATIENT
Start: 2023-01-13 | End: 2023-01-15 | Stop reason: HOSPADM

## 2023-01-13 RX ORDER — MIDODRINE HYDROCHLORIDE 5 MG/1
2.5 TABLET ORAL
Status: DISCONTINUED | OUTPATIENT
Start: 2023-01-13 | End: 2023-01-15 | Stop reason: HOSPADM

## 2023-01-13 RX ORDER — SODIUM CHLORIDE 9 MG/ML
INJECTION, SOLUTION INTRAVENOUS PRN
Status: DISCONTINUED | OUTPATIENT
Start: 2023-01-13 | End: 2023-01-15 | Stop reason: HOSPADM

## 2023-01-13 RX ORDER — TRAMADOL HYDROCHLORIDE 50 MG/1
50 TABLET ORAL EVERY 4 HOURS PRN
Status: DISCONTINUED | OUTPATIENT
Start: 2023-01-13 | End: 2023-01-15 | Stop reason: HOSPADM

## 2023-01-13 RX ADMIN — APIXABAN 10 MG: 5 TABLET, FILM COATED ORAL at 20:19

## 2023-01-13 RX ADMIN — MIDODRINE HYDROCHLORIDE 2.5 MG: 5 TABLET ORAL at 15:11

## 2023-01-13 RX ADMIN — TAMSULOSIN HYDROCHLORIDE 0.4 MG: 0.4 CAPSULE ORAL at 17:51

## 2023-01-13 RX ADMIN — ASPIRIN 81 MG: 81 TABLET, CHEWABLE ORAL at 17:51

## 2023-01-13 RX ADMIN — INSULIN HUMAN 25 UNITS: 100 INJECTION, SUSPENSION SUBCUTANEOUS at 17:53

## 2023-01-13 RX ADMIN — METOPROLOL TARTRATE 25 MG: 25 TABLET, FILM COATED ORAL at 20:19

## 2023-01-13 RX ADMIN — DOCUSATE SODIUM 100 MG: 100 CAPSULE, LIQUID FILLED ORAL at 20:19

## 2023-01-13 RX ADMIN — INSULIN LISPRO 6 UNITS: 100 INJECTION, SOLUTION INTRAVENOUS; SUBCUTANEOUS at 17:53

## 2023-01-13 RX ADMIN — INSULIN LISPRO 4 UNITS: 100 INJECTION, SOLUTION INTRAVENOUS; SUBCUTANEOUS at 20:20

## 2023-01-13 RX ADMIN — FERROUS SULFATE TAB 325 MG (65 MG ELEMENTAL FE) 325 MG: 325 (65 FE) TAB at 17:51

## 2023-01-13 RX ADMIN — CEPHALEXIN 500 MG: 500 CAPSULE ORAL at 20:19

## 2023-01-13 ASSESSMENT — PAIN - FUNCTIONAL ASSESSMENT
PAIN_FUNCTIONAL_ASSESSMENT: WONG-BAKER FACES
PAIN_FUNCTIONAL_ASSESSMENT: NONE - DENIES PAIN

## 2023-01-13 NOTE — FLOWSHEET NOTE
Patient sitting up in bed respirations non labored skin warm dry intact, vitals stable.  Will continue to monitor

## 2023-01-13 NOTE — ED NOTES
Pt unable to stand and remain standing to complete orthostatic vitals.       Merline Esquivel  01/13/23 1257

## 2023-01-13 NOTE — FLOWSHEET NOTE
Patient found at bottom of bed in chair pulling at [place]. Patient confused and unable to states where he is or what is going on.  RN assist from [place] and patient now back in bed, bed alarm on,  vitals stable, will continue to monitor

## 2023-01-13 NOTE — FLOWSHEET NOTE
Patient resting in bed respirations non labored skin warm dry intact, vitals stable.  Patient states no pain at this time, will continue to monitor

## 2023-01-13 NOTE — CONSULTS
INPATIENT CONSULT-[facility] CARDIOLOGY    Name: Sunny Segal    Age: 82 y.o.    Date of Admission: 1/12/2023 12:35 PM    Date of Service: 1/13/2023    Reason for Consultation: Recurrent syncope    Referring Physician: Dr. Adams    History of Present Illness: The patient is a 82 y.o. year old male whom I just saw approximately 5 days ago with an episode of syncope which appeared to be vasovagal in origin during admission January 6 for change in mental status and urinary retention.  Brought back from the nursing home with 3 episodes of syncope in the last several days.  Family reportedly very worried about this presentation.  Currently he is laying in the stretcher in no distress.  He is cogent and speaks to me but does not know where he is or what is going on.    Past Medical History:   Hypertension, hyperlipidemia, non-insulin-requiring diabetes mellitus, chronic kidney disease of unknown severity.  Creatinine this admission 2.  Echo December 2022 showed normal left ventricular size and systolic function without significant valvular abnormalities and with severe LVH (left ventricular wall thickness 1.5 cm), no LVOT gradient.    Review of Systems: Unable to assess due to mental status.      Family History:  Family History   Problem Relation Age of Onset    Cancer Father         prostate    Cancer Brother         prostate       Social History:  Social History     Socioeconomic History    Marital status:      Spouse name: Not on file    Number of children: Not on file    Years of education: Not on file    Highest education level: Not on file   Occupational History    Not on file   Tobacco Use    Smoking status: Never    Smokeless tobacco: Never    Tobacco comments:     pipe  40 years ago  (smoked once daily)   Vaping Use    Vaping Use: Never used   Substance and Sexual Activity    Alcohol use: No    Drug use: No    Sexual activity: Not Currently   Other Topics Concern    Not on file   Social History  Narrative    Not on file     Social Determinants of Health     Financial Resource Strain: Not on file   Food Insecurity: Not on file   Transportation Needs: Not on file   Physical Activity: Not on file   Stress: Not on file   Social Connections: Not on file   Intimate Partner Violence: Not on file   Housing Stability: Not on file       Allergies: Allergies   Allergen Reactions    Darvocet [Propoxyphene N-Acetaminophen] Nausea And Vomiting    Lisinopril Palpitations and Cough       Current Medications:  No current facility-administered medications for this encounter.      Current Outpatient Medications   Medication Sig Dispense Refill    apixaban (ELIQUIS) 5 MG TABS tablet Take 2 tablets by mouth 2 times daily for 3 days 12 tablet 0    [START ON 1/14/2023] apixaban (ELIQUIS) 5 MG TABS tablet Take 1 tablet by mouth 2 times daily 60 tablet 2    insulin lispro (HUMALOG) 100 UNIT/ML SOLN injection vial Inject 0-8 Units into the skin 3 times daily (with meals) 15 mL 0    insulin lispro (HUMALOG) 100 UNIT/ML SOLN injection vial Inject 0-4 Units into the skin nightly 100 mL 0    insulin NPH (HUMULIN N;NOVOLIN N) 100 UNIT/ML injection vial Inject 25 Units into the skin every morning 100 mL 0    insulin NPH (HUMULIN N;NOVOLIN N) 100 UNIT/ML injection vial Inject 25 Units into the skin daily (before dinner) 15 mL 3    cephALEXin (KEFLEX) 500 MG capsule Take 1 capsule by mouth every 12 hours for 4 doses 4 capsule 0    ferrous sulfate (IRON 325) 325 (65 Fe) MG tablet Take 1 tablet by mouth 2 times daily (with meals) 30 tablet 3    docusate sodium (COLACE, DULCOLAX) 100 MG CAPS Take 100 mg by mouth 2 times daily 60 capsule 0    tamsulosin (FLOMAX) 0.4 MG capsule Take 1 capsule by mouth daily 30 capsule 3    aspirin 81 MG chewable tablet Take 1 tablet by mouth daily 30 tablet 3    zinc sulfate (ZINCATE) 220 (50 Zn) MG capsule Take 1 capsule by mouth daily 30 capsule 3    tamsulosin (FLOMAX) 0.4 MG capsule Take 0.4 mg by mouth daily      losartan (COZAAR) 100 MG tablet Take 100 mg by mouth daily. omeprazole (PRILOSEC) 20 MG capsule Take 20 mg by mouth daily. metoprolol (LOPRESSOR) 25 MG tablet Take 25 mg by mouth 2 times daily. Multiple Vitamin (MULTI VITAMIN MENS PO) Take  by mouth.        vitamin D (CHOLECALCIFEROL) 400 UNITS TABS tablet Take 500 Units by mouth daily. acetaminophen (TYLENOL) 500 MG tablet Take 500 mg by mouth every 6 hours as needed. Physical Exam:  /75   Pulse 85   Temp 97.6 °F (36.4 °C) (Oral)   Resp 18   Ht 5' 11\" (1.803 m)   Wt 214 lb (97.1 kg)   SpO2 99%   BMI 29.85 kg/m²   Weight change: Wt Readings from Last 3 Encounters:   01/12/23 214 lb (97.1 kg)   01/11/23 214 lb 11.2 oz (97.4 kg)   12/30/22 225 lb (102.1 kg)         General: Awake, alert, oriented x3, no acute distress  HEENT: Unremarkable  Neck: No JVD or bruits. Cardiac: Regular rate and rhythm, normal S1 and S2, no extra heart sounds, murmurs, heaves, thrills  Resp: Lungs clear without wheezing or crackles. No accessory muscle use or retraction  Abdomen: soft, nontender, nondistended, no gross organomegaly or mass  Skin: Warm and dry, no cyanosis. Musculoskeletal: No identified joint deformity  Neuro: Grossly unremarkable  Psych: Cooperative, and normal affect    Intake/Output:  No intake or output data in the 24 hours ending 01/13/23 1008  No intake/output data recorded. Laboratory Tests:  Lab Results   Component Value Date    CREATININE 1.7 (H) 01/12/2023    BUN 33 (H) 01/12/2023     01/12/2023    K 4.5 01/12/2023    CL 99 01/12/2023    CO2 21 (L) 01/12/2023     No results for input(s): CKTOTAL, CKMB in the last 72 hours.     Invalid input(s): TROPONONI  No results found for: BNP  Lab Results   Component Value Date/Time    WBC 7.5 01/12/2023 01:14 PM    RBC 2.97 01/12/2023 01:14 PM    HGB 9.3 01/12/2023 01:14 PM    HCT 29.6 01/12/2023 01:14 PM    MCV 99.7 01/12/2023 01:14 PM    MCH 31.3 01/12/2023 01:14 PM    MCHC 31.4 01/12/2023 01:14 PM    RDW 12.4 01/12/2023 01:14 PM     01/12/2023 01:14 PM    MPV 9.8 01/12/2023 01:14 PM     Recent Labs     01/11/23  0430 01/12/23  1314   ALKPHOS 86 87   ALT 50* 38   AST 51* 30   PROT 6.7 7.1   BILITOT 0.4 0.4   LABALBU 2.8* 2.9*     Lab Results   Component Value Date/Time    MG 1.6 12/30/2022 06:16 AM     Lab Results   Component Value Date/Time    PROTIME 12.7 01/05/2023 12:56 PM    INR 1.1 01/05/2023 12:56 PM     Lab Results   Component Value Date/Time    TSH 0.804 01/06/2023 03:15 AM     No components found for: CHLPL  Lab Results   Component Value Date    TRIG 75 12/16/2019    TRIG 69 05/13/2019    TRIG 68 09/17/2018     Lab Results   Component Value Date    HDL 62 12/16/2019    HDL 54 05/13/2019    HDL 54 09/17/2018     Lab Results   Component Value Date    LDLCALC 87 12/16/2019    LDLCALC 72 05/13/2019    LDLCALC 80 09/17/2018     Lab Results   Component Value Date    INR 1.1 01/05/2023       Admission ECG reviewed by me revealed sinus tachycardia 100 bpm, otherwise normal.    ASSESSMENT / PLAN:    1.  Recurrent syncope-appears to be vasovagal in origin especially given his urinary retention.  Reportedly family is very concerned that this has happened 3 times in the last approximately 5 days.  Probably exacerbated by his Flomax and the high dose of losartan.  Would stop the losartan completely and start midodrine 2.5 mg 3 times daily.    2.  Doubt arrhythmic cause of his syncope and feel it is more medication induced.  Discussed with Dr. Adams that given the recurrent syncope, he may need to be transferred downtown for electrophysiology consultation.  Perhaps could hold off on that at this time and would first stop losartan while starting midodrine.  Orthostatic vital signs every shift.  Understand that we cannot stop the Flomax because of urinary issues.    2.  Hypertension-willing to tolerate higher blood pressure as long as it does not result  in him having recurrent syncope. 3.  Nonocclusive lower extremity thrombosis and anticoagulation-no acute issues. Eliquis to be continued per Dr. Selvin Hook. 4.  Chronic kidney disease-creatinine was 2 around a week ago but has decreased to 1.7.    5.  Continue to follow.     Electronically signed by Alem Quiroz MD on 1/13/2023 at 10:08 AM

## 2023-01-13 NOTE — FLOWSHEET NOTE
Patient resting in bed respirations non labored skin warm dry intact, vitals stable. Spoke with wife about transfer to [facility].

## 2023-01-13 NOTE — FLOWSHEET NOTE
Patient resting in bed respirations non labored skin warm dry intact, vitals stable.  Will continue to monitor

## 2023-01-13 NOTE — PROGRESS NOTES
Database initiated. Patient is alert to self and place. Got the year right but not the month. He comes in from [place]. Originally from home with wife. States he recently got his right knee replaced and has been using a walker and a wheelchair.  Admits to frequent diarrhea recently

## 2023-01-13 NOTE — ED NOTES
PRASHANT faxed to floor spoke with sanjeev, copy of fax verificarion received and in chart, pt. Is chimed.     Soy Lopez RN  01/13/23 1235

## 2023-01-14 ENCOUNTER — APPOINTMENT (OUTPATIENT)
Dept: CT IMAGING | Age: 83
DRG: 312 | End: 2023-01-14
Payer: MEDICARE

## 2023-01-14 LAB
ANION GAP SERPL CALCULATED.3IONS-SCNC: 12 MMOL/L (ref 7–16)
BASOPHILS ABSOLUTE: 0.05 E9/L (ref 0–0.2)
BASOPHILS RELATIVE PERCENT: 0.6 % (ref 0–2)
BUN BLDV-MCNC: 28 MG/DL (ref 6–23)
CALCIUM SERPL-MCNC: 9 MG/DL (ref 8.6–10.2)
CHLORIDE BLD-SCNC: 99 MMOL/L (ref 98–107)
CO2: 23 MMOL/L (ref 22–29)
CREAT SERPL-MCNC: 1.6 MG/DL (ref 0.7–1.2)
EOSINOPHILS ABSOLUTE: 0.15 E9/L (ref 0.05–0.5)
EOSINOPHILS RELATIVE PERCENT: 1.8 % (ref 0–6)
GFR SERPL CREATININE-BSD FRML MDRD: 43 ML/MIN/1.73
GLUCOSE BLD-MCNC: 162 MG/DL (ref 74–99)
HCT VFR BLD CALC: 25.2 % (ref 37–54)
HEMOGLOBIN: 7.9 G/DL (ref 12.5–16.5)
IMMATURE GRANULOCYTES #: 0.08 E9/L
IMMATURE GRANULOCYTES %: 0.9 % (ref 0–5)
LYMPHOCYTES ABSOLUTE: 0.96 E9/L (ref 1.5–4)
LYMPHOCYTES RELATIVE PERCENT: 11.2 % (ref 20–42)
MCH RBC QN AUTO: 31.1 PG (ref 26–35)
MCHC RBC AUTO-ENTMCNC: 31.3 % (ref 32–34.5)
MCV RBC AUTO: 99.2 FL (ref 80–99.9)
METER GLUCOSE: 165 MG/DL (ref 74–99)
METER GLUCOSE: 178 MG/DL (ref 74–99)
METER GLUCOSE: 200 MG/DL (ref 74–99)
METER GLUCOSE: 215 MG/DL (ref 74–99)
METER GLUCOSE: 370 MG/DL (ref 74–99)
MONOCYTES ABSOLUTE: 0.78 E9/L (ref 0.1–0.95)
MONOCYTES RELATIVE PERCENT: 9.1 % (ref 2–12)
NEUTROPHILS ABSOLUTE: 6.53 E9/L (ref 1.8–7.3)
NEUTROPHILS RELATIVE PERCENT: 76.4 % (ref 43–80)
PDW BLD-RTO: 12.4 FL (ref 11.5–15)
PLATELET # BLD: 413 E9/L (ref 130–450)
PMV BLD AUTO: 10.2 FL (ref 7–12)
POTASSIUM REFLEX MAGNESIUM: 4.6 MMOL/L (ref 3.5–5)
RBC # BLD: 2.54 E12/L (ref 3.8–5.8)
SODIUM BLD-SCNC: 134 MMOL/L (ref 132–146)
VITAMIN B-12: 1014 PG/ML (ref 211–946)
WBC # BLD: 8.6 E9/L (ref 4.5–11.5)

## 2023-01-14 PROCEDURE — 6370000000 HC RX 637 (ALT 250 FOR IP): Performed by: INTERNAL MEDICINE

## 2023-01-14 PROCEDURE — 51702 INSERT TEMP BLADDER CATH: CPT

## 2023-01-14 PROCEDURE — 82962 GLUCOSE BLOOD TEST: CPT

## 2023-01-14 PROCEDURE — 85025 COMPLETE CBC W/AUTO DIFF WBC: CPT

## 2023-01-14 PROCEDURE — 82270 OCCULT BLOOD FECES: CPT

## 2023-01-14 PROCEDURE — 80048 BASIC METABOLIC PNL TOTAL CA: CPT

## 2023-01-14 PROCEDURE — 36415 COLL VENOUS BLD VENIPUNCTURE: CPT

## 2023-01-14 PROCEDURE — 70450 CT HEAD/BRAIN W/O DYE: CPT

## 2023-01-14 PROCEDURE — 2060000000 HC ICU INTERMEDIATE R&B

## 2023-01-14 PROCEDURE — 82607 VITAMIN B-12: CPT

## 2023-01-14 PROCEDURE — 2580000003 HC RX 258: Performed by: INTERNAL MEDICINE

## 2023-01-14 PROCEDURE — 87040 BLOOD CULTURE FOR BACTERIA: CPT

## 2023-01-14 RX ADMIN — APIXABAN 10 MG: 5 TABLET, FILM COATED ORAL at 21:26

## 2023-01-14 RX ADMIN — DOCUSATE SODIUM 100 MG: 100 CAPSULE, LIQUID FILLED ORAL at 21:26

## 2023-01-14 RX ADMIN — CEPHALEXIN 500 MG: 500 CAPSULE ORAL at 08:17

## 2023-01-14 RX ADMIN — TAMSULOSIN HYDROCHLORIDE 0.4 MG: 0.4 CAPSULE ORAL at 08:18

## 2023-01-14 RX ADMIN — METOPROLOL TARTRATE 25 MG: 25 TABLET, FILM COATED ORAL at 21:26

## 2023-01-14 RX ADMIN — INSULIN HUMAN 25 UNITS: 100 INJECTION, SUSPENSION SUBCUTANEOUS at 16:42

## 2023-01-14 RX ADMIN — INSULIN HUMAN 25 UNITS: 100 INJECTION, SUSPENSION SUBCUTANEOUS at 08:19

## 2023-01-14 RX ADMIN — FERROUS SULFATE TAB 325 MG (65 MG ELEMENTAL FE) 325 MG: 325 (65 FE) TAB at 16:41

## 2023-01-14 RX ADMIN — FERROUS SULFATE TAB 325 MG (65 MG ELEMENTAL FE) 325 MG: 325 (65 FE) TAB at 08:18

## 2023-01-14 RX ADMIN — METOPROLOL TARTRATE 25 MG: 25 TABLET, FILM COATED ORAL at 08:18

## 2023-01-14 RX ADMIN — SODIUM CHLORIDE, PRESERVATIVE FREE 10 ML: 5 INJECTION INTRAVENOUS at 21:28

## 2023-01-14 RX ADMIN — SODIUM CHLORIDE, PRESERVATIVE FREE 10 ML: 5 INJECTION INTRAVENOUS at 08:18

## 2023-01-14 RX ADMIN — INSULIN LISPRO 2 UNITS: 100 INJECTION, SOLUTION INTRAVENOUS; SUBCUTANEOUS at 16:41

## 2023-01-14 RX ADMIN — APIXABAN 10 MG: 5 TABLET, FILM COATED ORAL at 08:17

## 2023-01-14 RX ADMIN — INSULIN LISPRO 2 UNITS: 100 INJECTION, SOLUTION INTRAVENOUS; SUBCUTANEOUS at 11:32

## 2023-01-14 RX ADMIN — CEPHALEXIN 500 MG: 500 CAPSULE ORAL at 21:26

## 2023-01-14 RX ADMIN — PANTOPRAZOLE SODIUM 40 MG: 40 TABLET, DELAYED RELEASE ORAL at 06:58

## 2023-01-14 RX ADMIN — INSULIN LISPRO 4 UNITS: 100 INJECTION, SOLUTION INTRAVENOUS; SUBCUTANEOUS at 21:27

## 2023-01-14 RX ADMIN — ASPIRIN 81 MG: 81 TABLET, CHEWABLE ORAL at 08:17

## 2023-01-14 RX ADMIN — MIDODRINE HYDROCHLORIDE 2.5 MG: 5 TABLET ORAL at 16:41

## 2023-01-14 RX ADMIN — DOCUSATE SODIUM 100 MG: 100 CAPSULE, LIQUID FILLED ORAL at 08:17

## 2023-01-14 ASSESSMENT — PAIN SCALES - GENERAL
PAINLEVEL_OUTOF10: 0

## 2023-01-14 NOTE — H&P
CHIEF COMPLAINT:  passing out      HISTORY OF PRESENT ILLNESS:      The patient is a 80 y.o. male patient with recurrent syncope. He is poor historian and does not add much. He has passed out over 5 times since [place].  He can not tell me if feels like going to pass out first.   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    Diabetes mellitus ([facility] [address]     Diverticulosis     mild    GERD (gastroesophageal reflux disease)     Hyperlipidemia     Hypertension        Past Surgical History:    Past Surgical History:   Procedure Laterality Date    BACK SURGERY      COLONOSCOPY      EYE SURGERY      PAROTIDECTOMY  12    left superficial       Medications Prior to Admission:    Medications Prior to Admission: insulin lispro, 1 Unit Dial, (HUMALOG/ADMELOG) 100 UNIT/ML SOPN, Inject 0-8 Units into the skin 3 times daily PER SLIDING SCALE: 0-90=0U, =5U, 141-200=6U, 201-260=7U, 261+=8U & CALL DR  insulin NPH (HUMULIN N;NOVOLIN N) 100 UNIT/ML injection vial, Inject 15 Units into the skin Daily with supper **SEE OTHER ORDERS**  insulin NPH (HUMULIN N;NOVOLIN N) 100 UNIT/ML injection vial, Inject 18 Units into the skin nightly **SEE OTHER ORDERS**  insulin NPH (HUMULIN N;NOVOLIN N) 100 UNIT/ML injection vial, Inject 27 Units into the skin daily (with breakfast) **SEE OTHER ORDERS**  apixaban (ELIQUIS) 5 MG TABS tablet, Take 2 tablets by mouth 2 times daily for 3 days  apixaban (ELIQUIS) 5 MG TABS tablet, Take 1 tablet by mouth 2 times daily  [] cephALEXin (KEFLEX) 500 MG capsule, Take 1 capsule by mouth every 12 hours for 4 doses  ferrous sulfate (IRON 325) 325 (65 Fe) MG tablet, Take 1 tablet by mouth 2 times daily (with meals)  docusate sodium (COLACE, DULCOLAX) 100 MG CAPS, Take 100 mg by mouth 2 times daily  tamsulosin (FLOMAX) 0.4 MG capsule, Take 1 capsule by mouth daily  aspirin 81 MG chewable tablet, Take 1 tablet by mouth daily  zinc sulfate (ZINCATE) 220 (50 Zn) MG capsule, Take 1 capsule by mouth daily  losartan (COZAAR) 100 MG tablet, Take 100 mg by mouth daily. omeprazole 20 MG EC tablet, Take 20 mg by mouth daily. metoprolol (LOPRESSOR) 25 MG tablet, Take 25 mg by mouth 2 times daily Indications: HOLD FOR SBP<100 AND/OR HR<60  Multiple Vitamin (MULTI VITAMIN MENS PO), Take 1 tablet by mouth daily  vitamin D (CHOLECALCIFEROL) 400 UNITS TABS tablet, Take 500 Units by mouth daily. acetaminophen (TYLENOL) 500 MG tablet, Take 500 mg by mouth every 6 hours as needed for Pain    Allergies:    Amlodipine, Codeine, Darvocet [propoxyphene n-acetaminophen], Hydrocodone-acetaminophen, and Lisinopril    Social History:    reports that he has never smoked. He has never used smokeless tobacco. He reports that he does not drink alcohol and does not use drugs. Family History:   family history includes Cancer in his brother and father. REVIEW OF SYSTEMS:  As above in the HPI, otherwise negative    PHYSICAL EXAM:    Vitals:  /83   Pulse 81   Temp 97.3 °F (36.3 °C) (Oral)   Resp 18   Ht 5' 11\" (1.803 m)   Wt 209 lb 9.6 oz (95.1 kg)   SpO2 95%   BMI 29.23 kg/m²     General:  Awake, alert, oriented X 3. Well developed, well nourished, well groomed. No apparent distress. HEENT:  Normocephalic, atraumatic. Pupils equal, round, reactive to light. No scleral icterus. No conjunctival injection. Normal lips, teeth, and gums. No nasal discharge. Neck:  Supple  Heart:  RRR, no murmurs, gallops, rubs  Lungs:  CTA bilaterally, bilat symmetrical expansion, no wheeze, rales, or rhonchi  Abdomen:   Bowel sounds present, soft, nontender, no masses, no organomegaly, no peritoneal signs  Extremities:  No clubbing, cyanosis, or edema  Skin:  Warm and dry, no open lesions or rash  Neuro:  Cranial nerves 2-12 intact, no focal deficits  Breast: deferred  Rectal: deferred  Genitalia:  deferred    LABS:  CBC with Differential:    Lab Results   Component Value Date/Time    WBC 8.6 01/14/2023 01:35 AM    RBC 2.54 01/14/2023 01:35 AM    HGB 7.9 01/14/2023 01:35 AM    HCT 25.2 01/14/2023 01:35 AM     01/14/2023 01:35 AM    MCV 99.2 01/14/2023 01:35 AM    MCH 31.1 01/14/2023 01:35 AM    MCHC 31.3 01/14/2023 01:35 AM    RDW 12.4 01/14/2023 01:35 AM    NRBC 0.0 12/25/2022 06:55 PM    LYMPHOPCT 11.2 01/14/2023 01:35 AM    PROMYELOPCT 0.9 12/25/2022 06:55 PM    MONOPCT 9.1 01/14/2023 01:35 AM    MYELOPCT 1.8 12/25/2022 02:01 PM    BASOPCT 0.6 01/14/2023 01:35 AM    MONOSABS 0.78 01/14/2023 01:35 AM    LYMPHSABS 0.96 01/14/2023 01:35 AM    EOSABS 0.15 01/14/2023 01:35 AM    BASOSABS 0.05 01/14/2023 01:35 AM     CMP:    Lab Results   Component Value Date/Time     01/14/2023 01:35 AM    K 4.6 01/14/2023 01:35 AM    CL 99 01/14/2023 01:35 AM    CO2 23 01/14/2023 01:35 AM    BUN 28 01/14/2023 01:35 AM    CREATININE 1.6 01/14/2023 01:35 AM    GFRAA 51 02/11/2020 08:00 AM    LABGLOM 43 01/14/2023 01:35 AM    GLUCOSE 162 01/14/2023 01:35 AM    PROT 7.1 01/12/2023 01:14 PM    LABALBU 2.9 01/12/2023 01:14 PM    CALCIUM 9.0 01/14/2023 01:35 AM    BILITOT 0.4 01/12/2023 01:14 PM    ALKPHOS 87 01/12/2023 01:14 PM    AST 30 01/12/2023 01:14 PM    ALT 38 01/12/2023 01:14 PM     PT/INR:    Lab Results   Component Value Date/Time    PROTIME 12.7 01/05/2023 12:56 PM    INR 1.1 01/05/2023 12:56 PM     @cktotal:3,ckmb:3,ckmbindex:3,troponini:3@  U/A:    Lab Results   Component Value Date/Time    NITRU Negative 01/05/2023 12:55 PM    COLORU Yellow 01/05/2023 12:55 PM    PHUR 6.0 01/05/2023 12:55 PM    WBCUA >20 01/05/2023 12:55 PM    RBCUA 0-1 01/05/2023 12:55 PM    BACTERIA MANY 01/05/2023 12:55 PM    CLARITYU SL CLOUDY 01/05/2023 12:55 PM    SPECGRAV >=1.030 01/05/2023 12:55 PM    UROBILINOGEN 0.2 01/05/2023 12:55 PM    BILIRUBINUR Negative 01/05/2023 12:55 PM    BLOODU SMALL 01/05/2023 12:55 PM    GLUCOSEU Negative 01/05/2023 12:55 PM    Bethany Kristina 15 01/05/2023 12:55 PM          ASSESSMENT:      Principal Problem:    Syncope and collapse  Active Problems:    Recurrent syncope  Resolved Problems:    * No resolved hospital problems.  *      PLAN:    Syncope  Orthostatics are unattainable as could not stand on own  Defer to cardiology if needs EP work up  On midodrine and off losartan    Acute urinary retention  Catheter and Flomax if able    DVT-  Continue Eliquis    Recent TKR-  Pt/ot to see    ?encephalopathy-  Will check cultures  Hold ABT for now    Anuja Stable, DO  7:12 AM  1/14/2023

## 2023-01-14 NOTE — PROGRESS NOTES
BP laying was obtained for orthos, pt. Was held in sitting position for sitting, pt. Was stood up with mainly staff effort, pt.  Was unable to stand and staff was unable to hold patient weight up safely to complete BP cycle, see flow sheet for laying and seated values

## 2023-01-14 NOTE — PROGRESS NOTES
Call placed to Dr. Sanjay Paula answering service re: RRT/Syncopal episode. Dr. Leti Davis aware. Dr. Cat Frausto notified of RRT/Syncopal episode.

## 2023-01-14 NOTE — CARE COORDINATION
Social Work / Discharge planning : Patient admitted from [facility] with syncope and collapse. Patient has Medicare. Will need to follow up with Liaison in regards to bed hold and if patient can return. N 17 generated and transport forms completed. SW will verify plan to return with spouse. SW to follow.  Electronically signed by MICK Reza on 1/14/23 at 7:55 AM EST

## 2023-01-14 NOTE — PROGRESS NOTES
Access center called no bed for [facility] SURGICAL [facility]

## 2023-01-14 NOTE — PROGRESS NOTES
Call placed to CMR per Dr. Cat Frausto request. Nitza Butts to review monitor before, during and after RRT for arrhythmias/ pauses and upload them to chart.

## 2023-01-14 NOTE — FLOWSHEET NOTE
01/14/23 1625   Vital Signs   Blood Pressure Lying 139/65   Pulse Lying 101 PER MINUTE   Blood Pressure Sitting 120/58   Pulse Sitting 110 PER MINUTE   Blood Pressure Standing 120/52   Pulse Standing 120 PER MINUTE       No dizziness/lightheadness/SOB observed

## 2023-01-14 NOTE — PROGRESS NOTES
Rapid Response      Patient Name: Mery Davis (28 y.o. male)  MRN: [id number]  YOB: 1940      Rapid Response paged by 6th floor at . Patient is a 82yowm patient of Dr Tiffany Jerry who is here for evaluation of multiple episodes of syncope over the past few weeks. Evaluation to date showed anemia and AMS. Cardiology had seen and advised against aggressive treatment of hypertension due to history of syncope. Currently holding midodrine for systolic blood pressure greater than 130. Last dose of metoprolol was this morning. Was recently hospitalized from 1/5/2023 to 1/11/2023 for closed fracture of 1 left-sided rib and complicated UTI. Currently was sent in on 1/13/2023 with loss of consciousness from the nursing home following syncopal episode. Upon admission he did have cessation of losartan and initiation of midodrine 2.5 mg 3 times daily. States that the orthostatics from supine to seated were within normal limits. Could not stand up on his own for completion. Midodrine today had been held. Nurse states that he was on the bedside commode when he turned very pale and unresponsive. He was subsequently moved to the bed. Placed on monitor which showed normal sinus rhythm. Blood pressure was slightly low. Patient was responsive to sternal rub. He remained in the supine position mentation and blood pressures did respond. Initial blood pressure 101/56 with final blood pressure at termination of rapid response 161/71. As noted patient did respond to questioning. No cranial nerve deficit. Negative pronator drift.         Subjective:   Patient is a 80 y.o. 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 Mentation did improve and he was able to answer appropriate questioning. No aphasia noted. Heart- RRR no murmurs   Lungs- CTAB without wheezes   Abdomen- soft, non-tender, mildly distended   Extremities- No edema or drainage bilaterally. Distal pulses intact bilaterally (2+ radial and dorsalis pedis)  Psych- Patient appears mildly anxious   Neuro-cranial nerves grossly intact. No aphasia or facial droop noted.  strength and upper extremity strength intact and equal 4/5 bilaterally. XR CHEST PORTABLE    Result Date: 1/12/2023  EXAMINATION: ONE XRAY VIEW OF THE CHEST 1/12/2023 1:14 pm COMPARISON: January 5, 2023. HISTORY: ORDERING SYSTEM PROVIDED HISTORY: syncope TECHNOLOGIST PROVIDED HISTORY: Reason for exam:->syncope FINDINGS: Poor inspiratory effort limits evaluation. The cardiomediastinal silhouette is without acute process. The lungs are without acute focal process. There is no effusion or pneumothorax. The osseous structures are without acute process. No evidence of acute cardiopulmonary disease is seen.           Basic Labs  CBC:   Recent Labs     01/14/23  0135   WBC 8.6   RBC 2.54*   HGB 7.9*   HCT 25.2*      MCV 99.2     BMP:    Recent Labs     01/14/23  0135      K 4.6   CL 99   CO2 23   BUN 28*   CREATININE 1.6*   GLUCOSE 162*   CALCIUM 9.0     Hepatic Function Panel:    Lab Results   Component Value Date/Time    ALKPHOS 87 01/12/2023 01:14 PM    AST 30 01/12/2023 01:14 PM    ALT 38 01/12/2023 01:14 PM    PROT 7.1 01/12/2023 01:14 PM    LABALBU 2.9 01/12/2023 01:14 PM    BILITOT 0.4 01/12/2023 01:14 PM     Magnesium:    Lab Results   Component Value Date/Time    MG 1.6 12/30/2022 06:16 AM     Cardiac Enzymes: No results found for: CKTOTAL, CKMB, CKMBINDEX, TROPONINI  PT/INR:    Lab Results Component Value Date/Time    PROTIME 12.7 01/05/2023 12:56 PM    INR 1.1 01/05/2023 12:56 PM     U/A:   Lab Results   Component Value Date/Time    LEUKOCYTESUR MODERATE 01/05/2023 12:55 PM    PHUR 6.0 01/05/2023 12:55 PM    WBCUA >20 01/05/2023 12:55 PM    RBCUA 0-1 01/05/2023 12:55 PM    BACTERIA MANY 01/05/2023 12:55 PM    SPECGRAV >=1.030 01/05/2023 12:55 PM    BLOODU SMALL 01/05/2023 12:55 PM    GLUCOSEU Negative 01/05/2023 12:55 PM     ABG:  No results for input(s): PHART, VKT2VKF, PO2ART, V3VBVNHB, GIF2DGX, BEART in the last 72 hours. TSH:    Lab Results   Component Value Date/Time    TSH 0.804 01/06/2023 03:15 AM           Assessment & Plan:  Syncopal episode most likely vasovagal in nature  -We will defer further blood pressure medication/midodrine adjustment to cardiology  -Out of bed with assistance only  -If recurrent altered mental status will need CT of the head. Results from earlier today show chronic small vessel ischemic change and cerebral volume loss without focalizing lesion or change. Consistent with age and history of hypertension.     Anemia  -Hemoccult stool  -Further evaluation per primary    Dr Narcisa Marques D.O   1/14/2023  2:50 PM

## 2023-01-14 NOTE — PROGRESS NOTES
[facility] CARDIOLOGY PROGRESS NOTE  [facility]        Subjective: Saw patient this morning as he has had possible multiple episodes of syncope in the last couple weeks. And some confusion, urinary retention. Chronic hypertension. Thoroughly reviewed his current chart and old chart. Echocardiogram December 2022 just done last month showed normal LVEF, mild concentric left ventricular perjury feet 1.5 cm thickness, no significant valve abnormality. This morning appears to be somewhat debilitated but pleasant conversant and appropriate. Denies any shortness of breath or chest pain or distress currently or overnight. Objective: Medications lab chart and telemetry all reviewed. Patient Vitals for the past 24 hrs:   BP Temp Temp src Pulse Resp SpO2 Height Weight   01/14/23 1125 132/82 -- -- 76 18 -- -- --   01/14/23 0722 (!) 143/72 98.4 °F (36.9 °C) Axillary 92 16 98 % -- --   01/14/23 0427 -- -- -- -- -- -- -- 209 lb 9.6 oz (95.1 kg)   01/14/23 0400 -- 97.3 °F (36.3 °C) Oral 81 18 -- -- --   01/13/23 1913 139/83 97.7 °F (36.5 °C) Oral (!) 108 16 -- -- --   01/13/23 1658 (!) 180/84 97.8 °F (36.6 °C) Oral 96 16 95 % -- --   01/13/23 1645 -- -- -- -- -- -- 5' 11\" (1.803 m) 207 lb 7.3 oz (94.1 kg)   01/13/23 1513 (!) 149/81 97.6 °F (36.4 °C) Oral (!) 108 16 98 % -- --         Intake/Output Summary (Last 24 hours) at 1/14/2023 1240  Last data filed at 1/14/2023 0513  Gross per 24 hour   Intake --   Output 500 ml   Net -500 ml       Wt Readings from Last 3 Encounters:   01/14/23 209 lb 9.6 oz (95.1 kg)   01/11/23 214 lb 11.2 oz (97.4 kg)   12/30/22 225 lb (102.1 kg)       Telemetry: Personally reviewed and shows normal sinus rhythm heart rate in the 70s and 80s no significant arrhythmia.     Current meds: Scheduled Meds:   midodrine  2.5 mg Oral TID WC    apixaban  10 mg Oral BID    Followed by    Amber Coe ON 1/16/2023] apixaban  5 mg Oral BID    aspirin  81 mg Oral Daily    cephALEXin  500 mg Oral 2 times per day    docusate sodium  100 mg Oral BID    ferrous sulfate  325 mg Oral BID WC    insulin lispro  0-4 Units SubCUTAneous Nightly    insulin lispro  0-8 Units SubCUTAneous TID WC    insulin NPH  25 Units SubCUTAneous QAM    insulin NPH  25 Units SubCUTAneous Daily before dinner    metoprolol tartrate  25 mg Oral BID    pantoprazole  40 mg Oral QAM AC    sodium chloride flush  5-40 mL IntraVENous 2 times per day    tamsulosin  0.4 mg Oral Daily     Continuous Infusions:   sodium chloride      dextrose       PRN Meds:.acetaminophen **OR** acetaminophen, sodium chloride, dextrose, dextrose bolus **OR** dextrose bolus, glucagon (rDNA), glucose, ondansetron **OR** ondansetron, polyethylene glycol, sodium chloride flush, traMADol    Allergies: Amlodipine, Codeine, Darvocet [propoxyphene n-acetaminophen], Hydrocodone-acetaminophen, and Lisinopril      Labs:   Recent Labs     01/12/23  1314 01/14/23  0135   WBC 7.5 8.6   HGB 9.3* 7.9*   HCT 29.6* 25.2*   MCV 99.7 99.2    413       Labs:   Recent Labs     01/12/23  1314 01/14/23  0135    134   K 4.5 4.6   CL 99 99   CO2 21* 23   BUN 33* 28*   CREATININE 1.7* 1.6*       Labs: No results for input(s): CKTOTAL, CKMB, CKMBINDEX, TROPONINI in the last 72 hours.    Labs: No results for input(s): BNP in the last 72 hours.    Labs: No results for input(s): CHOL, HDL, TRIG in the last 72 hours.    Invalid input(s): CHOLHDLR, LDLCALCU    Labs:   Recent Labs     01/12/23  1314   PROT 7.1       Review of systems: No reported significant weight gain or weight loss.  no dysuria or frequency, no dizziness, falls or trauma, no change in bowel or bladder habits, no hematochezia, hemoptysis or hematuria.  No fevers, chills, nausea or vomiting reported. No significant wheezing or sputum production.  No headache or visual changes.  The remainder of the 10 review of systems otherwise negative.                Exam      General: Patient comfortable in no distress and  currently denies any chest pain. HEENT: Face symmetrical and no apparent cranial nerve deficit. Neck: No jugular venous distention, carotid bruit or thyromegaly. Lungs: Clear bilaterally without rales, wheezes or dullness. Cardiac: Regular rate and rhythm, no S3, S4, no rub or gallop. Abdomen: No rebound or guarding, no hepatosplenomegaly. Extremities: Without significant clubbing , cyanosis, or edema. Neuro:  No focal motor or sensory deficit apparent. Skin: No petechiae, no significant bruising. Assessment: See plan below        Plan: #1 syncope and likely some degree of labile hypertension and would avoid too aggressively chasing higher blood pressures and would like to see average systolic in the 311V to 425G so at times systolic blood pressure may be 110 or 120 or into the 170s. Holding blood pressure medications and started on midodrine. Hold midodrine if systolic blood pressure over 130. #2 confusion and some mental status change. Today no focal motor or sensory deficit. #3 bladder issues and continues on Flomax. May also has some degree of hypotension or vagal mediated process if bladder becomes significantly full or distended. #4 hypertension allowing blood pressure to run higher avoid hypotension. #5 chronic kidney disease and now creatinine improved down to 1.6 today from 1.7. #6 anemia hemoglobin has trended down from 8.2 then 9.3 and today 7.9. Would avoid nonsteroidals. No melena reported. Today platelet count excellent at 413. Up to chair with meals and increase activity as tolerated.         Electronically signed by Mert Pulliam MD on 1/14/2023 at 12:40 PM

## 2023-01-14 NOTE — DISCHARGE INSTR - COC
Continuity of Care Form    Patient Name: Elif Aaron   :    MRN:  [id number]    Admit date:  2023  Discharge date:  ***    Code Status Order: Full Code   Advance Directives:     Admitting Physician:  Maude Carranza MD  PCP: Ryder Novak MD    Discharging Nurse: [facility]/Room#: 1583/1253-G  Discharging Unit Phone Number: ***    Emergency Contact:   Extended Emergency Contact Information  Primary Emergency Contact: Quin Segal  Address: H[place] [address] [place] [address] Phone: [phone]  Mobile Phone: [phone]  Relation: Spouse  Preferred language: English   needed?  No  Secondary Emergency Contact: Dave Segal  Address: [address] Phone: [phone]  Work Phone: [phone]  Mobile Phone: [phone]  Relation: Child    Past Surgical History:  Past Surgical History:   Procedure Laterality Date    BACK SURGERY      COLONOSCOPY      EYE SURGERY      PAROTIDECTOMY  12    left superficial       Immunization History:   Immunization History   Administered Date(s) Administered    COVID-19, PFIZER PURPLE top, DILUTE for use, (age 15 y+), 30mcg/0.3mL 2021, 2021, 2021       Active Problems:  Patient Active Problem List   Diagnosis Code    Closed fracture of one rib of left side, initial encounter J86.63PC    Complicated UTI (urinary tract infection) N39.0    UTI (urinary tract infection) N39.0    Syncope and collapse R55    Recurrent syncope R55       Isolation/Infection:   Isolation            No Isolation          Patient Infection Status       Infection Onset Added Last Indicated Last Indicated By Review Planned Expiration Resolved Resolved By    None active    Resolved    COVID-19 22 COVID-19, Rapid   23 Paralee Marine    No longer requires droplet plus isolation per cdc guidelines COVID-19 (Rule Out) 12/25/22 12/25/22 12/25/22 COVID-19, Rapid (Ordered)   12/25/22 Rule-Out Test Resulted    COVID-19 (Rule Out) 04/27/21 04/27/21 04/27/21 COVID-19 Ambulatory (Ordered)   04/28/21 Rule-Out Test Resulted            Nurse Assessment:  Last Vital Signs: BP (!) 143/72   Pulse 92   Temp 98.4 °F (36.9 °C) (Axillary)   Resp 16   Ht 5' 11\" (1.803 m)   Wt 209 lb 9.6 oz (95.1 kg)   SpO2 98%   BMI 29.23 kg/m²     Last documented pain score (0-10 scale):    Last Weight:   Wt Readings from Last 1 Encounters:   01/14/23 209 lb 9.6 oz (95.1 kg)     Mental Status:  {IP PT MENTAL STATUS:20030}    IV Access:  { ALEKS IV ACCESS:486619992}    Nursing Mobility/ADLs:  Walking   {CHP DME INMM:679845149}  Transfer  {CHP DME IDXW:609539272}  Bathing  {CHP DME JIGB:751748595}  Dressing  {CHP DME CFMP:858780354}  Toileting  {CHP DME GORC:718849293}  Feeding  {CHP DME XNVL:717414738}  Med Admin  {CHP DME FOCA:976439964}  Med Delivery   { ALEKS MED SHMLZTSM:487846662}    Wound Care Documentation and Therapy:  Incision 12/28/22 Knee Anterior;Right (Active)   Dressing Status Clean 01/13/23 2125   Dressing/Treatment Open to air 01/13/23 2125   Margins Approximated 01/13/23 2125   Incision Assessment Dry 01/13/23 2125   Drainage Amount None 01/13/23 2125   Number of days: 16        Elimination:  Continence:    Bowel: {YES / BW:37627}  Bladder: {YES / UH:34187}  Urinary Catheter: {Urinary Catheter:125370733}   Colostomy/Ileostomy/Ileal Conduit: {YES / UX:13835}       Date of Last BM: ***    Intake/Output Summary (Last 24 hours) at 1/14/2023 0756  Last data filed at 1/14/2023 0513  Gross per 24 hour   Intake --   Output 500 ml   Net -500 ml     I/O last 3 completed shifts:  In: -   Out: 500 [Urine:500]    Safety Concerns:     [address] Safety Concerns:030664872}    Impairments/Disabilities:      [address] Impairments/Disabilities:668689896}    Nutrition Therapy:  Current Nutrition Therapy:   [address] Diet List:330286973}    Routes of Feeding: {CHP DME Other Feedings:030535885}  Liquids: {Slp liquid thickness:07936}  Daily Fluid Restriction: {CHP DME Yes amt example:288897581}  Last Modified Barium Swallow with Video (Video Swallowing Test): {Done Not Done Date:}    Treatments at the Time of Hospital Discharge:   Respiratory Treatments: ***  Oxygen Therapy:  {Therapy; copd oxygen:15553}  Ventilator:    {[facility] Vent List:199515105}    Rehab Therapies: Physical Therapy, Occupational Therapy, and Speech/Language Therapy  Weight Bearing Status/Restrictions: {[facility] Weight Bearin}  Other Medical Equipment (for information only, NOT a DME order):  {EQUIPMENT:268663232}  Other Treatments: ***    Patient's personal belongings (please select all that are sent with patient):  {CHP DME Belongings:313986375}    RN SIGNATURE:  {Esignature:370475902}    CASE MANAGEMENT/SOCIAL WORK SECTION    Inpatient Status Date: ***    Readmission Risk Assessment Score:  Readmission Risk              Risk of Unplanned Readmission:  21           Discharging to Facility/ Agency   [facility]  Address:[address] [place] [address]  Phone:[phone]  Fax:[phone]    Dialysis Facility (if applicable)   Name:  Address:  Dialysis Schedule:  Phone:  Fax:    / signature: {Esignature:261482068}Electronically signed by MICK Kamara on 23 at 7:57 AM EST     PHYSICIAN SECTION    Prognosis: {Prognosis:3875941352}    Condition at Discharge: Stable    Rehab Potential (if transferring to Rehab): {Prognosis:9176411579}    Recommended Labs or Other Treatments After Discharge: ***    Physician Certification: I certify the above information and transfer of Sunny Segal  is necessary for the continuing treatment of the diagnosis listed and that he requires Skilled Nursing Facility for less 30 days.     Update Admission H&P: {CHP DME Changes in HandP:788842212}    PHYSICIAN SIGNATURE:  {Esignature:553608422}

## 2023-01-15 ENCOUNTER — HOSPITAL ENCOUNTER (OUTPATIENT)
Age: 83
Setting detail: OBSERVATION
Discharge: OTHER INSTITUTION | End: 2023-01-16
Attending: INTERNAL MEDICINE | Admitting: INTERNAL MEDICINE
Payer: MEDICARE

## 2023-01-15 VITALS
RESPIRATION RATE: 18 BRPM | OXYGEN SATURATION: 97 % | BODY MASS INDEX: 29.29 KG/M2 | HEART RATE: 93 BPM | SYSTOLIC BLOOD PRESSURE: 145 MMHG | WEIGHT: 209.22 LBS | DIASTOLIC BLOOD PRESSURE: 76 MMHG | HEIGHT: 71 IN | TEMPERATURE: 98.1 F

## 2023-01-15 DIAGNOSIS — R55 SYNCOPE AND COLLAPSE: Primary | ICD-10-CM

## 2023-01-15 DIAGNOSIS — R55 VASOVAGAL SYNCOPE: ICD-10-CM

## 2023-01-15 PROBLEM — N39.0 COMPLICATED UTI (URINARY TRACT INFECTION): Status: RESOLVED | Noted: 2023-01-05 | Resolved: 2023-01-15

## 2023-01-15 PROBLEM — N39.0 UTI (URINARY TRACT INFECTION): Status: RESOLVED | Noted: 2023-01-05 | Resolved: 2023-01-15

## 2023-01-15 PROBLEM — S22.32XA CLOSED FRACTURE OF ONE RIB OF LEFT SIDE, INITIAL ENCOUNTER: Status: RESOLVED | Noted: 2022-12-25 | Resolved: 2023-01-15

## 2023-01-15 LAB
ALBUMIN SERPL-MCNC: 3 G/DL (ref 3.5–5.2)
ALP BLD-CCNC: 87 U/L (ref 40–129)
ALT SERPL-CCNC: 22 U/L (ref 0–40)
ANION GAP SERPL CALCULATED.3IONS-SCNC: 10 MMOL/L (ref 7–16)
AST SERPL-CCNC: 21 U/L (ref 0–39)
BASOPHILS ABSOLUTE: 0.05 E9/L (ref 0–0.2)
BASOPHILS RELATIVE PERCENT: 0.6 % (ref 0–2)
BILIRUB SERPL-MCNC: 0.3 MG/DL (ref 0–1.2)
BUN BLDV-MCNC: 30 MG/DL (ref 6–23)
CALCIUM SERPL-MCNC: 9.1 MG/DL (ref 8.6–10.2)
CHLORIDE BLD-SCNC: 101 MMOL/L (ref 98–107)
CO2: 23 MMOL/L (ref 22–29)
CREAT SERPL-MCNC: 1.9 MG/DL (ref 0.7–1.2)
EOSINOPHILS ABSOLUTE: 0.17 E9/L (ref 0.05–0.5)
EOSINOPHILS RELATIVE PERCENT: 1.9 % (ref 0–6)
GFR SERPL CREATININE-BSD FRML MDRD: 35 ML/MIN/1.73
GLUCOSE BLD-MCNC: 147 MG/DL (ref 74–99)
HCT VFR BLD CALC: 25.5 % (ref 37–54)
HEMOGLOBIN: 7.9 G/DL (ref 12.5–16.5)
IMMATURE GRANULOCYTES #: 0.09 E9/L
IMMATURE GRANULOCYTES %: 1 % (ref 0–5)
LYMPHOCYTES ABSOLUTE: 1.08 E9/L (ref 1.5–4)
LYMPHOCYTES RELATIVE PERCENT: 12.4 % (ref 20–42)
MCH RBC QN AUTO: 30.5 PG (ref 26–35)
MCHC RBC AUTO-ENTMCNC: 31 % (ref 32–34.5)
MCV RBC AUTO: 98.5 FL (ref 80–99.9)
METER GLUCOSE: 146 MG/DL (ref 74–99)
METER GLUCOSE: 166 MG/DL (ref 74–99)
METER GLUCOSE: 193 MG/DL (ref 74–99)
METER GLUCOSE: 224 MG/DL (ref 74–99)
MONOCYTES ABSOLUTE: 0.87 E9/L (ref 0.1–0.95)
MONOCYTES RELATIVE PERCENT: 10 % (ref 2–12)
NEUTROPHILS ABSOLUTE: 6.46 E9/L (ref 1.8–7.3)
NEUTROPHILS RELATIVE PERCENT: 74.1 % (ref 43–80)
OCCULT BLOOD SCREENING: NORMAL
PDW BLD-RTO: 12.7 FL (ref 11.5–15)
PLATELET # BLD: 394 E9/L (ref 130–450)
PMV BLD AUTO: 9.9 FL (ref 7–12)
POTASSIUM REFLEX MAGNESIUM: 4.3 MMOL/L (ref 3.5–5)
POTASSIUM SERPL-SCNC: 4.3 MMOL/L (ref 3.5–5)
RBC # BLD: 2.59 E12/L (ref 3.8–5.8)
SODIUM BLD-SCNC: 134 MMOL/L (ref 132–146)
TOTAL PROTEIN: 6.6 G/DL (ref 6.4–8.3)
WBC # BLD: 8.7 E9/L (ref 4.5–11.5)

## 2023-01-15 PROCEDURE — 6370000000 HC RX 637 (ALT 250 FOR IP): Performed by: INTERNAL MEDICINE

## 2023-01-15 PROCEDURE — 2580000003 HC RX 258: Performed by: INTERNAL MEDICINE

## 2023-01-15 PROCEDURE — G0379 DIRECT REFER HOSPITAL OBSERV: HCPCS

## 2023-01-15 PROCEDURE — 82962 GLUCOSE BLOOD TEST: CPT

## 2023-01-15 PROCEDURE — 96360 HYDRATION IV INFUSION INIT: CPT

## 2023-01-15 PROCEDURE — 85025 COMPLETE CBC W/AUTO DIFF WBC: CPT

## 2023-01-15 PROCEDURE — 80048 BASIC METABOLIC PNL TOTAL CA: CPT

## 2023-01-15 PROCEDURE — 99205 OFFICE O/P NEW HI 60 MIN: CPT | Performed by: INTERNAL MEDICINE

## 2023-01-15 PROCEDURE — 36415 COLL VENOUS BLD VENIPUNCTURE: CPT

## 2023-01-15 PROCEDURE — G0378 HOSPITAL OBSERVATION PER HR: HCPCS

## 2023-01-15 PROCEDURE — 87088 URINE BACTERIA CULTURE: CPT

## 2023-01-15 PROCEDURE — 80053 COMPREHEN METABOLIC PANEL: CPT

## 2023-01-15 PROCEDURE — 96361 HYDRATE IV INFUSION ADD-ON: CPT

## 2023-01-15 RX ORDER — ACETAMINOPHEN 650 MG/1
650 SUPPOSITORY RECTAL EVERY 6 HOURS PRN
Status: DISCONTINUED | OUTPATIENT
Start: 2023-01-15 | End: 2023-01-16 | Stop reason: HOSPADM

## 2023-01-15 RX ORDER — INSULIN LISPRO 100 [IU]/ML
7 INJECTION, SOLUTION INTRAVENOUS; SUBCUTANEOUS
Status: DISCONTINUED | OUTPATIENT
Start: 2023-01-15 | End: 2023-01-16

## 2023-01-15 RX ORDER — ONDANSETRON 2 MG/ML
4 INJECTION INTRAMUSCULAR; INTRAVENOUS EVERY 6 HOURS PRN
Status: DISCONTINUED | OUTPATIENT
Start: 2023-01-15 | End: 2023-01-16 | Stop reason: HOSPADM

## 2023-01-15 RX ORDER — POLYETHYLENE GLYCOL 3350 17 G/17G
17 POWDER, FOR SOLUTION ORAL DAILY PRN
Qty: 527 G | Refills: 1 | Status: ON HOLD | OUTPATIENT
Start: 2023-01-15 | End: 2023-01-16 | Stop reason: HOSPADM

## 2023-01-15 RX ORDER — TAMSULOSIN HYDROCHLORIDE 0.4 MG/1
0.4 CAPSULE ORAL DAILY
Qty: 30 CAPSULE | Refills: 0 | Status: ON HOLD | OUTPATIENT
Start: 2023-01-15 | End: 2023-01-16 | Stop reason: HOSPADM

## 2023-01-15 RX ORDER — INSULIN LISPRO 100 [IU]/ML
0-8 INJECTION, SOLUTION INTRAVENOUS; SUBCUTANEOUS
Status: DISCONTINUED | OUTPATIENT
Start: 2023-01-15 | End: 2023-01-16 | Stop reason: HOSPADM

## 2023-01-15 RX ORDER — SODIUM CHLORIDE 9 MG/ML
INJECTION, SOLUTION INTRAVENOUS PRN
Status: DISCONTINUED | OUTPATIENT
Start: 2023-01-15 | End: 2023-01-16 | Stop reason: HOSPADM

## 2023-01-15 RX ORDER — PANTOPRAZOLE SODIUM 40 MG/1
40 TABLET, DELAYED RELEASE ORAL
Status: DISCONTINUED | OUTPATIENT
Start: 2023-01-16 | End: 2023-01-16 | Stop reason: HOSPADM

## 2023-01-15 RX ORDER — MIDODRINE HYDROCHLORIDE 2.5 MG/1
2.5 TABLET ORAL
Qty: 90 TABLET | Refills: 3 | Status: ON HOLD | OUTPATIENT
Start: 2023-01-15 | End: 2023-01-16 | Stop reason: HOSPADM

## 2023-01-15 RX ORDER — ACETAMINOPHEN 325 MG/1
650 TABLET ORAL EVERY 6 HOURS PRN
Status: DISCONTINUED | OUTPATIENT
Start: 2023-01-15 | End: 2023-01-16 | Stop reason: HOSPADM

## 2023-01-15 RX ORDER — TRAMADOL HYDROCHLORIDE 50 MG/1
50 TABLET ORAL EVERY 4 HOURS PRN
Status: DISCONTINUED | OUTPATIENT
Start: 2023-01-15 | End: 2023-01-16 | Stop reason: HOSPADM

## 2023-01-15 RX ORDER — SODIUM CHLORIDE 0.9 % (FLUSH) 0.9 %
5-40 SYRINGE (ML) INJECTION EVERY 12 HOURS SCHEDULED
Status: DISCONTINUED | OUTPATIENT
Start: 2023-01-15 | End: 2023-01-16 | Stop reason: HOSPADM

## 2023-01-15 RX ORDER — SODIUM CHLORIDE 9 MG/ML
INJECTION, SOLUTION INTRAVENOUS CONTINUOUS
Status: DISCONTINUED | OUTPATIENT
Start: 2023-01-15 | End: 2023-01-15 | Stop reason: HOSPADM

## 2023-01-15 RX ORDER — POLYETHYLENE GLYCOL 3350 17 G/17G
17 POWDER, FOR SOLUTION ORAL DAILY PRN
Status: DISCONTINUED | OUTPATIENT
Start: 2023-01-15 | End: 2023-01-16 | Stop reason: HOSPADM

## 2023-01-15 RX ORDER — MIDODRINE HYDROCHLORIDE 5 MG/1
5 TABLET ORAL
Status: DISCONTINUED | OUTPATIENT
Start: 2023-01-15 | End: 2023-01-16 | Stop reason: HOSPADM

## 2023-01-15 RX ORDER — TAMSULOSIN HYDROCHLORIDE 0.4 MG/1
0.4 CAPSULE ORAL DAILY
Status: DISCONTINUED | OUTPATIENT
Start: 2023-01-16 | End: 2023-01-15

## 2023-01-15 RX ORDER — MIDODRINE HYDROCHLORIDE 2.5 MG/1
2.5 TABLET ORAL
Status: DISCONTINUED | OUTPATIENT
Start: 2023-01-15 | End: 2023-01-15

## 2023-01-15 RX ORDER — DOCUSATE SODIUM 100 MG/1
100 CAPSULE, LIQUID FILLED ORAL 2 TIMES DAILY
Status: DISCONTINUED | OUTPATIENT
Start: 2023-01-15 | End: 2023-01-16 | Stop reason: HOSPADM

## 2023-01-15 RX ORDER — INSULIN LISPRO 100 [IU]/ML
0-4 INJECTION, SOLUTION INTRAVENOUS; SUBCUTANEOUS NIGHTLY
Qty: 10 ML | Refills: 0 | Status: SHIPPED | OUTPATIENT
Start: 2023-01-15

## 2023-01-15 RX ORDER — DEXTROSE MONOHYDRATE 100 MG/ML
INJECTION, SOLUTION INTRAVENOUS CONTINUOUS PRN
Status: DISCONTINUED | OUTPATIENT
Start: 2023-01-15 | End: 2023-01-16 | Stop reason: HOSPADM

## 2023-01-15 RX ORDER — INSULIN LISPRO 100 [IU]/ML
0-4 INJECTION, SOLUTION INTRAVENOUS; SUBCUTANEOUS NIGHTLY
Status: DISCONTINUED | OUTPATIENT
Start: 2023-01-15 | End: 2023-01-16 | Stop reason: HOSPADM

## 2023-01-15 RX ORDER — FERROUS SULFATE 325(65) MG
325 TABLET ORAL 2 TIMES DAILY WITH MEALS
Status: DISCONTINUED | OUTPATIENT
Start: 2023-01-15 | End: 2023-01-16 | Stop reason: HOSPADM

## 2023-01-15 RX ORDER — SODIUM CHLORIDE 9 MG/ML
INJECTION, SOLUTION INTRAVENOUS CONTINUOUS
Status: ACTIVE | OUTPATIENT
Start: 2023-01-15 | End: 2023-01-16

## 2023-01-15 RX ORDER — ONDANSETRON 4 MG/1
4 TABLET, ORALLY DISINTEGRATING ORAL EVERY 8 HOURS PRN
Status: DISCONTINUED | OUTPATIENT
Start: 2023-01-15 | End: 2023-01-16 | Stop reason: HOSPADM

## 2023-01-15 RX ORDER — SODIUM CHLORIDE 0.9 % (FLUSH) 0.9 %
5-40 SYRINGE (ML) INJECTION PRN
Status: DISCONTINUED | OUTPATIENT
Start: 2023-01-15 | End: 2023-01-16 | Stop reason: HOSPADM

## 2023-01-15 RX ADMIN — FERROUS SULFATE TAB 325 MG (65 MG ELEMENTAL FE) 325 MG: 325 (65 FE) TAB at 16:31

## 2023-01-15 RX ADMIN — APIXABAN 5 MG: 5 TABLET, FILM COATED ORAL at 08:51

## 2023-01-15 RX ADMIN — INSULIN LISPRO 7 UNITS: 100 INJECTION, SOLUTION INTRAVENOUS; SUBCUTANEOUS at 13:26

## 2023-01-15 RX ADMIN — SODIUM CHLORIDE: 9 INJECTION, SOLUTION INTRAVENOUS at 21:24

## 2023-01-15 RX ADMIN — DOCUSATE SODIUM 100 MG: 100 CAPSULE, LIQUID FILLED ORAL at 21:16

## 2023-01-15 RX ADMIN — INSULIN LISPRO 2 UNITS: 100 INJECTION, SOLUTION INTRAVENOUS; SUBCUTANEOUS at 13:26

## 2023-01-15 RX ADMIN — FERROUS SULFATE TAB 325 MG (65 MG ELEMENTAL FE) 325 MG: 325 (65 FE) TAB at 08:51

## 2023-01-15 RX ADMIN — METOPROLOL TARTRATE 25 MG: 25 TABLET, FILM COATED ORAL at 08:51

## 2023-01-15 RX ADMIN — MIDODRINE HYDROCHLORIDE 5 MG: 5 TABLET ORAL at 16:31

## 2023-01-15 RX ADMIN — SODIUM CHLORIDE: 9 INJECTION, SOLUTION INTRAVENOUS at 09:06

## 2023-01-15 RX ADMIN — MIDODRINE HYDROCHLORIDE 5 MG: 5 TABLET ORAL at 13:23

## 2023-01-15 RX ADMIN — METOPROLOL TARTRATE 25 MG: 25 TABLET, FILM COATED ORAL at 21:16

## 2023-01-15 RX ADMIN — INSULIN LISPRO 7 UNITS: 100 INJECTION, SOLUTION INTRAVENOUS; SUBCUTANEOUS at 18:08

## 2023-01-15 RX ADMIN — INSULIN HUMAN 25 UNITS: 100 INJECTION, SUSPENSION SUBCUTANEOUS at 16:31

## 2023-01-15 RX ADMIN — SODIUM CHLORIDE: 9 INJECTION, SOLUTION INTRAVENOUS at 11:41

## 2023-01-15 RX ADMIN — INSULIN HUMAN 25 UNITS: 100 INJECTION, SUSPENSION SUBCUTANEOUS at 08:53

## 2023-01-15 RX ADMIN — PANTOPRAZOLE SODIUM 40 MG: 40 TABLET, DELAYED RELEASE ORAL at 05:29

## 2023-01-15 RX ADMIN — SODIUM CHLORIDE, PRESERVATIVE FREE 10 ML: 5 INJECTION INTRAVENOUS at 08:53

## 2023-01-15 RX ADMIN — DOCUSATE SODIUM 100 MG: 100 CAPSULE, LIQUID FILLED ORAL at 08:51

## 2023-01-15 RX ADMIN — TAMSULOSIN HYDROCHLORIDE 0.4 MG: 0.4 CAPSULE ORAL at 08:51

## 2023-01-15 RX ADMIN — APIXABAN 5 MG: 5 TABLET, FILM COATED ORAL at 21:16

## 2023-01-15 ASSESSMENT — PAIN SCALES - GENERAL
PAINLEVEL_OUTOF10: 0
PAINLEVEL_OUTOF10: 0

## 2023-01-15 NOTE — PROGRESS NOTES
Electrophysiology consult called via perfect serve to Dr Baires Later related to recurrent syncopal episodes.

## 2023-01-15 NOTE — PROGRESS NOTES
Family would like patient to discharge to a rehab facility-RN messaged physician to request PT/OT evaluation/treatment.

## 2023-01-15 NOTE — PLAN OF CARE
Problem: ABCDS Injury Assessment  Goal: Absence of physical injury  1/15/2023 0016 by Jay Kang RN  Outcome: Progressing  1/15/2023 0016 by Jay Kang RN  Outcome: Progressing

## 2023-01-15 NOTE — PROGRESS NOTES
Call received from Dr. Shilpi Higuera and Dr. Corona Sanon: pt okay to transfer to [facility] at this time.

## 2023-01-15 NOTE — PROGRESS NOTES
Call placed to Dr. Valdemar Lino answering service re: transfer to [facility]. Awaiting to hear back from Dr. Valdemar Lino re: transfer he is going to speak with Dr. Tasia Tee .

## 2023-01-15 NOTE — PROGRESS NOTES
Call received from Eusebio Torres , Pt assigned Bed 6421 at [facility]. Call report to [phone]. PAS ETA 0930.

## 2023-01-15 NOTE — PROGRESS NOTES
Physician Progress Note      David Tam  [facility] #:                  [id number]  :                       1940  ADMIT DATE:       2023 12:35 PM  [address] DATE:  Quintin Quiver  PROVIDER #:        Abbey Parrish MD        QUERY TEXT:    Stage of Chronic Kidney Disease: Please provide further specificity, if known.     Clinical indicators include: bun, creatinine, bnp, chronic kidney disease  Options provided:  -- Chronic kidney disease stage 1  -- Chronic kidney disease stage 2  -- Chronic kidney disease stage 3  -- Chronic kidney disease stage 3a  -- Chronic kidney disease stage 3b  -- Chronic kidney disease stage 4  -- Chronic kidney disease stage 5  -- Chronic kidney disease stage 5, requiring dialysis  -- End stage renal disease  -- Other - I will add my own diagnosis  -- Disagree - Not applicable / Not valid  -- Disagree - Clinically Unable to determine / Unknown        PROVIDER RESPONSE TEXT:    thx      Electronically signed by:  Abbey Parrish MD 2023 10:10 PM

## 2023-01-15 NOTE — PROGRESS NOTES
Subjective: The patient is awake and alert. No problems overnight. Denies chest pain, angina, and dyspnea. Denies abdominal pain. Tolerating diet. No nausea or vomiting. Objective:    /63   Pulse 92   Temp 97.9 °F (36.6 °C) (Oral)   Resp 16   Ht 5' 11\" (1.803 m)   Wt 209 lb 3.5 oz (94.9 kg)   SpO2 96%   BMI 29.18 kg/m²     Heart:  RRR, no murmurs, gallops, or rubs.   Lungs:  CTA bilaterally, no wheeze, rales or rhonchi  Abd: bowel sounds present, nontender, nondistended, no masses  Extrem:  No clubbing, cyanosis, or edema    CBC with Differential:    Lab Results   Component Value Date/Time    WBC 8.7 01/15/2023 03:20 AM    RBC 2.59 01/15/2023 03:20 AM    HGB 7.9 01/15/2023 03:20 AM    HCT 25.5 01/15/2023 03:20 AM     01/15/2023 03:20 AM    MCV 98.5 01/15/2023 03:20 AM    MCH 30.5 01/15/2023 03:20 AM    MCHC 31.0 01/15/2023 03:20 AM    RDW 12.7 01/15/2023 03:20 AM    NRBC 0.0 12/25/2022 06:55 PM    LYMPHOPCT 12.4 01/15/2023 03:20 AM    PROMYELOPCT 0.9 12/25/2022 06:55 PM    MONOPCT 10.0 01/15/2023 03:20 AM    MYELOPCT 1.8 12/25/2022 02:01 PM    BASOPCT 0.6 01/15/2023 03:20 AM    MONOSABS 0.87 01/15/2023 03:20 AM    LYMPHSABS 1.08 01/15/2023 03:20 AM    EOSABS 0.17 01/15/2023 03:20 AM    BASOSABS 0.05 01/15/2023 03:20 AM     CMP:    Lab Results   Component Value Date/Time     01/15/2023 03:20 AM    K 4.3 01/15/2023 03:20 AM    K 4.3 01/15/2023 03:20 AM     01/15/2023 03:20 AM    CO2 23 01/15/2023 03:20 AM    BUN 30 01/15/2023 03:20 AM    CREATININE 1.9 01/15/2023 03:20 AM    GFRAA 51 02/11/2020 08:00 AM    LABGLOM 35 01/15/2023 03:20 AM    GLUCOSE 147 01/15/2023 03:20 AM    PROT 6.6 01/15/2023 03:20 AM    LABALBU 3.0 01/15/2023 03:20 AM    CALCIUM 9.1 01/15/2023 03:20 AM    BILITOT 0.3 01/15/2023 03:20 AM    ALKPHOS 87 01/15/2023 03:20 AM    AST 21 01/15/2023 03:20 AM    ALT 22 01/15/2023 03:20 AM     PT/INR:    Lab Results   Component Value Date/Time    PROTIME 12.7 01/05/2023 12:56 PM    INR 1.1 01/05/2023 12:56 PM     @cktotal:3,ckmb:3,ckmbindex:3,troponini:3@  U/A:    Lab Results   Component Value Date/Time    NITRU Negative 01/05/2023 12:55 PM    COLORU Yellow 01/05/2023 12:55 PM    PHUR 6.0 01/05/2023 12:55 PM    WBCUA >20 01/05/2023 12:55 PM    RBCUA 0-1 01/05/2023 12:55 PM    BACTERIA MANY 01/05/2023 12:55 PM    CLARITYU SL CLOUDY 01/05/2023 12:55 PM    SPECGRAV >=1.030 01/05/2023 12:55 PM    UROBILINOGEN 0.2 01/05/2023 12:55 PM    BILIRUBINUR Negative 01/05/2023 12:55 PM    BLOODU SMALL 01/05/2023 12:55 PM    GLUCOSEU Negative 01/05/2023 12:55 PM    KETUA 15 01/05/2023 12:55 PM        Assessment:    Principal Problem:    Syncope and collapse  Active Problems:    Recurrent syncope  Resolved Problems:    * No resolved hospital problems.  *    Plan:    Recurrent syncope-  Had another episode yesterday  Called monitor and reviewed  One lead off much artifact-some bradycardia to ?50's    CKD stage 3b-  Stable    Encephalopathy-  resolved    Anemia  Likely acute blood loss from draws ect  Check iron studies  Will ask cardiology if they think volume expansion with recurrent syncope        Darrion Wheeler, DO  6:20 AM  1/15/2023

## 2023-01-15 NOTE — PROGRESS NOTES
[facility] CARDIOLOGY PROGRESS NOTE  [facility]        Subjective: Had an episode yesterday while on the bedside commode of becoming pale and unresponsive I personally talked to the nurse that witnessed this event and he did not seem to be breathing very well. Was on telemetry I personally reviewed the strips when this occurred and it shows sinus bradycardia heart rate in the low 50s. No documented AV block or long pause on telemetry. He was admitted with a few episodes of similar syncope over the last 2 weeks occurring approximately 5 or 7 times. Echocardiogram December 2022 normal LVEF with mild concentric left ventricular hypertrophy and no significant valvular stenosis or regurgitation. He is not a reliable historian as he has poor memory. Does not really recall what happened yesterday so I cannot elicit any prodromal symptoms from him. He also denies any shortness of breath currently or chest pain. Objective:    Patient Vitals for the past 24 hrs:   BP Temp Temp src Pulse Resp SpO2 Weight   01/15/23 0700 (!) 145/76 98.1 °F (36.7 °C) Axillary 93 18 97 % --   01/15/23 0451 -- -- -- -- -- -- 209 lb 3.5 oz (94.9 kg)   01/15/23 0330 -- 97.9 °F (36.6 °C) Oral -- 16 96 % --   01/14/23 2028 132/63 98.1 °F (36.7 °C) Oral 92 18 97 % --   01/14/23 1625 139/65 98.1 °F (36.7 °C) Oral (!) 101 16 -- --   01/14/23 1444 (!) 161/71 -- -- 100 -- 97 % --   01/14/23 1440 (!) 165/74 -- -- 99 16 97 % --   01/14/23 1435 (!) 101/56 -- -- (!) 101 14 97 % --   01/14/23 1125 132/82 -- -- 76 18 -- --         Intake/Output Summary (Last 24 hours) at 1/15/2023 0949  Last data filed at 1/15/2023 0853  Gross per 24 hour   Intake 10 ml   Output 650 ml   Net -640 ml       Wt Readings from Last 3 Encounters:   01/15/23 209 lb 3.5 oz (94.9 kg)   01/11/23 214 lb 11.2 oz (97.4 kg)   12/30/22 225 lb (102.1 kg)       Telemetry: I personally reviewed and shows normal sinus rhythm bradycardic at times.   Heart rate in the mid 50s when asleep otherwise heart rate in the 60s and 70s. No indication of AV block or pause. Current meds: Scheduled Meds:   apixaban  5 mg Oral BID    midodrine  2.5 mg Oral TID WC    docusate sodium  100 mg Oral BID    ferrous sulfate  325 mg Oral BID WC    insulin lispro  0-4 Units SubCUTAneous Nightly    insulin lispro  0-8 Units SubCUTAneous TID WC    insulin NPH  25 Units SubCUTAneous QAM    insulin NPH  25 Units SubCUTAneous Daily before dinner    metoprolol tartrate  25 mg Oral BID    pantoprazole  40 mg Oral QAM AC    sodium chloride flush  5-40 mL IntraVENous 2 times per day    tamsulosin  0.4 mg Oral Daily     Continuous Infusions:   sodium chloride 100 mL/hr at 01/15/23 0906    sodium chloride      dextrose       PRN Meds:.acetaminophen **OR** acetaminophen, sodium chloride, dextrose, dextrose bolus **OR** dextrose bolus, glucagon (rDNA), glucose, ondansetron **OR** ondansetron, polyethylene glycol, sodium chloride flush, traMADol    Allergies: Amlodipine, Codeine, Darvocet [propoxyphene n-acetaminophen], Hydrocodone-acetaminophen, and Lisinopril      Labs:   Recent Labs     01/12/23  1314 01/14/23  0135 01/15/23  0320   WBC 7.5 8.6 8.7   HGB 9.3* 7.9* 7.9*   HCT 29.6* 25.2* 25.5*   MCV 99.7 99.2 98.5    413 394       Labs:   Recent Labs     01/12/23  1314 01/14/23  0135 01/15/23  0320    134 134   K 4.5 4.6 4.3  4.3   CL 99 99 101   CO2 21* 23 23   BUN 33* 28* 30*   CREATININE 1.7* 1.6* 1.9*       Labs: No results for input(s): CKTOTAL, CKMB, CKMBINDEX, TROPONINI in the last 72 hours. Labs: No results for input(s): BNP in the last 72 hours. Labs: No results for input(s): CHOL, HDL, TRIG in the last 72 hours. Invalid input(s): Briana Yoon    Labs:   Recent Labs     01/12/23  1314 01/15/23  0320   PROT 7.1 6.6       Review of systems: No reported significant weight gain or weight loss.   no dysuria or frequency, no dizziness, falls or trauma, no change in bowel or bladder habits, no hematochezia, hemoptysis or hematuria. No fevers, chills, nausea or vomiting reported. No significant wheezing or sputum production. No headache or visual changes. The remainder of the 10 review of systems otherwise negative. Exam      General: Patient comfortable in no distress and currently denies any chest pain. HEENT: Face symmetrical and no apparent cranial nerve deficit. Neck: No jugular venous distention, carotid bruit or thyromegaly. Lungs: Clear bilaterally without rales, wheezes or dullness. Cardiac: Regular rate and rhythm, no S3, S4, no rub or gallop. Abdomen: No rebound or guarding, no hepatosplenomegaly. Extremities: Without significant clubbing , cyanosis, or edema. Neuro:  No focal motor or sensory deficit apparent. Skin: No petechiae, no significant bruising. Assessment: See plan below        Plan: #1 syncope on telemetry without documented arrhythmia, no AV block or pause. Potentially vagal mediated as he was trying to have a bowel movement when this occurred on bedside commode. To be transferred downtown for further electrophysiology evaluation and possible tilt table test.    No indication for pacemaker as I do not think this is an electrical issue and more likely vagal mediated and/or hypotension related syncopal episodes. Avoid dehydration. Avoid hypotension, avoid medications that could predispose to progressive bradycardia. #2 hypertension and again avoiding any medications that would lower blood pressure too aggressively or slow heart rate too much. #3 chronic kidney disease and creatinine 1.9 BUN of 30 so I started IV fluid normal saline 100 cc an hour for 2 L.     #4 anemia hemoglobin 7.9 today and yesterday, heme positive stool and has been on Eliquis 10 mg twice a day for the last 10 days as was found to have a right lower extremity DVT and will reduce the Eliquis to 5 mg twice a day as today is the 10th or 11th day of the higher dose Eliquis. Would like to limit and reduce bleeding risk and or progressive anemia. #5 poor memory and likely some degree of dementia. #6 urinary retention issues and certainly increased risk of potential micturition related syncope.             Electronically signed by Feliciano Lee MD on 1/15/2023 at 9:49 AM

## 2023-01-15 NOTE — CONSULTS
[facility] and [facility] Electrophysiology  Consultation Report  PATIENT: Talia Tate  MEDICAL RECORD NUMBER: [id number]  DATE OF SERVICE:  1/15/2023  ATTENDING ELECTROPHYSIOLOGIST: Genet Antunez MD  PRIMARY ELECTROPHYSIOLOGIST: Genet Antunez MD  REFERRING PHYSICIAN: Rafy Archuleta DO and Kay Storey MD  CHIEF COMPLAINT: Syncope    HPI: This is a 80 y.o. male with a history of   Patient Active Problem List   Diagnosis    Syncope and collapse    Recurrent syncope   who presented to the hospital complaining of recurrent syncopal episodes. The patient has history of hypertension, hyperlipidemia, diabetes mellitus, CKD, dementia, right lower extremity DVT and right TKA on 12/9/22. The patient was noted to have syncopal episode on 12/25/22 after he was discharged to [facility] for rehabilitation after underwent right TKA on 12/19/22. Per note, he was help to the bathroom hen he had syncopal episode following large episode of diarrhea. Nurse performed CPR  briefly before he regained consciousness. He was noted to have blood sugar of 66 mg/dL during that episode. He was admitted to the hospital and seen by Cardiology who diagnosed with vasovagal syncope. He was also noted to be anemic with Hb/Hct of 8.7/26. 6. and found to have 3 anterior rib fracture likely secondary to CPR. Flomax was held. He presented to the hospital on 1/5/23 due to changed mental status and was noted to have UTI. During admission he was on the bedpan having a bowel movement and briefly passed out. He woke up and vomitted. Other work up showed R proximal profunda femoris DVT. He again was seen by Cardiology and was diagnosed with vasovagal syncope. He presented on the hospital [facility]12[facility]23 [facility] due to syncopal episode. Per note, \" he was supposed to take his medication with applesauce and apparently vomited up his medication\". He was seen by Cardiology and Losartan was discontinued.  He was started on Midodrine 2.5 mg tid. On 1/14/23 while he was on bedside commode,he became pale and unresponsive. Tlemetry was reviewd by Cradiology and showed sinus bradycardia at 50's bpm and no evidence of AV paul or pause. Echocardiogram on 12/26/22 showed normal LV EF and no valvular heart disease. He was transferred to [facility] ([facility]) for further management. Cardiac electrophysiology service is consulted for recurrent syncope. The patient is spoor historian and can not give out any details information.     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    Diabetes mellitus ([facility] [address]     Diverticulosis     mild    GERD (gastroesophageal reflux disease)     Hyperlipidemia     Hypertension        Family History   Problem Relation Age of Onset    Cancer Father         prostate    Cancer Brother         prostate       Social History     Tobacco Use    Smoking status: Never    Smokeless tobacco: Never    Tobacco comments:     pipe  40 years ago  (smoked once daily)   Substance Use Topics    Alcohol use: No       Current Facility-Administered Medications   Medication Dose Route Frequency Provider Last Rate Last Admin    docusate sodium (COLACE) capsule 100 mg  100 mg Oral BID Deonna Bailey MD        ferrous sulfate (IRON 325) tablet 325 mg  325 mg Oral BID DUANE Bailey MD        insulin lispro (HUMALOG) injection vial 0-4 Units  0-4 Units SubCUTAneous Nightly Deonna Bailey MD        insulin lispro (HUMALOG) injection vial 0-8 Units  0-8 Units SubCUTAneous TID DUANE Bailey MD        [START ON 1/16/2023] insulin NPH (HumuLIN N;NovoLIN N) injection vial 25 Units  25 Units SubCUTAneous QAM Deonna Bailey MD        insulin NPH (HumuLIN N;NovoLIN N) injection vial 25 Units  25 Units SubCUTAneous Daily before dinner Rodrigo Chawla MD        metoprolol tartrate (LOPRESSOR) tablet 25 mg  25 mg Oral BID Rodrigo Chawla MD        [START ON 1/16/2023] pantoprazole (PROTONIX) tablet 40 mg  40 mg Oral QAM AC Rodrigo Chawla MD        sodium chloride flush 0.9 % injection 5-40 mL  5-40 mL IntraVENous 2 times per day Rodrigo Chawla MD        [START ON 1/16/2023] tamsulosin (FLOMAX) capsule 0.4 mg  0.4 mg Oral Daily Rodrigo Chawla MD        acetaminophen (TYLENOL) tablet 650 mg  650 mg Oral Q6H PRN Rodrigo Chawla MD        Or    acetaminophen (TYLENOL) suppository 650 mg  650 mg Rectal Q6H PRN Rodrigo Chawla MD        0.9 % sodium chloride infusion   IntraVENous PRN Rodrigo Chawla MD        dextrose 10 % infusion   IntraVENous Continuous PRN Rodrigo Chawla MD        dextrose bolus 10% 125 mL  125 mL IntraVENous PRN Rodrigo Chawla MD        Or    dextrose bolus 10% 250 mL  250 mL IntraVENous PRN Rodrigo Chawla MD        glucagon (rDNA) injection 1 mg  1 mg SubCUTAneous PRN Rodrigo Chawla MD        glucose chewable tablet 16 g  4 tablet Oral PRN Rodrigo Chawla MD        ondansetron (ZOFRAN-ODT) disintegrating tablet 4 mg  4 mg Oral Q8H PRN Rodrigo Chawla MD        Or    ondansetron [facility]) injection 4 mg  4 mg IntraVENous Q6H PRN Rodrigo Chawla MD        polyethylene glycol (GLYCOLAX) packet 17 g  17 g Oral Daily PRN Rodrigo Chawla MD        sodium chloride flush 0.9 % injection 5-40 mL  5-40 mL IntraVENous PRN Rodrigo Chawla MD        traMADol Yusuf Hue) tablet 50 mg  50 mg Oral Q4H PRN Rodrigo Chawla MD        apixaban Ranell Milling) tablet 5 mg  5 mg Oral BID Rodrigo Chawla MD        0.9 % sodium chloride infusion   IntraVENous Continuous Rodrigo Chawla  mL/hr at 01/15/23 1141 New Bag at 01/15/23 1141    insulin lispro (HUMALOG) injection vial 7 Units  7 Units SubCUTAneous TID  Ezequiel Yeung Geoffery Gottron, MD        midodrine (PROAMATINE) tablet 5 mg  5 mg Oral TID  Phillip Blue MD            Allergies   Allergen Reactions    Amlodipine Other (See Comments)     UNKNOWN REACTION AS OF 01/13/2023    Codeine Other (See Comments)     UNKNOWN REACTION AS OF 01/13/2023    Darvocet [Propoxyphene N-Acetaminophen] Nausea And Vomiting    Hydrocodone-Acetaminophen Nausea And Vomiting    Lisinopril Palpitations and Cough       ROS: Unreliable  Constitutional: Negative for fever. Positive for activity change and appetite change. HENT: Negative for epistaxis. Eyes: Negative for diploplia, blurred vision. Respiratory: Negative for cough, chest tightness, shortness of breath and wheezing. Cardiovascular: pertinent positives in HPI  Gastrointestinal: Negative for abdominal pain and blood in stool. All other review of systems are negative     PHYSICAL EXAM:   Vitals:    01/15/23 1100   BP: 101/87   Pulse: 84   Resp: 17   Temp: 98.1 °F (36.7 °C)   TempSrc: Axillary   SpO2: 99%   Weight: 209 lb (94.8 kg)   Height: 5' 11\" (1.803 m)      Constitutional: Well-developed, no acute distress  Eyes: conjunctivae normal, no xanthelasma   Ears, Nose, Throat: oral mucosa moist, no cyanosis   CV: no JVD. Regular rate and rhythm. Normal S1S2 and no S3. No murmurs, rubs, or gallops.  PMI is nondisplaced  Lungs: clear to auscultation bilaterally, normal respiratory effort without used of accessory muscles  Abdomen: soft, non-tender, bowel sounds present, no masses or hepatomegaly   Musculoskeletal: no digital clubbing, trace edema of LE  Skin: warm, no rashes     I have personally reviewed the laboratory, cardiac diagnostic and radiographic testing as outlined below:    Data:    Recent Labs     01/12/23  1314 01/14/23  0135 01/15/23  0320   WBC 7.5 8.6 8.7   HGB 9.3* 7.9* 7.9*   HCT 29.6* 25.2* 25.5*    413 394     Recent Labs     01/12/23  1314 01/14/23  0135 01/15/23  0320    134 134   K 4.5 4.6 4.3  4.3   CL 99 99 101   CO2 21* 23 23   BUN 33* 28* 30*   CREATININE 1.7* 1.6* 1.9*   CALCIUM 9.3 9.0 9.1      Lab Results   Component Value Date/Time    MG 1.6 12/30/2022 06:16 AM     No results for input(s): TSH in the last 72 hours. No results for input(s): INR in the last 72 hours. CXR 1/12/23:   FINDINGS:   Poor inspiratory effort limits evaluation. The cardiomediastinal silhouette is without acute process. The lungs are without acute focal process. There is no effusion or   pneumothorax. The osseous structures are without acute process. Impression   No evidence of acute cardiopulmonary disease is seen. Telemetry 01/15/23 : NSR, no tachy or christian arrhythmias. EKG 1/12/23:  bpm, possible old IMI, Qtc 459 ms. Please see scan in Cardiology. Echocardiogram 12/26/22: Findings      Left Ventricle   Left ventricular size is grossly normal.   Mild left ventricular concentric hypertrophy noted. Ejection fraction is visually estimated at 65%. Right Ventricle   Normal right ventricle structure and function. Left Atrium   Normal sized left atrium. Right Atrium   Normal right atrium. Mitral Valve   Normal mitral valve structure and function. Tricuspid Valve   Normal tricuspid valve structure and function. Aortic Valve   Aortic valve opens well. No evidence of aortic valve regurgitation. Pulmonic Valve   The pulmonic valve was not well visualized. Pericardial Effusion   No evidence for hemodynamically significant pericardial effusion. Aorta   Normal aortic root and ascending aorta. Conclusions      Summary   Left ventricular size is grossly normal.   Mild left ventricular concentric hypertrophy noted. Ejection fraction is visually estimated at 65%. Normal right ventricle structure and function.       Signature      ----------------------------------------------------------------   Electronically signed by Richardine Severe MD(Interpreting   physician) on 12/26/2022 04:16 PM   ----------------------------------------------------------------    I have independently reviewed all of the ECGs and rhythm strips per above     Assessment/Plan: This is a 80 y.o. male with a history of   Patient Active Problem List   Diagnosis    Syncope and collapse    Recurrent syncope    who presents with recurrent syncope. 1. Recurrent syncope  - Based on history and physical this is most consistent with vasovagal syncope and/or orthostatic hypotension  - There are no clear red flag symptoms consistent with cardiac syncope. - EKG has not shown any other clear arrhythmogenic cause (WPW, HOCM, LQT, Brugada, ARVC)  - Other cardiac workup thus far has shown normal echocardiogram and only sinus bradycardia noted during episode on telemetry. - Removal of any medications which can exacerbate dizziness were also discussed and I have recommended evaluating the possibility of eliminating/finding an alternative for Flomax if ok with her prescribing HCP  - Advised life style modifications as below     - Make all postural changes from lying to sitting or sitting to standing slowly. - Drink to 2.0 -2.5 L of fluids per day. - Increase sodium in the diet to 3 - 5 g per day. - Avoid large meals which can cause low blood pressure during digestion.     - Avoid alcohol and excessive caffeine intake. - Perform lower extremity exercises to improve strength of the leg muscles. - Use physical counter maneuvers such as leg crossing, or leg raising and resting the leg on a chair.      - Raise the head of the bed by 6 to 10 inches. - Use custom fitted elastic support stockings. - Currently on Midodrine     2. Hypertension  - On Lopressor    3. Hyperlipidemia    4. Diabetes mellitus  - On Insulin    5. CKD  Estimated Creatinine Clearance: 35 mL/min (A) (based on SCr of 1.9 mg/dL (H)). 6. Dementia    7.  History of right lower extremity DVT   - On Eliquis    8. History of right TKA   - On 12/9/22. Recommendations:  Discontinue Flomax and find alternatives. Consider PRBC transfusion to keep Hb > 10 to increase oxygen blood flow. Consider place high thigh compression stocking especially when he is out of bed. Agree with increase Midodrine dose and can add Florinef if needed. Continue other life style modifications as above. I do not believe that he would benefit from tilt table testing with detail history and telemetry evidence that we currently have during the syncopal episode. He is also unlikely to tolerate the procedure with his recent knee surgery. Consider 14 day cardiac monitor at the time of discharge. EP will sign off. Please call for any questions or concerns. I have spent a total of 75 minutes with the patient and the family reviewing the above stated recommendations. And a total of >50% of that time involved face-to-face time providing counseling and or coordination of care with the other providers, reviewing records/tests, counseling/education of the patient, ordering medications/tests/procedures, coordinating care, and documenting clinical information in the EHR. Thank you for allowing me to participate in your patient's care. Please call me if there are any questions or concerns.       Fuentes Fragoso MD  Cardiac Electrophysiology  [address] [place] [address]  [facility]  12:39 PM  1/15/2023

## 2023-01-15 NOTE — FLOWSHEET NOTE
Orthos performed with staff holding patient upright as follows   01/15/23 0330   Vital Signs   Blood Pressure Lying 126/61   Pulse Lying 77 PER MINUTE   Blood Pressure Sitting 134/62   Pulse Sitting 89 PER MINUTE   Blood Pressure Standing 134/63   Pulse Standing 94 PER MINUTE

## 2023-01-15 NOTE — H&P
CHIEF COMPLAINT:  passing out      HISTORY OF PRESENT ILLNESS:      The patient is a 80 y.o. male patient with recurrent syncope. He is poor historian and does not add much. He has passed out over 6 times since Laura. Patient denies any prodrome. He says he does not feel anything. No lightheadedness when standing. Patient had suspected vagal episode while on the bedside commode at [facility]. This was witnessed by cardiology. Patient is transferred to [address] [place] [address] for electrophysiology consult. No chest pain or shortness of breath. No vomiting diarrhea. No fevers or chills. No cough or trouble breathing. No exacerbation relief.     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    Diabetes mellitus ([facility] [address]     Diverticulosis     mild    GERD (gastroesophageal reflux disease)     Hyperlipidemia     Hypertension        Past Surgical History:    Past Surgical History:   Procedure Laterality Date    BACK SURGERY      COLONOSCOPY      EYE SURGERY      PAROTIDECTOMY  9/11/12    left superficial       Medications Prior to Admission:    Medications Prior to Admission: glucose 4 g chewable tablet, Take 4 tablets by mouth as needed for Low blood sugar  insulin lispro (HUMALOG) 100 UNIT/ML SOLN injection vial, Inject 0-4 Units into the skin nightly  insulin NPH (HUMULIN N;NOVOLIN N) 100 UNIT/ML injection vial, Inject 25 Units into the skin every morning  insulin NPH (HUMULIN N;NOVOLIN N) 100 UNIT/ML injection vial, Inject 25 Units into the skin daily (before dinner)  midodrine (PROAMATINE) 2.5 MG tablet, Take 1 tablet by mouth 3 times daily (with meals)  polyethylene glycol (GLYCOLAX) 17 g packet, Take 17 g by mouth daily as needed for Constipation  tamsulosin (FLOMAX) 0.4 MG capsule, Take 1 capsule by mouth daily  insulin lispro, 1 Unit Dial, (HUMALOG/ADMELOG) 100 UNIT/ML SOPN, Inject 0-8 Units into the skin 3 times daily PER SLIDING SCALE: 0-90=0U, =5U, 141-200=6U, 201-260=7U, 261+=8U & CALL   apixaban (ELIQUIS) 5 MG TABS tablet, Take 1 tablet by mouth 2 times daily  ferrous sulfate (IRON 325) 325 (65 Fe) MG tablet, Take 1 tablet by mouth 2 times daily (with meals)  docusate sodium (COLACE, DULCOLAX) 100 MG CAPS, Take 100 mg by mouth 2 times daily  tamsulosin (FLOMAX) 0.4 MG capsule, Take 1 capsule by mouth daily  aspirin 81 MG chewable tablet, Take 1 tablet by mouth daily  losartan (COZAAR) 100 MG tablet, Take 100 mg by mouth daily. omeprazole 20 MG EC tablet, Take 20 mg by mouth daily. metoprolol (LOPRESSOR) 25 MG tablet, Take 25 mg by mouth 2 times daily Indications: HOLD FOR SBP<100 AND/OR HR<60  acetaminophen (TYLENOL) 500 MG tablet, Take 500 mg by mouth every 6 hours as needed for Pain    Allergies:    Amlodipine, Codeine, Darvocet [propoxyphene n-acetaminophen], Hydrocodone-acetaminophen, and Lisinopril    Social History:    reports that he has never smoked. He has never used smokeless tobacco. He reports that he does not drink alcohol and does not use drugs. Family History:   family history includes Cancer in his brother and father. REVIEW OF SYSTEMS:  As above in the HPI, otherwise negative    PHYSICAL EXAM:    Vitals:  /87   Pulse 84   Temp 98.1 °F (36.7 °C) (Axillary)   Resp 17   SpO2 99%     General:  Awake, alert, oriented X 3. Well developed, well nourished, well groomed. No apparent distress. HEENT:  Normocephalic, atraumatic. Pupils equal, round, reactive to light. No scleral icterus. No conjunctival injection. Normal lips, teeth, and gums. No nasal discharge. Neck:  Supple  Heart:  RRR, no murmurs, gallops, rubs  Lungs:  CTA bilaterally, bilat symmetrical expansion, no wheeze, rales, or rhonchi  Abdomen:   Bowel sounds present, soft, nontender, no masses, no organomegaly, no peritoneal signs  Extremities:  No clubbing, cyanosis, or edema  Skin:  Warm and dry, no open lesions or rash  Neuro:  Cranial nerves 2-12 intact, no focal deficits  Breast: deferred  Rectal: deferred  Genitalia:  deferred    LABS:  CBC with Differential:    Lab Results   Component Value Date/Time    WBC 8.7 01/15/2023 03:20 AM    RBC 2.59 01/15/2023 03:20 AM    HGB 7.9 01/15/2023 03:20 AM    HCT 25.5 01/15/2023 03:20 AM     01/15/2023 03:20 AM    MCV 98.5 01/15/2023 03:20 AM    MCH 30.5 01/15/2023 03:20 AM    MCHC 31.0 01/15/2023 03:20 AM    RDW 12.7 01/15/2023 03:20 AM    NRBC 0.0 12/25/2022 06:55 PM    LYMPHOPCT 12.4 01/15/2023 03:20 AM    PROMYELOPCT 0.9 12/25/2022 06:55 PM    MONOPCT 10.0 01/15/2023 03:20 AM    MYELOPCT 1.8 12/25/2022 02:01 PM    BASOPCT 0.6 01/15/2023 03:20 AM    MONOSABS 0.87 01/15/2023 03:20 AM    LYMPHSABS 1.08 01/15/2023 03:20 AM    EOSABS 0.17 01/15/2023 03:20 AM    BASOSABS 0.05 01/15/2023 03:20 AM     CMP:    Lab Results   Component Value Date/Time     01/15/2023 03:20 AM    K 4.3 01/15/2023 03:20 AM    K 4.3 01/15/2023 03:20 AM     01/15/2023 03:20 AM    CO2 23 01/15/2023 03:20 AM    BUN 30 01/15/2023 03:20 AM    CREATININE 1.9 01/15/2023 03:20 AM    GFRAA 51 02/11/2020 08:00 AM    LABGLOM 35 01/15/2023 03:20 AM    GLUCOSE 147 01/15/2023 03:20 AM    PROT 6.6 01/15/2023 03:20 AM    LABALBU 3.0 01/15/2023 03:20 AM    CALCIUM 9.1 01/15/2023 03:20 AM    BILITOT 0.3 01/15/2023 03:20 AM    ALKPHOS 87 01/15/2023 03:20 AM    AST 21 01/15/2023 03:20 AM    ALT 22 01/15/2023 03:20 AM     PT/INR:    Lab Results   Component Value Date/Time    PROTIME 12.7 01/05/2023 12:56 PM    INR 1.1 01/05/2023 12:56 PM     @cktotal:3,ckmb:3,ckmbindex:3,troponini:3@  U/A:    Lab Results   Component Value Date/Time    NITRU Negative 01/05/2023 12:55 PM    COLORU Yellow 01/05/2023 12:55 PM    PHUR 6.0 01/05/2023 12:55 PM    WBCUA >20 01/05/2023 12:55 PM    RBCUA 0-1 01/05/2023 12:55 PM    BACTERIA MANY 01/05/2023 12:55 PM    CLARITYU SL CLOUDY 01/05/2023 12:55 PM    SPECGRAV >=1.030 01/05/2023 12:55 PM    UROBILINOGEN 0.2 01/05/2023 12:55 PM    BILIRUBINUR Negative 01/05/2023 12:55 PM    BLOODU SMALL 01/05/2023 12:55 PM    GLUCOSEU Negative 01/05/2023 12:55 PM    Vania Bernheim 15 01/05/2023 12:55 PM          ASSESSMENT:      Principal Problem:    Recurrent syncope  Resolved Problems:    * No resolved hospital problems.  *      PLAN:    Syncope  Had episode on telemetry on bedside commode with sinus bradycardia no pauses or AV block  Increase midodrine  Holding losartan  Avoid negative chronotropic hypotensive agents  Cardiology consult-discussed case  Electrophysiology consult    Acute urinary retention  Catheter and Flomax if able    DM2 with uncontrolled blood sugars    CKD 3  Baseline creatinine 1.6-1.8  IV fluids    History of DVT-  Continue Eliquis    PT/OT  DVT PPx  DC planning     Antoinette Bronson MD  11:28 AM  1/15/2023

## 2023-01-16 VITALS
DIASTOLIC BLOOD PRESSURE: 76 MMHG | RESPIRATION RATE: 16 BRPM | BODY MASS INDEX: 29.26 KG/M2 | TEMPERATURE: 98.4 F | WEIGHT: 209 LBS | HEIGHT: 71 IN | OXYGEN SATURATION: 95 % | HEART RATE: 79 BPM | SYSTOLIC BLOOD PRESSURE: 178 MMHG

## 2023-01-16 LAB
ANION GAP SERPL CALCULATED.3IONS-SCNC: 14 MMOL/L (ref 7–16)
BASOPHILS ABSOLUTE: 0.05 E9/L (ref 0–0.2)
BASOPHILS RELATIVE PERCENT: 0.5 % (ref 0–2)
BUN BLDV-MCNC: 26 MG/DL (ref 6–23)
CALCIUM SERPL-MCNC: 8.6 MG/DL (ref 8.6–10.2)
CHLORIDE BLD-SCNC: 104 MMOL/L (ref 98–107)
CO2: 19 MMOL/L (ref 22–29)
CREAT SERPL-MCNC: 1.8 MG/DL (ref 0.7–1.2)
EOSINOPHILS ABSOLUTE: 0.21 E9/L (ref 0.05–0.5)
EOSINOPHILS RELATIVE PERCENT: 2.2 % (ref 0–6)
FERRITIN: 269 NG/ML
GFR SERPL CREATININE-BSD FRML MDRD: 37 ML/MIN/1.73
GLUCOSE BLD-MCNC: 84 MG/DL (ref 74–99)
HCT VFR BLD CALC: 24.1 % (ref 37–54)
HEMOGLOBIN: 7.8 G/DL (ref 12.5–16.5)
IMMATURE GRANULOCYTES #: 0.12 E9/L
IMMATURE GRANULOCYTES %: 1.2 % (ref 0–5)
IRON SATURATION: 25 % (ref 20–55)
IRON: 46 MCG/DL (ref 59–158)
LYMPHOCYTES ABSOLUTE: 1.33 E9/L (ref 1.5–4)
LYMPHOCYTES RELATIVE PERCENT: 13.7 % (ref 20–42)
MCH RBC QN AUTO: 32 PG (ref 26–35)
MCHC RBC AUTO-ENTMCNC: 32.4 % (ref 32–34.5)
MCV RBC AUTO: 98.8 FL (ref 80–99.9)
METER GLUCOSE: 161 MG/DL (ref 74–99)
METER GLUCOSE: 64 MG/DL (ref 74–99)
METER GLUCOSE: 91 MG/DL (ref 74–99)
METER GLUCOSE: 93 MG/DL (ref 74–99)
MONOCYTES ABSOLUTE: 1.07 E9/L (ref 0.1–0.95)
MONOCYTES RELATIVE PERCENT: 11 % (ref 2–12)
NEUTROPHILS ABSOLUTE: 6.91 E9/L (ref 1.8–7.3)
NEUTROPHILS RELATIVE PERCENT: 71.4 % (ref 43–80)
PDW BLD-RTO: 12.8 FL (ref 11.5–15)
PLATELET # BLD: 349 E9/L (ref 130–450)
PMV BLD AUTO: 10.2 FL (ref 7–12)
POTASSIUM REFLEX MAGNESIUM: 4 MMOL/L (ref 3.5–5)
RBC # BLD: 2.44 E12/L (ref 3.8–5.8)
SARS-COV-2, NAAT: DETECTED
SODIUM BLD-SCNC: 137 MMOL/L (ref 132–146)
TOTAL IRON BINDING CAPACITY: 183 MCG/DL (ref 250–450)
WBC # BLD: 9.7 E9/L (ref 4.5–11.5)

## 2023-01-16 PROCEDURE — 80048 BASIC METABOLIC PNL TOTAL CA: CPT

## 2023-01-16 PROCEDURE — 83550 IRON BINDING TEST: CPT

## 2023-01-16 PROCEDURE — 96361 HYDRATE IV INFUSION ADD-ON: CPT

## 2023-01-16 PROCEDURE — 82728 ASSAY OF FERRITIN: CPT

## 2023-01-16 PROCEDURE — 96372 THER/PROPH/DIAG INJ SC/IM: CPT

## 2023-01-16 PROCEDURE — 82962 GLUCOSE BLOOD TEST: CPT

## 2023-01-16 PROCEDURE — 85025 COMPLETE CBC W/AUTO DIFF WBC: CPT

## 2023-01-16 PROCEDURE — 87635 SARS-COV-2 COVID-19 AMP PRB: CPT

## 2023-01-16 PROCEDURE — 2580000003 HC RX 258: Performed by: INTERNAL MEDICINE

## 2023-01-16 PROCEDURE — 83540 ASSAY OF IRON: CPT

## 2023-01-16 PROCEDURE — G0378 HOSPITAL OBSERVATION PER HR: HCPCS

## 2023-01-16 PROCEDURE — 6360000002 HC RX W HCPCS: Performed by: INTERNAL MEDICINE

## 2023-01-16 PROCEDURE — 6370000000 HC RX 637 (ALT 250 FOR IP): Performed by: INTERNAL MEDICINE

## 2023-01-16 PROCEDURE — 36415 COLL VENOUS BLD VENIPUNCTURE: CPT

## 2023-01-16 RX ORDER — POLYETHYLENE GLYCOL 3350 17 G/17G
17 POWDER, FOR SOLUTION ORAL DAILY
Qty: 1530 G | Refills: 1 | DISCHARGE
Start: 2023-01-16 | End: 2023-02-15

## 2023-01-16 RX ORDER — MIDODRINE HYDROCHLORIDE 5 MG/1
5 TABLET ORAL
Qty: 90 TABLET | Refills: 3 | Status: SHIPPED | OUTPATIENT
Start: 2023-01-16

## 2023-01-16 RX ADMIN — APIXABAN 5 MG: 5 TABLET, FILM COATED ORAL at 08:44

## 2023-01-16 RX ADMIN — FERROUS SULFATE TAB 325 MG (65 MG ELEMENTAL FE) 325 MG: 325 (65 FE) TAB at 16:40

## 2023-01-16 RX ADMIN — Medication 10 ML: at 10:44

## 2023-01-16 RX ADMIN — INSULIN HUMAN 25 UNITS: 100 INJECTION, SUSPENSION SUBCUTANEOUS at 08:46

## 2023-01-16 RX ADMIN — INSULIN LISPRO 7 UNITS: 100 INJECTION, SOLUTION INTRAVENOUS; SUBCUTANEOUS at 08:44

## 2023-01-16 RX ADMIN — DOCUSATE SODIUM 100 MG: 100 CAPSULE, LIQUID FILLED ORAL at 08:43

## 2023-01-16 RX ADMIN — FERROUS SULFATE TAB 325 MG (65 MG ELEMENTAL FE) 325 MG: 325 (65 FE) TAB at 08:43

## 2023-01-16 RX ADMIN — METOPROLOL TARTRATE 25 MG: 25 TABLET, FILM COATED ORAL at 07:01

## 2023-01-16 RX ADMIN — MIDODRINE HYDROCHLORIDE 5 MG: 5 TABLET ORAL at 16:40

## 2023-01-16 RX ADMIN — PANTOPRAZOLE SODIUM 40 MG: 40 TABLET, DELAYED RELEASE ORAL at 06:40

## 2023-01-16 RX ADMIN — EPOETIN ALFA-EPBX 5000 UNITS: 10000 INJECTION, SOLUTION INTRAVENOUS; SUBCUTANEOUS at 12:59

## 2023-01-16 RX ADMIN — POLYETHYLENE GLYCOL 3350 17 G: 17 POWDER, FOR SOLUTION ORAL at 14:27

## 2023-01-16 NOTE — PROGRESS NOTES
Nurse-to-nurse called Soumya Andujar RN at [facility]. Discharge instructions faxed to [phone].

## 2023-01-16 NOTE — CARE COORDINATION
Patient presented to [facility] from [facility] with episodes of syncope. Cardiology and EP consulted. Patient has discharge order. Per Olesya Larios from [place], patient can return and she will arrange transport. Negative covid test indicated. Staff nurse aware. Spoke with patient's wife per phone and with patient at bedside. All in agreement to return to [facility]. No ambulette form needed  by facility transport. Will inform staff, patient , and wife once CM notified of transportation time. CM/SW will continue to follow.  Transportation arranged by facility for 430 pm

## 2023-01-16 NOTE — DISCHARGE SUMMARY
Physician Discharge Summary     Patient ID:  Mery Davis  [id number]  80 y.o.  1940    Admit date: 1/15/2023    Discharge date and time:  1/16/2023    Discharge Diagnoses: Principal Problem:    Vasovagal syncope  Resolved Problems:    * No resolved hospital problems. *      Consults: IP CONSULT TO ELECTROPHYSIOLOGY  IP CONSULT TO SOCIAL WORK    Procedures: See below    Hospital Course:   Syncope  Had episode on telemetry on bedside commode with sinus bradycardia no pauses or AV block  Increase midodrine  Holding losartan  Avoid negative chronotropic hypotensive agents  Cardiology consult-discussed case  Electrophysiology consult appreciated-discussed case     Recurrent urinary retention  Catheter   EP is recommending to hold Flomax   Plan for discharge with Gracia catheter and follow-up with urology for spontaneous voiding trial     DM2 with uncontrolled blood sugars-improved today continue SSI, MBS NPH     CKD 3  Baseline creatinine 1.6-1.8  IV fluids    Chronic anemia of CKD  Iron studies reviewed. Most consistent with chronic disease/CKD  B12 and folate adequate  Dose of Retacrit  Follow-up outpatient with nephrology for continued dosing known to Dr. Misty Agee case     History of DVT-  Continue Eliquis       Diet: ADULT DIET;  Regular; 4 carb choices (60 gm/meal)  Code Status: Full Code  PT/OT Eval Status:     DVT Prophylaxis:     Recommended disposition at discharge: SNF today    Discharge Exam:  See progress note from today    Condition:  Stable    Disposition: SNF    Patient Instructions:   Current Discharge Medication List        START taking these medications    Details   polyethylene glycol (GLYCOLAX) 17 GM/SCOOP powder Take 17 g by mouth daily Change to QOD when bowels moving regularly  Qty: 1530 g, Refills: 1           CONTINUE these medications which have CHANGED    Details   midodrine (PROAMATINE) 5 MG tablet Take 1 tablet by mouth 3 times daily (with meals)  Qty: 90 tablet, Refills: 3 CONTINUE these medications which have NOT CHANGED    Details   glucose 4 g chewable tablet Take 4 tablets by mouth as needed for Low blood sugar  Qty: 60 tablet, Refills: 3      insulin lispro (HUMALOG) 100 UNIT/ML SOLN injection vial Inject 0-4 Units into the skin nightly  Qty: 10 mL, Refills: 0      !! insulin NPH (HUMULIN N;NOVOLIN N) 100 UNIT/ML injection vial Inject 25 Units into the skin every morning  Qty: 10 mL, Refills: 3      !! insulin NPH (HUMULIN N;NOVOLIN N) 100 UNIT/ML injection vial Inject 25 Units into the skin daily (before dinner)  Qty: 10 mL, Refills: 0      insulin lispro, 1 Unit Dial, (HUMALOG/ADMELOG) 100 UNIT/ML SOPN Inject 0-8 Units into the skin 3 times daily PER SLIDING SCALE: 0-90=0U, =5U, 141-200=6U, 201-260=7U, 261+=8U & CALL       apixaban (ELIQUIS) 5 MG TABS tablet Take 1 tablet by mouth 2 times daily  Qty: 60 tablet, Refills: 2      ferrous sulfate (IRON 325) 325 (65 Fe) MG tablet Take 1 tablet by mouth 2 times daily (with meals)  Qty: 30 tablet, Refills: 3      docusate sodium (COLACE, DULCOLAX) 100 MG CAPS Take 100 mg by mouth 2 times daily  Qty: 60 capsule, Refills: 0      omeprazole 20 MG EC tablet Take 20 mg by mouth daily. metoprolol (LOPRESSOR) 25 MG tablet Take 25 mg by mouth 2 times daily Indications: HOLD FOR SBP<100 AND/OR HR<60      acetaminophen (TYLENOL) 500 MG tablet Take 500 mg by mouth every 6 hours as needed for Pain       !! - Potential duplicate medications found. Please discuss with provider.         STOP taking these medications       polyethylene glycol (GLYCOLAX) 17 g packet Comments:   Reason for Stopping:         tamsulosin (FLOMAX) 0.4 MG capsule Comments:   Reason for Stopping:         tamsulosin (FLOMAX) 0.4 MG capsule Comments:   Reason for Stopping:         aspirin 81 MG chewable tablet Comments:   Reason for Stopping:         losartan (COZAAR) 100 MG tablet Comments:   Reason for Stopping:             Activity: activity as tolerated  Diet: regular diet    Follow-up with cardiology, urology, nephrology    Signed:  Jose Carlos Covington MD  1/16/2023  2:56 PM

## 2023-01-16 NOTE — PROGRESS NOTES
[facility] CARDIOLOGY PROGRESS NOTE  [facility]        Subjective: Recurrent syncope usually related to micturition or defecation as occurred on Saturday, January 14, 2023 try to have a bowel movement on the bedside commode and on telemetry no bradycardia or arrhythmia noted. After this episode apparently had nausea and some vomiting. Has a known history also of urinary retention and bladder issues. Appreciate electrophysiology consultation input from yesterday. Very pleasant and appropriate today. No distress. Denies any shortness of breath. Echocardiogram December 2022 normal LVEF and no significant valve abnormality. Objective: Medications lab chart and telemetry all reviewed. Patient Vitals for the past 24 hrs:   BP Temp Temp src Pulse Resp SpO2 Height Weight   01/16/23 0730 (!) 178/76 98.4 °F (36.9 °C) Oral 79 -- 95 % -- --   01/16/23 0630 (!) 171/84 -- -- 99 -- -- -- --   01/16/23 0230 137/75 -- -- 83 -- -- -- --   01/15/23 2115 (!) 152/66 98.2 °F (36.8 °C) Oral 80 16 96 % -- --   01/15/23 1507 (!) 146/58 97.8 °F (36.6 °C) Oral 82 16 99 % -- --   01/15/23 1100 101/87 98.1 °F (36.7 °C) Axillary 84 17 99 % 5' 11\" (1.803 m) 209 lb (94.8 kg)         Intake/Output Summary (Last 24 hours) at 1/16/2023 1046  Last data filed at 1/15/2023 1819  Gross per 24 hour   Intake 180 ml   Output 550 ml   Net -370 ml       Wt Readings from Last 3 Encounters:   01/15/23 209 lb (94.8 kg)   01/15/23 209 lb 3.5 oz (94.9 kg)   01/11/23 214 lb 11.2 oz (97.4 kg)       Telemetry: Personally reviewed and shows normal sinus rhythm heart rate in the 70s.     Current meds: Scheduled Meds:   epoetin fernanda-epbx  5,000 Units SubCUTAneous Once per day on Mon Wed Fri    docusate sodium  100 mg Oral BID    ferrous sulfate  325 mg Oral BID WC    insulin lispro  0-4 Units SubCUTAneous Nightly    insulin lispro  0-8 Units SubCUTAneous TID WC    insulin NPH  25 Units SubCUTAneous QAM    insulin NPH  25 Units SubCUTAneous Daily before dinner    metoprolol tartrate  25 mg Oral BID    pantoprazole  40 mg Oral QAM AC    sodium chloride flush  5-40 mL IntraVENous 2 times per day    apixaban  5 mg Oral BID    insulin lispro  7 Units SubCUTAneous TID WC    midodrine  5 mg Oral TID WC     Continuous Infusions:   sodium chloride      dextrose       PRN Meds:.acetaminophen **OR** acetaminophen, sodium chloride, dextrose, dextrose bolus **OR** dextrose bolus, glucagon (rDNA), glucose, ondansetron **OR** ondansetron, polyethylene glycol, sodium chloride flush, traMADol    Allergies: Amlodipine, Codeine, Darvocet [propoxyphene n-acetaminophen], Hydrocodone-acetaminophen, and Lisinopril      Labs:   Recent Labs     01/14/23  0135 01/15/23  0320 01/16/23  0444   WBC 8.6 8.7 9.7   HGB 7.9* 7.9* 7.8*   HCT 25.2* 25.5* 24.1*   MCV 99.2 98.5 98.8    394 349       Labs:   Recent Labs     01/14/23  0135 01/15/23  0320 01/16/23  0444    134 137   K 4.6 4.3  4.3 4.0   CL 99 101 104   CO2 23 23 19*   BUN 28* 30* 26*   CREATININE 1.6* 1.9* 1.8*       Labs: No results for input(s): CKTOTAL, CKMB, CKMBINDEX, TROPONINI in the last 72 hours. Labs: No results for input(s): BNP in the last 72 hours. Labs: No results for input(s): CHOL, HDL, TRIG in the last 72 hours. Invalid input(s): Elyse Frost    Labs:   Recent Labs     01/15/23  0320   PROT 6.6       Review of systems: No reported significant weight gain or weight loss. no dysuria or frequency, no dizziness, falls or trauma, no change in bowel or bladder habits, no hematochezia, hemoptysis or hematuria. No fevers, chills, nausea or vomiting reported. No significant wheezing or sputum production. No headache or visual changes. The remainder of the 10 review of systems otherwise negative. Exam      General: Patient comfortable in no distress and currently denies any chest pain. HEENT: Face symmetrical and no apparent cranial nerve deficit.      Neck: No jugular venous distention, carotid bruit or thyromegaly. Lungs: Clear bilaterally without rales, wheezes or dullness. Cardiac: Regular rate and rhythm, no S3, S4, no rub or gallop. Abdomen: No rebound or guarding, no hepatosplenomegaly. Extremities: Without significant clubbing , cyanosis, or edema. Neuro:  No focal motor or sensory deficit apparent. Skin: No petechiae, no significant bruising. Assessment: See plan below        Plan: #1 syncope and not related to electrical system and continue to do the above EP recommendations including no significant alcohol or caffeine, well-hydrated 2-1/2 L liquid intake daily preferably water, lower extremity support hose, increase sodium intake in diet. Also currently receiving midodrine 5 mg 3 times daily. #2 hypertension and will run blood pressure higher and avoid any significant hypotension. #3 anemia no overt bleeding but is heme positive. Continues on Eliquis with prior DVT approximately 12 days ago. No overt bleeding but consider blood transfusion as hemoglobin 7.9 yesterday and 7.8 today. #4 chronic kidney disease creatinine 1.9 yesterday and 1.8 today. Has received some IV fluid. Has normal LVEF. #5 some degree of cognitive deficit may be impairing his ability to be cautious when getting up and moving. May benefit from continued physical therapy and rehab placement. Continue physical therapy assessment and hopefully can be discharged to rehab facility in the next day or 2 from cardiology viewpoint.         Electronically signed by Ashlee Burdick MD on 1/16/2023 at 10:46 AM

## 2023-01-16 NOTE — CARE COORDINATION
Per Eden Watkins from [facility] she had concerns that patient did not have a BM documented since 1/9 as told by reporting nurse. Per patient's wife he did have a BM on 1/14 and they sent a specimen. This was relayed to Dr. Chidi Dai and he made medication changes for discharge This was relayed back to Eden Watkins. Patient to transport at 430.

## 2023-01-16 NOTE — PROGRESS NOTES
Hospitalist Progress Note      Synopsis: Patient admitted on 1/15/2023 80 y.o. male patient with recurrent syncope. He is poor historian and does not add much. He has passed out over 6 times since [place]. Patient denies any prodrome. He says he does not feel anything. No lightheadedness when standing. Patient had suspected vagal episode while on the bedside commode at [facility]. This was witnessed by cardiology. Patient is transferred to [address] [place] [address] for electrophysiology consult. No chest pain or shortness of breath. No vomiting diarrhea. No fevers or chills. No cough or trouble breathing. No exacerbation relief. Subjective    Feeling without complaint  No CP or SOB  No fever or chills   No uncontrolled pain  No vomiting or diarrhea   No events reported overnight     Exam:  BP (!) 178/76   Pulse 79   Temp 98.4 °F (36.9 °C) (Oral)   Resp 16   Ht 5' 11\" (1.803 m)   Wt 209 lb (94.8 kg)   SpO2 95%   BMI 29.15 kg/m²   General appearance: No apparent distress, appears stated age and cooperative. HEENT: Pupils equal, round, and reactive to light. Conjunctivae/corneas clear. Neck: Supple. No jugular venous distention. Trachea midline. Respiratory:  Normal respiratory effort. Clear to auscultation, bilaterally without Rales/Wheezes/Rhonchi. Cardiovascular: Regular rate and rhythm with normal S1/S2 without murmurs, rubs or gallops. Abdomen: Soft, non-tender, non-distended with normal bowel sounds. Musculoskeletal: No clubbing, cyanosis or edema bilaterally. Brisk capillary refill. 2+radial pulses.    Skin:  No rashes    Neurologic: awake, alert and following commands    Medications:  Reviewed    Infusion Medications    sodium chloride      dextrose       Scheduled Medications    docusate sodium  100 mg Oral BID    ferrous sulfate  325 mg Oral BID WC    insulin lispro  0-4 Units SubCUTAneous Nightly    insulin lispro  0-8 Units SubCUTAneous TID WC    insulin NPH  25 Units SubCUTAneous QAM    insulin NPH  25 Units SubCUTAneous Daily before dinner    metoprolol tartrate  25 mg Oral BID    pantoprazole  40 mg Oral QAM AC    sodium chloride flush  5-40 mL IntraVENous 2 times per day    apixaban  5 mg Oral BID    insulin lispro  7 Units SubCUTAneous TID WC    midodrine  5 mg Oral TID WC     PRN Meds: acetaminophen **OR** acetaminophen, sodium chloride, dextrose, dextrose bolus **OR** dextrose bolus, glucagon (rDNA), glucose, ondansetron **OR** ondansetron, polyethylene glycol, sodium chloride flush, traMADol    I/O    Intake/Output Summary (Last 24 hours) at 1/16/2023 0921  Last data filed at 1/15/2023 1819  Gross per 24 hour   Intake 180 ml   Output 550 ml   Net -370 ml       Labs:   Recent Labs     01/14/23  0135 01/15/23  0320 01/16/23  0444   WBC 8.6 8.7 9.7   HGB 7.9* 7.9* 7.8*   HCT 25.2* 25.5* 24.1*    394 349       Recent Labs     01/14/23  0135 01/15/23  0320 01/16/23  0444    134 137   K 4.6 4.3  4.3 4.0   CL 99 101 104   CO2 23 23 19*   BUN 28* 30* 26*   CREATININE 1.6* 1.9* 1.8*   CALCIUM 9.0 9.1 8.6       Recent Labs     01/15/23  0320   PROT 6.6   ALKPHOS 87   ALT 22   AST 21   BILITOT 0.3       No results for input(s): INR in the last 72 hours. No results for input(s): Giovanny Grigsby in the last 72 hours. Chronic labs:  Lab Results   Component Value Date    CHOL 164 12/16/2019    TRIG 75 12/16/2019    HDL 62 12/16/2019    LDLCALC 87 12/16/2019    TSH 0.804 01/06/2023    PSA 4.25 (H) 01/29/2020    INR 1.1 01/05/2023    LABA1C 8.5 (H) 12/16/2019       Radiology:  Imaging studies reviewed today. ASSESSMENT:    Principal Problem:    Vasovagal syncope  Resolved Problems:    * No resolved hospital problems.  *       PLAN:       Syncope  Had episode on telemetry on bedside commode with sinus bradycardia no pauses or AV block  Increase midodrine  Holding losartan  Avoid negative chronotropic hypotensive agents  Cardiology consult-discussed case  Electrophysiology consult appreciated-discussed case     Recurrent urinary retention  Catheter   EP is recommending to hold Flomax   Plan for discharge with Gracia catheter and follow-up with urology for spontaneous voiding trial     DM2 with uncontrolled blood sugars-improved today continue SSI, MBS NPH     CKD 3  Baseline creatinine 1.6-1.8  IV fluids    Chronic anemia of CKD  Iron studies reviewed. Most consistent with chronic disease/CKD  B12 and folate adequate  Dose of Retacrit  Follow-up outpatient with nephrology for continued dosing known to Dr. Alisia Villagomez case     History of DVT-  Continue Eliquis       Diet: ADULT DIET; Regular; 4 carb choices (60 gm/meal)  Code Status: Full Code  PT/OT Eval Status:     DVT Prophylaxis:     Recommended disposition at discharge: SNF today    +++++++++++++++++++++++++++++++++++++++++++++++++  Khadra Roland MD   [facility].  +++++++++++++++++++++++++++++++++++++++++++++++++  NOTE: This report was transcribed using voice recognition software. Every effort was made to ensure accuracy; however, inadvertent computerized transcription errors may be present.

## 2023-01-16 NOTE — PLAN OF CARE
Problem: Chronic Conditions and Co-morbidities  Goal: Patient's chronic conditions and co-morbidity symptoms are monitored and maintained or improved  1/16/2023 1431 by Santana Rhodes RN  Outcome: Adequate for Discharge     Problem: Discharge Planning  Goal: Discharge to home or other facility with appropriate resources  1/16/2023 1431 by Santana Rhodes RN  Outcome: Adequate for Discharge  1/16/2023 0158 by Jorge Magaña RN  Outcome: Progressing     Problem: Skin/Tissue Integrity  Goal: Absence of new skin breakdown  Description: 1. Monitor for areas of redness and/or skin breakdown  2. Assess vascular access sites hourly  3. Every 4-6 hours minimum:  Change oxygen saturation probe site  4. Every 4-6 hours:  If on nasal continuous positive airway pressure, respiratory therapy assess nares and determine need for appliance change or resting period.   1/16/2023 1431 by Santana Rhodes RN  Outcome: Adequate for Discharge  1/16/2023 0158 by Jorge Magaña RN  Outcome: Progressing     Problem: Safety - Adult  Goal: Free from fall injury  1/16/2023 1431 by Santana Rhodes RN  Outcome: Adequate for Discharge     Problem: ABCDS Injury Assessment  Goal: Absence of physical injury  1/16/2023 1431 by Santana Rhodes RN  Outcome: Adequate for Discharge     Problem: ABCDS Injury Assessment  Goal: Absence of physical injury  1/16/2023 1431 by Santana Rhodes RN  Outcome: Adequate for Discharge     Problem: Pain  Goal: Verbalizes/displays adequate comfort level or baseline comfort level  1/16/2023 1431 by Santana Rhodes RN  Outcome: Adequate for Discharge

## 2023-01-16 NOTE — PROGRESS NOTES
Message sent to Dr Zina Ochoa via perfect serve related to blood glucose of 69-patient is alert-juice given-nursing is awaiting any adjustments to insulin orders.

## 2023-01-16 NOTE — DISCHARGE INSTR - COC
Continuity of Care Form    Patient Name: Jakie Bloch   :  [id number]  MRN:  [id number]    Admit date:  1/15/2023  Discharge date:  2023    Code Status Order: Full Code   Advance Directives:     Admitting Physician:  Dora Hoffmann MD  PCP: Rajiv Solo MD    Discharging Nurse: Onelia Mai RN/[facility] Unit/Room#: 1369/3565-N  Discharging Unit Phone Number: [phone]    Emergency Contact:   Extended Emergency Contact Information  Primary Emergency Contact: Jessy Fulton  Address: H[place] L[place] [address] [place] [address] Phone: [phone]  Mobile Phone: [phone]  Relation: Spouse  Preferred language: English   needed?  No  Secondary Emergency Contact: Dave Segal  Address: [address] Phone: [phone]  Work Phone: [phone]  Mobile Phone: [phone]  Relation: Child    Past Surgical History:  Past Surgical History:   Procedure Laterality Date    BACK SURGERY      COLONOSCOPY      EYE SURGERY      PAROTIDECTOMY  12    left superficial       Immunization History:   Immunization History   Administered Date(s) Administered    COVID-19, PFIZER PURPLE top, DILUTE for use, (age 15 y+), 30mcg/0.3mL 2021, 2021, 2021       Active Problems:  Patient Active Problem List   Diagnosis Code    Syncope and collapse R55    Vasovagal syncope R55       Isolation/Infection:   Isolation            No Isolation          Patient Infection Status       Infection Onset Added Last Indicated Last Indicated By Review Planned Expiration Resolved Resolved By    None active    Resolved    COVID-19 22 COVID-19, Rapid   23 [facility]    No longer requires droplet plus isolation per cdc guidelines    COVID-19 (Rule Out) 22 COVID-19, Rapid (Ordered)   22 Rule-Out Test Resulted COVID-19 (Rule Out) 04/27/21 04/27/21 04/27/21 COVID-19 Ambulatory (Ordered)   04/28/21 Rule-Out Test Resulted            Nurse Assessment:  Last Vital Signs: BP (!) 178/76   Pulse 79   Temp 98.4 °F (36.9 °C) (Oral)   Resp 16   Ht 5' 11\" (1.803 m)   Wt 209 lb (94.8 kg)   SpO2 95%   BMI 29.15 kg/m²     Last documented pain score (0-10 scale): Pain Level: 0  Last Weight:   Wt Readings from Last 1 Encounters:   01/15/23 209 lb (94.8 kg)     Mental Status:  alert and to self/place-confusion is intermittent    IV Access:  - None    Nursing Mobility/ADLs:  Walking   Dependent  Transfer  Dependent  Bathing  Assisted  Dressing  Assisted  Toileting  Assisted  Feeding  Independent  Med Admin  Assisted  Med Delivery   whole    Wound Care Documentation and Therapy:  Incision 12/28/22 Knee Anterior;Right (Active)   Dressing Status Clean 01/13/23 2125   Dressing/Treatment Open to air 01/15/23 2115   Margins Approximated 01/15/23 2115   Incision Assessment Dry 01/15/23 2115   Drainage Amount None 01/15/23 2115   Carmen-incision Assessment Intact 01/11/23 0800   Number of days: 19        Elimination:  Continence: Bowel: Yes  Bladder: Yes  Urinary Catheter:  Colostomy/Ileostomy/Ileal Conduit: No       Date of Last BM: 1/9/2023-last documented      Intake/Output Summary (Last 24 hours) at 1/16/2023 1206  Last data filed at 1/15/2023 1819  Gross per 24 hour   Intake 180 ml   Output 550 ml   Net -370 ml     I/O last 3 completed shifts: In: 180 [P.O.:180]  Out: 550 [Urine:550]    Safety Concerns: At Risk for Falls    Impairments/Disabilities:      None    Nutrition Therapy:  Current Nutrition Therapy:   - Oral Diet:  Carb Control 4 carbs/meal (1800kcals/day)    Routes of Feeding: Oral  Liquids:  Thin Liquids  Daily Fluid Restriction: no  Last Modified Barium Swallow with Video (Video Swallowing Test): not done    Treatments at the Time of Hospital Discharge:   Respiratory Treatments: na  Oxygen Therapy:  is not on home oxygen therapy. Ventilator:    - No ventilator support    Rehab Therapies: Physical Therapy/Occupational Therapy  Weight Bearing Status/Restrictions: No weight bearing restrictions  Other Medical Equipment (for information only, NOT a DME order):  wheelchair and hospital bed  Other Treatments: na    Patient's personal belongings (please select all that are sent with patient):  Bijan, neil    RN SIGNATURE:  Electronically signed by Santos Dolan RN on 1/16/23 at 1:43 PM EST    CASE MANAGEMENT/SOCIAL WORK SECTION    Inpatient Status Date: ***    Readmission Risk Assessment Score:  Readmission Risk              Risk of Unplanned Readmission:  0           Discharging to Facility/ Agency   Name:   Address:  Phone:  Fax:    Dialysis Facility (if applicable)   Name:[facility]  Address: [place] [address]  Dialysis Schedule:  Phone:[phone]  Fax:    / signature: Electronically signed by Kiley Thrasher RN on 1/16/23 at 12:07 PM EST    PHYSICIAN SECTION    Prognosis: {Prognosis:1921816088}    Condition at Discharge: [facility] Patient Condition:205677615}    Rehab Potential (if transferring to Rehab): {Prognosis:8955690221}    Recommended Labs or Other Treatments After Discharge: ***    Physician Certification: I certify the above information and transfer of Elisabeth Jurado  is necessary for the continuing treatment of the diagnosis listed and that he requires {Admit to Appropriate Level of Care:32001} for {GREATER/LESS:904530835} 30 days.      Update Admission H&P: {CHP DME Changes in YRKHB:851146912}    PHYSICIAN SIGNATURE:  Electronically signed by Suzy Elam MD on 1/16/23 at 12:06 PM EST

## 2023-01-17 LAB — URINE CULTURE, ROUTINE: NORMAL

## 2023-01-19 LAB
BLOOD CULTURE, ROUTINE: NORMAL
CULTURE, BLOOD 2: NORMAL

## 2023-01-19 NOTE — PROGRESS NOTES
Physician Progress Note      DEMETRIDalauren Alexandre  [facility] #:                  [id number]  :                       1940  ADMIT DATE:       2023 12:35 PM  Tunde Shahid DATE:        1/15/2023 10:00 AM  RESPONDING  PROVIDER #:        Yobani Montoya DO          QUERY TEXT:    Dear Attending Physician,    Pt admitted with Syncope and has Encephalopathy documented. If possible,   please document in progress notes and discharge summary further specificity   regarding the type of Encephalopathy:    The medical record reflects the following:  Risk Factors: Dementia, ABLA , CKD 3, recurrent syncope sec to vasovagal  Clinical Indicators: Per PCP 1/15,\". .. Recurrent syncope. [facility] [place] [facility] [place] [facility] lead off much   artifact-some bradycardia to ?50's. [facility] [place] [facility] [place] Encephalopathy. [facility] [place] [facility] [place] \"  Treatment: IVF,  Tx to [facility] for electrophysiology evaluation and possible   tilt table test.    Thank you,  Darya Flores RN CCDS  Clinical Documentation Improvement Specialist  Options provided:  -- Metabolic Encephalopathy, Please indicate the likely cause of Metabolic   Encephalopathy. -- Encephalopathy, Please indicate the specific type and cause of   Encephalopathy . -- Encephalopathy was ruled out. AMS likely secondary to, Please indicate the   likely cause of AMS. -- Other - I will add my own diagnosis  -- Disagree - Not applicable / Not valid  -- Disagree - Clinically unable to determine / Unknown  -- Refer to Clinical Documentation Reviewer    PROVIDER RESPONSE TEXT:    Encephalopathy was ruled out.  AMS likely secondary unsure but resolved    Query created by: Xena Toribio on 2023 12:17 PM      Electronically signed by:  Yobani Montoya DO 2023 7:17 AM

## 2023-05-16 DIAGNOSIS — N18.9 ANEMIA OF CHRONIC RENAL FAILURE, UNSPECIFIED CKD STAGE: ICD-10-CM

## 2023-05-16 DIAGNOSIS — D63.1 ANEMIA OF CHRONIC RENAL FAILURE, UNSPECIFIED CKD STAGE: ICD-10-CM

## 2023-05-26 ENCOUNTER — HOSPITAL ENCOUNTER (OUTPATIENT)
Dept: INFUSION THERAPY | Age: 83
Setting detail: INFUSION SERIES
Discharge: HOME | End: 2023-05-26
Payer: MEDICARE

## 2023-05-26 VITALS
RESPIRATION RATE: 18 BRPM | HEIGHT: 71 IN | SYSTOLIC BLOOD PRESSURE: 187 MMHG | HEART RATE: 77 BPM | TEMPERATURE: 97.7 F | DIASTOLIC BLOOD PRESSURE: 80 MMHG | WEIGHT: 220 LBS | OXYGEN SATURATION: 96 % | BODY MASS INDEX: 30.8 KG/M2

## 2023-05-26 DIAGNOSIS — N18.9 ANEMIA OF CHRONIC RENAL FAILURE, UNSPECIFIED CKD STAGE: Primary | ICD-10-CM

## 2023-05-26 DIAGNOSIS — D63.1 ANEMIA OF CHRONIC RENAL FAILURE, UNSPECIFIED CKD STAGE: Primary | ICD-10-CM

## 2023-05-26 LAB
ERYTHROCYTE [DISTWIDTH] IN BLOOD BY AUTOMATED COUNT: 14.5 FL (ref 11.5–15)
FERRITIN SERPL-MCNC: 41 NG/ML
HCT VFR BLD AUTO: 28.6 % (ref 37–54)
HGB BLD-MCNC: 9 G/DL (ref 12.5–16.5)
IRON SATN MFR SERPL: 20 % (ref 20–55)
IRON SERPL-MCNC: 57 MCG/DL (ref 59–158)
MCH RBC QN AUTO: 30.8 PG (ref 26–35)
MCHC RBC AUTO-ENTMCNC: 31.5 % (ref 32–34.5)
MCV RBC AUTO: 97.9 FL (ref 80–99.9)
PLATELET # BLD AUTO: 238 E9/L (ref 130–450)
PMV BLD AUTO: 9.1 FL (ref 7–12)
RBC # BLD AUTO: 2.92 E12/L (ref 3.8–5.8)
TIBC SERPL-MCNC: 281 MCG/DL (ref 250–450)
WBC # BLD: 6.1 E9/L (ref 4.5–11.5)

## 2023-05-26 PROCEDURE — 36415 COLL VENOUS BLD VENIPUNCTURE: CPT

## 2023-05-26 PROCEDURE — 83540 ASSAY OF IRON: CPT

## 2023-05-26 PROCEDURE — 82728 ASSAY OF FERRITIN: CPT

## 2023-05-26 PROCEDURE — 6360000002 HC RX W HCPCS: Performed by: INTERNAL MEDICINE

## 2023-05-26 PROCEDURE — 85027 COMPLETE CBC AUTOMATED: CPT

## 2023-05-26 PROCEDURE — 83550 IRON BINDING TEST: CPT

## 2023-05-26 PROCEDURE — 96372 THER/PROPH/DIAG INJ SC/IM: CPT

## 2023-05-26 RX ADMIN — DARBEPOETIN ALFA 100 MCG: 100 INJECTION, SOLUTION INTRAVENOUS; SUBCUTANEOUS at 13:34

## 2023-05-26 ASSESSMENT — PAIN DESCRIPTION - FREQUENCY: FREQUENCY: CONTINUOUS

## 2023-05-26 ASSESSMENT — PAIN SCALES - GENERAL: PAINLEVEL_OUTOF10: 6

## 2023-05-26 ASSESSMENT — PAIN DESCRIPTION - LOCATION: LOCATION: OTHER (COMMENT)

## 2023-05-26 ASSESSMENT — PAIN DESCRIPTION - PAIN TYPE: TYPE: CHRONIC PAIN

## 2023-05-26 ASSESSMENT — PAIN DESCRIPTION - DESCRIPTORS: DESCRIPTORS: ACHING

## 2023-05-26 NOTE — PROGRESS NOTES
Tolerated aranesp injection well. Reviewed therapy plan, offered education material and/or discharge material, reviewed medication information and signs and symptoms  and educated on possible side effects, verbalizes good knowledge of current plan patient verbalizes understanding, and has no signs or symptoms to report at this time. Patient discharged. Patient alert and oriented x3. No distress noted. Vital signs stable. Patient denies any new or worsening pain. Patient denies any needs. All questions answered. Next appointment scheduled. Declines copy of AVS. Instructed on lab draw for next injection.

## 2023-06-02 ENCOUNTER — HOSPITAL ENCOUNTER (OUTPATIENT)
Dept: INFUSION THERAPY | Age: 83
Setting detail: INFUSION SERIES
Discharge: HOME | End: 2023-06-02
Payer: MEDICARE

## 2023-06-02 VITALS
TEMPERATURE: 97.7 F | OXYGEN SATURATION: 96 % | SYSTOLIC BLOOD PRESSURE: 110 MMHG | WEIGHT: 220 LBS | DIASTOLIC BLOOD PRESSURE: 55 MMHG | BODY MASS INDEX: 30.8 KG/M2 | RESPIRATION RATE: 16 BRPM | HEART RATE: 76 BPM | HEIGHT: 71 IN

## 2023-06-02 DIAGNOSIS — D63.1 ANEMIA OF CHRONIC RENAL FAILURE, UNSPECIFIED CKD STAGE: Primary | ICD-10-CM

## 2023-06-02 DIAGNOSIS — N18.9 ANEMIA OF CHRONIC RENAL FAILURE, UNSPECIFIED CKD STAGE: Primary | ICD-10-CM

## 2023-06-02 LAB
ERYTHROCYTE [DISTWIDTH] IN BLOOD BY AUTOMATED COUNT: 14.6 FL (ref 11.5–15)
HCT VFR BLD AUTO: 29 % (ref 37–54)
HGB BLD-MCNC: 9.2 G/DL (ref 12.5–16.5)
MCH RBC QN AUTO: 31.5 PG (ref 26–35)
MCHC RBC AUTO-ENTMCNC: 31.7 % (ref 32–34.5)
MCV RBC AUTO: 99.3 FL (ref 80–99.9)
PLATELET # BLD AUTO: 261 E9/L (ref 130–450)
PMV BLD AUTO: 9 FL (ref 7–12)
RBC # BLD AUTO: 2.92 E12/L (ref 3.8–5.8)
WBC # BLD: 8.4 E9/L (ref 4.5–11.5)

## 2023-06-02 PROCEDURE — 85027 COMPLETE CBC AUTOMATED: CPT

## 2023-06-02 PROCEDURE — 96372 THER/PROPH/DIAG INJ SC/IM: CPT

## 2023-06-02 PROCEDURE — 36415 COLL VENOUS BLD VENIPUNCTURE: CPT

## 2023-06-02 PROCEDURE — 6360000002 HC RX W HCPCS: Performed by: INTERNAL MEDICINE

## 2023-06-02 RX ADMIN — DARBEPOETIN ALFA 100 MCG: 100 INJECTION, SOLUTION INTRAVENOUS; SUBCUTANEOUS at 13:39

## 2023-06-02 NOTE — PROGRESS NOTES
Tolerated injection well. Reviewed therapy plan, offered education material and/or discharge material, reviewed medication information and signs and symptoms  and educated on possible side effects, verbalizes good knowledge of current plan patient verbalizes understanding, and has no signs or symptoms to report at this time. Patient discharged. Patient alert and oriented x3. No distress noted. Vital signs stable. Patient denies any new or worsening pain. Patient denies any needs. All questions answered. Next appointment scheduled. DECLINES  copy of AVS. Instructed on lab draw for next injection.

## 2023-06-09 ENCOUNTER — HOSPITAL ENCOUNTER (OUTPATIENT)
Dept: INFUSION THERAPY | Age: 83
Setting detail: INFUSION SERIES
Discharge: HOME | End: 2023-06-09
Payer: MEDICARE

## 2023-06-09 VITALS
DIASTOLIC BLOOD PRESSURE: 77 MMHG | BODY MASS INDEX: 30.8 KG/M2 | HEART RATE: 70 BPM | RESPIRATION RATE: 16 BRPM | TEMPERATURE: 98.1 F | SYSTOLIC BLOOD PRESSURE: 181 MMHG | WEIGHT: 220 LBS | OXYGEN SATURATION: 94 % | HEIGHT: 71 IN

## 2023-06-09 DIAGNOSIS — N18.9 ANEMIA OF CHRONIC RENAL FAILURE, UNSPECIFIED CKD STAGE: Primary | ICD-10-CM

## 2023-06-09 DIAGNOSIS — D63.1 ANEMIA OF CHRONIC RENAL FAILURE, UNSPECIFIED CKD STAGE: Primary | ICD-10-CM

## 2023-06-09 LAB
ALBUMIN SERPL-MCNC: 4 G/DL (ref 3.5–5.2)
ALP SERPL-CCNC: 82 U/L (ref 40–129)
ALT SERPL-CCNC: 9 U/L (ref 0–40)
ANION GAP SERPL CALCULATED.3IONS-SCNC: 10 MMOL/L (ref 7–16)
AST SERPL-CCNC: 13 U/L (ref 0–39)
BILIRUB SERPL-MCNC: 0.2 MG/DL (ref 0–1.2)
BUN SERPL-MCNC: 35 MG/DL (ref 6–23)
CALCIUM SERPL-MCNC: 9.2 MG/DL (ref 8.6–10.2)
CHLORIDE SERPL-SCNC: 106 MMOL/L (ref 98–107)
CO2 SERPL-SCNC: 22 MMOL/L (ref 22–29)
CREAT SERPL-MCNC: 2 MG/DL (ref 0.7–1.2)
ERYTHROCYTE [DISTWIDTH] IN BLOOD BY AUTOMATED COUNT: 14.6 FL (ref 11.5–15)
GLUCOSE SERPL-MCNC: 246 MG/DL (ref 74–99)
HCT VFR BLD AUTO: 31.7 % (ref 37–54)
HGB BLD-MCNC: 9.9 G/DL (ref 12.5–16.5)
MCH RBC QN AUTO: 31.3 PG (ref 26–35)
MCHC RBC AUTO-ENTMCNC: 31.2 % (ref 32–34.5)
MCV RBC AUTO: 100.3 FL (ref 80–99.9)
PHOSPHATE SERPL-MCNC: 3.9 MG/DL (ref 2.5–4.5)
PLATELET # BLD AUTO: 262 E9/L (ref 130–450)
PMV BLD AUTO: 9.1 FL (ref 7–12)
POTASSIUM SERPL-SCNC: 5.6 MMOL/L (ref 3.5–5)
PROT SERPL-MCNC: 7.1 G/DL (ref 6.4–8.3)
RBC # BLD AUTO: 3.16 E12/L (ref 3.8–5.8)
SODIUM SERPL-SCNC: 138 MMOL/L (ref 132–146)
WBC # BLD: 6.7 E9/L (ref 4.5–11.5)

## 2023-06-09 PROCEDURE — 6360000002 HC RX W HCPCS: Performed by: INTERNAL MEDICINE

## 2023-06-09 PROCEDURE — 85027 COMPLETE CBC AUTOMATED: CPT

## 2023-06-09 PROCEDURE — 96372 THER/PROPH/DIAG INJ SC/IM: CPT

## 2023-06-09 PROCEDURE — 80053 COMPREHEN METABOLIC PANEL: CPT

## 2023-06-09 PROCEDURE — 36415 COLL VENOUS BLD VENIPUNCTURE: CPT

## 2023-06-09 PROCEDURE — 84100 ASSAY OF PHOSPHORUS: CPT

## 2023-06-09 RX ADMIN — DARBEPOETIN ALFA 100 MCG: 100 INJECTION, SOLUTION INTRAVENOUS; SUBCUTANEOUS at 13:39

## 2023-06-16 ENCOUNTER — HOSPITAL ENCOUNTER (OUTPATIENT)
Dept: INFUSION THERAPY | Age: 83
Setting detail: INFUSION SERIES
Discharge: HOME | End: 2023-06-16
Payer: MEDICARE

## 2023-06-16 VITALS
TEMPERATURE: 98.2 F | DIASTOLIC BLOOD PRESSURE: 81 MMHG | RESPIRATION RATE: 16 BRPM | SYSTOLIC BLOOD PRESSURE: 183 MMHG | OXYGEN SATURATION: 95 % | HEART RATE: 71 BPM

## 2023-06-16 LAB
ALBUMIN SERPL-MCNC: 4.2 G/DL (ref 3.5–5.2)
ALP SERPL-CCNC: 94 U/L (ref 40–129)
ALT SERPL-CCNC: 9 U/L (ref 0–40)
ANION GAP SERPL CALCULATED.3IONS-SCNC: 10 MMOL/L (ref 7–16)
AST SERPL-CCNC: 15 U/L (ref 0–39)
BILIRUB SERPL-MCNC: 0.2 MG/DL (ref 0–1.2)
BUN SERPL-MCNC: 43 MG/DL (ref 6–23)
CALCIUM SERPL-MCNC: 9 MG/DL (ref 8.6–10.2)
CHLORIDE SERPL-SCNC: 107 MMOL/L (ref 98–107)
CO2 SERPL-SCNC: 22 MMOL/L (ref 22–29)
CREAT SERPL-MCNC: 2.1 MG/DL (ref 0.7–1.2)
ERYTHROCYTE [DISTWIDTH] IN BLOOD BY AUTOMATED COUNT: 14.7 FL (ref 11.5–15)
GLUCOSE SERPL-MCNC: 130 MG/DL (ref 74–99)
HCT VFR BLD AUTO: 35.5 % (ref 37–54)
HGB BLD-MCNC: 11 G/DL (ref 12.5–16.5)
MCH RBC QN AUTO: 31.7 PG (ref 26–35)
MCHC RBC AUTO-ENTMCNC: 31 % (ref 32–34.5)
MCV RBC AUTO: 102.3 FL (ref 80–99.9)
PHOSPHATE SERPL-MCNC: 3.8 MG/DL (ref 2.5–4.5)
PLATELET # BLD AUTO: 260 E9/L (ref 130–450)
PMV BLD AUTO: 9.4 FL (ref 7–12)
POTASSIUM SERPL-SCNC: 5 MMOL/L (ref 3.5–5)
PROT SERPL-MCNC: 7.1 G/DL (ref 6.4–8.3)
RBC # BLD AUTO: 3.47 E12/L (ref 3.8–5.8)
SODIUM SERPL-SCNC: 139 MMOL/L (ref 132–146)
WBC # BLD: 6.5 E9/L (ref 4.5–11.5)

## 2023-06-16 PROCEDURE — 85027 COMPLETE CBC AUTOMATED: CPT

## 2023-06-16 PROCEDURE — 84100 ASSAY OF PHOSPHORUS: CPT

## 2023-06-16 PROCEDURE — 36415 COLL VENOUS BLD VENIPUNCTURE: CPT

## 2023-06-16 PROCEDURE — 80053 COMPREHEN METABOLIC PANEL: CPT

## 2023-06-23 ENCOUNTER — HOSPITAL ENCOUNTER (OUTPATIENT)
Dept: INFUSION THERAPY | Age: 83
Setting detail: INFUSION SERIES
Discharge: HOME | End: 2023-06-23
Payer: MEDICARE

## 2023-06-23 VITALS
TEMPERATURE: 98.1 F | HEART RATE: 57 BPM | DIASTOLIC BLOOD PRESSURE: 88 MMHG | WEIGHT: 220 LBS | OXYGEN SATURATION: 95 % | RESPIRATION RATE: 16 BRPM | BODY MASS INDEX: 30.8 KG/M2 | HEIGHT: 71 IN | SYSTOLIC BLOOD PRESSURE: 150 MMHG

## 2023-06-23 LAB
ERYTHROCYTE [DISTWIDTH] IN BLOOD BY AUTOMATED COUNT: 13.9 FL (ref 11.5–15)
HCT VFR BLD AUTO: 36 % (ref 37–54)
HGB BLD-MCNC: 11.3 G/DL (ref 12.5–16.5)
MCH RBC QN AUTO: 31.6 PG (ref 26–35)
MCHC RBC AUTO-ENTMCNC: 31.4 % (ref 32–34.5)
MCV RBC AUTO: 100.6 FL (ref 80–99.9)
PLATELET # BLD AUTO: 231 E9/L (ref 130–450)
PMV BLD AUTO: 9.5 FL (ref 7–12)
RBC # BLD AUTO: 3.58 E12/L (ref 3.8–5.8)
WBC # BLD: 7.3 E9/L (ref 4.5–11.5)

## 2023-06-23 PROCEDURE — 85027 COMPLETE CBC AUTOMATED: CPT

## 2023-06-23 PROCEDURE — 36415 COLL VENOUS BLD VENIPUNCTURE: CPT

## 2023-06-28 ENCOUNTER — HOSPITAL ENCOUNTER (OUTPATIENT)
Dept: INFUSION THERAPY | Age: 83
Setting detail: INFUSION SERIES
Discharge: HOME | End: 2023-06-28
Payer: MEDICARE

## 2023-06-28 VITALS
HEART RATE: 69 BPM | WEIGHT: 220 LBS | TEMPERATURE: 97.6 F | OXYGEN SATURATION: 96 % | RESPIRATION RATE: 16 BRPM | BODY MASS INDEX: 30.68 KG/M2 | SYSTOLIC BLOOD PRESSURE: 118 MMHG | DIASTOLIC BLOOD PRESSURE: 62 MMHG

## 2023-06-28 DIAGNOSIS — N18.9 ANEMIA OF CHRONIC RENAL FAILURE, UNSPECIFIED CKD STAGE: Primary | ICD-10-CM

## 2023-06-28 DIAGNOSIS — D63.1 ANEMIA OF CHRONIC RENAL FAILURE, UNSPECIFIED CKD STAGE: Primary | ICD-10-CM

## 2023-06-28 LAB
ERYTHROCYTE [DISTWIDTH] IN BLOOD BY AUTOMATED COUNT: 13.4 FL (ref 11.5–15)
HCT VFR BLD AUTO: 34.1 % (ref 37–54)
HGB BLD-MCNC: 10.7 G/DL (ref 12.5–16.5)
MCH RBC QN AUTO: 31.7 PG (ref 26–35)
MCHC RBC AUTO-ENTMCNC: 31.4 % (ref 32–34.5)
MCV RBC AUTO: 100.9 FL (ref 80–99.9)
PLATELET # BLD AUTO: 192 E9/L (ref 130–450)
PMV BLD AUTO: 9.7 FL (ref 7–12)
RBC # BLD AUTO: 3.38 E12/L (ref 3.8–5.8)
WBC # BLD: 6.3 E9/L (ref 4.5–11.5)

## 2023-06-28 PROCEDURE — 6360000002 HC RX W HCPCS: Performed by: INTERNAL MEDICINE

## 2023-06-28 PROCEDURE — 96372 THER/PROPH/DIAG INJ SC/IM: CPT

## 2023-06-28 PROCEDURE — 36415 COLL VENOUS BLD VENIPUNCTURE: CPT

## 2023-06-28 PROCEDURE — 85027 COMPLETE CBC AUTOMATED: CPT

## 2023-06-28 RX ADMIN — DARBEPOETIN ALFA 100 MCG: 100 INJECTION, SOLUTION INTRAVENOUS; SUBCUTANEOUS at 09:20

## 2023-06-29 ENCOUNTER — HOSPITAL ENCOUNTER (OUTPATIENT)
Dept: INFUSION THERAPY | Age: 83
Setting detail: INFUSION SERIES
Discharge: HOME | End: 2023-06-29

## 2023-07-02 ENCOUNTER — HOSPITAL ENCOUNTER (EMERGENCY)
Age: 83
Discharge: HOME | End: 2023-07-02
Attending: EMERGENCY MEDICINE
Payer: MEDICARE

## 2023-07-02 VITALS
HEIGHT: 71 IN | HEART RATE: 70 BPM | DIASTOLIC BLOOD PRESSURE: 85 MMHG | OXYGEN SATURATION: 98 % | BODY MASS INDEX: 30.8 KG/M2 | RESPIRATION RATE: 16 BRPM | SYSTOLIC BLOOD PRESSURE: 140 MMHG | WEIGHT: 220 LBS | TEMPERATURE: 97.9 F

## 2023-07-02 DIAGNOSIS — R33.8 ACUTE RETENTION OF URINE: Primary | ICD-10-CM

## 2023-07-02 PROCEDURE — 51701 INSERT BLADDER CATHETER: CPT

## 2023-07-02 PROCEDURE — 99283 EMERGENCY DEPT VISIT LOW MDM: CPT

## 2023-07-02 ASSESSMENT — ENCOUNTER SYMPTOMS
NAUSEA: 0
WHEEZING: 0
BACK PAIN: 0
COUGH: 0
SORE THROAT: 0
SHORTNESS OF BREATH: 0
VOMITING: 0
DIARRHEA: 0
ABDOMINAL PAIN: 0

## 2023-07-02 ASSESSMENT — PAIN - FUNCTIONAL ASSESSMENT
PAIN_FUNCTIONAL_ASSESSMENT: 0-10
PAIN_FUNCTIONAL_ASSESSMENT: NONE - DENIES PAIN

## 2023-07-02 ASSESSMENT — PAIN SCALES - GENERAL: PAINLEVEL_OUTOF10: 0

## 2023-07-07 ENCOUNTER — HOSPITAL ENCOUNTER (OUTPATIENT)
Dept: INFUSION THERAPY | Age: 83
Setting detail: INFUSION SERIES
Discharge: HOME | End: 2023-07-07
Payer: MEDICARE

## 2023-07-07 VITALS
HEART RATE: 62 BPM | TEMPERATURE: 97.4 F | DIASTOLIC BLOOD PRESSURE: 75 MMHG | OXYGEN SATURATION: 97 % | SYSTOLIC BLOOD PRESSURE: 165 MMHG | RESPIRATION RATE: 16 BRPM

## 2023-07-07 LAB
ERYTHROCYTE [DISTWIDTH] IN BLOOD BY AUTOMATED COUNT: 13.2 FL (ref 11.5–15)
HCT VFR BLD AUTO: 35.4 % (ref 37–54)
HGB BLD-MCNC: 11.3 G/DL (ref 12.5–16.5)
MCH RBC QN AUTO: 31.7 PG (ref 26–35)
MCHC RBC AUTO-ENTMCNC: 31.9 % (ref 32–34.5)
MCV RBC AUTO: 99.2 FL (ref 80–99.9)
PLATELET # BLD AUTO: 265 E9/L (ref 130–450)
PMV BLD AUTO: 9.3 FL (ref 7–12)
RBC # BLD AUTO: 3.57 E12/L (ref 3.8–5.8)
WBC # BLD: 7 E9/L (ref 4.5–11.5)

## 2023-07-07 PROCEDURE — 85027 COMPLETE CBC AUTOMATED: CPT

## 2023-07-07 PROCEDURE — 36415 COLL VENOUS BLD VENIPUNCTURE: CPT

## 2023-07-14 ENCOUNTER — HOSPITAL ENCOUNTER (OUTPATIENT)
Dept: INFUSION THERAPY | Age: 83
Setting detail: INFUSION SERIES
Discharge: HOME | End: 2023-07-14
Payer: MEDICARE

## 2023-07-14 VITALS
DIASTOLIC BLOOD PRESSURE: 75 MMHG | TEMPERATURE: 98.3 F | HEART RATE: 68 BPM | SYSTOLIC BLOOD PRESSURE: 126 MMHG | RESPIRATION RATE: 16 BRPM

## 2023-07-14 LAB
ALBUMIN SERPL-MCNC: 4.1 G/DL (ref 3.5–5.2)
ALP SERPL-CCNC: 98 U/L (ref 40–129)
ALT SERPL-CCNC: 9 U/L (ref 0–40)
ANION GAP SERPL CALCULATED.3IONS-SCNC: 11 MMOL/L (ref 7–16)
AST SERPL-CCNC: 11 U/L (ref 0–39)
BILIRUB SERPL-MCNC: 0.2 MG/DL (ref 0–1.2)
BUN SERPL-MCNC: 49 MG/DL (ref 6–23)
CALCIUM SERPL-MCNC: 9.1 MG/DL (ref 8.6–10.2)
CHLORIDE SERPL-SCNC: 102 MMOL/L (ref 98–107)
CO2 SERPL-SCNC: 20 MMOL/L (ref 22–29)
CREAT SERPL-MCNC: 2.4 MG/DL (ref 0.7–1.2)
ERYTHROCYTE [DISTWIDTH] IN BLOOD BY AUTOMATED COUNT: 12.9 FL (ref 11.5–15)
GLUCOSE SERPL-MCNC: 185 MG/DL (ref 74–99)
HCT VFR BLD AUTO: 36.7 % (ref 37–54)
HGB BLD-MCNC: 11.7 G/DL (ref 12.5–16.5)
MCH RBC QN AUTO: 31.4 PG (ref 26–35)
MCHC RBC AUTO-ENTMCNC: 31.9 % (ref 32–34.5)
MCV RBC AUTO: 98.4 FL (ref 80–99.9)
PHOSPHATE SERPL-MCNC: 3.8 MG/DL (ref 2.5–4.5)
PLATELET # BLD AUTO: 225 E9/L (ref 130–450)
PMV BLD AUTO: 9.7 FL (ref 7–12)
POTASSIUM SERPL-SCNC: 5.2 MMOL/L (ref 3.5–5)
PROT SERPL-MCNC: 7.2 G/DL (ref 6.4–8.3)
RBC # BLD AUTO: 3.73 E12/L (ref 3.8–5.8)
SODIUM SERPL-SCNC: 133 MMOL/L (ref 132–146)
WBC # BLD: 6.9 E9/L (ref 4.5–11.5)

## 2023-07-14 PROCEDURE — 36415 COLL VENOUS BLD VENIPUNCTURE: CPT

## 2023-07-14 PROCEDURE — 85027 COMPLETE CBC AUTOMATED: CPT

## 2023-07-14 PROCEDURE — 84100 ASSAY OF PHOSPHORUS: CPT

## 2023-07-14 PROCEDURE — 80053 COMPREHEN METABOLIC PANEL: CPT

## 2023-07-14 NOTE — PROGRESS NOTES
Hemoglobin  11.7  as per order  no need for  shot today   Venipuncture  left lateral antecubital  pressure held post band aid applied  and transported specimen to lab post idenitiy confirmed

## 2023-07-21 ENCOUNTER — HOSPITAL ENCOUNTER (OUTPATIENT)
Dept: INFUSION THERAPY | Age: 83
Setting detail: INFUSION SERIES
Discharge: HOME | End: 2023-07-21
Payer: MEDICARE

## 2023-07-21 VITALS
HEART RATE: 72 BPM | DIASTOLIC BLOOD PRESSURE: 72 MMHG | TEMPERATURE: 97.2 F | SYSTOLIC BLOOD PRESSURE: 151 MMHG | OXYGEN SATURATION: 95 % | RESPIRATION RATE: 16 BRPM

## 2023-07-21 LAB
ERYTHROCYTE [DISTWIDTH] IN BLOOD BY AUTOMATED COUNT: 12.6 % (ref 11.5–15)
HCT VFR BLD AUTO: 37.8 % (ref 37–54)
HGB BLD-MCNC: 12.1 G/DL (ref 12.5–16.5)
MCH RBC QN AUTO: 31.3 PG (ref 26–35)
MCHC RBC AUTO-ENTMCNC: 32 G/DL (ref 32–34.5)
MCV RBC AUTO: 97.7 FL (ref 80–99.9)
PLATELET # BLD AUTO: 203 K/UL (ref 130–450)
PMV BLD AUTO: 10.1 FL (ref 7–12)
RBC # BLD AUTO: 3.87 M/UL (ref 3.8–5.8)
WBC OTHER # BLD: 6 K/UL (ref 4.5–11.5)

## 2023-07-21 PROCEDURE — 36415 COLL VENOUS BLD VENIPUNCTURE: CPT

## 2023-07-21 PROCEDURE — 85027 COMPLETE CBC AUTOMATED: CPT

## 2023-07-21 ASSESSMENT — PAIN DESCRIPTION - LOCATION: LOCATION: KNEE

## 2023-07-21 ASSESSMENT — PAIN DESCRIPTION - ORIENTATION: ORIENTATION: LEFT

## 2023-07-21 ASSESSMENT — PAIN SCALES - GENERAL: PAINLEVEL_OUTOF10: 5

## 2023-07-28 ENCOUNTER — HOSPITAL ENCOUNTER (OUTPATIENT)
Dept: INFUSION THERAPY | Age: 83
Setting detail: INFUSION SERIES
Discharge: HOME | End: 2023-07-28
Payer: MEDICARE

## 2023-07-28 VITALS
HEART RATE: 66 BPM | HEIGHT: 71 IN | OXYGEN SATURATION: 97 % | TEMPERATURE: 97.7 F | DIASTOLIC BLOOD PRESSURE: 80 MMHG | WEIGHT: 220 LBS | SYSTOLIC BLOOD PRESSURE: 159 MMHG | BODY MASS INDEX: 30.8 KG/M2 | RESPIRATION RATE: 16 BRPM

## 2023-07-28 LAB
ERYTHROCYTE [DISTWIDTH] IN BLOOD BY AUTOMATED COUNT: 12.5 % (ref 11.5–15)
HCT VFR BLD AUTO: 36.6 % (ref 37–54)
HGB BLD-MCNC: 11.6 G/DL (ref 12.5–16.5)
MCH RBC QN AUTO: 31.2 PG (ref 26–35)
MCHC RBC AUTO-ENTMCNC: 31.7 G/DL (ref 32–34.5)
MCV RBC AUTO: 98.4 FL (ref 80–99.9)
PLATELET # BLD AUTO: 218 K/UL (ref 130–450)
PMV BLD AUTO: 10 FL (ref 7–12)
RBC # BLD AUTO: 3.72 M/UL (ref 3.8–5.8)
WBC OTHER # BLD: 6 K/UL (ref 4.5–11.5)

## 2023-07-28 PROCEDURE — 85027 COMPLETE CBC AUTOMATED: CPT

## 2023-07-28 PROCEDURE — 36415 COLL VENOUS BLD VENIPUNCTURE: CPT

## 2023-07-28 ASSESSMENT — PAIN SCALES - GENERAL: PAINLEVEL_OUTOF10: 0

## 2023-07-28 NOTE — PROGRESS NOTES
Labs drawn HGB 11.6 , aranesp injection not needed. Next appointment made and patient instructed on lab draw and procedure for next injection.

## 2023-08-04 ENCOUNTER — HOSPITAL ENCOUNTER (OUTPATIENT)
Dept: INFUSION THERAPY | Age: 83
Setting detail: INFUSION SERIES
Discharge: HOME | End: 2023-08-04
Payer: MEDICARE

## 2023-08-04 VITALS
HEART RATE: 67 BPM | SYSTOLIC BLOOD PRESSURE: 153 MMHG | OXYGEN SATURATION: 98 % | TEMPERATURE: 98.4 F | HEIGHT: 71 IN | RESPIRATION RATE: 16 BRPM | WEIGHT: 220 LBS | BODY MASS INDEX: 30.8 KG/M2 | DIASTOLIC BLOOD PRESSURE: 72 MMHG

## 2023-08-04 DIAGNOSIS — D63.1 ANEMIA OF CHRONIC RENAL FAILURE, UNSPECIFIED CKD STAGE: Primary | ICD-10-CM

## 2023-08-04 DIAGNOSIS — N18.9 ANEMIA OF CHRONIC RENAL FAILURE, UNSPECIFIED CKD STAGE: Primary | ICD-10-CM

## 2023-08-04 LAB
ERYTHROCYTE [DISTWIDTH] IN BLOOD BY AUTOMATED COUNT: 12.6 % (ref 11.5–15)
HCT VFR BLD AUTO: 33.4 % (ref 37–54)
HGB BLD-MCNC: 10.8 G/DL (ref 12.5–16.5)
MCH RBC QN AUTO: 31.4 PG (ref 26–35)
MCHC RBC AUTO-ENTMCNC: 32.3 G/DL (ref 32–34.5)
MCV RBC AUTO: 97.1 FL (ref 80–99.9)
PLATELET # BLD AUTO: 208 K/UL (ref 130–450)
PMV BLD AUTO: 9.8 FL (ref 7–12)
RBC # BLD AUTO: 3.44 M/UL (ref 3.8–5.8)
WBC OTHER # BLD: 6.6 K/UL (ref 4.5–11.5)

## 2023-08-04 PROCEDURE — 6360000002 HC RX W HCPCS: Performed by: INTERNAL MEDICINE

## 2023-08-04 PROCEDURE — 96372 THER/PROPH/DIAG INJ SC/IM: CPT

## 2023-08-04 PROCEDURE — 85027 COMPLETE CBC AUTOMATED: CPT

## 2023-08-04 PROCEDURE — 36415 COLL VENOUS BLD VENIPUNCTURE: CPT

## 2023-08-04 RX ADMIN — DARBEPOETIN ALFA 100 MCG: 100 INJECTION, SOLUTION INTRAVENOUS; SUBCUTANEOUS at 13:54

## 2023-08-04 ASSESSMENT — PAIN DESCRIPTION - LOCATION: LOCATION: LEG

## 2023-08-04 ASSESSMENT — PAIN SCALES - GENERAL: PAINLEVEL_OUTOF10: 7

## 2023-08-04 ASSESSMENT — PAIN DESCRIPTION - ORIENTATION: ORIENTATION: LEFT

## 2023-08-11 ENCOUNTER — HOSPITAL ENCOUNTER (OUTPATIENT)
Dept: INFUSION THERAPY | Age: 83
Setting detail: INFUSION SERIES
Discharge: HOME | End: 2023-08-11
Payer: MEDICARE

## 2023-08-11 VITALS
OXYGEN SATURATION: 96 % | TEMPERATURE: 97.7 F | SYSTOLIC BLOOD PRESSURE: 167 MMHG | HEIGHT: 71 IN | BODY MASS INDEX: 30.8 KG/M2 | WEIGHT: 220 LBS | DIASTOLIC BLOOD PRESSURE: 74 MMHG | RESPIRATION RATE: 16 BRPM | HEART RATE: 67 BPM

## 2023-08-11 DIAGNOSIS — N18.9 ANEMIA OF CHRONIC RENAL FAILURE, UNSPECIFIED CKD STAGE: Primary | ICD-10-CM

## 2023-08-11 DIAGNOSIS — D63.1 ANEMIA OF CHRONIC RENAL FAILURE, UNSPECIFIED CKD STAGE: Primary | ICD-10-CM

## 2023-08-11 LAB
ERYTHROCYTE [DISTWIDTH] IN BLOOD BY AUTOMATED COUNT: 13.1 % (ref 11.5–15)
HCT VFR BLD AUTO: 33.5 % (ref 37–54)
HGB BLD-MCNC: 10.8 G/DL (ref 12.5–16.5)
MCH RBC QN AUTO: 31.5 PG (ref 26–35)
MCHC RBC AUTO-ENTMCNC: 32.2 G/DL (ref 32–34.5)
MCV RBC AUTO: 97.7 FL (ref 80–99.9)
PLATELET # BLD AUTO: 206 K/UL (ref 130–450)
PMV BLD AUTO: 9.2 FL (ref 7–12)
RBC # BLD AUTO: 3.43 M/UL (ref 3.8–5.8)
WBC OTHER # BLD: 6 K/UL (ref 4.5–11.5)

## 2023-08-11 PROCEDURE — 96372 THER/PROPH/DIAG INJ SC/IM: CPT

## 2023-08-11 PROCEDURE — 6360000002 HC RX W HCPCS: Performed by: INTERNAL MEDICINE

## 2023-08-11 PROCEDURE — 85027 COMPLETE CBC AUTOMATED: CPT

## 2023-08-11 PROCEDURE — 36415 COLL VENOUS BLD VENIPUNCTURE: CPT

## 2023-08-11 RX ADMIN — DARBEPOETIN ALFA 100 MCG: 100 INJECTION, SOLUTION INTRAVENOUS; SUBCUTANEOUS at 13:42

## 2023-08-11 ASSESSMENT — PAIN SCALES - GENERAL: PAINLEVEL_OUTOF10: 0

## 2023-08-21 ENCOUNTER — HOSPITAL ENCOUNTER (OUTPATIENT)
Dept: INFUSION THERAPY | Age: 83
Setting detail: INFUSION SERIES
Discharge: HOME | End: 2023-08-21
Payer: MEDICARE

## 2023-08-21 VITALS
DIASTOLIC BLOOD PRESSURE: 73 MMHG | HEIGHT: 71 IN | BODY MASS INDEX: 30.8 KG/M2 | SYSTOLIC BLOOD PRESSURE: 160 MMHG | HEART RATE: 69 BPM | OXYGEN SATURATION: 93 % | WEIGHT: 220 LBS | TEMPERATURE: 97.9 F | RESPIRATION RATE: 16 BRPM

## 2023-08-21 LAB
ALBUMIN SERPL-MCNC: 4 G/DL (ref 3.5–5.2)
ALP SERPL-CCNC: 99 U/L (ref 40–129)
ALT SERPL-CCNC: 10 U/L (ref 0–40)
ANION GAP SERPL CALCULATED.3IONS-SCNC: 10 MMOL/L (ref 7–16)
AST SERPL-CCNC: 16 U/L (ref 0–39)
BILIRUB SERPL-MCNC: 0.3 MG/DL (ref 0–1.2)
BUN SERPL-MCNC: 37 MG/DL (ref 6–23)
CALCIUM SERPL-MCNC: 9.1 MG/DL (ref 8.6–10.2)
CHLORIDE SERPL-SCNC: 106 MMOL/L (ref 98–107)
CO2 SERPL-SCNC: 22 MMOL/L (ref 22–29)
CREAT SERPL-MCNC: 2 MG/DL (ref 0.7–1.2)
ERYTHROCYTE [DISTWIDTH] IN BLOOD BY AUTOMATED COUNT: 13.8 % (ref 11.5–15)
FERRITIN SERPL-MCNC: 48 NG/ML
GFR SERPL CREATININE-BSD FRML MDRD: 33 ML/MIN/1.73M2
GLUCOSE SERPL-MCNC: 129 MG/DL (ref 74–99)
HCT VFR BLD AUTO: 38 % (ref 37–54)
HGB BLD-MCNC: 11.8 G/DL (ref 12.5–16.5)
IRON SATN MFR SERPL: 41 % (ref 20–55)
IRON SERPL-MCNC: 115 UG/DL (ref 59–158)
MCH RBC QN AUTO: 30.8 PG (ref 26–35)
MCHC RBC AUTO-ENTMCNC: 31.1 G/DL (ref 32–34.5)
MCV RBC AUTO: 99.2 FL (ref 80–99.9)
PHOSPHATE SERPL-MCNC: 3.5 MG/DL (ref 2.5–4.5)
PLATELET # BLD AUTO: 208 K/UL (ref 130–450)
PMV BLD AUTO: 9.6 FL (ref 7–12)
POTASSIUM SERPL-SCNC: 5.1 MMOL/L (ref 3.5–5)
PROT SERPL-MCNC: 7 G/DL (ref 6.4–8.3)
RBC # BLD AUTO: 3.83 M/UL (ref 3.8–5.8)
SODIUM SERPL-SCNC: 138 MMOL/L (ref 132–146)
TIBC SERPL-MCNC: 280 UG/DL (ref 250–450)
WBC OTHER # BLD: 5.9 K/UL (ref 4.5–11.5)

## 2023-08-21 PROCEDURE — 84100 ASSAY OF PHOSPHORUS: CPT

## 2023-08-21 PROCEDURE — 82728 ASSAY OF FERRITIN: CPT

## 2023-08-21 PROCEDURE — 80053 COMPREHEN METABOLIC PANEL: CPT

## 2023-08-21 PROCEDURE — 85027 COMPLETE CBC AUTOMATED: CPT

## 2023-08-21 PROCEDURE — 36415 COLL VENOUS BLD VENIPUNCTURE: CPT

## 2023-08-21 PROCEDURE — 83550 IRON BINDING TEST: CPT

## 2023-08-21 PROCEDURE — 83540 ASSAY OF IRON: CPT

## 2023-09-01 ENCOUNTER — HOSPITAL ENCOUNTER (OUTPATIENT)
Dept: INFUSION THERAPY | Age: 83
Setting detail: INFUSION SERIES
Discharge: HOME | End: 2023-09-01
Payer: MEDICARE

## 2023-09-01 VITALS
TEMPERATURE: 97.9 F | DIASTOLIC BLOOD PRESSURE: 86 MMHG | OXYGEN SATURATION: 94 % | HEART RATE: 69 BPM | RESPIRATION RATE: 16 BRPM | HEIGHT: 71 IN | SYSTOLIC BLOOD PRESSURE: 179 MMHG | WEIGHT: 220 LBS | BODY MASS INDEX: 30.8 KG/M2

## 2023-09-01 LAB
ERYTHROCYTE [DISTWIDTH] IN BLOOD BY AUTOMATED COUNT: 13.2 % (ref 11.5–15)
HCT VFR BLD AUTO: 38.8 % (ref 37–54)
HGB BLD-MCNC: 12.4 G/DL (ref 12.5–16.5)
MCH RBC QN AUTO: 31.4 PG (ref 26–35)
MCHC RBC AUTO-ENTMCNC: 32 G/DL (ref 32–34.5)
MCV RBC AUTO: 98.2 FL (ref 80–99.9)
PLATELET # BLD AUTO: 225 K/UL (ref 130–450)
PMV BLD AUTO: 9.8 FL (ref 7–12)
RBC # BLD AUTO: 3.95 M/UL (ref 3.8–5.8)
WBC OTHER # BLD: 7.1 K/UL (ref 4.5–11.5)

## 2023-09-01 PROCEDURE — 36415 COLL VENOUS BLD VENIPUNCTURE: CPT

## 2023-09-01 PROCEDURE — 85027 COMPLETE CBC AUTOMATED: CPT

## 2023-09-08 ENCOUNTER — HOSPITAL ENCOUNTER (OUTPATIENT)
Dept: INFUSION THERAPY | Age: 83
Setting detail: INFUSION SERIES
Discharge: HOME | End: 2023-09-08
Payer: MEDICARE

## 2023-09-08 VITALS
HEIGHT: 71 IN | TEMPERATURE: 97.2 F | WEIGHT: 220 LBS | OXYGEN SATURATION: 98 % | BODY MASS INDEX: 30.8 KG/M2 | SYSTOLIC BLOOD PRESSURE: 168 MMHG | HEART RATE: 65 BPM | RESPIRATION RATE: 16 BRPM | DIASTOLIC BLOOD PRESSURE: 80 MMHG

## 2023-09-08 LAB
ERYTHROCYTE [DISTWIDTH] IN BLOOD BY AUTOMATED COUNT: 13.1 % (ref 11.5–15)
HCT VFR BLD AUTO: 35.8 % (ref 37–54)
HGB BLD-MCNC: 11.5 G/DL (ref 12.5–16.5)
MCH RBC QN AUTO: 31.4 PG (ref 26–35)
MCHC RBC AUTO-ENTMCNC: 32.1 G/DL (ref 32–34.5)
MCV RBC AUTO: 97.8 FL (ref 80–99.9)
PLATELET # BLD AUTO: 208 K/UL (ref 130–450)
PMV BLD AUTO: 10.4 FL (ref 7–12)
RBC # BLD AUTO: 3.66 M/UL (ref 3.8–5.8)
WBC OTHER # BLD: 6.5 K/UL (ref 4.5–11.5)

## 2023-09-08 PROCEDURE — 36415 COLL VENOUS BLD VENIPUNCTURE: CPT

## 2023-09-08 PROCEDURE — 85027 COMPLETE CBC AUTOMATED: CPT

## 2023-09-15 ENCOUNTER — HOSPITAL ENCOUNTER (OUTPATIENT)
Dept: INFUSION THERAPY | Age: 83
Setting detail: INFUSION SERIES
Discharge: HOME | End: 2023-09-15

## 2023-09-20 ENCOUNTER — HOSPITAL ENCOUNTER (OUTPATIENT)
Dept: INFUSION THERAPY | Age: 83
Setting detail: INFUSION SERIES
Discharge: HOME | End: 2023-09-20
Payer: MEDICARE

## 2023-09-20 VITALS
DIASTOLIC BLOOD PRESSURE: 88 MMHG | SYSTOLIC BLOOD PRESSURE: 160 MMHG | RESPIRATION RATE: 18 BRPM | OXYGEN SATURATION: 100 % | WEIGHT: 220 LBS | BODY MASS INDEX: 30.8 KG/M2 | HEIGHT: 71 IN | HEART RATE: 77 BPM | TEMPERATURE: 97.6 F

## 2023-09-20 LAB
ERYTHROCYTE [DISTWIDTH] IN BLOOD BY AUTOMATED COUNT: 12.8 % (ref 11.5–15)
HCT VFR BLD AUTO: 37.1 % (ref 37–54)
HGB BLD-MCNC: 12 G/DL (ref 12.5–16.5)
MCH RBC QN AUTO: 31.3 PG (ref 26–35)
MCHC RBC AUTO-ENTMCNC: 32.3 G/DL (ref 32–34.5)
MCV RBC AUTO: 96.9 FL (ref 80–99.9)
PLATELET # BLD AUTO: 211 K/UL (ref 130–450)
PMV BLD AUTO: 9.8 FL (ref 7–12)
RBC # BLD AUTO: 3.83 M/UL (ref 3.8–5.8)
WBC OTHER # BLD: 7.4 K/UL (ref 4.5–11.5)

## 2023-09-20 PROCEDURE — 85027 COMPLETE CBC AUTOMATED: CPT

## 2023-09-20 PROCEDURE — 36415 COLL VENOUS BLD VENIPUNCTURE: CPT

## 2023-09-20 ASSESSMENT — PAIN DESCRIPTION - PAIN TYPE: TYPE: CHRONIC PAIN

## 2023-09-20 ASSESSMENT — PAIN DESCRIPTION - ONSET: ONSET: ON-GOING

## 2023-09-20 ASSESSMENT — PAIN SCALES - GENERAL: PAINLEVEL_OUTOF10: 7

## 2023-09-20 ASSESSMENT — PAIN DESCRIPTION - ORIENTATION: ORIENTATION: LEFT

## 2023-09-20 ASSESSMENT — PAIN DESCRIPTION - FREQUENCY: FREQUENCY: CONTINUOUS

## 2023-09-20 ASSESSMENT — PAIN DESCRIPTION - DESCRIPTORS: DESCRIPTORS: ACHING

## 2023-09-20 ASSESSMENT — PAIN - FUNCTIONAL ASSESSMENT: PAIN_FUNCTIONAL_ASSESSMENT: PREVENTS OR INTERFERES SOME ACTIVE ACTIVITIES AND ADLS

## 2023-09-20 ASSESSMENT — PAIN DESCRIPTION - LOCATION: LOCATION: KNEE

## 2023-09-20 NOTE — PROGRESS NOTES
Labs drawn HGB 12.0 , aranesp injection not needed. Next appointment made and patient instructed on lab draw and procedure for next injection.

## 2023-09-22 ENCOUNTER — APPOINTMENT (OUTPATIENT)
Dept: INFUSION THERAPY | Age: 83
End: 2023-09-22
Payer: MEDICARE

## 2023-10-06 ENCOUNTER — HOSPITAL ENCOUNTER (OUTPATIENT)
Dept: INFUSION THERAPY | Age: 83
Setting detail: INFUSION SERIES
Discharge: HOME | End: 2023-10-06

## 2023-10-20 ENCOUNTER — HOSPITAL ENCOUNTER (OUTPATIENT)
Dept: INFUSION THERAPY | Age: 83
Setting detail: INFUSION SERIES
Discharge: HOME | End: 2023-10-20

## 2023-11-17 ENCOUNTER — HOSPITAL ENCOUNTER (OUTPATIENT)
Dept: INFUSION THERAPY | Age: 83
Setting detail: INFUSION SERIES
Discharge: HOME | End: 2023-11-17
Payer: MEDICARE

## 2023-11-17 VITALS
RESPIRATION RATE: 18 BRPM | TEMPERATURE: 97.2 F | HEART RATE: 70 BPM | HEIGHT: 71 IN | BODY MASS INDEX: 30.8 KG/M2 | DIASTOLIC BLOOD PRESSURE: 81 MMHG | WEIGHT: 220 LBS | OXYGEN SATURATION: 94 % | SYSTOLIC BLOOD PRESSURE: 190 MMHG

## 2023-11-17 DIAGNOSIS — N18.9 ANEMIA OF CHRONIC RENAL FAILURE, UNSPECIFIED CKD STAGE: Primary | ICD-10-CM

## 2023-11-17 DIAGNOSIS — D63.1 ANEMIA OF CHRONIC RENAL FAILURE, UNSPECIFIED CKD STAGE: Primary | ICD-10-CM

## 2023-11-17 LAB
ALBUMIN SERPL-MCNC: 3.9 G/DL (ref 3.5–5.2)
ALP SERPL-CCNC: 98 U/L (ref 40–129)
ALT SERPL-CCNC: 7 U/L (ref 0–40)
ANION GAP SERPL CALCULATED.3IONS-SCNC: 10 MMOL/L (ref 7–16)
AST SERPL-CCNC: 14 U/L (ref 0–39)
BILIRUB SERPL-MCNC: 0.2 MG/DL (ref 0–1.2)
BUN SERPL-MCNC: 43 MG/DL (ref 6–23)
CALCIUM SERPL-MCNC: 8.7 MG/DL (ref 8.6–10.2)
CHLORIDE SERPL-SCNC: 104 MMOL/L (ref 98–107)
CO2 SERPL-SCNC: 24 MMOL/L (ref 22–29)
CREAT SERPL-MCNC: 2 MG/DL (ref 0.7–1.2)
ERYTHROCYTE [DISTWIDTH] IN BLOOD BY AUTOMATED COUNT: 13 % (ref 11.5–15)
FERRITIN SERPL-MCNC: 43 NG/ML
GFR SERPL CREATININE-BSD FRML MDRD: 33 ML/MIN/1.73M2
GLUCOSE SERPL-MCNC: 209 MG/DL (ref 74–99)
HCT VFR BLD AUTO: 31 % (ref 37–54)
HGB BLD-MCNC: 10.1 G/DL (ref 12.5–16.5)
IRON SATN MFR SERPL: 25 % (ref 20–55)
IRON SERPL-MCNC: 62 UG/DL (ref 59–158)
MCH RBC QN AUTO: 32.6 PG (ref 26–35)
MCHC RBC AUTO-ENTMCNC: 32.6 G/DL (ref 32–34.5)
MCV RBC AUTO: 100 FL (ref 80–99.9)
PHOSPHATE SERPL-MCNC: 3.8 MG/DL (ref 2.5–4.5)
PLATELET # BLD AUTO: 212 K/UL (ref 130–450)
PMV BLD AUTO: 9.9 FL (ref 7–12)
POTASSIUM SERPL-SCNC: 4.8 MMOL/L (ref 3.5–5)
PROT SERPL-MCNC: 6.9 G/DL (ref 6.4–8.3)
RBC # BLD AUTO: 3.1 M/UL (ref 3.8–5.8)
SODIUM SERPL-SCNC: 138 MMOL/L (ref 132–146)
TIBC SERPL-MCNC: 253 UG/DL (ref 250–450)
WBC OTHER # BLD: 6.6 K/UL (ref 4.5–11.5)

## 2023-11-17 PROCEDURE — 85027 COMPLETE CBC AUTOMATED: CPT

## 2023-11-17 PROCEDURE — 84100 ASSAY OF PHOSPHORUS: CPT

## 2023-11-17 PROCEDURE — 80053 COMPREHEN METABOLIC PANEL: CPT

## 2023-11-17 PROCEDURE — 36415 COLL VENOUS BLD VENIPUNCTURE: CPT

## 2023-11-17 PROCEDURE — 83550 IRON BINDING TEST: CPT

## 2023-11-17 PROCEDURE — 82728 ASSAY OF FERRITIN: CPT

## 2023-11-17 PROCEDURE — 83540 ASSAY OF IRON: CPT

## 2023-11-17 PROCEDURE — 6360000002 HC RX W HCPCS: Performed by: INTERNAL MEDICINE

## 2023-11-17 PROCEDURE — 96372 THER/PROPH/DIAG INJ SC/IM: CPT

## 2023-11-17 RX ADMIN — DARBEPOETIN ALFA 100 MCG: 100 INJECTION, SOLUTION INTRAVENOUS; SUBCUTANEOUS at 13:44

## 2023-12-18 ENCOUNTER — HOSPITAL ENCOUNTER (OUTPATIENT)
Dept: INFUSION THERAPY | Age: 83
Setting detail: INFUSION SERIES
Discharge: HOME | End: 2023-12-18

## 2023-12-18 NOTE — PROGRESS NOTES
Pt noted to have labs drawn from [facility] and HGB 11.3 so no need to be redrawn today due to ordered monthly

## 2024-01-10 DIAGNOSIS — N18.4 CHRONIC KIDNEY DISEASE, STAGE 4 (SEVERE) (HCC): Primary | ICD-10-CM

## 2024-01-15 ENCOUNTER — HOSPITAL ENCOUNTER (OUTPATIENT)
Dept: INFUSION THERAPY | Age: 84
Setting detail: INFUSION SERIES
Discharge: HOME | End: 2024-01-15
Payer: MEDICARE

## 2024-01-15 VITALS
OXYGEN SATURATION: 97 % | HEIGHT: 71 IN | TEMPERATURE: 97.6 F | RESPIRATION RATE: 18 BRPM | HEART RATE: 69 BPM | BODY MASS INDEX: 30.7 KG/M2

## 2024-01-15 LAB
ALBUMIN SERPL-MCNC: 4 G/DL (ref 3.5–5.2)
ALP SERPL-CCNC: 83 U/L (ref 40–129)
ALT SERPL-CCNC: 11 U/L (ref 0–40)
ANION GAP SERPL CALCULATED.3IONS-SCNC: 11 MMOL/L (ref 7–16)
AST SERPL-CCNC: 19 U/L (ref 0–39)
BILIRUB SERPL-MCNC: 0.2 MG/DL (ref 0–1.2)
BUN SERPL-MCNC: 51 MG/DL (ref 6–23)
CALCIUM SERPL-MCNC: 8.7 MG/DL (ref 8.6–10.2)
CHLORIDE SERPL-SCNC: 108 MMOL/L (ref 98–107)
CO2 SERPL-SCNC: 20 MMOL/L (ref 22–29)
CREAT SERPL-MCNC: 2.2 MG/DL (ref 0.7–1.2)
ERYTHROCYTE [DISTWIDTH] IN BLOOD BY AUTOMATED COUNT: 12.5 % (ref 11.5–15)
GFR SERPL CREATININE-BSD FRML MDRD: 29 ML/MIN/1.73M2
GLUCOSE SERPL-MCNC: 106 MG/DL (ref 74–99)
HCT VFR BLD AUTO: 34.1 % (ref 37–54)
HGB BLD-MCNC: 11 G/DL (ref 12.5–16.5)
MCH RBC QN AUTO: 32.1 PG (ref 26–35)
MCHC RBC AUTO-ENTMCNC: 32.3 G/DL (ref 32–34.5)
MCV RBC AUTO: 99.4 FL (ref 80–99.9)
PHOSPHATE SERPL-MCNC: 3.7 MG/DL (ref 2.5–4.5)
PLATELET # BLD AUTO: 220 K/UL (ref 130–450)
PMV BLD AUTO: 9.9 FL (ref 7–12)
POTASSIUM SERPL-SCNC: 4.8 MMOL/L (ref 3.5–5)
PROT SERPL-MCNC: 6.9 G/DL (ref 6.4–8.3)
RBC # BLD AUTO: 3.43 M/UL (ref 3.8–5.8)
SODIUM SERPL-SCNC: 139 MMOL/L (ref 132–146)
WBC OTHER # BLD: 6 K/UL (ref 4.5–11.5)

## 2024-01-15 PROCEDURE — 36415 COLL VENOUS BLD VENIPUNCTURE: CPT

## 2024-01-15 PROCEDURE — 84100 ASSAY OF PHOSPHORUS: CPT

## 2024-01-15 PROCEDURE — 85027 COMPLETE CBC AUTOMATED: CPT

## 2024-01-15 PROCEDURE — 80053 COMPREHEN METABOLIC PANEL: CPT

## 2024-01-15 NOTE — PROGRESS NOTES
Labs drawn HGB 11.0 , aranesp injection not needed. Next appointment made and patient instructed on lab draw and procedure for next injection.

## 2024-02-12 ENCOUNTER — HOSPITAL ENCOUNTER (OUTPATIENT)
Dept: INFUSION THERAPY | Age: 84
Setting detail: INFUSION SERIES
Discharge: HOME | End: 2024-02-12
Payer: MEDICARE

## 2024-02-12 VITALS
BODY MASS INDEX: 30.7 KG/M2 | HEART RATE: 67 BPM | TEMPERATURE: 97.7 F | DIASTOLIC BLOOD PRESSURE: 93 MMHG | OXYGEN SATURATION: 97 % | SYSTOLIC BLOOD PRESSURE: 192 MMHG | RESPIRATION RATE: 16 BRPM | WEIGHT: 220 LBS

## 2024-02-12 PROBLEM — D63.1 ANEMIA OF CHRONIC RENAL FAILURE, STAGE 4 (SEVERE) (HCC): Status: ACTIVE | Noted: 2024-01-10

## 2024-02-12 LAB
ALBUMIN SERPL-MCNC: 4.2 G/DL (ref 3.5–5.2)
ALP SERPL-CCNC: 96 U/L (ref 40–129)
ALT SERPL-CCNC: 11 U/L (ref 0–40)
ANION GAP SERPL CALCULATED.3IONS-SCNC: 10 MMOL/L (ref 7–16)
AST SERPL-CCNC: 18 U/L (ref 0–39)
BILIRUB SERPL-MCNC: 0.3 MG/DL (ref 0–1.2)
BUN SERPL-MCNC: 38 MG/DL (ref 6–23)
CALCIUM SERPL-MCNC: 9.4 MG/DL (ref 8.6–10.2)
CHLORIDE SERPL-SCNC: 106 MMOL/L (ref 98–107)
CO2 SERPL-SCNC: 23 MMOL/L (ref 22–29)
CREAT SERPL-MCNC: 1.8 MG/DL (ref 0.7–1.2)
ERYTHROCYTE [DISTWIDTH] IN BLOOD BY AUTOMATED COUNT: 12.7 % (ref 11.5–15)
FERRITIN SERPL-MCNC: 54 NG/ML
GFR SERPL CREATININE-BSD FRML MDRD: 37 ML/MIN/1.73M2
GLUCOSE SERPL-MCNC: 155 MG/DL (ref 74–99)
HCT VFR BLD AUTO: 35.9 % (ref 37–54)
HGB BLD-MCNC: 11.4 G/DL (ref 12.5–16.5)
IRON SATN MFR SERPL: 37 % (ref 20–55)
IRON SERPL-MCNC: 102 UG/DL (ref 59–158)
MCH RBC QN AUTO: 31.6 PG (ref 26–35)
MCHC RBC AUTO-ENTMCNC: 31.8 G/DL (ref 32–34.5)
MCV RBC AUTO: 99.4 FL (ref 80–99.9)
PHOSPHATE SERPL-MCNC: 3.3 MG/DL (ref 2.5–4.5)
PLATELET # BLD AUTO: 218 K/UL (ref 130–450)
PMV BLD AUTO: 10.3 FL (ref 7–12)
POTASSIUM SERPL-SCNC: 5 MMOL/L (ref 3.5–5)
PROT SERPL-MCNC: 7.4 G/DL (ref 6.4–8.3)
RBC # BLD AUTO: 3.61 M/UL (ref 3.8–5.8)
SODIUM SERPL-SCNC: 139 MMOL/L (ref 132–146)
TIBC SERPL-MCNC: 275 UG/DL (ref 250–450)
WBC OTHER # BLD: 6.1 K/UL (ref 4.5–11.5)

## 2024-02-12 PROCEDURE — 36415 COLL VENOUS BLD VENIPUNCTURE: CPT

## 2024-02-12 PROCEDURE — 83550 IRON BINDING TEST: CPT

## 2024-02-12 PROCEDURE — 82728 ASSAY OF FERRITIN: CPT

## 2024-02-12 PROCEDURE — 83540 ASSAY OF IRON: CPT

## 2024-02-12 PROCEDURE — 84100 ASSAY OF PHOSPHORUS: CPT

## 2024-02-12 PROCEDURE — 85027 COMPLETE CBC AUTOMATED: CPT

## 2024-02-12 PROCEDURE — 80053 COMPREHEN METABOLIC PANEL: CPT

## 2024-02-12 ASSESSMENT — PAIN DESCRIPTION - LOCATION: LOCATION: GENERALIZED

## 2024-02-12 ASSESSMENT — PAIN SCALES - GENERAL: PAINLEVEL_OUTOF10: 5

## 2024-02-12 NOTE — PROGRESS NOTES
Right lateral antecubital  venipuncture  for  cbc cmp phos iron panel  ferritin    pressure applied to site  ban aid applied and specimens labeled and transported to lab  post identity confirmed

## 2024-03-12 ENCOUNTER — OFFICE VISIT (OUTPATIENT)
Dept: PODIATRY | Age: 84
End: 2024-03-12
Payer: MEDICARE

## 2024-03-12 VITALS — BODY MASS INDEX: 31.5 KG/M2 | HEIGHT: 70 IN | WEIGHT: 220 LBS

## 2024-03-12 DIAGNOSIS — Z79.4 TYPE 2 DIABETES MELLITUS WITHOUT COMPLICATION, WITH LONG-TERM CURRENT USE OF INSULIN (HCC): Primary | ICD-10-CM

## 2024-03-12 DIAGNOSIS — E11.9 TYPE 2 DIABETES MELLITUS WITHOUT COMPLICATION, WITH LONG-TERM CURRENT USE OF INSULIN (HCC): Primary | ICD-10-CM

## 2024-03-12 DIAGNOSIS — B35.1 TINEA UNGUIUM: ICD-10-CM

## 2024-03-12 DIAGNOSIS — G60.8 HEREDITARY SENSORY NEUROPATHY: ICD-10-CM

## 2024-03-12 PROCEDURE — G8427 DOCREV CUR MEDS BY ELIG CLIN: HCPCS | Performed by: PODIATRIST

## 2024-03-12 PROCEDURE — 11721 DEBRIDE NAIL 6 OR MORE: CPT | Performed by: PODIATRIST

## 2024-03-12 PROCEDURE — 99203 OFFICE O/P NEW LOW 30 MIN: CPT | Performed by: PODIATRIST

## 2024-03-12 PROCEDURE — G8417 CALC BMI ABV UP PARAM F/U: HCPCS | Performed by: PODIATRIST

## 2024-03-12 PROCEDURE — G8484 FLU IMMUNIZE NO ADMIN: HCPCS | Performed by: PODIATRIST

## 2024-03-12 PROCEDURE — 1036F TOBACCO NON-USER: CPT | Performed by: PODIATRIST

## 2024-03-12 PROCEDURE — 1123F ACP DISCUSS/DSCN MKR DOCD: CPT | Performed by: PODIATRIST

## 2024-03-12 NOTE — PROGRESS NOTES
diabetes mellitus without complication, with long-term current use of insulin ([facility])  -     Diabetic Foot Exam    Tinea unguium    Hereditary sensory neuropathy          New referral diabetic foot ankle exam.    Formal debridement toenails 1 through 5 right and left with manual debridement for thickness and overall length.  Family member present throughout entire visit.  Avoid barefoot.  Follow-up 2 months.      Return in about 2 months (around 5/12/2024).     Seen By:  Suresh Bonilla DPM      Document was created using voice recognition software.  Note was reviewed however may contain grammatical errors.

## 2024-04-25 ENCOUNTER — HOSPITAL ENCOUNTER (OUTPATIENT)
Dept: INFUSION THERAPY | Age: 84
Setting detail: INFUSION SERIES
Discharge: HOME | End: 2024-04-25
Payer: MEDICARE

## 2024-04-25 VITALS
BODY MASS INDEX: 30.8 KG/M2 | SYSTOLIC BLOOD PRESSURE: 185 MMHG | RESPIRATION RATE: 18 BRPM | OXYGEN SATURATION: 99 % | WEIGHT: 220 LBS | HEIGHT: 71 IN | DIASTOLIC BLOOD PRESSURE: 92 MMHG | HEART RATE: 77 BPM | TEMPERATURE: 97.3 F

## 2024-04-25 DIAGNOSIS — D63.1 ANEMIA OF CHRONIC RENAL FAILURE, STAGE 4 (SEVERE) (HCC): Primary | ICD-10-CM

## 2024-04-25 DIAGNOSIS — N18.4 ANEMIA OF CHRONIC RENAL FAILURE, STAGE 4 (SEVERE) (HCC): Primary | ICD-10-CM

## 2024-04-25 LAB
25(OH)D3 SERPL-MCNC: 29.2 NG/ML (ref 30–100)
ALBUMIN SERPL-MCNC: 4.1 G/DL (ref 3.5–5.2)
ALP SERPL-CCNC: 101 U/L (ref 40–129)
ALT SERPL-CCNC: 13 U/L (ref 0–40)
ANION GAP SERPL CALCULATED.3IONS-SCNC: 11 MMOL/L (ref 7–16)
AST SERPL-CCNC: 19 U/L (ref 0–39)
BACTERIA URNS QL MICRO: ABNORMAL
BILIRUB SERPL-MCNC: 0.3 MG/DL (ref 0–1.2)
BILIRUB UR QL STRIP: NEGATIVE
BUN SERPL-MCNC: 47 MG/DL (ref 6–23)
CALCIUM SERPL-MCNC: 8.9 MG/DL (ref 8.6–10.2)
CHLORIDE SERPL-SCNC: 102 MMOL/L (ref 98–107)
CLARITY UR: CLEAR
CO2 SERPL-SCNC: 21 MMOL/L (ref 22–29)
COLOR UR: YELLOW
CREAT SERPL-MCNC: 2.1 MG/DL (ref 0.7–1.2)
CREAT UR-MCNC: 54.9 MG/DL (ref 40–278)
ERYTHROCYTE [DISTWIDTH] IN BLOOD BY AUTOMATED COUNT: 12.7 % (ref 11.5–15)
GFR SERPL CREATININE-BSD FRML MDRD: 30 ML/MIN/1.73M2
GLUCOSE SERPL-MCNC: 211 MG/DL (ref 74–99)
GLUCOSE UR STRIP-MCNC: NEGATIVE MG/DL
HCT VFR BLD AUTO: 33.6 % (ref 37–54)
HCT VFR BLD AUTO: 33.8 % (ref 37–54)
HGB BLD-MCNC: 10.5 G/DL (ref 12.5–16.5)
HGB BLD-MCNC: 10.6 G/DL (ref 12.5–16.5)
HGB UR QL STRIP.AUTO: NEGATIVE
KETONES UR STRIP-MCNC: NEGATIVE MG/DL
LEUKOCYTE ESTERASE UR QL STRIP: ABNORMAL
MCH RBC QN AUTO: 31.6 PG (ref 26–35)
MCHC RBC AUTO-ENTMCNC: 31.5 G/DL (ref 32–34.5)
MCV RBC AUTO: 100.3 FL (ref 80–99.9)
NITRITE UR QL STRIP: NEGATIVE
PH UR STRIP: 5.5 [PH] (ref 5–9)
PHOSPHATE SERPL-MCNC: 3.4 MG/DL (ref 2.5–4.5)
PLATELET # BLD AUTO: 265 K/UL (ref 130–450)
PMV BLD AUTO: 10.2 FL (ref 7–12)
POTASSIUM SERPL-SCNC: 4.5 MMOL/L (ref 3.5–5)
PROT SERPL-MCNC: 7.3 G/DL (ref 6.4–8.3)
PROT UR STRIP-MCNC: 30 MG/DL
PTH-INTACT SERPL-MCNC: 108.8 PG/ML (ref 15–65)
RBC # BLD AUTO: 3.35 M/UL (ref 3.8–5.8)
RBC #/AREA URNS HPF: ABNORMAL /HPF
SODIUM SERPL-SCNC: 134 MMOL/L (ref 132–146)
SODIUM UR-SCNC: 115 MMOL/L
SP GR UR STRIP: 1.02 (ref 1–1.03)
UROBILINOGEN UR STRIP-ACNC: 0.2 EU/DL (ref 0–1)
WBC #/AREA URNS HPF: ABNORMAL /HPF
WBC OTHER # BLD: 9.8 K/UL (ref 4.5–11.5)

## 2024-04-25 PROCEDURE — 85018 HEMOGLOBIN: CPT

## 2024-04-25 PROCEDURE — 82570 ASSAY OF URINE CREATININE: CPT

## 2024-04-25 PROCEDURE — 80053 COMPREHEN METABOLIC PANEL: CPT

## 2024-04-25 PROCEDURE — 85014 HEMATOCRIT: CPT

## 2024-04-25 PROCEDURE — 84100 ASSAY OF PHOSPHORUS: CPT

## 2024-04-25 PROCEDURE — 6360000002 HC RX W HCPCS: Performed by: INTERNAL MEDICINE

## 2024-04-25 PROCEDURE — 84300 ASSAY OF URINE SODIUM: CPT

## 2024-04-25 PROCEDURE — 85027 COMPLETE CBC AUTOMATED: CPT

## 2024-04-25 PROCEDURE — 36415 COLL VENOUS BLD VENIPUNCTURE: CPT

## 2024-04-25 PROCEDURE — 83970 ASSAY OF PARATHORMONE: CPT

## 2024-04-25 PROCEDURE — 96372 THER/PROPH/DIAG INJ SC/IM: CPT

## 2024-04-25 PROCEDURE — 81001 URINALYSIS AUTO W/SCOPE: CPT

## 2024-04-25 PROCEDURE — 82306 VITAMIN D 25 HYDROXY: CPT

## 2024-04-25 RX ADMIN — DARBEPOETIN ALFA 100 MCG: 100 INJECTION, SOLUTION INTRAVENOUS; SUBCUTANEOUS at 13:51

## 2024-05-10 RX ORDER — SODIUM CHLORIDE 0.9 % (FLUSH) 0.9 %
5-40 SYRINGE (ML) INJECTION PRN
OUTPATIENT
Start: 2024-05-13

## 2024-05-10 RX ORDER — EPINEPHRINE 1 MG/ML
0.3 INJECTION, SOLUTION, CONCENTRATE INTRAVENOUS PRN
OUTPATIENT
Start: 2024-05-13

## 2024-05-10 RX ORDER — ALBUTEROL SULFATE 90 UG/1
4 AEROSOL, METERED RESPIRATORY (INHALATION) PRN
OUTPATIENT
Start: 2024-05-13

## 2024-05-10 RX ORDER — SODIUM CHLORIDE 9 MG/ML
INJECTION, SOLUTION INTRAVENOUS CONTINUOUS
OUTPATIENT
Start: 2024-05-13

## 2024-05-10 RX ORDER — ONDANSETRON 2 MG/ML
8 INJECTION INTRAMUSCULAR; INTRAVENOUS
OUTPATIENT
Start: 2024-05-13

## 2024-05-10 RX ORDER — DIPHENHYDRAMINE HYDROCHLORIDE 50 MG/ML
50 INJECTION INTRAMUSCULAR; INTRAVENOUS
OUTPATIENT
Start: 2024-05-13

## 2024-05-10 RX ORDER — SODIUM CHLORIDE 9 MG/ML
5-250 INJECTION, SOLUTION INTRAVENOUS PRN
OUTPATIENT
Start: 2024-05-13

## 2024-05-14 ENCOUNTER — PROCEDURE VISIT (OUTPATIENT)
Dept: PODIATRY | Age: 84
End: 2024-05-14
Payer: MEDICARE

## 2024-05-14 DIAGNOSIS — Z79.01 ENCOUNTER FOR CURRENT LONG-TERM USE OF ANTICOAGULANTS: ICD-10-CM

## 2024-05-14 DIAGNOSIS — L84 CORNS AND CALLOSITIES: ICD-10-CM

## 2024-05-14 DIAGNOSIS — Z79.4 TYPE 2 DIABETES MELLITUS WITHOUT COMPLICATION, WITH LONG-TERM CURRENT USE OF INSULIN (HCC): Primary | ICD-10-CM

## 2024-05-14 DIAGNOSIS — G60.8 HEREDITARY SENSORY NEUROPATHY: ICD-10-CM

## 2024-05-14 DIAGNOSIS — E11.9 TYPE 2 DIABETES MELLITUS WITHOUT COMPLICATION, WITH LONG-TERM CURRENT USE OF INSULIN (HCC): Primary | ICD-10-CM

## 2024-05-14 DIAGNOSIS — B35.1 TINEA UNGUIUM: ICD-10-CM

## 2024-05-14 PROCEDURE — 11721 DEBRIDE NAIL 6 OR MORE: CPT | Performed by: PODIATRIST

## 2024-05-14 PROCEDURE — 11056 PARNG/CUTG B9 HYPRKR LES 2-4: CPT | Performed by: PODIATRIST

## 2024-05-14 NOTE — PROGRESS NOTES
Eliquis  Formal debridement toenails 1 through 5 right and left with manual debridement for thickness and overall length.  Paring hyperkeratotic tissue first metatarsal bilateral.  Follow-up 2 months diabetic foot ankle exam      Return in about 2 months (around 7/14/2024).     Seen By:  Suresh Bonilla DPM      Document was created using voice recognition software.  Note was reviewed however may contain grammatical errors.

## 2024-05-23 ENCOUNTER — HOSPITAL ENCOUNTER (OUTPATIENT)
Dept: INFUSION THERAPY | Age: 84
Setting detail: INFUSION SERIES
Discharge: HOME | End: 2024-05-23

## 2024-05-23 NOTE — PROGRESS NOTES
Wife called in said she was sick and canceling the appointment for the patient and she will call back and reschedule

## 2024-05-24 ENCOUNTER — HOSPITAL ENCOUNTER (OUTPATIENT)
Dept: ULTRASOUND IMAGING | Age: 84
End: 2024-05-24
Attending: INTERNAL MEDICINE
Payer: MEDICARE

## 2024-05-24 DIAGNOSIS — N18.4 CHRONIC KIDNEY DISEASE, STAGE IV (SEVERE) (HCC): ICD-10-CM

## 2024-05-24 DIAGNOSIS — N18.6 ANEMIA IN END-STAGE RENAL DISEASE (HCC): ICD-10-CM

## 2024-05-24 DIAGNOSIS — D63.1 ANEMIA IN END-STAGE RENAL DISEASE (HCC): ICD-10-CM

## 2024-05-24 DIAGNOSIS — N25.81 HYPERPARATHYROIDISM DUE TO RENAL INSUFFICIENCY (HCC): ICD-10-CM

## 2024-05-24 DIAGNOSIS — E11.21 DM KIDNEY DISEASE (HCC): ICD-10-CM

## 2024-05-24 DIAGNOSIS — E87.5 HYPERKALEMIA: ICD-10-CM

## 2024-05-24 DIAGNOSIS — N18.9 CHRONIC KIDNEY DISEASE, UNSPECIFIED CKD STAGE: ICD-10-CM

## 2024-05-24 PROCEDURE — 76775 US EXAM ABDO BACK WALL LIM: CPT | Performed by: INTERNAL MEDICINE

## 2024-06-06 ENCOUNTER — HOSPITAL ENCOUNTER (OUTPATIENT)
Dept: INFUSION THERAPY | Age: 84
Setting detail: INFUSION SERIES
Discharge: HOME | End: 2024-06-06
Payer: MEDICARE

## 2024-06-06 VITALS
WEIGHT: 220 LBS | RESPIRATION RATE: 18 BRPM | SYSTOLIC BLOOD PRESSURE: 160 MMHG | TEMPERATURE: 97 F | HEART RATE: 58 BPM | DIASTOLIC BLOOD PRESSURE: 89 MMHG | HEIGHT: 71 IN | BODY MASS INDEX: 30.8 KG/M2 | OXYGEN SATURATION: 97 %

## 2024-06-06 DIAGNOSIS — N18.4 ANEMIA OF CHRONIC RENAL FAILURE, STAGE 4 (SEVERE) (HCC): Primary | ICD-10-CM

## 2024-06-06 DIAGNOSIS — D63.1 ANEMIA OF CHRONIC RENAL FAILURE, STAGE 4 (SEVERE) (HCC): Primary | ICD-10-CM

## 2024-06-06 LAB
ALBUMIN SERPL-MCNC: 4.1 G/DL (ref 3.5–5.2)
ALP SERPL-CCNC: 94 U/L (ref 40–129)
ALT SERPL-CCNC: 12 U/L (ref 0–40)
ANION GAP SERPL CALCULATED.3IONS-SCNC: 9 MMOL/L (ref 7–16)
AST SERPL-CCNC: 18 U/L (ref 0–39)
BILIRUB SERPL-MCNC: 0.3 MG/DL (ref 0–1.2)
BUN SERPL-MCNC: 48 MG/DL (ref 6–23)
CALCIUM SERPL-MCNC: 9.1 MG/DL (ref 8.6–10.2)
CHLORIDE SERPL-SCNC: 104 MMOL/L (ref 98–107)
CO2 SERPL-SCNC: 23 MMOL/L (ref 22–29)
CREAT SERPL-MCNC: 2.3 MG/DL (ref 0.7–1.2)
ERYTHROCYTE [DISTWIDTH] IN BLOOD BY AUTOMATED COUNT: 12.6 % (ref 11.5–15)
GFR, ESTIMATED: 28 ML/MIN/1.73M2
GLUCOSE SERPL-MCNC: 232 MG/DL (ref 74–99)
HCT VFR BLD AUTO: 36.6 % (ref 37–54)
HGB BLD-MCNC: 11.4 G/DL (ref 12.5–16.5)
MCH RBC QN AUTO: 31.2 PG (ref 26–35)
MCHC RBC AUTO-ENTMCNC: 31.1 G/DL (ref 32–34.5)
MCV RBC AUTO: 100.3 FL (ref 80–99.9)
PHOSPHATE SERPL-MCNC: 3.3 MG/DL (ref 2.5–4.5)
PLATELET # BLD AUTO: 197 K/UL (ref 130–450)
PMV BLD AUTO: 10.4 FL (ref 7–12)
POTASSIUM SERPL-SCNC: 5.5 MMOL/L (ref 3.5–5)
PROT SERPL-MCNC: 7 G/DL (ref 6.4–8.3)
RBC # BLD AUTO: 3.65 M/UL (ref 3.8–5.8)
SODIUM SERPL-SCNC: 136 MMOL/L (ref 132–146)
WBC OTHER # BLD: 5.9 K/UL (ref 4.5–11.5)

## 2024-06-06 PROCEDURE — 6360000002 HC RX W HCPCS: Performed by: INTERNAL MEDICINE

## 2024-06-06 PROCEDURE — 2580000003 HC RX 258: Performed by: INTERNAL MEDICINE

## 2024-06-06 PROCEDURE — 96365 THER/PROPH/DIAG IV INF INIT: CPT

## 2024-06-06 PROCEDURE — 84100 ASSAY OF PHOSPHORUS: CPT

## 2024-06-06 PROCEDURE — 80053 COMPREHEN METABOLIC PANEL: CPT

## 2024-06-06 PROCEDURE — 85027 COMPLETE CBC AUTOMATED: CPT

## 2024-06-06 RX ORDER — SODIUM CHLORIDE 0.9 % (FLUSH) 0.9 %
5-40 SYRINGE (ML) INJECTION PRN
Status: DISCONTINUED | OUTPATIENT
Start: 2024-06-06 | End: 2024-06-07 | Stop reason: HOSPADM

## 2024-06-06 RX ORDER — SODIUM CHLORIDE 9 MG/ML
5-250 INJECTION, SOLUTION INTRAVENOUS PRN
Status: CANCELLED | OUTPATIENT
Start: 2024-06-13

## 2024-06-06 RX ORDER — DIPHENHYDRAMINE HYDROCHLORIDE 50 MG/ML
50 INJECTION INTRAMUSCULAR; INTRAVENOUS
OUTPATIENT
Start: 2024-06-13

## 2024-06-06 RX ORDER — SODIUM CHLORIDE 9 MG/ML
5-250 INJECTION, SOLUTION INTRAVENOUS PRN
Status: DISCONTINUED | OUTPATIENT
Start: 2024-06-06 | End: 2024-06-07 | Stop reason: HOSPADM

## 2024-06-06 RX ORDER — ONDANSETRON 2 MG/ML
8 INJECTION INTRAMUSCULAR; INTRAVENOUS
OUTPATIENT
Start: 2024-06-13

## 2024-06-06 RX ORDER — SODIUM CHLORIDE 0.9 % (FLUSH) 0.9 %
5-40 SYRINGE (ML) INJECTION PRN
Status: CANCELLED | OUTPATIENT
Start: 2024-06-13

## 2024-06-06 RX ORDER — SODIUM CHLORIDE 9 MG/ML
INJECTION, SOLUTION INTRAVENOUS CONTINUOUS
OUTPATIENT
Start: 2024-06-13

## 2024-06-06 RX ORDER — ALBUTEROL SULFATE 90 UG/1
4 AEROSOL, METERED RESPIRATORY (INHALATION) PRN
OUTPATIENT
Start: 2024-06-13

## 2024-06-06 RX ORDER — EPINEPHRINE 1 MG/ML
0.3 INJECTION, SOLUTION, CONCENTRATE INTRAVENOUS PRN
OUTPATIENT
Start: 2024-06-13

## 2024-06-06 RX ADMIN — SODIUM CHLORIDE, PRESERVATIVE FREE 10 ML: 5 INJECTION INTRAVENOUS at 12:59

## 2024-06-06 RX ADMIN — SODIUM CHLORIDE, PRESERVATIVE FREE 10 ML: 5 INJECTION INTRAVENOUS at 11:49

## 2024-06-06 RX ADMIN — SODIUM CHLORIDE 125 MG: 9 INJECTION, SOLUTION INTRAVENOUS at 11:58

## 2024-06-06 RX ADMIN — SODIUM CHLORIDE 20 ML/HR: 9 INJECTION, SOLUTION INTRAVENOUS at 11:53

## 2024-06-10 ENCOUNTER — HOSPITAL ENCOUNTER (OUTPATIENT)
Age: 84
Discharge: HOME | End: 2024-06-10
Payer: MEDICARE

## 2024-06-10 LAB
ANION GAP SERPL CALCULATED.3IONS-SCNC: 10 MMOL/L (ref 7–16)
BUN SERPL-MCNC: 50 MG/DL (ref 6–23)
CALCIUM SERPL-MCNC: 8.9 MG/DL (ref 8.6–10.2)
CHLORIDE SERPL-SCNC: 105 MMOL/L (ref 98–107)
CREAT SERPL-MCNC: 2.2 MG/DL (ref 0.7–1.2)
GFR, ESTIMATED: 29 ML/MIN/1.73M2
GLUCOSE SERPL-MCNC: 109 MG/DL (ref 74–99)
POTASSIUM SERPL-SCNC: 4.9 MMOL/L (ref 3.5–5)
SODIUM SERPL-SCNC: 138 MMOL/L (ref 132–146)

## 2024-06-10 PROCEDURE — 36415 COLL VENOUS BLD VENIPUNCTURE: CPT

## 2024-06-10 PROCEDURE — 80048 BASIC METABOLIC PNL TOTAL CA: CPT

## 2024-06-14 ENCOUNTER — HOSPITAL ENCOUNTER (OUTPATIENT)
Dept: INFUSION THERAPY | Age: 84
Setting detail: INFUSION SERIES
Discharge: HOME | End: 2024-06-14
Payer: MEDICARE

## 2024-06-14 VITALS
OXYGEN SATURATION: 98 % | SYSTOLIC BLOOD PRESSURE: 189 MMHG | RESPIRATION RATE: 16 BRPM | HEART RATE: 67 BPM | DIASTOLIC BLOOD PRESSURE: 78 MMHG | TEMPERATURE: 98 F

## 2024-06-14 DIAGNOSIS — N18.4 ANEMIA OF CHRONIC RENAL FAILURE, STAGE 4 (SEVERE) (HCC): Primary | ICD-10-CM

## 2024-06-14 DIAGNOSIS — D63.1 ANEMIA OF CHRONIC RENAL FAILURE, STAGE 4 (SEVERE) (HCC): Primary | ICD-10-CM

## 2024-06-14 PROCEDURE — 2580000003 HC RX 258: Performed by: INTERNAL MEDICINE

## 2024-06-14 PROCEDURE — 6360000002 HC RX W HCPCS: Performed by: INTERNAL MEDICINE

## 2024-06-14 PROCEDURE — 96365 THER/PROPH/DIAG IV INF INIT: CPT

## 2024-06-14 RX ORDER — DIPHENHYDRAMINE HYDROCHLORIDE 50 MG/ML
50 INJECTION INTRAMUSCULAR; INTRAVENOUS
OUTPATIENT
Start: 2024-06-20

## 2024-06-14 RX ORDER — SODIUM CHLORIDE 9 MG/ML
5-250 INJECTION, SOLUTION INTRAVENOUS PRN
OUTPATIENT
Start: 2024-06-20

## 2024-06-14 RX ORDER — SODIUM CHLORIDE 0.9 % (FLUSH) 0.9 %
5-40 SYRINGE (ML) INJECTION PRN
OUTPATIENT
Start: 2024-06-20

## 2024-06-14 RX ORDER — ALBUTEROL SULFATE 90 UG/1
4 AEROSOL, METERED RESPIRATORY (INHALATION) PRN
OUTPATIENT
Start: 2024-06-20

## 2024-06-14 RX ORDER — SODIUM CHLORIDE 0.9 % (FLUSH) 0.9 %
5-40 SYRINGE (ML) INJECTION PRN
Status: DISCONTINUED | OUTPATIENT
Start: 2024-06-14 | End: 2024-06-15 | Stop reason: HOSPADM

## 2024-06-14 RX ORDER — DOXYCYCLINE HYCLATE 100 MG/1
100 CAPSULE ORAL 2 TIMES DAILY
COMMUNITY
Start: 2024-06-10

## 2024-06-14 RX ORDER — EPINEPHRINE 1 MG/ML
0.3 INJECTION, SOLUTION, CONCENTRATE INTRAVENOUS PRN
OUTPATIENT
Start: 2024-06-20

## 2024-06-14 RX ORDER — SODIUM CHLORIDE 9 MG/ML
INJECTION, SOLUTION INTRAVENOUS CONTINUOUS
OUTPATIENT
Start: 2024-06-20

## 2024-06-14 RX ORDER — ONDANSETRON 2 MG/ML
8 INJECTION INTRAMUSCULAR; INTRAVENOUS
OUTPATIENT
Start: 2024-06-20

## 2024-06-14 RX ORDER — SODIUM CHLORIDE 9 MG/ML
5-250 INJECTION, SOLUTION INTRAVENOUS PRN
Status: DISCONTINUED | OUTPATIENT
Start: 2024-06-14 | End: 2024-06-15 | Stop reason: HOSPADM

## 2024-06-14 RX ADMIN — SODIUM CHLORIDE 125 MG: 9 INJECTION, SOLUTION INTRAVENOUS at 11:20

## 2024-06-14 RX ADMIN — SODIUM CHLORIDE 20 ML/HR: 9 INJECTION, SOLUTION INTRAVENOUS at 11:21

## 2024-06-14 RX ADMIN — SODIUM CHLORIDE, PRESERVATIVE FREE 10 ML: 5 INJECTION INTRAVENOUS at 12:25

## 2024-06-14 RX ADMIN — SODIUM CHLORIDE, PRESERVATIVE FREE 10 ML: 5 INJECTION INTRAVENOUS at 11:20

## 2024-06-14 NOTE — PROGRESS NOTES
Tolerated infusion well.  Reviewed therapy plan, offered education material and/or discharge material, reviewed medication information and signs and symptoms  and educated on possible side effects, verbalizes good knowledge of current plan patient verbalizes understanding, and has no signs or symptoms to report at this time.   Patient discharged. Patient alert and oriented x3.   No distress noted.   Vital signs stable.   Patient denies any new or worsening pain.  Patient denies any needs.  All questions answered.  Next appointment scheduled.declines  copy of AVS. Patient stayed for 30 minute observation after completion of infusion.

## 2024-06-21 ENCOUNTER — HOSPITAL ENCOUNTER (OUTPATIENT)
Dept: INFUSION THERAPY | Age: 84
Setting detail: INFUSION SERIES
Discharge: HOME | End: 2024-06-21
Payer: MEDICARE

## 2024-06-21 VITALS
HEIGHT: 71 IN | WEIGHT: 220 LBS | SYSTOLIC BLOOD PRESSURE: 187 MMHG | BODY MASS INDEX: 30.8 KG/M2 | OXYGEN SATURATION: 98 % | RESPIRATION RATE: 18 BRPM | HEART RATE: 66 BPM | DIASTOLIC BLOOD PRESSURE: 86 MMHG | TEMPERATURE: 97.1 F

## 2024-06-21 DIAGNOSIS — N18.4 ANEMIA OF CHRONIC RENAL FAILURE, STAGE 4 (SEVERE) (HCC): Primary | ICD-10-CM

## 2024-06-21 DIAGNOSIS — D63.1 ANEMIA OF CHRONIC RENAL FAILURE, STAGE 4 (SEVERE) (HCC): Primary | ICD-10-CM

## 2024-06-21 PROCEDURE — 2580000003 HC RX 258: Performed by: INTERNAL MEDICINE

## 2024-06-21 PROCEDURE — 6360000002 HC RX W HCPCS: Performed by: INTERNAL MEDICINE

## 2024-06-21 PROCEDURE — 96365 THER/PROPH/DIAG IV INF INIT: CPT

## 2024-06-21 RX ORDER — SODIUM CHLORIDE 9 MG/ML
5-250 INJECTION, SOLUTION INTRAVENOUS PRN
Status: CANCELLED | OUTPATIENT
Start: 2024-06-28

## 2024-06-21 RX ORDER — SODIUM CHLORIDE 0.9 % (FLUSH) 0.9 %
5-40 SYRINGE (ML) INJECTION PRN
Status: CANCELLED | OUTPATIENT
Start: 2024-06-28

## 2024-06-21 RX ORDER — SODIUM CHLORIDE 9 MG/ML
5-250 INJECTION, SOLUTION INTRAVENOUS PRN
Status: DISCONTINUED | OUTPATIENT
Start: 2024-06-21 | End: 2024-06-22 | Stop reason: HOSPADM

## 2024-06-21 RX ORDER — EPINEPHRINE 1 MG/ML
0.3 INJECTION, SOLUTION, CONCENTRATE INTRAVENOUS PRN
Status: CANCELLED | OUTPATIENT
Start: 2024-06-28

## 2024-06-21 RX ORDER — SODIUM CHLORIDE 9 MG/ML
INJECTION, SOLUTION INTRAVENOUS CONTINUOUS
Status: CANCELLED | OUTPATIENT
Start: 2024-06-28

## 2024-06-21 RX ORDER — DIPHENHYDRAMINE HYDROCHLORIDE 50 MG/ML
50 INJECTION INTRAMUSCULAR; INTRAVENOUS
Status: CANCELLED | OUTPATIENT
Start: 2024-06-28

## 2024-06-21 RX ORDER — ONDANSETRON 2 MG/ML
8 INJECTION INTRAMUSCULAR; INTRAVENOUS
Status: CANCELLED | OUTPATIENT
Start: 2024-06-28

## 2024-06-21 RX ORDER — ALBUTEROL SULFATE 90 UG/1
4 AEROSOL, METERED RESPIRATORY (INHALATION) PRN
Status: CANCELLED | OUTPATIENT
Start: 2024-06-28

## 2024-06-21 RX ORDER — SODIUM CHLORIDE 0.9 % (FLUSH) 0.9 %
5-40 SYRINGE (ML) INJECTION PRN
Status: DISCONTINUED | OUTPATIENT
Start: 2024-06-21 | End: 2024-06-22 | Stop reason: HOSPADM

## 2024-06-21 RX ADMIN — SODIUM CHLORIDE, PRESERVATIVE FREE 10 ML: 5 INJECTION INTRAVENOUS at 12:40

## 2024-06-21 RX ADMIN — SODIUM CHLORIDE 20 ML/HR: 9 INJECTION, SOLUTION INTRAVENOUS at 11:31

## 2024-06-21 RX ADMIN — SODIUM CHLORIDE 125 MG: 9 INJECTION, SOLUTION INTRAVENOUS at 11:40

## 2024-06-21 RX ADMIN — SODIUM CHLORIDE, PRESERVATIVE FREE 10 ML: 5 INJECTION INTRAVENOUS at 11:29

## 2024-06-21 ASSESSMENT — PAIN DESCRIPTION - FREQUENCY: FREQUENCY: CONTINUOUS

## 2024-06-21 ASSESSMENT — PAIN DESCRIPTION - DESCRIPTORS: DESCRIPTORS: ACHING

## 2024-06-21 ASSESSMENT — PAIN SCALES - GENERAL: PAINLEVEL_OUTOF10: 10

## 2024-06-21 ASSESSMENT — PAIN DESCRIPTION - ORIENTATION: ORIENTATION: LEFT

## 2024-06-21 ASSESSMENT — PAIN DESCRIPTION - LOCATION: LOCATION: KNEE

## 2024-06-21 ASSESSMENT — PAIN DESCRIPTION - PAIN TYPE: TYPE: CHRONIC PAIN

## 2024-06-28 ENCOUNTER — HOSPITAL ENCOUNTER (OUTPATIENT)
Dept: INFUSION THERAPY | Age: 84
Setting detail: INFUSION SERIES
Discharge: HOME | End: 2024-06-28
Payer: MEDICARE

## 2024-06-28 VITALS
SYSTOLIC BLOOD PRESSURE: 173 MMHG | TEMPERATURE: 96.6 F | HEIGHT: 71 IN | OXYGEN SATURATION: 98 % | WEIGHT: 220 LBS | BODY MASS INDEX: 30.8 KG/M2 | DIASTOLIC BLOOD PRESSURE: 62 MMHG | HEART RATE: 60 BPM | RESPIRATION RATE: 18 BRPM

## 2024-06-28 DIAGNOSIS — D63.1 ANEMIA OF CHRONIC RENAL FAILURE, STAGE 4 (SEVERE) (HCC): Primary | ICD-10-CM

## 2024-06-28 DIAGNOSIS — N18.4 ANEMIA OF CHRONIC RENAL FAILURE, STAGE 4 (SEVERE) (HCC): Primary | ICD-10-CM

## 2024-06-28 PROCEDURE — 6360000002 HC RX W HCPCS: Performed by: INTERNAL MEDICINE

## 2024-06-28 PROCEDURE — 96365 THER/PROPH/DIAG IV INF INIT: CPT

## 2024-06-28 PROCEDURE — 2580000003 HC RX 258: Performed by: INTERNAL MEDICINE

## 2024-06-28 RX ORDER — SODIUM CHLORIDE 9 MG/ML
INJECTION, SOLUTION INTRAVENOUS CONTINUOUS
OUTPATIENT
Start: 2024-07-05

## 2024-06-28 RX ORDER — ONDANSETRON 2 MG/ML
8 INJECTION INTRAMUSCULAR; INTRAVENOUS
OUTPATIENT
Start: 2024-07-05

## 2024-06-28 RX ORDER — SODIUM CHLORIDE 9 MG/ML
5-250 INJECTION, SOLUTION INTRAVENOUS PRN
Status: CANCELLED | OUTPATIENT
Start: 2024-07-05

## 2024-06-28 RX ORDER — SODIUM CHLORIDE 0.9 % (FLUSH) 0.9 %
5-40 SYRINGE (ML) INJECTION PRN
Status: CANCELLED | OUTPATIENT
Start: 2024-07-05

## 2024-06-28 RX ORDER — DIPHENHYDRAMINE HYDROCHLORIDE 50 MG/ML
50 INJECTION INTRAMUSCULAR; INTRAVENOUS
OUTPATIENT
Start: 2024-07-05

## 2024-06-28 RX ORDER — ALBUTEROL SULFATE 90 UG/1
4 AEROSOL, METERED RESPIRATORY (INHALATION) PRN
OUTPATIENT
Start: 2024-07-05

## 2024-06-28 RX ORDER — SODIUM CHLORIDE 9 MG/ML
5-250 INJECTION, SOLUTION INTRAVENOUS PRN
Status: DISCONTINUED | OUTPATIENT
Start: 2024-06-28 | End: 2024-06-29 | Stop reason: HOSPADM

## 2024-06-28 RX ORDER — SODIUM CHLORIDE 0.9 % (FLUSH) 0.9 %
5-40 SYRINGE (ML) INJECTION PRN
Status: DISCONTINUED | OUTPATIENT
Start: 2024-06-28 | End: 2024-06-29 | Stop reason: HOSPADM

## 2024-06-28 RX ORDER — EPINEPHRINE 1 MG/ML
0.3 INJECTION, SOLUTION, CONCENTRATE INTRAVENOUS PRN
OUTPATIENT
Start: 2024-07-05

## 2024-06-28 RX ADMIN — SODIUM CHLORIDE, PRESERVATIVE FREE 10 ML: 5 INJECTION INTRAVENOUS at 11:18

## 2024-06-28 RX ADMIN — SODIUM CHLORIDE, PRESERVATIVE FREE 10 ML: 5 INJECTION INTRAVENOUS at 12:24

## 2024-06-28 RX ADMIN — SODIUM CHLORIDE 20 ML/HR: 9 INJECTION, SOLUTION INTRAVENOUS at 11:19

## 2024-06-28 RX ADMIN — SODIUM CHLORIDE 125 MG: 9 INJECTION, SOLUTION INTRAVENOUS at 11:23

## 2024-07-05 ENCOUNTER — HOSPITAL ENCOUNTER (OUTPATIENT)
Dept: INFUSION THERAPY | Age: 84
Setting detail: INFUSION SERIES
Discharge: HOME | End: 2024-07-05
Payer: MEDICARE

## 2024-07-05 VITALS
HEART RATE: 55 BPM | TEMPERATURE: 98.9 F | DIASTOLIC BLOOD PRESSURE: 64 MMHG | SYSTOLIC BLOOD PRESSURE: 189 MMHG | OXYGEN SATURATION: 100 % | RESPIRATION RATE: 16 BRPM

## 2024-07-05 DIAGNOSIS — N18.4 ANEMIA OF CHRONIC RENAL FAILURE, STAGE 4 (SEVERE) (HCC): Primary | ICD-10-CM

## 2024-07-05 DIAGNOSIS — D63.1 ANEMIA OF CHRONIC RENAL FAILURE, STAGE 4 (SEVERE) (HCC): Primary | ICD-10-CM

## 2024-07-05 LAB
ALBUMIN SERPL-MCNC: 3.9 G/DL (ref 3.5–5.2)
ALP SERPL-CCNC: 87 U/L (ref 40–129)
ALT SERPL-CCNC: 14 U/L (ref 0–40)
ANION GAP SERPL CALCULATED.3IONS-SCNC: 12 MMOL/L (ref 7–16)
AST SERPL-CCNC: 18 U/L (ref 0–39)
BILIRUB SERPL-MCNC: 0.3 MG/DL (ref 0–1.2)
BUN SERPL-MCNC: 46 MG/DL (ref 6–23)
CALCIUM SERPL-MCNC: 8.6 MG/DL (ref 8.6–10.2)
CHLORIDE SERPL-SCNC: 105 MMOL/L (ref 98–107)
CO2 SERPL-SCNC: 21 MMOL/L (ref 22–29)
CREAT SERPL-MCNC: 2.2 MG/DL (ref 0.7–1.2)
ERYTHROCYTE [DISTWIDTH] IN BLOOD BY AUTOMATED COUNT: 13.2 % (ref 11.5–15)
GFR, ESTIMATED: 30 ML/MIN/1.73M2
GLUCOSE SERPL-MCNC: 180 MG/DL (ref 74–99)
HCT VFR BLD AUTO: 34.5 % (ref 37–54)
HGB BLD-MCNC: 10.9 G/DL (ref 12.5–16.5)
MCH RBC QN AUTO: 31.2 PG (ref 26–35)
MCHC RBC AUTO-ENTMCNC: 31.6 G/DL (ref 32–34.5)
MCV RBC AUTO: 98.9 FL (ref 80–99.9)
PHOSPHATE SERPL-MCNC: 3.3 MG/DL (ref 2.5–4.5)
PLATELET # BLD AUTO: 215 K/UL (ref 130–450)
PMV BLD AUTO: 10.6 FL (ref 7–12)
POTASSIUM SERPL-SCNC: 4.4 MMOL/L (ref 3.5–5)
PROT SERPL-MCNC: 6.9 G/DL (ref 6.4–8.3)
RBC # BLD AUTO: 3.49 M/UL (ref 3.8–5.8)
SODIUM SERPL-SCNC: 138 MMOL/L (ref 132–146)
WBC OTHER # BLD: 5.5 K/UL (ref 4.5–11.5)

## 2024-07-05 PROCEDURE — 96372 THER/PROPH/DIAG INJ SC/IM: CPT

## 2024-07-05 PROCEDURE — 84100 ASSAY OF PHOSPHORUS: CPT

## 2024-07-05 PROCEDURE — 85027 COMPLETE CBC AUTOMATED: CPT

## 2024-07-05 PROCEDURE — 80053 COMPREHEN METABOLIC PANEL: CPT

## 2024-07-05 PROCEDURE — 6360000002 HC RX W HCPCS: Performed by: INTERNAL MEDICINE

## 2024-07-05 PROCEDURE — 96365 THER/PROPH/DIAG IV INF INIT: CPT

## 2024-07-05 PROCEDURE — 2580000003 HC RX 258: Performed by: INTERNAL MEDICINE

## 2024-07-05 RX ORDER — SODIUM CHLORIDE 9 MG/ML
5-250 INJECTION, SOLUTION INTRAVENOUS PRN
Status: DISCONTINUED | OUTPATIENT
Start: 2024-07-05 | End: 2024-07-06 | Stop reason: HOSPADM

## 2024-07-05 RX ORDER — ONDANSETRON 2 MG/ML
8 INJECTION INTRAMUSCULAR; INTRAVENOUS
OUTPATIENT
Start: 2024-07-05

## 2024-07-05 RX ORDER — EPINEPHRINE 1 MG/ML
0.3 INJECTION, SOLUTION, CONCENTRATE INTRAVENOUS PRN
OUTPATIENT
Start: 2024-07-05

## 2024-07-05 RX ORDER — ALBUTEROL SULFATE 90 UG/1
4 AEROSOL, METERED RESPIRATORY (INHALATION) PRN
OUTPATIENT
Start: 2024-07-05

## 2024-07-05 RX ORDER — SODIUM CHLORIDE 9 MG/ML
INJECTION, SOLUTION INTRAVENOUS CONTINUOUS
OUTPATIENT
Start: 2024-07-05

## 2024-07-05 RX ORDER — DIPHENHYDRAMINE HYDROCHLORIDE 50 MG/ML
50 INJECTION INTRAMUSCULAR; INTRAVENOUS
OUTPATIENT
Start: 2024-07-05

## 2024-07-05 RX ORDER — SODIUM CHLORIDE 0.9 % (FLUSH) 0.9 %
5-40 SYRINGE (ML) INJECTION PRN
OUTPATIENT
Start: 2024-07-05

## 2024-07-05 RX ORDER — SODIUM CHLORIDE 9 MG/ML
5-250 INJECTION, SOLUTION INTRAVENOUS PRN
Status: CANCELLED | OUTPATIENT
Start: 2024-07-05

## 2024-07-05 RX ORDER — SODIUM CHLORIDE 0.9 % (FLUSH) 0.9 %
5-40 SYRINGE (ML) INJECTION PRN
Status: DISCONTINUED | OUTPATIENT
Start: 2024-07-05 | End: 2024-07-06 | Stop reason: HOSPADM

## 2024-07-05 RX ADMIN — SODIUM CHLORIDE 125 MG: 9 INJECTION, SOLUTION INTRAVENOUS at 11:29

## 2024-07-05 RX ADMIN — SODIUM CHLORIDE, PRESERVATIVE FREE 10 ML: 5 INJECTION INTRAVENOUS at 11:27

## 2024-07-05 RX ADMIN — DARBEPOETIN ALFA 100 MCG: 100 INJECTION, SOLUTION INTRAVENOUS; SUBCUTANEOUS at 12:58

## 2024-07-05 RX ADMIN — SODIUM CHLORIDE 20 ML/HR: 9 INJECTION, SOLUTION INTRAVENOUS at 11:28

## 2024-07-05 NOTE — PROGRESS NOTES
Tolerated injection well.  Reviewed therapy plan, offered education material and/or discharge material, reviewed medication information and signs and symptoms  and educated on possible side effects, verbalizes good knowledge of current plan patient verbalizes understanding, and has no signs or symptoms to report at this time.   Patient discharged. Patient alert and oriented x3.   No distress noted.   Vital signs stable.   Patient denies any new or worsening pain.  Patient denies any needs.  All questions answered.  Next appointment scheduledeclinescopy of AVS. Instructed on lab draw for next injection.

## 2024-07-05 NOTE — PROGRESS NOTES
Tolerated infusion well.  Reviewed therapy plan, offered education material and/or discharge material, reviewed medication information and signs and symptoms  and educated on possible side effects, verbalizes good knowledge of current plan patient verbalizes understanding, and has no signs or symptoms to report at this time.   Patient discharged. Patient alert and oriented x3.   No distress noted.   Vital signs stable.   Patient denies any new or worsening pain.  Patient denies any needs.  All questions answered.  Next appointment scheduleddeclines copy of AVS. Patient stayed for 30 minute observation after completion of infusion.

## 2024-07-16 ENCOUNTER — PROCEDURE VISIT (OUTPATIENT)
Dept: PODIATRY | Age: 84
End: 2024-07-16
Payer: MEDICARE

## 2024-07-16 VITALS — WEIGHT: 220 LBS | BODY MASS INDEX: 30.8 KG/M2 | HEIGHT: 71 IN

## 2024-07-16 DIAGNOSIS — L84 CORNS AND CALLOSITIES: ICD-10-CM

## 2024-07-16 DIAGNOSIS — B35.1 TINEA UNGUIUM: ICD-10-CM

## 2024-07-16 DIAGNOSIS — Z79.01 ENCOUNTER FOR CURRENT LONG-TERM USE OF ANTICOAGULANTS: ICD-10-CM

## 2024-07-16 DIAGNOSIS — E11.9 TYPE 2 DIABETES MELLITUS WITHOUT COMPLICATION, WITH LONG-TERM CURRENT USE OF INSULIN (HCC): Primary | ICD-10-CM

## 2024-07-16 DIAGNOSIS — Z79.4 TYPE 2 DIABETES MELLITUS WITHOUT COMPLICATION, WITH LONG-TERM CURRENT USE OF INSULIN (HCC): Primary | ICD-10-CM

## 2024-07-16 DIAGNOSIS — G60.8 HEREDITARY SENSORY NEUROPATHY: ICD-10-CM

## 2024-07-16 PROCEDURE — 11056 PARNG/CUTG B9 HYPRKR LES 2-4: CPT | Performed by: PODIATRIST

## 2024-07-16 PROCEDURE — 11721 DEBRIDE NAIL 6 OR MORE: CPT | Performed by: PODIATRIST

## 2024-07-16 NOTE — PROGRESS NOTES
Patient in today for nail care. Patient does not have any complaints of pain at this time.   He is a type 2 diabetic.  Patient's PCP is Gumaro Adams MD and date of last ov 6/10/2024.    Sofia Young MA       CC:   Painful elongated toenails 1-5 right and left  Diabetic foot and ankle exam    HPI  Follow-up diabetic foot ankle exam.  History insulin.  No open wounds.  History Eliquis.  No additional pedal complaints.      ROS:  Const: Denies constitutional symptoms  Musculo: Denies symptoms other than stated above  Skin: Denies symptoms other than stated above      Current Outpatient Medications:     MAGNESIUM PO, Take 500 mg by mouth in the morning and at bedtime, Disp: , Rfl:     glucose 4 g chewable tablet, Take 4 tablets by mouth as needed for Low blood sugar, Disp: 60 tablet, Rfl: 3    insulin lispro (HUMALOG) 100 UNIT/ML SOLN injection vial, Inject 0-4 Units into the skin nightly, Disp: 10 mL, Rfl: 0    insulin NPH (HUMULIN N;NOVOLIN N) 100 UNIT/ML injection vial, Inject 25 Units into the skin every morning, Disp: 10 mL, Rfl: 3    insulin NPH (HUMULIN N;NOVOLIN N) 100 UNIT/ML injection vial, Inject 25 Units into the skin daily (before dinner), Disp: 10 mL, Rfl: 0    insulin lispro, 1 Unit Dial, (HUMALOG/ADMELOG) 100 UNIT/ML SOPN, Inject 0-8 Units into the skin 3 times daily PER SLIDING SCALE: 0-90=0U, =5U, 141-200=6U, 201-260=7U, 261+=8U & CALL , Disp: , Rfl:     ferrous sulfate (IRON 325) 325 (65 Fe) MG tablet, Take 1 tablet by mouth 2 times daily (with meals), Disp: 30 tablet, Rfl: 3    omeprazole 20 MG EC tablet, Take 1 tablet by mouth daily, Disp: , Rfl:     metoprolol (LOPRESSOR) 25 MG tablet, Take 1 tablet by mouth 2 times daily Indications: HOLD FOR SBP<100 AND/OR HR<60, Disp: , Rfl:     acetaminophen (TYLENOL) 500 MG tablet, Take 1 tablet by mouth every 6 hours as needed for Pain, Disp: , Rfl:     apixaban (ELIQUIS) 5 MG TABS tablet, Take 1 tablet by mouth 2 times daily, Disp: 60 tablet,

## 2024-08-02 ENCOUNTER — HOSPITAL ENCOUNTER (OUTPATIENT)
Dept: INFUSION THERAPY | Age: 84
Setting detail: INFUSION SERIES
Discharge: HOME | End: 2024-08-02
Payer: MEDICARE

## 2024-08-02 VITALS
WEIGHT: 220 LBS | DIASTOLIC BLOOD PRESSURE: 62 MMHG | OXYGEN SATURATION: 96 % | RESPIRATION RATE: 16 BRPM | BODY MASS INDEX: 30.8 KG/M2 | SYSTOLIC BLOOD PRESSURE: 142 MMHG | TEMPERATURE: 97.1 F | HEART RATE: 64 BPM | HEIGHT: 71 IN

## 2024-08-02 LAB
ANION GAP SERPL CALCULATED.3IONS-SCNC: 11 MMOL/L (ref 7–16)
BUN SERPL-MCNC: 44 MG/DL (ref 6–23)
CALCIUM SERPL-MCNC: 8.1 MG/DL (ref 8.6–10.2)
CHLORIDE SERPL-SCNC: 104 MMOL/L (ref 98–107)
CO2 SERPL-SCNC: 24 MMOL/L (ref 22–29)
CREAT SERPL-MCNC: 2.1 MG/DL (ref 0.7–1.2)
ERYTHROCYTE [DISTWIDTH] IN BLOOD BY AUTOMATED COUNT: 13.5 % (ref 11.5–15)
GFR, ESTIMATED: 32 ML/MIN/1.73M2
GLUCOSE SERPL-MCNC: 123 MG/DL (ref 74–99)
HCT VFR BLD AUTO: 35.7 % (ref 37–54)
HGB BLD-MCNC: 11.2 G/DL (ref 12.5–16.5)
IRON SATN MFR SERPL: 33 % (ref 20–55)
IRON SERPL-MCNC: 84 UG/DL (ref 59–158)
MCH RBC QN AUTO: 31.5 PG (ref 26–35)
MCHC RBC AUTO-ENTMCNC: 31.4 G/DL (ref 32–34.5)
MCV RBC AUTO: 100.6 FL (ref 80–99.9)
PHOSPHATE SERPL-MCNC: 3.4 MG/DL (ref 2.5–4.5)
PLATELET # BLD AUTO: 229 K/UL (ref 130–450)
PMV BLD AUTO: 10.2 FL (ref 7–12)
POTASSIUM SERPL-SCNC: 4.4 MMOL/L (ref 3.5–5)
RBC # BLD AUTO: 3.55 M/UL (ref 3.8–5.8)
SODIUM SERPL-SCNC: 139 MMOL/L (ref 132–146)
TIBC SERPL-MCNC: 256 UG/DL (ref 250–450)
WBC OTHER # BLD: 8.2 K/UL (ref 4.5–11.5)

## 2024-08-02 PROCEDURE — 83540 ASSAY OF IRON: CPT

## 2024-08-02 PROCEDURE — 80048 BASIC METABOLIC PNL TOTAL CA: CPT

## 2024-08-02 PROCEDURE — 85027 COMPLETE CBC AUTOMATED: CPT

## 2024-08-02 PROCEDURE — 83550 IRON BINDING TEST: CPT

## 2024-08-02 PROCEDURE — 84100 ASSAY OF PHOSPHORUS: CPT

## 2024-08-02 PROCEDURE — 36415 COLL VENOUS BLD VENIPUNCTURE: CPT

## 2024-08-30 ENCOUNTER — HOSPITAL ENCOUNTER (OUTPATIENT)
Dept: INFUSION THERAPY | Age: 84
Setting detail: INFUSION SERIES
Discharge: HOME | End: 2024-08-30
Payer: MEDICARE

## 2024-08-30 VITALS
OXYGEN SATURATION: 99 % | SYSTOLIC BLOOD PRESSURE: 140 MMHG | RESPIRATION RATE: 18 BRPM | WEIGHT: 220 LBS | DIASTOLIC BLOOD PRESSURE: 86 MMHG | TEMPERATURE: 97.2 F | BODY MASS INDEX: 30.8 KG/M2 | HEART RATE: 65 BPM | HEIGHT: 71 IN

## 2024-08-30 LAB
ANION GAP SERPL CALCULATED.3IONS-SCNC: 13 MMOL/L (ref 7–16)
BUN SERPL-MCNC: 37 MG/DL (ref 6–23)
CALCIUM SERPL-MCNC: 8.7 MG/DL (ref 8.6–10.2)
CHLORIDE SERPL-SCNC: 108 MMOL/L (ref 98–107)
CO2 SERPL-SCNC: 21 MMOL/L (ref 22–29)
CREAT SERPL-MCNC: 1.7 MG/DL (ref 0.7–1.2)
ERYTHROCYTE [DISTWIDTH] IN BLOOD BY AUTOMATED COUNT: 13.2 % (ref 11.5–15)
GFR, ESTIMATED: 38 ML/MIN/1.73M2
GLUCOSE SERPL-MCNC: 93 MG/DL (ref 74–99)
HCT VFR BLD AUTO: 35.4 % (ref 37–54)
HGB BLD-MCNC: 11.1 G/DL (ref 12.5–16.5)
MCH RBC QN AUTO: 31.5 PG (ref 26–35)
MCHC RBC AUTO-ENTMCNC: 31.4 G/DL (ref 32–34.5)
MCV RBC AUTO: 100.6 FL (ref 80–99.9)
PLATELET # BLD AUTO: 206 K/UL (ref 130–450)
PMV BLD AUTO: 10.6 FL (ref 7–12)
POTASSIUM SERPL-SCNC: 3.9 MMOL/L (ref 3.5–5)
RBC # BLD AUTO: 3.52 M/UL (ref 3.8–5.8)
SODIUM SERPL-SCNC: 142 MMOL/L (ref 132–146)
WBC OTHER # BLD: 6.2 K/UL (ref 4.5–11.5)

## 2024-08-30 PROCEDURE — 85027 COMPLETE CBC AUTOMATED: CPT

## 2024-08-30 PROCEDURE — 80048 BASIC METABOLIC PNL TOTAL CA: CPT

## 2024-08-30 PROCEDURE — 36415 COLL VENOUS BLD VENIPUNCTURE: CPT

## 2024-09-03 ENCOUNTER — HOSPITAL ENCOUNTER (OUTPATIENT)
Dept: PHYSICAL THERAPY | Age: 84
Setting detail: THERAPIES SERIES
Discharge: HOME | End: 2024-09-03
Payer: MEDICARE

## 2024-09-03 DIAGNOSIS — D63.1 ANEMIA OF CHRONIC RENAL FAILURE, STAGE 4 (SEVERE) (HCC): Primary | ICD-10-CM

## 2024-09-03 DIAGNOSIS — N18.4 ANEMIA OF CHRONIC RENAL FAILURE, STAGE 4 (SEVERE) (HCC): Primary | ICD-10-CM

## 2024-09-03 PROCEDURE — 97161 PT EVAL LOW COMPLEX 20 MIN: CPT

## 2024-09-12 ENCOUNTER — HOSPITAL ENCOUNTER (OUTPATIENT)
Dept: PHYSICAL THERAPY | Age: 84
Setting detail: THERAPIES SERIES
Discharge: HOME | End: 2024-09-12
Payer: MEDICARE

## 2024-09-12 PROCEDURE — 97110 THERAPEUTIC EXERCISES: CPT

## 2024-09-19 ENCOUNTER — HOSPITAL ENCOUNTER (OUTPATIENT)
Dept: PHYSICAL THERAPY | Age: 84
Setting detail: THERAPIES SERIES
Discharge: HOME | End: 2024-09-19
Payer: MEDICARE

## 2024-09-19 ENCOUNTER — PROCEDURE VISIT (OUTPATIENT)
Dept: PODIATRY | Age: 84
End: 2024-09-19
Payer: MEDICARE

## 2024-09-19 VITALS — BODY MASS INDEX: 30.8 KG/M2 | HEIGHT: 71 IN | WEIGHT: 220 LBS

## 2024-09-19 DIAGNOSIS — L84 CORNS AND CALLOSITIES: ICD-10-CM

## 2024-09-19 DIAGNOSIS — Z79.01 ENCOUNTER FOR CURRENT LONG-TERM USE OF ANTICOAGULANTS: ICD-10-CM

## 2024-09-19 DIAGNOSIS — B35.1 TINEA UNGUIUM: ICD-10-CM

## 2024-09-19 DIAGNOSIS — Z79.4 TYPE 2 DIABETES MELLITUS WITHOUT COMPLICATION, WITH LONG-TERM CURRENT USE OF INSULIN (HCC): Primary | ICD-10-CM

## 2024-09-19 DIAGNOSIS — E11.9 TYPE 2 DIABETES MELLITUS WITHOUT COMPLICATION, WITH LONG-TERM CURRENT USE OF INSULIN (HCC): Primary | ICD-10-CM

## 2024-09-19 DIAGNOSIS — G60.8 HEREDITARY SENSORY NEUROPATHY: ICD-10-CM

## 2024-09-19 PROCEDURE — 97110 THERAPEUTIC EXERCISES: CPT

## 2024-09-19 PROCEDURE — 11056 PARNG/CUTG B9 HYPRKR LES 2-4: CPT | Performed by: PODIATRIST

## 2024-09-19 PROCEDURE — 11721 DEBRIDE NAIL 6 OR MORE: CPT | Performed by: PODIATRIST

## 2024-09-27 ENCOUNTER — HOSPITAL ENCOUNTER (OUTPATIENT)
Dept: INFUSION THERAPY | Age: 84
Setting detail: INFUSION SERIES
Discharge: HOME | End: 2024-09-27
Payer: MEDICARE

## 2024-09-27 VITALS
OXYGEN SATURATION: 99 % | SYSTOLIC BLOOD PRESSURE: 170 MMHG | HEART RATE: 65 BPM | TEMPERATURE: 98.4 F | DIASTOLIC BLOOD PRESSURE: 83 MMHG

## 2024-09-27 LAB
ANION GAP SERPL CALCULATED.3IONS-SCNC: 12 MMOL/L (ref 7–16)
BUN SERPL-MCNC: 39 MG/DL (ref 6–23)
CALCIUM SERPL-MCNC: 8.7 MG/DL (ref 8.6–10.2)
CHLORIDE SERPL-SCNC: 106 MMOL/L (ref 98–107)
CO2 SERPL-SCNC: 22 MMOL/L (ref 22–29)
CREAT SERPL-MCNC: 2 MG/DL (ref 0.7–1.2)
ERYTHROCYTE [DISTWIDTH] IN BLOOD BY AUTOMATED COUNT: 13 % (ref 11.5–15)
GFR, ESTIMATED: 32 ML/MIN/1.73M2
GLUCOSE SERPL-MCNC: 102 MG/DL (ref 74–99)
HCT VFR BLD AUTO: 34.9 % (ref 37–54)
HGB BLD-MCNC: 11 G/DL (ref 12.5–16.5)
MCH RBC QN AUTO: 31.9 PG (ref 26–35)
MCHC RBC AUTO-ENTMCNC: 31.5 G/DL (ref 32–34.5)
MCV RBC AUTO: 101.2 FL (ref 80–99.9)
PHOSPHATE SERPL-MCNC: 2.9 MG/DL (ref 2.5–4.5)
PLATELET # BLD AUTO: 229 K/UL (ref 130–450)
PMV BLD AUTO: 10.1 FL (ref 7–12)
POTASSIUM SERPL-SCNC: 4.4 MMOL/L (ref 3.5–5)
RBC # BLD AUTO: 3.45 M/UL (ref 3.8–5.8)
SODIUM SERPL-SCNC: 140 MMOL/L (ref 132–146)
WBC OTHER # BLD: 5.2 K/UL (ref 4.5–11.5)

## 2024-09-27 PROCEDURE — 85027 COMPLETE CBC AUTOMATED: CPT

## 2024-09-27 PROCEDURE — 80048 BASIC METABOLIC PNL TOTAL CA: CPT

## 2024-09-27 PROCEDURE — 36415 COLL VENOUS BLD VENIPUNCTURE: CPT

## 2024-09-27 PROCEDURE — 84100 ASSAY OF PHOSPHORUS: CPT

## 2024-09-27 ASSESSMENT — PAIN DESCRIPTION - FREQUENCY: FREQUENCY: INTERMITTENT

## 2024-09-27 NOTE — PROGRESS NOTES
Venipuncture  left arm  23 buttrfly  pressure applied to site   band aidapplioed  identity confirmed  and labeled specimens  and transported to lab

## 2024-10-06 ENCOUNTER — APPOINTMENT (OUTPATIENT)
Dept: CT IMAGING | Age: 84
DRG: 281 | End: 2024-10-06
Payer: MEDICARE

## 2024-10-06 ENCOUNTER — APPOINTMENT (OUTPATIENT)
Dept: GENERAL RADIOLOGY | Age: 84
DRG: 281 | End: 2024-10-06
Payer: MEDICARE

## 2024-10-06 ENCOUNTER — HOSPITAL ENCOUNTER (INPATIENT)
Age: 84
LOS: 9 days | Discharge: NURSING, REHAB OR LONG-TERM CARE | DRG: 281 | End: 2024-10-15
Attending: EMERGENCY MEDICINE | Admitting: STUDENT IN AN ORGANIZED HEALTH CARE EDUCATION/TRAINING PROGRAM
Payer: MEDICARE

## 2024-10-06 DIAGNOSIS — R07.9 CHEST PAIN, UNSPECIFIED TYPE: ICD-10-CM

## 2024-10-06 DIAGNOSIS — I21.4 NSTEMI (NON-ST ELEVATED MYOCARDIAL INFARCTION) (HCC): Primary | ICD-10-CM

## 2024-10-06 DIAGNOSIS — I16.1 HYPERTENSIVE EMERGENCY: ICD-10-CM

## 2024-10-06 DIAGNOSIS — I25.10 CAD (CORONARY ARTERY DISEASE): ICD-10-CM

## 2024-10-06 DIAGNOSIS — I24.9 ACUTE CORONARY SYNDROME (HCC): ICD-10-CM

## 2024-10-06 LAB
ALBUMIN SERPL-MCNC: 3.9 G/DL (ref 3.5–5.2)
ALP SERPL-CCNC: 89 U/L (ref 40–129)
ALT SERPL-CCNC: 11 U/L (ref 0–40)
ANION GAP SERPL CALCULATED.3IONS-SCNC: 16 MMOL/L (ref 7–16)
AST SERPL-CCNC: 16 U/L (ref 0–39)
BASOPHILS # BLD: 0 K/UL (ref 0–0.2)
BASOPHILS NFR BLD: 0 % (ref 0–2)
BILIRUB SERPL-MCNC: 0.4 MG/DL (ref 0–1.2)
BNP SERPL-MCNC: 441 PG/ML (ref 0–450)
BUN SERPL-MCNC: 46 MG/DL (ref 6–23)
CALCIUM SERPL-MCNC: 8.6 MG/DL (ref 8.6–10.2)
CHLORIDE SERPL-SCNC: 103 MMOL/L (ref 98–107)
CO2 SERPL-SCNC: 20 MMOL/L (ref 22–29)
CREAT SERPL-MCNC: 2.2 MG/DL (ref 0.7–1.2)
D-DIMER QUANTITATIVE: 272 NG/ML DDU (ref 0–230)
EOSINOPHIL # BLD: 0.27 K/UL (ref 0.05–0.5)
EOSINOPHILS RELATIVE PERCENT: 3 % (ref 0–6)
ERYTHROCYTE [DISTWIDTH] IN BLOOD BY AUTOMATED COUNT: 13.2 % (ref 11.5–15)
ERYTHROCYTE [DISTWIDTH] IN BLOOD BY AUTOMATED COUNT: 13.2 % (ref 11.5–15)
GFR, ESTIMATED: 29 ML/MIN/1.73M2
GLUCOSE BLD-MCNC: 260 MG/DL (ref 74–99)
GLUCOSE BLD-MCNC: 262 MG/DL (ref 74–99)
GLUCOSE SERPL-MCNC: 221 MG/DL (ref 74–99)
HCT VFR BLD AUTO: 32.8 % (ref 37–54)
HCT VFR BLD AUTO: 34.6 % (ref 37–54)
HGB BLD-MCNC: 10.5 G/DL (ref 12.5–16.5)
HGB BLD-MCNC: 11 G/DL (ref 12.5–16.5)
LYMPHOCYTES NFR BLD: 0.45 K/UL (ref 1.5–4)
LYMPHOCYTES RELATIVE PERCENT: 4 % (ref 20–42)
MCH RBC QN AUTO: 31.9 PG (ref 26–35)
MCH RBC QN AUTO: 32.2 PG (ref 26–35)
MCHC RBC AUTO-ENTMCNC: 31.8 G/DL (ref 32–34.5)
MCHC RBC AUTO-ENTMCNC: 32 G/DL (ref 32–34.5)
MCV RBC AUTO: 100.3 FL (ref 80–99.9)
MCV RBC AUTO: 100.6 FL (ref 80–99.9)
MONOCYTES NFR BLD: 0.45 K/UL (ref 0.1–0.95)
MONOCYTES NFR BLD: 4 % (ref 2–12)
NEUTROPHILS NFR BLD: 89 % (ref 43–80)
NEUTS SEG NFR BLD: 9.22 K/UL (ref 1.8–7.3)
PARTIAL THROMBOPLASTIN TIME: 32.3 SEC (ref 24.5–35.1)
PARTIAL THROMBOPLASTIN TIME: 58.1 SEC (ref 24.5–35.1)
PLATELET # BLD AUTO: 207 K/UL (ref 130–450)
PLATELET # BLD AUTO: 208 K/UL (ref 130–450)
PMV BLD AUTO: 10.6 FL (ref 7–12)
PMV BLD AUTO: 10.6 FL (ref 7–12)
POTASSIUM SERPL-SCNC: 4.4 MMOL/L (ref 3.5–5)
PROT SERPL-MCNC: 6.8 G/DL (ref 6.4–8.3)
RBC # BLD AUTO: 3.26 M/UL (ref 3.8–5.8)
RBC # BLD AUTO: 3.45 M/UL (ref 3.8–5.8)
RBC # BLD: ABNORMAL 10*6/UL
SODIUM SERPL-SCNC: 139 MMOL/L (ref 132–146)
TROPONIN I SERPL HS-MCNC: 184 NG/L (ref 0–11)
TROPONIN I SERPL HS-MCNC: 67 NG/L (ref 0–11)
WBC OTHER # BLD: 10.2 K/UL (ref 4.5–11.5)
WBC OTHER # BLD: 10.4 K/UL (ref 4.5–11.5)

## 2024-10-06 PROCEDURE — 71045 X-RAY EXAM CHEST 1 VIEW: CPT

## 2024-10-06 PROCEDURE — 85025 COMPLETE CBC W/AUTO DIFF WBC: CPT

## 2024-10-06 PROCEDURE — 6370000000 HC RX 637 (ALT 250 FOR IP): Performed by: INTERNAL MEDICINE

## 2024-10-06 PROCEDURE — 85730 THROMBOPLASTIN TIME PARTIAL: CPT

## 2024-10-06 PROCEDURE — 2140000000 HC CCU INTERMEDIATE R&B

## 2024-10-06 PROCEDURE — 85027 COMPLETE CBC AUTOMATED: CPT

## 2024-10-06 PROCEDURE — 99285 EMERGENCY DEPT VISIT HI MDM: CPT

## 2024-10-06 PROCEDURE — 2580000003 HC RX 258: Performed by: EMERGENCY MEDICINE

## 2024-10-06 PROCEDURE — 85379 FIBRIN DEGRADATION QUANT: CPT

## 2024-10-06 PROCEDURE — 6370000000 HC RX 637 (ALT 250 FOR IP)

## 2024-10-06 PROCEDURE — 84484 ASSAY OF TROPONIN QUANT: CPT

## 2024-10-06 PROCEDURE — 96365 THER/PROPH/DIAG IV INF INIT: CPT

## 2024-10-06 PROCEDURE — 83880 ASSAY OF NATRIURETIC PEPTIDE: CPT

## 2024-10-06 PROCEDURE — 93005 ELECTROCARDIOGRAM TRACING: CPT | Performed by: EMERGENCY MEDICINE

## 2024-10-06 PROCEDURE — 6360000002 HC RX W HCPCS

## 2024-10-06 PROCEDURE — 71275 CT ANGIOGRAPHY CHEST: CPT

## 2024-10-06 PROCEDURE — 36415 COLL VENOUS BLD VENIPUNCTURE: CPT

## 2024-10-06 PROCEDURE — 96375 TX/PRO/DX INJ NEW DRUG ADDON: CPT

## 2024-10-06 PROCEDURE — 96374 THER/PROPH/DIAG INJ IV PUSH: CPT

## 2024-10-06 PROCEDURE — 82962 GLUCOSE BLOOD TEST: CPT

## 2024-10-06 PROCEDURE — 6360000002 HC RX W HCPCS: Performed by: EMERGENCY MEDICINE

## 2024-10-06 PROCEDURE — 6360000004 HC RX CONTRAST MEDICATION: Performed by: RADIOLOGY

## 2024-10-06 PROCEDURE — 80053 COMPREHEN METABOLIC PANEL: CPT

## 2024-10-06 PROCEDURE — 93005 ELECTROCARDIOGRAM TRACING: CPT

## 2024-10-06 RX ORDER — IOPAMIDOL 755 MG/ML
75 INJECTION, SOLUTION INTRAVASCULAR
Status: COMPLETED | OUTPATIENT
Start: 2024-10-06 | End: 2024-10-06

## 2024-10-06 RX ORDER — METOPROLOL SUCCINATE 25 MG/1
25 TABLET, EXTENDED RELEASE ORAL 2 TIMES DAILY
Status: DISCONTINUED | OUTPATIENT
Start: 2024-10-06 | End: 2024-10-09

## 2024-10-06 RX ORDER — ACETAMINOPHEN 325 MG/1
650 TABLET ORAL EVERY 6 HOURS PRN
Status: DISCONTINUED | OUTPATIENT
Start: 2024-10-06 | End: 2024-10-08 | Stop reason: SDUPTHER

## 2024-10-06 RX ORDER — METOPROLOL TARTRATE 25 MG/1
25 TABLET, FILM COATED ORAL 2 TIMES DAILY
Status: DISCONTINUED | OUTPATIENT
Start: 2024-10-06 | End: 2024-10-06

## 2024-10-06 RX ORDER — SODIUM CHLORIDE 0.9 % (FLUSH) 0.9 %
5-40 SYRINGE (ML) INJECTION PRN
Status: DISCONTINUED | OUTPATIENT
Start: 2024-10-06 | End: 2024-10-08 | Stop reason: SDUPTHER

## 2024-10-06 RX ORDER — HEPARIN SODIUM 10000 [USP'U]/100ML
5-30 INJECTION, SOLUTION INTRAVENOUS CONTINUOUS
Status: DISCONTINUED | OUTPATIENT
Start: 2024-10-06 | End: 2024-10-08

## 2024-10-06 RX ORDER — HEPARIN SODIUM 1000 [USP'U]/ML
4000 INJECTION, SOLUTION INTRAVENOUS; SUBCUTANEOUS ONCE
Status: COMPLETED | OUTPATIENT
Start: 2024-10-06 | End: 2024-10-06

## 2024-10-06 RX ORDER — INSULIN GLARGINE 100 [IU]/ML
22 INJECTION, SOLUTION SUBCUTANEOUS NIGHTLY
COMMUNITY

## 2024-10-06 RX ORDER — POLYETHYLENE GLYCOL 3350 17 G/17G
17 POWDER, FOR SOLUTION ORAL DAILY PRN
Status: DISCONTINUED | OUTPATIENT
Start: 2024-10-06 | End: 2024-10-07

## 2024-10-06 RX ORDER — INSULIN GLARGINE 100 [IU]/ML
22 INJECTION, SOLUTION SUBCUTANEOUS NIGHTLY
Status: DISCONTINUED | OUTPATIENT
Start: 2024-10-06 | End: 2024-10-15 | Stop reason: HOSPADM

## 2024-10-06 RX ORDER — NITROGLYCERIN 20 MG/100ML
5-200 INJECTION INTRAVENOUS CONTINUOUS
Status: DISCONTINUED | OUTPATIENT
Start: 2024-10-06 | End: 2024-10-09

## 2024-10-06 RX ORDER — NITROGLYCERIN 20 MG/100ML
5-200 INJECTION INTRAVENOUS CONTINUOUS
Status: DISCONTINUED | OUTPATIENT
Start: 2024-10-06 | End: 2024-10-06

## 2024-10-06 RX ORDER — SODIUM CHLORIDE 9 MG/ML
INJECTION, SOLUTION INTRAVENOUS PRN
Status: DISCONTINUED | OUTPATIENT
Start: 2024-10-06 | End: 2024-10-15 | Stop reason: HOSPADM

## 2024-10-06 RX ORDER — MAGNESIUM OXIDE/MAG AA CHELATE 300 MG
1 CAPSULE ORAL DAILY
COMMUNITY

## 2024-10-06 RX ORDER — DEXTROSE MONOHYDRATE 100 MG/ML
INJECTION, SOLUTION INTRAVENOUS CONTINUOUS PRN
Status: DISCONTINUED | OUTPATIENT
Start: 2024-10-06 | End: 2024-10-15 | Stop reason: HOSPADM

## 2024-10-06 RX ORDER — SODIUM CHLORIDE 0.9 % (FLUSH) 0.9 %
5-40 SYRINGE (ML) INJECTION EVERY 12 HOURS SCHEDULED
Status: DISCONTINUED | OUTPATIENT
Start: 2024-10-06 | End: 2024-10-08 | Stop reason: SDUPTHER

## 2024-10-06 RX ORDER — PANTOPRAZOLE SODIUM 40 MG/1
40 TABLET, DELAYED RELEASE ORAL
Status: DISCONTINUED | OUTPATIENT
Start: 2024-10-07 | End: 2024-10-07

## 2024-10-06 RX ORDER — CALCITRIOL 0.25 UG/1
0.25 CAPSULE, LIQUID FILLED ORAL EVERY OTHER DAY
COMMUNITY

## 2024-10-06 RX ORDER — SODIUM CHLORIDE 9 MG/ML
INJECTION, SOLUTION INTRAVENOUS CONTINUOUS
Status: DISCONTINUED | OUTPATIENT
Start: 2024-10-06 | End: 2024-10-07

## 2024-10-06 RX ORDER — ONDANSETRON 2 MG/ML
4 INJECTION INTRAMUSCULAR; INTRAVENOUS ONCE
Status: COMPLETED | OUTPATIENT
Start: 2024-10-06 | End: 2024-10-06

## 2024-10-06 RX ORDER — ACETAMINOPHEN 650 MG/1
650 SUPPOSITORY RECTAL EVERY 6 HOURS PRN
Status: DISCONTINUED | OUTPATIENT
Start: 2024-10-06 | End: 2024-10-15 | Stop reason: HOSPADM

## 2024-10-06 RX ORDER — GLUCAGON 1 MG/ML
1 KIT INJECTION PRN
Status: DISCONTINUED | OUTPATIENT
Start: 2024-10-06 | End: 2024-10-15 | Stop reason: HOSPADM

## 2024-10-06 RX ORDER — INSULIN LISPRO 100 [IU]/ML
0-4 INJECTION, SOLUTION INTRAVENOUS; SUBCUTANEOUS NIGHTLY
Status: DISCONTINUED | OUTPATIENT
Start: 2024-10-06 | End: 2024-10-15 | Stop reason: HOSPADM

## 2024-10-06 RX ORDER — CALCITRIOL 0.25 UG/1
0.25 CAPSULE, LIQUID FILLED ORAL EVERY OTHER DAY
Status: DISCONTINUED | OUTPATIENT
Start: 2024-10-07 | End: 2024-10-15 | Stop reason: HOSPADM

## 2024-10-06 RX ORDER — HEPARIN SODIUM 1000 [USP'U]/ML
2000 INJECTION, SOLUTION INTRAVENOUS; SUBCUTANEOUS PRN
Status: DISCONTINUED | OUTPATIENT
Start: 2024-10-06 | End: 2024-10-08

## 2024-10-06 RX ORDER — HEPARIN SODIUM 1000 [USP'U]/ML
4000 INJECTION, SOLUTION INTRAVENOUS; SUBCUTANEOUS PRN
Status: DISCONTINUED | OUTPATIENT
Start: 2024-10-06 | End: 2024-10-08

## 2024-10-06 RX ORDER — NITROGLYCERIN 0.4 MG/1
0.4 TABLET SUBLINGUAL EVERY 5 MIN PRN
Status: DISCONTINUED | OUTPATIENT
Start: 2024-10-06 | End: 2024-10-15 | Stop reason: HOSPADM

## 2024-10-06 RX ORDER — NITROGLYCERIN 20 MG/100ML
INJECTION INTRAVENOUS
Status: COMPLETED
Start: 2024-10-06 | End: 2024-10-06

## 2024-10-06 RX ORDER — INSULIN LISPRO 100 [IU]/ML
0-8 INJECTION, SOLUTION INTRAVENOUS; SUBCUTANEOUS
Status: DISCONTINUED | OUTPATIENT
Start: 2024-10-06 | End: 2024-10-15 | Stop reason: HOSPADM

## 2024-10-06 RX ADMIN — SODIUM CHLORIDE: 9 INJECTION, SOLUTION INTRAVENOUS at 15:10

## 2024-10-06 RX ADMIN — ACETAMINOPHEN 650 MG: 325 TABLET ORAL at 17:48

## 2024-10-06 RX ADMIN — NITROGLYCERIN 5 MCG/MIN: 20 INJECTION INTRAVENOUS at 13:46

## 2024-10-06 RX ADMIN — NITROGLYCERIN 0.4 MG: 0.4 TABLET SUBLINGUAL at 12:38

## 2024-10-06 RX ADMIN — INSULIN LISPRO 4 UNITS: 100 INJECTION, SOLUTION INTRAVENOUS; SUBCUTANEOUS at 17:56

## 2024-10-06 RX ADMIN — HEPARIN SODIUM 10 UNITS/KG/HR: 10000 INJECTION, SOLUTION INTRAVENOUS at 14:45

## 2024-10-06 RX ADMIN — ACETAMINOPHEN 650 MG: 325 TABLET ORAL at 22:54

## 2024-10-06 RX ADMIN — IOPAMIDOL 75 ML: 755 INJECTION, SOLUTION INTRAVENOUS at 13:21

## 2024-10-06 RX ADMIN — INSULIN GLARGINE 22 UNITS: 100 INJECTION, SOLUTION SUBCUTANEOUS at 20:33

## 2024-10-06 RX ADMIN — NITROGLYCERIN 0.4 MG: 0.4 TABLET SUBLINGUAL at 13:31

## 2024-10-06 RX ADMIN — HEPARIN SODIUM 4000 UNITS: 1000 INJECTION INTRAVENOUS; SUBCUTANEOUS at 14:36

## 2024-10-06 RX ADMIN — NITROGLYCERIN 30 MCG/MIN: 20 INJECTION INTRAVENOUS at 14:35

## 2024-10-06 RX ADMIN — METOPROLOL SUCCINATE 25 MG: 25 TABLET, EXTENDED RELEASE ORAL at 17:48

## 2024-10-06 RX ADMIN — ONDANSETRON 4 MG: 2 INJECTION INTRAMUSCULAR; INTRAVENOUS at 13:29

## 2024-10-06 ASSESSMENT — PAIN DESCRIPTION - ORIENTATION
ORIENTATION: LEFT
ORIENTATION_2: ANTERIOR
ORIENTATION_2: ANTERIOR
ORIENTATION: LEFT
ORIENTATION: ANTERIOR;MID
ORIENTATION: LEFT
ORIENTATION: ANTERIOR

## 2024-10-06 ASSESSMENT — PAIN SCALES - GENERAL
PAINLEVEL_OUTOF10: 3
PAINLEVEL_OUTOF10: 4
PAINLEVEL_OUTOF10: 5
PAINLEVEL_OUTOF10: 3
PAINLEVEL_OUTOF10: 7
PAINLEVEL_OUTOF10: 3
PAINLEVEL_OUTOF10: 5
PAINLEVEL_OUTOF10: 3

## 2024-10-06 ASSESSMENT — PAIN DESCRIPTION - LOCATION
LOCATION: CHEST
LOCATION: CHEST
LOCATION_2: CHEST
LOCATION: HEAD
LOCATION_2: CHEST
LOCATION: CHEST
LOCATION: CHEST
LOCATION_2: CHEST
LOCATION: HEAD
LOCATION: CHEST

## 2024-10-06 ASSESSMENT — PAIN - FUNCTIONAL ASSESSMENT
PAIN_FUNCTIONAL_ASSESSMENT: ACTIVITIES ARE NOT PREVENTED
PAIN_FUNCTIONAL_ASSESSMENT: 0-10
PAIN_FUNCTIONAL_ASSESSMENT_SITE2: ACTIVITIES ARE NOT PREVENTED
PAIN_FUNCTIONAL_ASSESSMENT_SITE2: ACTIVITIES ARE NOT PREVENTED
PAIN_FUNCTIONAL_ASSESSMENT: ACTIVITIES ARE NOT PREVENTED
PAIN_FUNCTIONAL_ASSESSMENT: ACTIVITIES ARE NOT PREVENTED

## 2024-10-06 ASSESSMENT — PAIN DESCRIPTION - DESCRIPTORS
DESCRIPTORS: SHARP;DISCOMFORT;ACHING
DESCRIPTORS: DULL
DESCRIPTORS: ACHING
DESCRIPTORS: SQUEEZING
DESCRIPTORS: ACHING;SHARP;THROBBING
DESCRIPTORS_2: PRESSURE
DESCRIPTORS: PRESSURE;SQUEEZING
DESCRIPTORS: DULL

## 2024-10-06 ASSESSMENT — PAIN DESCRIPTION - INTENSITY
RATING_2: 4
RATING_2: 0
RATING_2: 4

## 2024-10-06 ASSESSMENT — PAIN DESCRIPTION - PAIN TYPE
TYPE: ACUTE PAIN
TYPE: ACUTE PAIN

## 2024-10-06 ASSESSMENT — PAIN DESCRIPTION - FREQUENCY: FREQUENCY: INTERMITTENT

## 2024-10-06 NOTE — FLOWSHEET NOTE
10/06/24 1605   Belongings   Dental Appliances None   Vision - Corrective Lenses None   Hearing Aid None   Clothing Shorts;Socks;Shirt;Jacket/Coat   Jewelry None   Body Piercings Removed N/A   Electronic Devices Cell Phone   Weapons (Notify Protective Services/Security) None   Home Medications Sent home   Valuables Given To Patient   Provide Name(s) of Who Valuable(s) Were Given To Quin   Orientation to Room   Patient/Responsible Party informed of Rights and Responsibilities Yes   Orientation to Room Performed Yes

## 2024-10-06 NOTE — ED NOTES
Radiology Procedure Waiver   Name: Sunny Segal  : 1940  MRN: [id number]    Date:  10/6/24    Time: 12:42 PM EDT    Benefits of immediately proceeding with Radiology exam(s) without pre-testing outweigh the risks or are not indicated as specified below and therefore the following is/are being waived:    [] Pregnancy test   [] Patients LMP on-time and regular.   [] Patient had Tubal Ligation or has other Contraception Device.   [] Patient  is Menopausal or Premenarcheal.    [] Patient had Full or Partial Hysterectomy.    [] Protocol for Iodine allergy    [] MRI Questionnaire     [x] BUN/Creatinine   [] Patient age w/no hx of renal dysfunction.   [] Patient on Dialysis.   [] Recent Normal Labs.  Electronically signed by Alexandro Walter MD on 10/6/24 at 12:42 PM EDT

## 2024-10-06 NOTE — ED PROVIDER NOTES
HPI:  10/6/24, Time: 10:59 AM EDT         Sunny Segal is a 84 y.o. male presenting to the ED for chest pain beginning this morning.  States he woke up and felt lightheaded with associated left-sided chest pain, nonradiating, associated nausea and 1 episode of nonbloody emesis.  Patient states that his right shoulder has been hurting him all week.  No falls or trauma.  No shortness of breath, fevers, cough, recent illness, and abdominal pain.  Patient is wheelchair-bound and lives at home with his wife.  Denies history of DVT/PE.  He is not anticoagulated.  EMS states that they gave the patient 4 baby aspirin but he vomited shortly after.  Patient was noted to be hypertensive on arrival.  He is on blood pressure medication and reports medication compliance.  Patient denies any history of CAD however chart review indicates that he does have a history of CAD.  Patient appears to follow with [facility] cardiology.    I reviewed the patient's chart.  Patient underwent echocardiogram by Dr. Chi on 12/26/2022 showing an EF of 65%.  Mild LVH.    --------------------------------------------- PAST HISTORY ---------------------------------------------  Past Medical History:  has a past medical history of Anemia, CAD (coronary artery disease), Cancer (HCC), Diabetes mellitus (HCC), Diverticulosis, GERD (gastroesophageal reflux disease), Hyperlipidemia, and Hypertension.    Past Surgical History:  has a past surgical history that includes Colonoscopy; eye surgery; back surgery; and Parotidectomy (9/11/12).    Social History:  reports that he has never smoked. He has never used smokeless tobacco. He reports that he does not drink alcohol and does not use drugs.    Family History: family history includes Cancer in his brother and father.     The patient’s home medications have been reviewed.    Allergies: Amlodipine, Codeine, Darvocet [propoxyphene n-acetaminophen], Hydrocodone-acetaminophen, and 
effort was made to ensure accuracy; however, inadvertent computerized transcription errors may be present

## 2024-10-07 ENCOUNTER — APPOINTMENT (OUTPATIENT)
Dept: GENERAL RADIOLOGY | Age: 84
DRG: 281 | End: 2024-10-07
Payer: MEDICARE

## 2024-10-07 ENCOUNTER — APPOINTMENT (OUTPATIENT)
Dept: ULTRASOUND IMAGING | Age: 84
DRG: 281 | End: 2024-10-07
Payer: MEDICARE

## 2024-10-07 LAB
ANION GAP SERPL CALCULATED.3IONS-SCNC: 10 MMOL/L (ref 7–16)
ANION GAP SERPL CALCULATED.3IONS-SCNC: 14 MMOL/L (ref 7–16)
BASOPHILS # BLD: 0.03 K/UL (ref 0–0.2)
BASOPHILS NFR BLD: 0 % (ref 0–2)
BUN SERPL-MCNC: 39 MG/DL (ref 6–23)
BUN SERPL-MCNC: 44 MG/DL (ref 6–23)
CALCIUM SERPL-MCNC: 8.1 MG/DL (ref 8.6–10.2)
CALCIUM SERPL-MCNC: 8.4 MG/DL (ref 8.6–10.2)
CHLORIDE SERPL-SCNC: 105 MMOL/L (ref 98–107)
CHLORIDE SERPL-SCNC: 106 MMOL/L (ref 98–107)
CHOLEST SERPL-MCNC: 110 MG/DL
CO2 SERPL-SCNC: 18 MMOL/L (ref 22–29)
CO2 SERPL-SCNC: 22 MMOL/L (ref 22–29)
CREAT SERPL-MCNC: 2.2 MG/DL (ref 0.7–1.2)
CREAT SERPL-MCNC: 2.3 MG/DL (ref 0.7–1.2)
CREAT UR-MCNC: 130 MG/DL (ref 40–278)
CREAT UR-MCNC: 145 MG/DL (ref 40–278)
EKG ATRIAL RATE: 76 BPM
EKG P AXIS: 55 DEGREES
EKG P-R INTERVAL: 214 MS
EKG Q-T INTERVAL: 420 MS
EKG QRS DURATION: 112 MS
EKG QTC CALCULATION (BAZETT): 472 MS
EKG R AXIS: 7 DEGREES
EKG T AXIS: 96 DEGREES
EKG VENTRICULAR RATE: 76 BPM
EOSINOPHIL # BLD: 0.02 K/UL (ref 0.05–0.5)
EOSINOPHILS RELATIVE PERCENT: 0 % (ref 0–6)
ERYTHROCYTE [DISTWIDTH] IN BLOOD BY AUTOMATED COUNT: 13.4 % (ref 11.5–15)
FERRITIN SERPL-MCNC: 77 NG/ML
FOLATE SERPL-MCNC: 12.1 NG/ML (ref 4.8–24.2)
GFR, ESTIMATED: 28 ML/MIN/1.73M2
GFR, ESTIMATED: 28 ML/MIN/1.73M2
GLUCOSE BLD-MCNC: 217 MG/DL (ref 74–99)
GLUCOSE BLD-MCNC: 254 MG/DL (ref 74–99)
GLUCOSE BLD-MCNC: 287 MG/DL (ref 74–99)
GLUCOSE SERPL-MCNC: 217 MG/DL (ref 74–99)
GLUCOSE SERPL-MCNC: 222 MG/DL (ref 74–99)
HBA1C MFR BLD: 7.1 % (ref 4–5.6)
HCT VFR BLD AUTO: 29.6 % (ref 37–54)
HDLC SERPL-MCNC: 48 MG/DL
HGB BLD-MCNC: 9.4 G/DL (ref 12.5–16.5)
IMM GRANULOCYTES # BLD AUTO: 0.04 K/UL (ref 0–0.58)
IMM GRANULOCYTES NFR BLD: 0 % (ref 0–5)
IRON SATN MFR SERPL: 33 % (ref 20–55)
IRON SERPL-MCNC: 73 UG/DL (ref 59–158)
LDLC SERPL CALC-MCNC: 50 MG/DL
LYMPHOCYTES NFR BLD: 0.62 K/UL (ref 1.5–4)
LYMPHOCYTES RELATIVE PERCENT: 6 % (ref 20–42)
MCH RBC QN AUTO: 32 PG (ref 26–35)
MCHC RBC AUTO-ENTMCNC: 31.8 G/DL (ref 32–34.5)
MCV RBC AUTO: 100.7 FL (ref 80–99.9)
MONOCYTES NFR BLD: 1.01 K/UL (ref 0.1–0.95)
MONOCYTES NFR BLD: 10 % (ref 2–12)
NEUTROPHILS NFR BLD: 83 % (ref 43–80)
NEUTS SEG NFR BLD: 8.57 K/UL (ref 1.8–7.3)
PARTIAL THROMBOPLASTIN TIME: 49.6 SEC (ref 24.5–35.1)
PARTIAL THROMBOPLASTIN TIME: 56.2 SEC (ref 24.5–35.1)
PARTIAL THROMBOPLASTIN TIME: 63.8 SEC (ref 24.5–35.1)
PLATELET # BLD AUTO: 201 K/UL (ref 130–450)
PMV BLD AUTO: 11.1 FL (ref 7–12)
POTASSIUM SERPL-SCNC: 4.2 MMOL/L (ref 3.5–5)
POTASSIUM SERPL-SCNC: 4.3 MMOL/L (ref 3.5–5)
RBC # BLD AUTO: 2.94 M/UL (ref 3.8–5.8)
SODIUM SERPL-SCNC: 137 MMOL/L (ref 132–146)
SODIUM SERPL-SCNC: 138 MMOL/L (ref 132–146)
SODIUM UR-SCNC: 66 MMOL/L
TIBC SERPL-MCNC: 219 UG/DL (ref 250–450)
TOTAL PROTEIN, URINE: 10 MG/DL (ref 0–12)
TRANSFERRIN SERPL-MCNC: 200 MG/DL (ref 200–360)
TRIGL SERPL-MCNC: 61 MG/DL
TROPONIN I SERPL HS-MCNC: 1570 NG/L (ref 0–11)
URINE TOTAL PROTEIN CREATININE RATIO: 0.07 (ref 0–0.2)
UUN UR-MCNC: 764 MG/DL (ref 800–1666)
VIT B12 SERPL-MCNC: 375 PG/ML (ref 211–946)
VLDLC SERPL CALC-MCNC: 12 MG/DL
WBC OTHER # BLD: 10.3 K/UL (ref 4.5–11.5)

## 2024-10-07 PROCEDURE — 85730 THROMBOPLASTIN TIME PARTIAL: CPT

## 2024-10-07 PROCEDURE — 2580000003 HC RX 258: Performed by: INTERNAL MEDICINE

## 2024-10-07 PROCEDURE — 84156 ASSAY OF PROTEIN URINE: CPT

## 2024-10-07 PROCEDURE — 6370000000 HC RX 637 (ALT 250 FOR IP): Performed by: NURSE PRACTITIONER

## 2024-10-07 PROCEDURE — 6360000002 HC RX W HCPCS: Performed by: STUDENT IN AN ORGANIZED HEALTH CARE EDUCATION/TRAINING PROGRAM

## 2024-10-07 PROCEDURE — 93010 ELECTROCARDIOGRAM REPORT: CPT | Performed by: INTERNAL MEDICINE

## 2024-10-07 PROCEDURE — 84540 ASSAY OF URINE/UREA-N: CPT

## 2024-10-07 PROCEDURE — 6370000000 HC RX 637 (ALT 250 FOR IP): Performed by: INTERNAL MEDICINE

## 2024-10-07 PROCEDURE — 2580000003 HC RX 258: Performed by: EMERGENCY MEDICINE

## 2024-10-07 PROCEDURE — 6360000002 HC RX W HCPCS: Performed by: EMERGENCY MEDICINE

## 2024-10-07 PROCEDURE — 83540 ASSAY OF IRON: CPT

## 2024-10-07 PROCEDURE — 84300 ASSAY OF URINE SODIUM: CPT

## 2024-10-07 PROCEDURE — 84466 ASSAY OF TRANSFERRIN: CPT

## 2024-10-07 PROCEDURE — 80048 BASIC METABOLIC PNL TOTAL CA: CPT

## 2024-10-07 PROCEDURE — 2500000003 HC RX 250 WO HCPCS: Performed by: INTERNAL MEDICINE

## 2024-10-07 PROCEDURE — 82570 ASSAY OF URINE CREATININE: CPT

## 2024-10-07 PROCEDURE — 85025 COMPLETE CBC W/AUTO DIFF WBC: CPT

## 2024-10-07 PROCEDURE — 82962 GLUCOSE BLOOD TEST: CPT

## 2024-10-07 PROCEDURE — 82607 VITAMIN B-12: CPT

## 2024-10-07 PROCEDURE — 2140000000 HC CCU INTERMEDIATE R&B

## 2024-10-07 PROCEDURE — 82728 ASSAY OF FERRITIN: CPT

## 2024-10-07 PROCEDURE — 76770 US EXAM ABDO BACK WALL COMP: CPT

## 2024-10-07 PROCEDURE — 83036 HEMOGLOBIN GLYCOSYLATED A1C: CPT

## 2024-10-07 PROCEDURE — 74018 RADEX ABDOMEN 1 VIEW: CPT

## 2024-10-07 PROCEDURE — 84484 ASSAY OF TROPONIN QUANT: CPT

## 2024-10-07 PROCEDURE — 80061 LIPID PANEL: CPT

## 2024-10-07 PROCEDURE — 36415 COLL VENOUS BLD VENIPUNCTURE: CPT

## 2024-10-07 PROCEDURE — 6370000000 HC RX 637 (ALT 250 FOR IP): Performed by: STUDENT IN AN ORGANIZED HEALTH CARE EDUCATION/TRAINING PROGRAM

## 2024-10-07 PROCEDURE — 82746 ASSAY OF FOLIC ACID SERUM: CPT

## 2024-10-07 PROCEDURE — 6360000002 HC RX W HCPCS

## 2024-10-07 RX ORDER — ONDANSETRON 2 MG/ML
INJECTION INTRAMUSCULAR; INTRAVENOUS
Status: COMPLETED
Start: 2024-10-07 | End: 2024-10-07

## 2024-10-07 RX ORDER — TRAMADOL HYDROCHLORIDE 50 MG/1
50 TABLET ORAL ONCE
Status: COMPLETED | OUTPATIENT
Start: 2024-10-07 | End: 2024-10-07

## 2024-10-07 RX ORDER — POLYETHYLENE GLYCOL 3350 17 G/17G
17 POWDER, FOR SOLUTION ORAL DAILY
Status: DISCONTINUED | OUTPATIENT
Start: 2024-10-07 | End: 2024-10-15 | Stop reason: HOSPADM

## 2024-10-07 RX ORDER — PANTOPRAZOLE SODIUM 40 MG/10ML
40 INJECTION, POWDER, LYOPHILIZED, FOR SOLUTION INTRAVENOUS DAILY
Status: DISCONTINUED | OUTPATIENT
Start: 2024-10-07 | End: 2024-10-15 | Stop reason: HOSPADM

## 2024-10-07 RX ORDER — ONDANSETRON 2 MG/ML
4 INJECTION INTRAMUSCULAR; INTRAVENOUS EVERY 6 HOURS PRN
Status: DISCONTINUED | OUTPATIENT
Start: 2024-10-07 | End: 2024-10-15 | Stop reason: HOSPADM

## 2024-10-07 RX ORDER — SENNOSIDES A AND B 8.6 MG/1
1 TABLET, FILM COATED ORAL 2 TIMES DAILY
Status: DISCONTINUED | OUTPATIENT
Start: 2024-10-07 | End: 2024-10-15 | Stop reason: HOSPADM

## 2024-10-07 RX ORDER — ONDANSETRON 4 MG/1
4 TABLET, ORALLY DISINTEGRATING ORAL EVERY 8 HOURS PRN
Status: DISCONTINUED | OUTPATIENT
Start: 2024-10-07 | End: 2024-10-15 | Stop reason: HOSPADM

## 2024-10-07 RX ORDER — SODIUM BICARBONATE 650 MG/1
650 TABLET ORAL 2 TIMES DAILY
Status: DISCONTINUED | OUTPATIENT
Start: 2024-10-07 | End: 2024-10-15 | Stop reason: HOSPADM

## 2024-10-07 RX ORDER — OXYCODONE HYDROCHLORIDE 5 MG/1
5 TABLET ORAL EVERY 4 HOURS PRN
Status: DISCONTINUED | OUTPATIENT
Start: 2024-10-07 | End: 2024-10-09

## 2024-10-07 RX ADMIN — SENNOSIDES 8.6 MG: 8.6 TABLET, FILM COATED ORAL at 21:19

## 2024-10-07 RX ADMIN — NITROGLYCERIN 50 MCG/MIN: 20 INJECTION INTRAVENOUS at 06:30

## 2024-10-07 RX ADMIN — SODIUM BICARBONATE 650 MG: 650 TABLET ORAL at 21:19

## 2024-10-07 RX ADMIN — TRAMADOL HYDROCHLORIDE 50 MG: 50 TABLET ORAL at 01:22

## 2024-10-07 RX ADMIN — SODIUM BICARBONATE: 84 INJECTION, SOLUTION INTRAVENOUS at 10:04

## 2024-10-07 RX ADMIN — PANTOPRAZOLE SODIUM 40 MG: 40 INJECTION, POWDER, FOR SOLUTION INTRAVENOUS at 10:58

## 2024-10-07 RX ADMIN — METOPROLOL SUCCINATE 25 MG: 25 TABLET, EXTENDED RELEASE ORAL at 21:19

## 2024-10-07 RX ADMIN — PANTOPRAZOLE SODIUM 40 MG: 40 TABLET, DELAYED RELEASE ORAL at 06:25

## 2024-10-07 RX ADMIN — ONDANSETRON 4 MG: 2 INJECTION INTRAMUSCULAR; INTRAVENOUS at 08:38

## 2024-10-07 RX ADMIN — SODIUM CHLORIDE, PRESERVATIVE FREE 10 ML: 5 INJECTION INTRAVENOUS at 21:20

## 2024-10-07 RX ADMIN — INSULIN LISPRO 4 UNITS: 100 INJECTION, SOLUTION INTRAVENOUS; SUBCUTANEOUS at 13:54

## 2024-10-07 RX ADMIN — POLYETHYLENE GLYCOL 3350 17 G: 17 POWDER, FOR SOLUTION ORAL at 11:21

## 2024-10-07 RX ADMIN — SODIUM CHLORIDE: 9 INJECTION, SOLUTION INTRAVENOUS at 02:36

## 2024-10-07 RX ADMIN — INSULIN LISPRO 2 UNITS: 100 INJECTION, SOLUTION INTRAVENOUS; SUBCUTANEOUS at 09:00

## 2024-10-07 RX ADMIN — INSULIN LISPRO 2 UNITS: 100 INJECTION, SOLUTION INTRAVENOUS; SUBCUTANEOUS at 17:35

## 2024-10-07 RX ADMIN — NITROGLYCERIN 50 MCG/MIN: 20 INJECTION INTRAVENOUS at 23:32

## 2024-10-07 RX ADMIN — INSULIN GLARGINE 22 UNITS: 100 INJECTION, SOLUTION SUBCUTANEOUS at 21:19

## 2024-10-07 RX ADMIN — HEPARIN SODIUM 2000 UNITS: 1000 INJECTION INTRAVENOUS; SUBCUTANEOUS at 02:34

## 2024-10-07 RX ADMIN — SODIUM CHLORIDE, PRESERVATIVE FREE 10 ML: 5 INJECTION INTRAVENOUS at 08:40

## 2024-10-07 RX ADMIN — CALCITRIOL CAPSULES 0.25 MCG 0.25 MCG: 0.25 CAPSULE ORAL at 11:22

## 2024-10-07 RX ADMIN — SENNOSIDES 8.6 MG: 8.6 TABLET, FILM COATED ORAL at 11:08

## 2024-10-07 RX ADMIN — SODIUM BICARBONATE 650 MG: 650 TABLET ORAL at 11:08

## 2024-10-07 RX ADMIN — METOPROLOL SUCCINATE 25 MG: 25 TABLET, EXTENDED RELEASE ORAL at 08:44

## 2024-10-07 RX ADMIN — HEPARIN SODIUM 12 UNITS/KG/HR: 10000 INJECTION, SOLUTION INTRAVENOUS at 19:39

## 2024-10-07 RX ADMIN — SODIUM BICARBONATE: 84 INJECTION, SOLUTION INTRAVENOUS at 21:27

## 2024-10-07 ASSESSMENT — PAIN SCALES - GENERAL
PAINLEVEL_OUTOF10: 9
PAINLEVEL_OUTOF10: 3
PAINLEVEL_OUTOF10: 4
PAINLEVEL_OUTOF10: 5
PAINLEVEL_OUTOF10: 0
PAINLEVEL_OUTOF10: 4

## 2024-10-07 ASSESSMENT — PAIN DESCRIPTION - LOCATION
LOCATION: HEAD
LOCATION_2: CHEST
LOCATION: HEAD
LOCATION_2: CHEST
LOCATION: HEAD

## 2024-10-07 ASSESSMENT — PAIN DESCRIPTION - PAIN TYPE
TYPE: ACUTE PAIN
TYPE: ACUTE PAIN

## 2024-10-07 ASSESSMENT — PAIN DESCRIPTION - ORIENTATION
ORIENTATION: ANTERIOR;MID
ORIENTATION: ANTERIOR;MID
ORIENTATION: MID
ORIENTATION: ANTERIOR;MID
ORIENTATION: ANTERIOR;MID

## 2024-10-07 ASSESSMENT — PAIN DESCRIPTION - DESCRIPTORS
DESCRIPTORS: ACHING;PENETRATING
DESCRIPTORS: ACHING;DISCOMFORT;NAGGING
DESCRIPTORS: DULL
DESCRIPTORS: NAGGING;ACHING

## 2024-10-07 ASSESSMENT — PAIN DESCRIPTION - INTENSITY
RATING_2: 0

## 2024-10-07 ASSESSMENT — PAIN DESCRIPTION - FREQUENCY
FREQUENCY: CONTINUOUS

## 2024-10-07 ASSESSMENT — PAIN - FUNCTIONAL ASSESSMENT
PAIN_FUNCTIONAL_ASSESSMENT: ACTIVITIES ARE NOT PREVENTED

## 2024-10-07 NOTE — ACP (ADVANCE CARE PLANNING)
10/07/24, Advance Care Planning   Healthcare Decision Maker:    Primary Decision Maker: Quin Segal - [facility] - [phone]    Click here to complete Healthcare Decision Makers including selection of the Healthcare Decision Maker Relationship (ie \"Primary\").

## 2024-10-07 NOTE — CARE COORDINATION
10/07/24, Patient admitted for NSTEMI.  SW met with patient in room to discuss transition of care/SW role.  Patient lives with wife in a 1 story condo with no steps to enter the home.  Patient says he is independent with ADL's but does use an electric WC to help transfer him about when home.  Wife does all the driving for the patient.  Other DME is shower bench and WW.  MIKA hx none and HHC hx yes (not sure who company was).  Patient says he has been doing the Aqua therapy.  PCP is Dr. Adams and Pharmacy is [facility].  Discharge plan will be home with wife for transportation.  Will need to watch for needed 02 which patient is currently on.  Patient does not use any 02 at home and has no preference on company only one that uses patient's insurance.  RN to wean patient off-if needed will need pulse ox testing and DME order.  SW/CM to follow.      Case Management Assessment  Initial Evaluation    Date/Time of Evaluation: 10/7/2024 1:16 PM  Assessment Completed by: MICK Gomez    If patient is discharged prior to next notation, then this note serves as note for discharge by case management.    Patient Name: Sunny Segal                   YOB: 1940  Diagnosis: NSTEMI (non-ST elevated myocardial infarction) (HCC) [I21.4]                   Date / Time: 10/6/2024 10:54 AM    Patient Admission Status: Inpatient   Readmission Risk (Low < 19, Mod (19-27), High > 27): Readmission Risk Score: 18.8    Current PCP: Gumaro Adams MD  PCP verified by CM? Yes    Chart Reviewed: Yes      History Provided by: Patient  Patient Orientation: Alert and Oriented    Patient Cognition: Alert    Hospitalization in the last 30 days (Readmission):  No    If yes, Readmission Assessment in  Navigator will be completed.    Advance Directives:      Code Status: Full Code   Patient's Primary Decision Maker is: Legal Next of Kin    Primary Decision Maker: Quin Segal - Spouse - [phone]    Discharge

## 2024-10-07 NOTE — H&P
Internal Medicine History & Physical     Name: Sunny Segal  : 1940  Chief Complaint: Chest Pain (Pt states he woke up with CP radiating to neck. Shoulder pain last night.  Pt given 324mg ASA by EMS however vomited after. )  Primary Care Physician: Gumaro Adams MD  Admission date: 10/6/2024  Date of service: 10/7/2024     History of Present Illness  Sunny is a 84 y.o. year old male who presented with a chief complaint of chest pain     Patient seen in bed on NC O2 NAD, states about a weak ago starting having aching pain in his back. Was fatigued. He woke up  morning and was light headed. He went down and had breakfast with his wife and then watched TV but he started having chest pain pressure type sensation. Constant squeezing. He waiting a little bit but it did not go away so he told his wife he needed to go to the hospital. He is non smoker, non drinker. Lives at home with wife. States he does not know if his mother or father had CAD but his father had cancer of the bone.     Past Medical History:   Diagnosis Date    Anemia     CAD (coronary artery disease)     Cancer (HCC)     left parotid    Diabetes mellitus (HCC)     Diverticulosis     mild    GERD (gastroesophageal reflux disease)     Hyperlipidemia     Hypertension        Past Surgical History:   Procedure Laterality Date    BACK SURGERY      COLONOSCOPY      EYE SURGERY      PAROTIDECTOMY  12    left superficial       Family History   Problem Relation Age of Onset    Cancer Father         prostate    Cancer Brother         prostate         Social History  Patient lives at home.   At baseline patient ambulates with out assistance   Illicit drugs: Denies   TOBACCO:   reports that he has never smoked. He has never used smokeless tobacco.  ETOH:   reports no history of alcohol use.    Home Medications  Prior to Admission medications    Medication Sig Start Date End Date Taking? Authorizing Provider   Magnesium 300 MG CAPS Take 1

## 2024-10-08 LAB
25(OH)D3 SERPL-MCNC: 23.6 NG/ML (ref 30–100)
ALBUMIN SERPL-MCNC: 3.6 G/DL (ref 3.5–5.2)
ALP SERPL-CCNC: 71 U/L (ref 40–129)
ALT SERPL-CCNC: 14 U/L (ref 0–40)
ANION GAP SERPL CALCULATED.3IONS-SCNC: 11 MMOL/L (ref 7–16)
AST SERPL-CCNC: 50 U/L (ref 0–39)
BASOPHILS # BLD: 0.02 K/UL (ref 0–0.2)
BASOPHILS NFR BLD: 0 % (ref 0–2)
BILIRUB SERPL-MCNC: 0.4 MG/DL (ref 0–1.2)
BUN SERPL-MCNC: 34 MG/DL (ref 6–23)
CA-I BLD-SCNC: 1.14 MMOL/L (ref 1.15–1.33)
CALCIUM SERPL-MCNC: 8.5 MG/DL (ref 8.6–10.2)
CHLORIDE SERPL-SCNC: 104 MMOL/L (ref 98–107)
CO2 SERPL-SCNC: 24 MMOL/L (ref 22–29)
CREAT SERPL-MCNC: 2.1 MG/DL (ref 0.7–1.2)
EOSINOPHIL # BLD: 0.05 K/UL (ref 0.05–0.5)
EOSINOPHILS RELATIVE PERCENT: 1 % (ref 0–6)
ERYTHROCYTE [DISTWIDTH] IN BLOOD BY AUTOMATED COUNT: 13.2 % (ref 11.5–15)
GFR, ESTIMATED: 30 ML/MIN/1.73M2
GLUCOSE BLD-MCNC: 269 MG/DL (ref 74–99)
GLUCOSE BLD-MCNC: 329 MG/DL (ref 74–99)
GLUCOSE SERPL-MCNC: 195 MG/DL (ref 74–99)
HCT VFR BLD AUTO: 27.3 % (ref 37–54)
HGB BLD-MCNC: 8.8 G/DL (ref 12.5–16.5)
IMM GRANULOCYTES # BLD AUTO: 0.07 K/UL (ref 0–0.58)
IMM GRANULOCYTES NFR BLD: 1 % (ref 0–5)
LYMPHOCYTES NFR BLD: 0.66 K/UL (ref 1.5–4)
LYMPHOCYTES RELATIVE PERCENT: 8 % (ref 20–42)
MCH RBC QN AUTO: 31.9 PG (ref 26–35)
MCHC RBC AUTO-ENTMCNC: 32.2 G/DL (ref 32–34.5)
MCV RBC AUTO: 98.9 FL (ref 80–99.9)
MONOCYTES NFR BLD: 1.25 K/UL (ref 0.1–0.95)
MONOCYTES NFR BLD: 14 % (ref 2–12)
NEUTROPHILS NFR BLD: 77 % (ref 43–80)
NEUTS SEG NFR BLD: 6.8 K/UL (ref 1.8–7.3)
PARTIAL THROMBOPLASTIN TIME: 50.1 SEC (ref 24.5–35.1)
PHOSPHATE SERPL-MCNC: 2.4 MG/DL (ref 2.5–4.5)
PLATELET # BLD AUTO: 175 K/UL (ref 130–450)
PMV BLD AUTO: 11 FL (ref 7–12)
POTASSIUM SERPL-SCNC: 4 MMOL/L (ref 3.5–5)
PROT SERPL-MCNC: 6.1 G/DL (ref 6.4–8.3)
PTH-INTACT SERPL-MCNC: 170.3 PG/ML (ref 15–65)
RBC # BLD AUTO: 2.76 M/UL (ref 3.8–5.8)
SODIUM SERPL-SCNC: 139 MMOL/L (ref 132–146)
WBC OTHER # BLD: 8.9 K/UL (ref 4.5–11.5)

## 2024-10-08 PROCEDURE — C1769 GUIDE WIRE: HCPCS | Performed by: INTERNAL MEDICINE

## 2024-10-08 PROCEDURE — 6360000002 HC RX W HCPCS: Performed by: EMERGENCY MEDICINE

## 2024-10-08 PROCEDURE — 36415 COLL VENOUS BLD VENIPUNCTURE: CPT

## 2024-10-08 PROCEDURE — 82962 GLUCOSE BLOOD TEST: CPT

## 2024-10-08 PROCEDURE — 84100 ASSAY OF PHOSPHORUS: CPT

## 2024-10-08 PROCEDURE — 80053 COMPREHEN METABOLIC PANEL: CPT

## 2024-10-08 PROCEDURE — 85730 THROMBOPLASTIN TIME PARTIAL: CPT

## 2024-10-08 PROCEDURE — 82330 ASSAY OF CALCIUM: CPT

## 2024-10-08 PROCEDURE — 6370000000 HC RX 637 (ALT 250 FOR IP): Performed by: INTERNAL MEDICINE

## 2024-10-08 PROCEDURE — B2151ZZ FLUOROSCOPY OF LEFT HEART USING LOW OSMOLAR CONTRAST: ICD-10-PCS | Performed by: INTERNAL MEDICINE

## 2024-10-08 PROCEDURE — 4A023N7 MEASUREMENT OF CARDIAC SAMPLING AND PRESSURE, LEFT HEART, PERCUTANEOUS APPROACH: ICD-10-PCS | Performed by: INTERNAL MEDICINE

## 2024-10-08 PROCEDURE — B2111ZZ FLUOROSCOPY OF MULTIPLE CORONARY ARTERIES USING LOW OSMOLAR CONTRAST: ICD-10-PCS | Performed by: INTERNAL MEDICINE

## 2024-10-08 PROCEDURE — 6360000002 HC RX W HCPCS: Performed by: INTERNAL MEDICINE

## 2024-10-08 PROCEDURE — 6370000000 HC RX 637 (ALT 250 FOR IP): Performed by: STUDENT IN AN ORGANIZED HEALTH CARE EDUCATION/TRAINING PROGRAM

## 2024-10-08 PROCEDURE — 2709999900 HC NON-CHARGEABLE SUPPLY: Performed by: INTERNAL MEDICINE

## 2024-10-08 PROCEDURE — 6360000004 HC RX CONTRAST MEDICATION: Performed by: INTERNAL MEDICINE

## 2024-10-08 PROCEDURE — 82306 VITAMIN D 25 HYDROXY: CPT

## 2024-10-08 PROCEDURE — 2580000003 HC RX 258: Performed by: INTERNAL MEDICINE

## 2024-10-08 PROCEDURE — C1894 INTRO/SHEATH, NON-LASER: HCPCS | Performed by: INTERNAL MEDICINE

## 2024-10-08 PROCEDURE — 85025 COMPLETE CBC W/AUTO DIFF WBC: CPT

## 2024-10-08 PROCEDURE — 2500000003 HC RX 250 WO HCPCS: Performed by: INTERNAL MEDICINE

## 2024-10-08 PROCEDURE — 93454 CORONARY ARTERY ANGIO S&I: CPT | Performed by: INTERNAL MEDICINE

## 2024-10-08 PROCEDURE — 2140000000 HC CCU INTERMEDIATE R&B

## 2024-10-08 PROCEDURE — 83970 ASSAY OF PARATHORMONE: CPT

## 2024-10-08 PROCEDURE — 6360000002 HC RX W HCPCS: Performed by: STUDENT IN AN ORGANIZED HEALTH CARE EDUCATION/TRAINING PROGRAM

## 2024-10-08 RX ORDER — FENTANYL CITRATE 50 UG/ML
INJECTION, SOLUTION INTRAMUSCULAR; INTRAVENOUS PRN
Status: DISCONTINUED | OUTPATIENT
Start: 2024-10-08 | End: 2024-10-08 | Stop reason: HOSPADM

## 2024-10-08 RX ORDER — SODIUM CHLORIDE 0.9 % (FLUSH) 0.9 %
5-40 SYRINGE (ML) INJECTION PRN
Status: DISCONTINUED | OUTPATIENT
Start: 2024-10-08 | End: 2024-10-15 | Stop reason: HOSPADM

## 2024-10-08 RX ORDER — ASPIRIN 81 MG/1
324 TABLET, CHEWABLE ORAL ONCE
Status: COMPLETED | OUTPATIENT
Start: 2024-10-08 | End: 2024-10-08

## 2024-10-08 RX ORDER — SODIUM CHLORIDE 9 MG/ML
INJECTION, SOLUTION INTRAVENOUS PRN
Status: DISCONTINUED | OUTPATIENT
Start: 2024-10-08 | End: 2024-10-15 | Stop reason: HOSPADM

## 2024-10-08 RX ORDER — HYDRALAZINE HYDROCHLORIDE 20 MG/ML
INJECTION INTRAMUSCULAR; INTRAVENOUS PRN
Status: DISCONTINUED | OUTPATIENT
Start: 2024-10-08 | End: 2024-10-08 | Stop reason: HOSPADM

## 2024-10-08 RX ORDER — IOPAMIDOL 755 MG/ML
INJECTION, SOLUTION INTRAVASCULAR PRN
Status: DISCONTINUED | OUTPATIENT
Start: 2024-10-08 | End: 2024-10-08 | Stop reason: HOSPADM

## 2024-10-08 RX ORDER — ISOSORBIDE DINITRATE 10 MG/1
20 TABLET ORAL 3 TIMES DAILY
Status: DISCONTINUED | OUTPATIENT
Start: 2024-10-08 | End: 2024-10-09

## 2024-10-08 RX ORDER — SODIUM CHLORIDE 0.9 % (FLUSH) 0.9 %
5-40 SYRINGE (ML) INJECTION EVERY 12 HOURS SCHEDULED
Status: DISCONTINUED | OUTPATIENT
Start: 2024-10-08 | End: 2024-10-15 | Stop reason: HOSPADM

## 2024-10-08 RX ORDER — VERAPAMIL HYDROCHLORIDE 2.5 MG/ML
INJECTION, SOLUTION INTRAVENOUS PRN
Status: DISCONTINUED | OUTPATIENT
Start: 2024-10-08 | End: 2024-10-08 | Stop reason: HOSPADM

## 2024-10-08 RX ORDER — MIDAZOLAM HYDROCHLORIDE 1 MG/ML
INJECTION INTRAMUSCULAR; INTRAVENOUS PRN
Status: DISCONTINUED | OUTPATIENT
Start: 2024-10-08 | End: 2024-10-08 | Stop reason: HOSPADM

## 2024-10-08 RX ORDER — HYDRALAZINE HYDROCHLORIDE 25 MG/1
25 TABLET, FILM COATED ORAL 3 TIMES DAILY
Status: DISCONTINUED | OUTPATIENT
Start: 2024-10-08 | End: 2024-10-09

## 2024-10-08 RX ORDER — HEPARIN SODIUM 10000 [USP'U]/ML
INJECTION, SOLUTION INTRAVENOUS; SUBCUTANEOUS PRN
Status: DISCONTINUED | OUTPATIENT
Start: 2024-10-08 | End: 2024-10-08 | Stop reason: HOSPADM

## 2024-10-08 RX ORDER — ACETAMINOPHEN 325 MG/1
650 TABLET ORAL EVERY 4 HOURS PRN
Status: DISCONTINUED | OUTPATIENT
Start: 2024-10-08 | End: 2024-10-15 | Stop reason: HOSPADM

## 2024-10-08 RX ADMIN — METOPROLOL SUCCINATE 25 MG: 25 TABLET, EXTENDED RELEASE ORAL at 14:17

## 2024-10-08 RX ADMIN — POLYETHYLENE GLYCOL 3350 17 G: 17 POWDER, FOR SOLUTION ORAL at 14:16

## 2024-10-08 RX ADMIN — OXYCODONE HYDROCHLORIDE 5 MG: 5 TABLET ORAL at 22:27

## 2024-10-08 RX ADMIN — METOPROLOL SUCCINATE 25 MG: 25 TABLET, EXTENDED RELEASE ORAL at 20:34

## 2024-10-08 RX ADMIN — INSULIN LISPRO 6 UNITS: 100 INJECTION, SOLUTION INTRAVENOUS; SUBCUTANEOUS at 14:27

## 2024-10-08 RX ADMIN — SENNOSIDES 8.6 MG: 8.6 TABLET, FILM COATED ORAL at 14:16

## 2024-10-08 RX ADMIN — ASPIRIN 324 MG: 81 TABLET, CHEWABLE ORAL at 08:18

## 2024-10-08 RX ADMIN — HYDRALAZINE HYDROCHLORIDE 25 MG: 25 TABLET ORAL at 17:56

## 2024-10-08 RX ADMIN — PANTOPRAZOLE SODIUM 40 MG: 40 INJECTION, POWDER, FOR SOLUTION INTRAVENOUS at 14:16

## 2024-10-08 RX ADMIN — INSULIN GLARGINE 22 UNITS: 100 INJECTION, SOLUTION SUBCUTANEOUS at 20:35

## 2024-10-08 RX ADMIN — ACETAMINOPHEN 650 MG: 325 TABLET ORAL at 23:58

## 2024-10-08 RX ADMIN — SODIUM CHLORIDE, PRESERVATIVE FREE 10 ML: 5 INJECTION INTRAVENOUS at 20:35

## 2024-10-08 RX ADMIN — SENNOSIDES 8.6 MG: 8.6 TABLET, FILM COATED ORAL at 20:34

## 2024-10-08 RX ADMIN — ISOSORBIDE DINITRATE 20 MG: 10 TABLET ORAL at 17:55

## 2024-10-08 RX ADMIN — EPOETIN ALFA-EPBX 5000 UNITS: 10000 INJECTION, SOLUTION INTRAVENOUS; SUBCUTANEOUS at 17:57

## 2024-10-08 RX ADMIN — NITROGLYCERIN 60 MCG/MIN: 20 INJECTION INTRAVENOUS at 15:19

## 2024-10-08 ASSESSMENT — PAIN - FUNCTIONAL ASSESSMENT: PAIN_FUNCTIONAL_ASSESSMENT: ACTIVITIES ARE NOT PREVENTED

## 2024-10-08 ASSESSMENT — PAIN DESCRIPTION - ORIENTATION
ORIENTATION: LEFT
ORIENTATION: LEFT

## 2024-10-08 ASSESSMENT — PAIN DESCRIPTION - PAIN TYPE: TYPE: CHRONIC PAIN

## 2024-10-08 ASSESSMENT — PAIN SCALES - GENERAL
PAINLEVEL_OUTOF10: 10
PAINLEVEL_OUTOF10: 9

## 2024-10-08 ASSESSMENT — PAIN DESCRIPTION - DESCRIPTORS
DESCRIPTORS: ACHING;DISCOMFORT;DULL
DESCRIPTORS: ACHING;DISCOMFORT;SORE

## 2024-10-08 ASSESSMENT — PAIN DESCRIPTION - LOCATION
LOCATION: KNEE
LOCATION: KNEE

## 2024-10-08 ASSESSMENT — PAIN DESCRIPTION - FREQUENCY: FREQUENCY: CONTINUOUS

## 2024-10-08 NOTE — CARE COORDINATION
10/08/24, Discharge plan remains home with wife when medically cleared for discharge.  Patient still on 02-2/3L.  Per rounds RN to wean if possible.  Should patient need 02 for home referral was made to Jane at [facility] who will follow patient.  DME order and pulse ox testing would be needed.  Template for 02 pulse ox testing is on front of chart in Epic.  SW to follow for any home going needs.      MICK Gomez  [facility] Case Management  [phone]

## 2024-10-09 LAB
ABO + RH BLD: NORMAL
ALBUMIN SERPL-MCNC: 3.3 G/DL (ref 3.5–5.2)
ALP SERPL-CCNC: 65 U/L (ref 40–129)
ALT SERPL-CCNC: 12 U/L (ref 0–40)
ANION GAP SERPL CALCULATED.3IONS-SCNC: 12 MMOL/L (ref 7–16)
ARM BAND NUMBER: NORMAL
AST SERPL-CCNC: 33 U/L (ref 0–39)
BASOPHILS # BLD: 0.02 K/UL (ref 0–0.2)
BASOPHILS NFR BLD: 0 % (ref 0–2)
BILIRUB SERPL-MCNC: 0.4 MG/DL (ref 0–1.2)
BLOOD BANK SAMPLE EXPIRATION: NORMAL
BLOOD GROUP ANTIBODIES SERPL: NEGATIVE
BUN SERPL-MCNC: 34 MG/DL (ref 6–23)
CA-I BLD-SCNC: 1.02 MMOL/L (ref 1.15–1.33)
CALCIUM SERPL-MCNC: 7.7 MG/DL (ref 8.6–10.2)
CHLORIDE SERPL-SCNC: 102 MMOL/L (ref 98–107)
CO2 SERPL-SCNC: 22 MMOL/L (ref 22–29)
CREAT SERPL-MCNC: 2.1 MG/DL (ref 0.7–1.2)
ECHO BSA: 2.22 M2
EKG ATRIAL RATE: 59 BPM
EKG P AXIS: 40 DEGREES
EKG P-R INTERVAL: 202 MS
EKG Q-T INTERVAL: 450 MS
EKG QRS DURATION: 108 MS
EKG QTC CALCULATION (BAZETT): 445 MS
EKG R AXIS: 16 DEGREES
EKG T AXIS: 81 DEGREES
EKG VENTRICULAR RATE: 59 BPM
EOSINOPHIL # BLD: 0.01 K/UL (ref 0.05–0.5)
EOSINOPHILS RELATIVE PERCENT: 0 % (ref 0–6)
ERYTHROCYTE [DISTWIDTH] IN BLOOD BY AUTOMATED COUNT: 13.3 % (ref 11.5–15)
GFR, ESTIMATED: 31 ML/MIN/1.73M2
GLUCOSE BLD-MCNC: 153 MG/DL (ref 74–99)
GLUCOSE BLD-MCNC: 205 MG/DL (ref 74–99)
GLUCOSE BLD-MCNC: 275 MG/DL (ref 74–99)
GLUCOSE SERPL-MCNC: 292 MG/DL (ref 74–99)
HCT VFR BLD AUTO: 28.2 % (ref 37–54)
HGB BLD-MCNC: 8.9 G/DL (ref 12.5–16.5)
IMM GRANULOCYTES # BLD AUTO: 0.06 K/UL (ref 0–0.58)
IMM GRANULOCYTES NFR BLD: 1 % (ref 0–5)
LYMPHOCYTES NFR BLD: 0.54 K/UL (ref 1.5–4)
LYMPHOCYTES RELATIVE PERCENT: 5 % (ref 20–42)
MCH RBC QN AUTO: 31.8 PG (ref 26–35)
MCHC RBC AUTO-ENTMCNC: 31.6 G/DL (ref 32–34.5)
MCV RBC AUTO: 100.7 FL (ref 80–99.9)
MONOCYTES NFR BLD: 1.78 K/UL (ref 0.1–0.95)
MONOCYTES NFR BLD: 16 % (ref 2–12)
NEUTROPHILS NFR BLD: 78 % (ref 43–80)
NEUTS SEG NFR BLD: 8.52 K/UL (ref 1.8–7.3)
PHOSPHATE SERPL-MCNC: 2.4 MG/DL (ref 2.5–4.5)
PLATELET # BLD AUTO: 159 K/UL (ref 130–450)
PMV BLD AUTO: 11.1 FL (ref 7–12)
POTASSIUM SERPL-SCNC: 3.7 MMOL/L (ref 3.5–5)
PROT SERPL-MCNC: 5.8 G/DL (ref 6.4–8.3)
RBC # BLD AUTO: 2.8 M/UL (ref 3.8–5.8)
RBC # BLD: NORMAL 10*6/UL
SODIUM SERPL-SCNC: 136 MMOL/L (ref 132–146)
WBC OTHER # BLD: 10.9 K/UL (ref 4.5–11.5)

## 2024-10-09 PROCEDURE — 82330 ASSAY OF CALCIUM: CPT

## 2024-10-09 PROCEDURE — 6370000000 HC RX 637 (ALT 250 FOR IP): Performed by: INTERNAL MEDICINE

## 2024-10-09 PROCEDURE — 97535 SELF CARE MNGMENT TRAINING: CPT

## 2024-10-09 PROCEDURE — 2140000000 HC CCU INTERMEDIATE R&B

## 2024-10-09 PROCEDURE — 97530 THERAPEUTIC ACTIVITIES: CPT

## 2024-10-09 PROCEDURE — 86900 BLOOD TYPING SEROLOGIC ABO: CPT

## 2024-10-09 PROCEDURE — 36415 COLL VENOUS BLD VENIPUNCTURE: CPT

## 2024-10-09 PROCEDURE — 97166 OT EVAL MOD COMPLEX 45 MIN: CPT

## 2024-10-09 PROCEDURE — 6370000000 HC RX 637 (ALT 250 FOR IP): Performed by: STUDENT IN AN ORGANIZED HEALTH CARE EDUCATION/TRAINING PROGRAM

## 2024-10-09 PROCEDURE — 93010 ELECTROCARDIOGRAM REPORT: CPT | Performed by: INTERNAL MEDICINE

## 2024-10-09 PROCEDURE — 97161 PT EVAL LOW COMPLEX 20 MIN: CPT

## 2024-10-09 PROCEDURE — 84100 ASSAY OF PHOSPHORUS: CPT

## 2024-10-09 PROCEDURE — 6370000000 HC RX 637 (ALT 250 FOR IP): Performed by: PHYSICIAN ASSISTANT

## 2024-10-09 PROCEDURE — 86901 BLOOD TYPING SEROLOGIC RH(D): CPT

## 2024-10-09 PROCEDURE — 2580000003 HC RX 258: Performed by: INTERNAL MEDICINE

## 2024-10-09 PROCEDURE — 82962 GLUCOSE BLOOD TEST: CPT

## 2024-10-09 PROCEDURE — 86850 RBC ANTIBODY SCREEN: CPT

## 2024-10-09 PROCEDURE — 85025 COMPLETE CBC W/AUTO DIFF WBC: CPT

## 2024-10-09 PROCEDURE — 80053 COMPREHEN METABOLIC PANEL: CPT

## 2024-10-09 PROCEDURE — 6360000002 HC RX W HCPCS: Performed by: STUDENT IN AN ORGANIZED HEALTH CARE EDUCATION/TRAINING PROGRAM

## 2024-10-09 PROCEDURE — 6360000002 HC RX W HCPCS: Performed by: INTERNAL MEDICINE

## 2024-10-09 RX ORDER — ATORVASTATIN CALCIUM 20 MG/1
20 TABLET, FILM COATED ORAL NIGHTLY
Status: DISCONTINUED | OUTPATIENT
Start: 2024-10-09 | End: 2024-10-15 | Stop reason: HOSPADM

## 2024-10-09 RX ORDER — METOPROLOL SUCCINATE 25 MG/1
50 TABLET, EXTENDED RELEASE ORAL 2 TIMES DAILY
Status: DISCONTINUED | OUTPATIENT
Start: 2024-10-09 | End: 2024-10-15 | Stop reason: HOSPADM

## 2024-10-09 RX ORDER — ASPIRIN 325 MG
325 TABLET, DELAYED RELEASE (ENTERIC COATED) ORAL ONCE
Status: COMPLETED | OUTPATIENT
Start: 2024-10-09 | End: 2024-10-09

## 2024-10-09 RX ORDER — TRAMADOL HYDROCHLORIDE 50 MG/1
50 TABLET ORAL EVERY 6 HOURS PRN
Status: CANCELLED | OUTPATIENT
Start: 2024-10-09

## 2024-10-09 RX ORDER — CLOPIDOGREL 300 MG/1
300 TABLET, FILM COATED ORAL ONCE
Status: COMPLETED | OUTPATIENT
Start: 2024-10-09 | End: 2024-10-09

## 2024-10-09 RX ORDER — CLOPIDOGREL BISULFATE 75 MG/1
75 TABLET ORAL DAILY
Status: DISCONTINUED | OUTPATIENT
Start: 2024-10-09 | End: 2024-10-15 | Stop reason: HOSPADM

## 2024-10-09 RX ORDER — ACETAMINOPHEN 500 MG
1000 TABLET ORAL EVERY 6 HOURS PRN
Status: DISCONTINUED | OUTPATIENT
Start: 2024-10-09 | End: 2024-10-15 | Stop reason: HOSPADM

## 2024-10-09 RX ORDER — ISOSORBIDE DINITRATE 10 MG/1
40 TABLET ORAL 3 TIMES DAILY
Status: DISCONTINUED | OUTPATIENT
Start: 2024-10-09 | End: 2024-10-11

## 2024-10-09 RX ORDER — ASPIRIN 81 MG/1
81 TABLET ORAL DAILY
Status: DISCONTINUED | OUTPATIENT
Start: 2024-10-10 | End: 2024-10-15 | Stop reason: HOSPADM

## 2024-10-09 RX ADMIN — OXYCODONE HYDROCHLORIDE 5 MG: 5 TABLET ORAL at 02:20

## 2024-10-09 RX ADMIN — ISOSORBIDE DINITRATE 40 MG: 10 TABLET ORAL at 11:21

## 2024-10-09 RX ADMIN — CALCITRIOL CAPSULES 0.25 MCG 0.25 MCG: 0.25 CAPSULE ORAL at 10:18

## 2024-10-09 RX ADMIN — DICLOFENAC SODIUM 2 G: 10 GEL TOPICAL at 01:48

## 2024-10-09 RX ADMIN — CLOPIDOGREL BISULFATE 75 MG: 75 TABLET ORAL at 10:17

## 2024-10-09 RX ADMIN — DICLOFENAC SODIUM 2 G: 10 GEL TOPICAL at 10:16

## 2024-10-09 RX ADMIN — OXYCODONE HYDROCHLORIDE 5 MG: 5 TABLET ORAL at 10:16

## 2024-10-09 RX ADMIN — HYDRALAZINE HYDROCHLORIDE 75 MG: 50 TABLET ORAL at 17:28

## 2024-10-09 RX ADMIN — CLOPIDOGREL BISULFATE 300 MG: 300 TABLET, FILM COATED ORAL at 18:59

## 2024-10-09 RX ADMIN — POLYETHYLENE GLYCOL 3350 17 G: 17 POWDER, FOR SOLUTION ORAL at 10:17

## 2024-10-09 RX ADMIN — CALCIUM GLUCONATE 2000 MG: 98 INJECTION, SOLUTION INTRAVENOUS at 10:56

## 2024-10-09 RX ADMIN — HYDRALAZINE HYDROCHLORIDE 75 MG: 50 TABLET ORAL at 11:21

## 2024-10-09 RX ADMIN — PANTOPRAZOLE SODIUM 40 MG: 40 INJECTION, POWDER, FOR SOLUTION INTRAVENOUS at 10:18

## 2024-10-09 RX ADMIN — ISOSORBIDE DINITRATE 20 MG: 10 TABLET ORAL at 06:11

## 2024-10-09 RX ADMIN — ISOSORBIDE DINITRATE 40 MG: 10 TABLET ORAL at 17:28

## 2024-10-09 RX ADMIN — ASPIRIN 325 MG: 325 TABLET, COATED ORAL at 18:59

## 2024-10-09 RX ADMIN — HYDRALAZINE HYDROCHLORIDE 25 MG: 25 TABLET ORAL at 06:12

## 2024-10-09 RX ADMIN — SENNOSIDES 8.6 MG: 8.6 TABLET, FILM COATED ORAL at 10:17

## 2024-10-09 RX ADMIN — INSULIN LISPRO 2 UNITS: 100 INJECTION, SOLUTION INTRAVENOUS; SUBCUTANEOUS at 11:21

## 2024-10-09 RX ADMIN — SODIUM CHLORIDE, PRESERVATIVE FREE 10 ML: 5 INJECTION INTRAVENOUS at 10:17

## 2024-10-09 RX ADMIN — METOPROLOL SUCCINATE 50 MG: 25 TABLET, EXTENDED RELEASE ORAL at 10:17

## 2024-10-09 ASSESSMENT — PAIN SCALES - GENERAL
PAINLEVEL_OUTOF10: 8
PAINLEVEL_OUTOF10: 6
PAINLEVEL_OUTOF10: 4
PAINLEVEL_OUTOF10: 0
PAINLEVEL_OUTOF10: 0
PAINLEVEL_OUTOF10: 9

## 2024-10-09 ASSESSMENT — PAIN DESCRIPTION - DESCRIPTORS
DESCRIPTORS: ACHING;SORE
DESCRIPTORS: ACHING;DISCOMFORT
DESCRIPTORS: ACHING
DESCRIPTORS: ACHING;DISCOMFORT

## 2024-10-09 ASSESSMENT — PAIN DESCRIPTION - ORIENTATION
ORIENTATION: LEFT

## 2024-10-09 ASSESSMENT — PAIN DESCRIPTION - ONSET: ONSET: ON-GOING

## 2024-10-09 ASSESSMENT — PAIN DESCRIPTION - PAIN TYPE
TYPE: CHRONIC PAIN
TYPE: CHRONIC PAIN

## 2024-10-09 ASSESSMENT — PAIN DESCRIPTION - LOCATION
LOCATION: KNEE

## 2024-10-09 ASSESSMENT — PAIN - FUNCTIONAL ASSESSMENT
PAIN_FUNCTIONAL_ASSESSMENT: ACTIVITIES ARE NOT PREVENTED
PAIN_FUNCTIONAL_ASSESSMENT: PREVENTS OR INTERFERES SOME ACTIVE ACTIVITIES AND ADLS

## 2024-10-09 ASSESSMENT — PAIN DESCRIPTION - FREQUENCY: FREQUENCY: INTERMITTENT

## 2024-10-09 NOTE — CARE COORDINATION
10/09/24, FRANSICO met with wife and patient in room.  Discussed patient possibly needing [facility] for rehab once discharged.  Awaiting PT/OT evals.  Wife given list.  First choice is [facility].  Wife would be looking at list later tonight for other choices.  Call placed to [facility].  Referral given to Hola.  She will look over referral and let SW know about referral.  SW to follow.      MICK Gomez  [facility] Case Management  [phone]

## 2024-10-10 LAB
ALBUMIN SERPL-MCNC: 3.1 G/DL (ref 3.5–5.2)
ALP SERPL-CCNC: 69 U/L (ref 40–129)
ALT SERPL-CCNC: 12 U/L (ref 0–40)
ANION GAP SERPL CALCULATED.3IONS-SCNC: 11 MMOL/L (ref 7–16)
AST SERPL-CCNC: 29 U/L (ref 0–39)
BASOPHILS # BLD: 0.02 K/UL (ref 0–0.2)
BASOPHILS NFR BLD: 0 % (ref 0–2)
BILIRUB SERPL-MCNC: 0.5 MG/DL (ref 0–1.2)
BUN SERPL-MCNC: 42 MG/DL (ref 6–23)
CA-I BLD-SCNC: 1.17 MMOL/L (ref 1.15–1.33)
CALCIUM SERPL-MCNC: 8.5 MG/DL (ref 8.6–10.2)
CHLORIDE SERPL-SCNC: 101 MMOL/L (ref 98–107)
CO2 SERPL-SCNC: 23 MMOL/L (ref 22–29)
CREAT SERPL-MCNC: 2.7 MG/DL (ref 0.7–1.2)
EOSINOPHIL # BLD: 0.06 K/UL (ref 0.05–0.5)
EOSINOPHILS RELATIVE PERCENT: 1 % (ref 0–6)
ERYTHROCYTE [DISTWIDTH] IN BLOOD BY AUTOMATED COUNT: 13.6 % (ref 11.5–15)
GFR, ESTIMATED: 22 ML/MIN/1.73M2
GLUCOSE BLD-MCNC: 200 MG/DL (ref 74–99)
GLUCOSE BLD-MCNC: 210 MG/DL (ref 74–99)
GLUCOSE BLD-MCNC: 272 MG/DL (ref 74–99)
GLUCOSE SERPL-MCNC: 168 MG/DL (ref 74–99)
HCT VFR BLD AUTO: 28.2 % (ref 37–54)
HGB BLD-MCNC: 9 G/DL (ref 12.5–16.5)
IMM GRANULOCYTES # BLD AUTO: 0.03 K/UL (ref 0–0.58)
IMM GRANULOCYTES NFR BLD: 0 % (ref 0–5)
LYMPHOCYTES NFR BLD: 0.7 K/UL (ref 1.5–4)
LYMPHOCYTES RELATIVE PERCENT: 9 % (ref 20–42)
MCH RBC QN AUTO: 31.7 PG (ref 26–35)
MCHC RBC AUTO-ENTMCNC: 31.9 G/DL (ref 32–34.5)
MCV RBC AUTO: 99.3 FL (ref 80–99.9)
MONOCYTES NFR BLD: 1.27 K/UL (ref 0.1–0.95)
MONOCYTES NFR BLD: 16 % (ref 2–12)
NEUTROPHILS NFR BLD: 74 % (ref 43–80)
NEUTS SEG NFR BLD: 6 K/UL (ref 1.8–7.3)
PHOSPHATE SERPL-MCNC: 2.9 MG/DL (ref 2.5–4.5)
PLATELET # BLD AUTO: 181 K/UL (ref 130–450)
PMV BLD AUTO: 10.6 FL (ref 7–12)
POTASSIUM SERPL-SCNC: 3.9 MMOL/L (ref 3.5–5)
PROT SERPL-MCNC: 6.1 G/DL (ref 6.4–8.3)
RBC # BLD AUTO: 2.84 M/UL (ref 3.8–5.8)
SODIUM SERPL-SCNC: 135 MMOL/L (ref 132–146)
WBC OTHER # BLD: 8.1 K/UL (ref 4.5–11.5)

## 2024-10-10 PROCEDURE — 6370000000 HC RX 637 (ALT 250 FOR IP): Performed by: INTERNAL MEDICINE

## 2024-10-10 PROCEDURE — 6370000000 HC RX 637 (ALT 250 FOR IP): Performed by: STUDENT IN AN ORGANIZED HEALTH CARE EDUCATION/TRAINING PROGRAM

## 2024-10-10 PROCEDURE — 85025 COMPLETE CBC W/AUTO DIFF WBC: CPT

## 2024-10-10 PROCEDURE — 6360000002 HC RX W HCPCS: Performed by: STUDENT IN AN ORGANIZED HEALTH CARE EDUCATION/TRAINING PROGRAM

## 2024-10-10 PROCEDURE — 82962 GLUCOSE BLOOD TEST: CPT

## 2024-10-10 PROCEDURE — 80053 COMPREHEN METABOLIC PANEL: CPT

## 2024-10-10 PROCEDURE — 2580000003 HC RX 258: Performed by: INTERNAL MEDICINE

## 2024-10-10 PROCEDURE — 82330 ASSAY OF CALCIUM: CPT

## 2024-10-10 PROCEDURE — 36415 COLL VENOUS BLD VENIPUNCTURE: CPT

## 2024-10-10 PROCEDURE — 2140000000 HC CCU INTERMEDIATE R&B

## 2024-10-10 PROCEDURE — 84100 ASSAY OF PHOSPHORUS: CPT

## 2024-10-10 RX ORDER — SODIUM CHLORIDE 9 MG/ML
INJECTION, SOLUTION INTRAVENOUS CONTINUOUS
Status: DISCONTINUED | OUTPATIENT
Start: 2024-10-10 | End: 2024-10-13

## 2024-10-10 RX ADMIN — INSULIN LISPRO 2 UNITS: 100 INJECTION, SOLUTION INTRAVENOUS; SUBCUTANEOUS at 12:14

## 2024-10-10 RX ADMIN — CLOPIDOGREL BISULFATE 75 MG: 75 TABLET ORAL at 09:47

## 2024-10-10 RX ADMIN — HYDRALAZINE HYDROCHLORIDE 75 MG: 50 TABLET ORAL at 13:59

## 2024-10-10 RX ADMIN — ACETAMINOPHEN 650 MG: 325 TABLET ORAL at 20:42

## 2024-10-10 RX ADMIN — ISOSORBIDE DINITRATE 40 MG: 10 TABLET ORAL at 00:05

## 2024-10-10 RX ADMIN — SENNOSIDES 8.6 MG: 8.6 TABLET, FILM COATED ORAL at 00:04

## 2024-10-10 RX ADMIN — METOPROLOL SUCCINATE 50 MG: 25 TABLET, EXTENDED RELEASE ORAL at 00:05

## 2024-10-10 RX ADMIN — ISOSORBIDE DINITRATE 40 MG: 10 TABLET ORAL at 09:46

## 2024-10-10 RX ADMIN — ATORVASTATIN CALCIUM 20 MG: 20 TABLET, FILM COATED ORAL at 00:04

## 2024-10-10 RX ADMIN — SODIUM CHLORIDE, PRESERVATIVE FREE 10 ML: 5 INJECTION INTRAVENOUS at 09:45

## 2024-10-10 RX ADMIN — METOPROLOL SUCCINATE 50 MG: 25 TABLET, EXTENDED RELEASE ORAL at 20:41

## 2024-10-10 RX ADMIN — ISOSORBIDE DINITRATE 40 MG: 10 TABLET ORAL at 20:43

## 2024-10-10 RX ADMIN — INSULIN LISPRO 4 UNITS: 100 INJECTION, SOLUTION INTRAVENOUS; SUBCUTANEOUS at 17:39

## 2024-10-10 RX ADMIN — PANTOPRAZOLE SODIUM 40 MG: 40 INJECTION, POWDER, FOR SOLUTION INTRAVENOUS at 09:45

## 2024-10-10 RX ADMIN — DICLOFENAC SODIUM 2 G: 10 GEL TOPICAL at 00:03

## 2024-10-10 RX ADMIN — DICLOFENAC SODIUM 2 G: 10 GEL TOPICAL at 09:50

## 2024-10-10 RX ADMIN — METOPROLOL SUCCINATE 50 MG: 25 TABLET, EXTENDED RELEASE ORAL at 09:46

## 2024-10-10 RX ADMIN — ASPIRIN 81 MG: 81 TABLET, COATED ORAL at 09:46

## 2024-10-10 RX ADMIN — INSULIN GLARGINE 22 UNITS: 100 INJECTION, SOLUTION SUBCUTANEOUS at 00:05

## 2024-10-10 RX ADMIN — SODIUM CHLORIDE: 9 INJECTION, SOLUTION INTRAVENOUS at 20:41

## 2024-10-10 RX ADMIN — ATORVASTATIN CALCIUM 20 MG: 20 TABLET, FILM COATED ORAL at 20:41

## 2024-10-10 RX ADMIN — INSULIN GLARGINE 22 UNITS: 100 INJECTION, SOLUTION SUBCUTANEOUS at 20:43

## 2024-10-10 RX ADMIN — SODIUM CHLORIDE: 9 INJECTION, SOLUTION INTRAVENOUS at 10:26

## 2024-10-10 RX ADMIN — HYDRALAZINE HYDROCHLORIDE 75 MG: 50 TABLET ORAL at 20:41

## 2024-10-10 RX ADMIN — SODIUM CHLORIDE, PRESERVATIVE FREE 10 ML: 5 INJECTION INTRAVENOUS at 00:05

## 2024-10-10 RX ADMIN — SENNOSIDES 8.6 MG: 8.6 TABLET, FILM COATED ORAL at 09:46

## 2024-10-10 RX ADMIN — HYDRALAZINE HYDROCHLORIDE 75 MG: 50 TABLET ORAL at 00:04

## 2024-10-10 RX ADMIN — SENNOSIDES 8.6 MG: 8.6 TABLET, FILM COATED ORAL at 20:42

## 2024-10-10 RX ADMIN — SODIUM CHLORIDE, PRESERVATIVE FREE 10 ML: 5 INJECTION INTRAVENOUS at 20:44

## 2024-10-10 RX ADMIN — ISOSORBIDE DINITRATE 40 MG: 10 TABLET ORAL at 13:59

## 2024-10-10 RX ADMIN — HYDRALAZINE HYDROCHLORIDE 75 MG: 50 TABLET ORAL at 09:46

## 2024-10-10 ASSESSMENT — PAIN SCALES - GENERAL
PAINLEVEL_OUTOF10: 4
PAINLEVEL_OUTOF10: 0
PAINLEVEL_OUTOF10: 0
PAINLEVEL_OUTOF10: 2
PAINLEVEL_OUTOF10: 0
PAINLEVEL_OUTOF10: 0

## 2024-10-10 ASSESSMENT — PAIN DESCRIPTION - ORIENTATION
ORIENTATION: LEFT;MID;ANTERIOR
ORIENTATION: LOWER
ORIENTATION: LEFT

## 2024-10-10 ASSESSMENT — PAIN DESCRIPTION - DESCRIPTORS
DESCRIPTORS: ACHING
DESCRIPTORS: ACHING;DISCOMFORT;DULL

## 2024-10-10 ASSESSMENT — PAIN DESCRIPTION - LOCATION
LOCATION: KNEE
LOCATION: BACK
LOCATION: KNEE

## 2024-10-10 NOTE — CARE COORDINATION
10/10/24, Patient for a PCI tomorrow.  [facility] has been [facility].  No precert is needed per Hola from [facility].  Patient can go to facility when medically ready.  Wife, patient and son of patient in room updated on the above.  PAS/RR, face sheet, ambulance (will need completed on day of discharge) and envelope completed.  SW to follow.      Cathy Pineda CHE  [facility] Case Management  [phone]

## 2024-10-11 ENCOUNTER — APPOINTMENT (OUTPATIENT)
Dept: CT IMAGING | Age: 84
DRG: 281 | End: 2024-10-11
Attending: STUDENT IN AN ORGANIZED HEALTH CARE EDUCATION/TRAINING PROGRAM
Payer: MEDICARE

## 2024-10-11 ENCOUNTER — APPOINTMENT (OUTPATIENT)
Age: 84
DRG: 281 | End: 2024-10-11
Attending: INTERNAL MEDICINE
Payer: MEDICARE

## 2024-10-11 LAB
ALBUMIN SERPL-MCNC: 2.8 G/DL (ref 3.5–5.2)
ALP SERPL-CCNC: 60 U/L (ref 40–129)
ALT SERPL-CCNC: 11 U/L (ref 0–40)
ANION GAP SERPL CALCULATED.3IONS-SCNC: 12 MMOL/L (ref 7–16)
AST SERPL-CCNC: 24 U/L (ref 0–39)
BACTERIA URNS QL MICRO: ABNORMAL
BASOPHILS # BLD: 0.03 K/UL (ref 0–0.2)
BASOPHILS NFR BLD: 0 % (ref 0–2)
BILIRUB SERPL-MCNC: 0.4 MG/DL (ref 0–1.2)
BILIRUB UR QL STRIP: NEGATIVE
BUN SERPL-MCNC: 49 MG/DL (ref 6–23)
CALCIUM SERPL-MCNC: 8.1 MG/DL (ref 8.6–10.2)
CHLORIDE SERPL-SCNC: 105 MMOL/L (ref 98–107)
CLARITY UR: CLEAR
CO2 SERPL-SCNC: 22 MMOL/L (ref 22–29)
COLOR UR: YELLOW
CREAT SERPL-MCNC: 3.4 MG/DL (ref 0.7–1.2)
CREAT UR-MCNC: 84.4 MG/DL (ref 40–278)
CREAT UR-MCNC: 84.7 MG/DL (ref 40–278)
CREAT UR-MCNC: 87.5 MG/DL (ref 40–278)
ECHO AV AREA PEAK VELOCITY: 3 CM2
ECHO AV AREA VTI: 3 CM2
ECHO AV AREA/BSA PEAK VELOCITY: 1.4 CM2/M2
ECHO AV AREA/BSA VTI: 1.4 CM2/M2
ECHO AV CUSP MM: 1.9 CM
ECHO AV MEAN GRADIENT: 6 MMHG
ECHO AV MEAN VELOCITY: 1.2 M/S
ECHO AV PEAK GRADIENT: 10 MMHG
ECHO AV PEAK VELOCITY: 1.6 M/S
ECHO AV VELOCITY RATIO: 0.94
ECHO AV VTI: 32.5 CM
ECHO BSA: 2.22 M2
ECHO LA DIAMETER INDEX: 1.73 CM/M2
ECHO LA DIAMETER: 3.8 CM
ECHO LA VOL A-L A2C: 60 ML (ref 18–58)
ECHO LA VOL A-L A4C: 49 ML (ref 18–58)
ECHO LA VOL BP: 53 ML (ref 18–58)
ECHO LA VOL MOD A2C: 57 ML (ref 18–58)
ECHO LA VOL MOD A4C: 46 ML (ref 18–58)
ECHO LA VOL/BSA BIPLANE: 24 ML/M2 (ref 16–34)
ECHO LA VOLUME AREA LENGTH: 56 ML
ECHO LA VOLUME INDEX A-L A2C: 27 ML/M2 (ref 16–34)
ECHO LA VOLUME INDEX A-L A4C: 22 ML/M2 (ref 16–34)
ECHO LA VOLUME INDEX AREA LENGTH: 25 ML/M2 (ref 16–34)
ECHO LA VOLUME INDEX MOD A2C: 26 ML/M2 (ref 16–34)
ECHO LA VOLUME INDEX MOD A4C: 21 ML/M2 (ref 16–34)
ECHO LV EF PHYSICIAN: 55 %
ECHO LV FRACTIONAL SHORTENING: 26 % (ref 28–44)
ECHO LV INTERNAL DIMENSION DIASTOLE INDEX: 1.91 CM/M2
ECHO LV INTERNAL DIMENSION DIASTOLIC: 4.2 CM (ref 4.2–5.9)
ECHO LV INTERNAL DIMENSION SYSTOLIC INDEX: 1.41 CM/M2
ECHO LV INTERNAL DIMENSION SYSTOLIC: 3.1 CM
ECHO LV IVSD: 1.2 CM (ref 0.6–1)
ECHO LV IVSS: 1.5 CM
ECHO LV MASS 2D: 212.3 G (ref 88–224)
ECHO LV MASS INDEX 2D: 96.5 G/M2 (ref 49–115)
ECHO LV POSTERIOR WALL DIASTOLIC: 1.5 CM (ref 0.6–1)
ECHO LV POSTERIOR WALL SYSTOLIC: 1.7 CM
ECHO LV RELATIVE WALL THICKNESS RATIO: 0.71
ECHO LVOT AREA: 3.1 CM2
ECHO LVOT AV VTI INDEX: 0.94
ECHO LVOT DIAM: 2 CM
ECHO LVOT MEAN GRADIENT: 5 MMHG
ECHO LVOT PEAK GRADIENT: 8 MMHG
ECHO LVOT PEAK VELOCITY: 1.5 M/S
ECHO LVOT STROKE VOLUME INDEX: 43.7 ML/M2
ECHO LVOT SV: 96.1 ML
ECHO LVOT VTI: 30.6 CM
ECHO MV "A" WAVE DURATION: 157 MSEC
ECHO MV A VELOCITY: 0.92 M/S
ECHO MV AREA PHT: 3.1 CM2
ECHO MV AREA VTI: 3.4 CM2
ECHO MV E DECELERATION TIME (DT): 279.5 MS
ECHO MV E VELOCITY: 0.8 M/S
ECHO MV E/A RATIO: 0.87
ECHO MV LVOT VTI INDEX: 0.91
ECHO MV MAX VELOCITY: 1.1 M/S
ECHO MV MEAN GRADIENT: 2 MMHG
ECHO MV MEAN VELOCITY: 0.7 M/S
ECHO MV PEAK GRADIENT: 5 MMHG
ECHO MV PRESSURE HALF TIME (PHT): 71.9 MS
ECHO MV VTI: 27.9 CM
ECHO PVEIN A DURATION: 119.9 MS
ECHO PVEIN A VELOCITY: 0.4 M/S
ECHO PVEIN PEAK D VELOCITY: 0.3 M/S
ECHO PVEIN PEAK S VELOCITY: 0.4 M/S
ECHO PVEIN S/D RATIO: 1.3
ECHO RV INTERNAL DIMENSION: 3.5 CM
ECHO TV REGURGITANT MAX VELOCITY: 3.13 M/S
ECHO TV REGURGITANT PEAK GRADIENT: 39 MMHG
EOSINOPHIL # BLD: 0.12 K/UL (ref 0.05–0.5)
EOSINOPHILS RELATIVE PERCENT: 1 % (ref 0–6)
ERYTHROCYTE [DISTWIDTH] IN BLOOD BY AUTOMATED COUNT: 13.4 % (ref 11.5–15)
GFR, ESTIMATED: 17 ML/MIN/1.73M2
GLUCOSE BLD-MCNC: 270 MG/DL (ref 74–99)
GLUCOSE BLD-MCNC: 299 MG/DL (ref 74–99)
GLUCOSE BLD-MCNC: 311 MG/DL (ref 74–99)
GLUCOSE SERPL-MCNC: 152 MG/DL (ref 74–99)
GLUCOSE UR STRIP-MCNC: NEGATIVE MG/DL
HCT VFR BLD AUTO: 25.2 % (ref 37–54)
HGB BLD-MCNC: 8 G/DL (ref 12.5–16.5)
HGB UR QL STRIP.AUTO: ABNORMAL
IMM GRANULOCYTES # BLD AUTO: 0.05 K/UL (ref 0–0.58)
IMM GRANULOCYTES NFR BLD: 1 % (ref 0–5)
KETONES UR STRIP-MCNC: NEGATIVE MG/DL
LEUKOCYTE ESTERASE UR QL STRIP: ABNORMAL
LYMPHOCYTES NFR BLD: 0.44 K/UL (ref 1.5–4)
LYMPHOCYTES RELATIVE PERCENT: 5 % (ref 20–42)
MCH RBC QN AUTO: 31.9 PG (ref 26–35)
MCHC RBC AUTO-ENTMCNC: 31.7 G/DL (ref 32–34.5)
MCV RBC AUTO: 100.4 FL (ref 80–99.9)
MICROALBUMIN UR-MCNC: 661 MG/L (ref 0–19)
MICROALBUMIN/CREAT UR-RTO: 756 MCG/MG CREAT (ref 0–30)
MONOCYTES NFR BLD: 0.98 K/UL (ref 0.1–0.95)
MONOCYTES NFR BLD: 11 % (ref 2–12)
NEUTROPHILS NFR BLD: 82 % (ref 43–80)
NEUTS SEG NFR BLD: 7.22 K/UL (ref 1.8–7.3)
NITRITE UR QL STRIP: POSITIVE
PH UR STRIP: 6 [PH] (ref 5–9)
PHOSPHATE SERPL-MCNC: 3.6 MG/DL (ref 2.5–4.5)
PLATELET # BLD AUTO: 179 K/UL (ref 130–450)
PMV BLD AUTO: 10.8 FL (ref 7–12)
POTASSIUM SERPL-SCNC: 3.8 MMOL/L (ref 3.5–5)
PROT SERPL-MCNC: 5.7 G/DL (ref 6.4–8.3)
PROT UR STRIP-MCNC: 100 MG/DL
RBC # BLD AUTO: 2.51 M/UL (ref 3.8–5.8)
RBC # BLD: ABNORMAL 10*6/UL
RBC #/AREA URNS HPF: ABNORMAL /HPF
SODIUM SERPL-SCNC: 139 MMOL/L (ref 132–146)
SODIUM UR-SCNC: 88 MMOL/L
SP GR UR STRIP: 1.02 (ref 1–1.03)
TOTAL PROTEIN, URINE: 157 MG/DL (ref 0–12)
URINE TOTAL PROTEIN CREATININE RATIO: 1.85 (ref 0–0.2)
UROBILINOGEN UR STRIP-ACNC: 0.2 EU/DL (ref 0–1)
UUN UR-MCNC: 258 MG/DL (ref 800–1666)
WBC #/AREA URNS HPF: ABNORMAL /HPF
WBC OTHER # BLD: 8.8 K/UL (ref 4.5–11.5)

## 2024-10-11 PROCEDURE — 87086 URINE CULTURE/COLONY COUNT: CPT

## 2024-10-11 PROCEDURE — 84100 ASSAY OF PHOSPHORUS: CPT

## 2024-10-11 PROCEDURE — 85025 COMPLETE CBC W/AUTO DIFF WBC: CPT

## 2024-10-11 PROCEDURE — 6370000000 HC RX 637 (ALT 250 FOR IP): Performed by: INTERNAL MEDICINE

## 2024-10-11 PROCEDURE — 81001 URINALYSIS AUTO W/SCOPE: CPT

## 2024-10-11 PROCEDURE — 2709999900 HC NON-CHARGEABLE SUPPLY: Performed by: INTERNAL MEDICINE

## 2024-10-11 PROCEDURE — 82962 GLUCOSE BLOOD TEST: CPT

## 2024-10-11 PROCEDURE — 2700000000 HC OXYGEN THERAPY PER DAY

## 2024-10-11 PROCEDURE — 51798 US URINE CAPACITY MEASURE: CPT

## 2024-10-11 PROCEDURE — 84540 ASSAY OF URINE/UREA-N: CPT

## 2024-10-11 PROCEDURE — 6370000000 HC RX 637 (ALT 250 FOR IP): Performed by: STUDENT IN AN ORGANIZED HEALTH CARE EDUCATION/TRAINING PROGRAM

## 2024-10-11 PROCEDURE — 84300 ASSAY OF URINE SODIUM: CPT

## 2024-10-11 PROCEDURE — 6370000000 HC RX 637 (ALT 250 FOR IP): Performed by: NURSE PRACTITIONER

## 2024-10-11 PROCEDURE — 82043 UR ALBUMIN QUANTITATIVE: CPT

## 2024-10-11 PROCEDURE — 6360000002 HC RX W HCPCS: Performed by: STUDENT IN AN ORGANIZED HEALTH CARE EDUCATION/TRAINING PROGRAM

## 2024-10-11 PROCEDURE — 2140000000 HC CCU INTERMEDIATE R&B

## 2024-10-11 PROCEDURE — 80053 COMPREHEN METABOLIC PANEL: CPT

## 2024-10-11 PROCEDURE — 84156 ASSAY OF PROTEIN URINE: CPT

## 2024-10-11 PROCEDURE — 2580000003 HC RX 258: Performed by: INTERNAL MEDICINE

## 2024-10-11 PROCEDURE — 82570 ASSAY OF URINE CREATININE: CPT

## 2024-10-11 PROCEDURE — 2580000003 HC RX 258: Performed by: STUDENT IN AN ORGANIZED HEALTH CARE EDUCATION/TRAINING PROGRAM

## 2024-10-11 PROCEDURE — 87077 CULTURE AEROBIC IDENTIFY: CPT

## 2024-10-11 PROCEDURE — 74176 CT ABD & PELVIS W/O CONTRAST: CPT

## 2024-10-11 PROCEDURE — 36415 COLL VENOUS BLD VENIPUNCTURE: CPT

## 2024-10-11 PROCEDURE — 93306 TTE W/DOPPLER COMPLETE: CPT

## 2024-10-11 RX ORDER — ISOSORBIDE DINITRATE 10 MG/1
60 TABLET ORAL 3 TIMES DAILY
Status: DISCONTINUED | OUTPATIENT
Start: 2024-10-11 | End: 2024-10-15 | Stop reason: HOSPADM

## 2024-10-11 RX ORDER — RANOLAZINE 500 MG/1
500 TABLET, EXTENDED RELEASE ORAL 2 TIMES DAILY
Status: DISCONTINUED | OUTPATIENT
Start: 2024-10-11 | End: 2024-10-15 | Stop reason: HOSPADM

## 2024-10-11 RX ORDER — LIDOCAINE HYDROCHLORIDE 20 MG/ML
JELLY TOPICAL PRN
Status: DISCONTINUED | OUTPATIENT
Start: 2024-10-11 | End: 2024-10-15 | Stop reason: HOSPADM

## 2024-10-11 RX ADMIN — SENNOSIDES 8.6 MG: 8.6 TABLET, FILM COATED ORAL at 21:00

## 2024-10-11 RX ADMIN — ASPIRIN 81 MG: 81 TABLET, COATED ORAL at 07:01

## 2024-10-11 RX ADMIN — SENNOSIDES 8.6 MG: 8.6 TABLET, FILM COATED ORAL at 09:27

## 2024-10-11 RX ADMIN — RANOLAZINE 500 MG: 500 TABLET, FILM COATED, EXTENDED RELEASE ORAL at 21:52

## 2024-10-11 RX ADMIN — SODIUM CHLORIDE: 9 INJECTION, SOLUTION INTRAVENOUS at 17:10

## 2024-10-11 RX ADMIN — LIDOCAINE HYDROCHLORIDE: 20 JELLY TOPICAL at 16:25

## 2024-10-11 RX ADMIN — METOPROLOL SUCCINATE 50 MG: 25 TABLET, EXTENDED RELEASE ORAL at 21:52

## 2024-10-11 RX ADMIN — ATORVASTATIN CALCIUM 20 MG: 20 TABLET, FILM COATED ORAL at 21:52

## 2024-10-11 RX ADMIN — INSULIN LISPRO 4 UNITS: 100 INJECTION, SOLUTION INTRAVENOUS; SUBCUTANEOUS at 16:26

## 2024-10-11 RX ADMIN — HYDRALAZINE HYDROCHLORIDE 75 MG: 50 TABLET ORAL at 21:52

## 2024-10-11 RX ADMIN — INSULIN GLARGINE 22 UNITS: 100 INJECTION, SOLUTION SUBCUTANEOUS at 21:47

## 2024-10-11 RX ADMIN — ISOSORBIDE DINITRATE 60 MG: 10 TABLET ORAL at 21:52

## 2024-10-11 RX ADMIN — INSULIN LISPRO 4 UNITS: 100 INJECTION, SOLUTION INTRAVENOUS; SUBCUTANEOUS at 21:48

## 2024-10-11 RX ADMIN — SODIUM CHLORIDE: 9 INJECTION, SOLUTION INTRAVENOUS at 06:51

## 2024-10-11 RX ADMIN — INSULIN LISPRO 4 UNITS: 100 INJECTION, SOLUTION INTRAVENOUS; SUBCUTANEOUS at 11:34

## 2024-10-11 RX ADMIN — SODIUM CHLORIDE, PRESERVATIVE FREE 10 ML: 5 INJECTION INTRAVENOUS at 09:26

## 2024-10-11 RX ADMIN — HYDRALAZINE HYDROCHLORIDE 75 MG: 50 TABLET ORAL at 15:50

## 2024-10-11 RX ADMIN — DICLOFENAC SODIUM 2 G: 10 GEL TOPICAL at 09:37

## 2024-10-11 RX ADMIN — WATER 1000 MG: 1 INJECTION INTRAMUSCULAR; INTRAVENOUS; SUBCUTANEOUS at 14:40

## 2024-10-11 RX ADMIN — RANOLAZINE 500 MG: 500 TABLET, FILM COATED, EXTENDED RELEASE ORAL at 11:35

## 2024-10-11 RX ADMIN — HYDRALAZINE HYDROCHLORIDE 75 MG: 50 TABLET ORAL at 09:26

## 2024-10-11 RX ADMIN — METOPROLOL SUCCINATE 50 MG: 25 TABLET, EXTENDED RELEASE ORAL at 09:27

## 2024-10-11 RX ADMIN — PANTOPRAZOLE SODIUM 40 MG: 40 INJECTION, POWDER, FOR SOLUTION INTRAVENOUS at 09:26

## 2024-10-11 RX ADMIN — ISOSORBIDE DINITRATE 40 MG: 10 TABLET ORAL at 09:26

## 2024-10-11 RX ADMIN — POLYETHYLENE GLYCOL 3350 17 G: 17 POWDER, FOR SOLUTION ORAL at 09:27

## 2024-10-11 RX ADMIN — ISOSORBIDE DINITRATE 60 MG: 10 TABLET ORAL at 15:50

## 2024-10-11 RX ADMIN — CLOPIDOGREL BISULFATE 75 MG: 75 TABLET ORAL at 07:01

## 2024-10-11 RX ADMIN — DICLOFENAC SODIUM 2 G: 10 GEL TOPICAL at 21:50

## 2024-10-11 RX ADMIN — CALCITRIOL CAPSULES 0.25 MCG 0.25 MCG: 0.25 CAPSULE ORAL at 09:27

## 2024-10-11 ASSESSMENT — PAIN DESCRIPTION - DESCRIPTORS: DESCRIPTORS: ACHING;DISCOMFORT

## 2024-10-11 ASSESSMENT — PAIN DESCRIPTION - PAIN TYPE: TYPE: CHRONIC PAIN

## 2024-10-11 ASSESSMENT — PAIN SCALES - GENERAL
PAINLEVEL_OUTOF10: 0
PAINLEVEL_OUTOF10: 2

## 2024-10-11 ASSESSMENT — PAIN DESCRIPTION - LOCATION: LOCATION: KNEE

## 2024-10-11 ASSESSMENT — PAIN DESCRIPTION - ORIENTATION: ORIENTATION: LEFT

## 2024-10-11 ASSESSMENT — PAIN DESCRIPTION - FREQUENCY: FREQUENCY: CONTINUOUS

## 2024-10-11 ASSESSMENT — PAIN - FUNCTIONAL ASSESSMENT: PAIN_FUNCTIONAL_ASSESSMENT: ACTIVITIES ARE NOT PREVENTED

## 2024-10-11 NOTE — CARE COORDINATION
10/11/24, Discharge plan will be to [facility].  Patient can admit over the weekend per Hola from [facility].  No precert is needed.  PAS/RR, face sheet, ambulance (will need completed on day of discharge), and envelope is on soft  chart.  SW to follow.      MICK Gomez  [facility] Case Management  [phone]

## 2024-10-12 LAB
ALBUMIN SERPL-MCNC: 2.9 G/DL (ref 3.5–5.2)
ALP SERPL-CCNC: 84 U/L (ref 40–129)
ALT SERPL-CCNC: 17 U/L (ref 0–40)
ANION GAP SERPL CALCULATED.3IONS-SCNC: 11 MMOL/L (ref 7–16)
AST SERPL-CCNC: 27 U/L (ref 0–39)
BASOPHILS # BLD: 0.02 K/UL (ref 0–0.2)
BASOPHILS NFR BLD: 0 % (ref 0–2)
BILIRUB SERPL-MCNC: 0.2 MG/DL (ref 0–1.2)
BUN SERPL-MCNC: 54 MG/DL (ref 6–23)
CALCIUM SERPL-MCNC: 7.9 MG/DL (ref 8.6–10.2)
CHLORIDE SERPL-SCNC: 106 MMOL/L (ref 98–107)
CO2 SERPL-SCNC: 20 MMOL/L (ref 22–29)
CREAT SERPL-MCNC: 3.4 MG/DL (ref 0.7–1.2)
EOSINOPHIL # BLD: 0.29 K/UL (ref 0.05–0.5)
EOSINOPHILS RELATIVE PERCENT: 4 % (ref 0–6)
ERYTHROCYTE [DISTWIDTH] IN BLOOD BY AUTOMATED COUNT: 13.4 % (ref 11.5–15)
GFR, ESTIMATED: 17 ML/MIN/1.73M2
GLUCOSE BLD-MCNC: 221 MG/DL (ref 74–99)
GLUCOSE BLD-MCNC: 253 MG/DL (ref 74–99)
GLUCOSE BLD-MCNC: 303 MG/DL (ref 74–99)
GLUCOSE SERPL-MCNC: 223 MG/DL (ref 74–99)
HCT VFR BLD AUTO: 25.2 % (ref 37–54)
HGB BLD-MCNC: 7.8 G/DL (ref 12.5–16.5)
IMM GRANULOCYTES # BLD AUTO: 0.04 K/UL (ref 0–0.58)
IMM GRANULOCYTES NFR BLD: 1 % (ref 0–5)
LYMPHOCYTES NFR BLD: 0.6 K/UL (ref 1.5–4)
LYMPHOCYTES RELATIVE PERCENT: 9 % (ref 20–42)
MCH RBC QN AUTO: 31.2 PG (ref 26–35)
MCHC RBC AUTO-ENTMCNC: 31 G/DL (ref 32–34.5)
MCV RBC AUTO: 100.8 FL (ref 80–99.9)
MONOCYTES NFR BLD: 0.72 K/UL (ref 0.1–0.95)
MONOCYTES NFR BLD: 10 % (ref 2–12)
NEUTROPHILS NFR BLD: 76 % (ref 43–80)
NEUTS SEG NFR BLD: 5.31 K/UL (ref 1.8–7.3)
PHOSPHATE SERPL-MCNC: 3.7 MG/DL (ref 2.5–4.5)
PLATELET # BLD AUTO: 193 K/UL (ref 130–450)
PMV BLD AUTO: 10.7 FL (ref 7–12)
POTASSIUM SERPL-SCNC: 4.2 MMOL/L (ref 3.5–5)
PROT SERPL-MCNC: 5.8 G/DL (ref 6.4–8.3)
RBC # BLD AUTO: 2.5 M/UL (ref 3.8–5.8)
SODIUM SERPL-SCNC: 137 MMOL/L (ref 132–146)
WBC OTHER # BLD: 7 K/UL (ref 4.5–11.5)

## 2024-10-12 PROCEDURE — 6360000002 HC RX W HCPCS: Performed by: STUDENT IN AN ORGANIZED HEALTH CARE EDUCATION/TRAINING PROGRAM

## 2024-10-12 PROCEDURE — 84100 ASSAY OF PHOSPHORUS: CPT

## 2024-10-12 PROCEDURE — 2140000000 HC CCU INTERMEDIATE R&B

## 2024-10-12 PROCEDURE — 36415 COLL VENOUS BLD VENIPUNCTURE: CPT

## 2024-10-12 PROCEDURE — 85025 COMPLETE CBC W/AUTO DIFF WBC: CPT

## 2024-10-12 PROCEDURE — 80053 COMPREHEN METABOLIC PANEL: CPT

## 2024-10-12 PROCEDURE — 2580000003 HC RX 258: Performed by: INTERNAL MEDICINE

## 2024-10-12 PROCEDURE — 6370000000 HC RX 637 (ALT 250 FOR IP): Performed by: INTERNAL MEDICINE

## 2024-10-12 PROCEDURE — 6370000000 HC RX 637 (ALT 250 FOR IP): Performed by: STUDENT IN AN ORGANIZED HEALTH CARE EDUCATION/TRAINING PROGRAM

## 2024-10-12 PROCEDURE — 2580000003 HC RX 258: Performed by: STUDENT IN AN ORGANIZED HEALTH CARE EDUCATION/TRAINING PROGRAM

## 2024-10-12 PROCEDURE — 82962 GLUCOSE BLOOD TEST: CPT

## 2024-10-12 PROCEDURE — 2700000000 HC OXYGEN THERAPY PER DAY

## 2024-10-12 RX ORDER — INSULIN LISPRO 100 [IU]/ML
5 INJECTION, SOLUTION INTRAVENOUS; SUBCUTANEOUS
Status: DISCONTINUED | OUTPATIENT
Start: 2024-10-12 | End: 2024-10-15 | Stop reason: HOSPADM

## 2024-10-12 RX ADMIN — METOPROLOL SUCCINATE 50 MG: 25 TABLET, EXTENDED RELEASE ORAL at 09:20

## 2024-10-12 RX ADMIN — SODIUM CHLORIDE 100 ML/HR: 9 INJECTION, SOLUTION INTRAVENOUS at 03:15

## 2024-10-12 RX ADMIN — HYDRALAZINE HYDROCHLORIDE 75 MG: 50 TABLET ORAL at 20:45

## 2024-10-12 RX ADMIN — METOPROLOL SUCCINATE 50 MG: 25 TABLET, EXTENDED RELEASE ORAL at 20:45

## 2024-10-12 RX ADMIN — SODIUM CHLORIDE: 9 INJECTION, SOLUTION INTRAVENOUS at 23:51

## 2024-10-12 RX ADMIN — SODIUM CHLORIDE, PRESERVATIVE FREE 10 ML: 5 INJECTION INTRAVENOUS at 20:47

## 2024-10-12 RX ADMIN — RANOLAZINE 500 MG: 500 TABLET, FILM COATED, EXTENDED RELEASE ORAL at 09:20

## 2024-10-12 RX ADMIN — ATORVASTATIN CALCIUM 20 MG: 20 TABLET, FILM COATED ORAL at 21:01

## 2024-10-12 RX ADMIN — HYDRALAZINE HYDROCHLORIDE 75 MG: 50 TABLET ORAL at 17:26

## 2024-10-12 RX ADMIN — DICLOFENAC SODIUM 2 G: 10 GEL TOPICAL at 23:17

## 2024-10-12 RX ADMIN — ISOSORBIDE DINITRATE 60 MG: 10 TABLET ORAL at 09:20

## 2024-10-12 RX ADMIN — ISOSORBIDE DINITRATE 60 MG: 10 TABLET ORAL at 17:26

## 2024-10-12 RX ADMIN — PANTOPRAZOLE SODIUM 40 MG: 40 INJECTION, POWDER, FOR SOLUTION INTRAVENOUS at 09:21

## 2024-10-12 RX ADMIN — INSULIN LISPRO 2 UNITS: 100 INJECTION, SOLUTION INTRAVENOUS; SUBCUTANEOUS at 17:26

## 2024-10-12 RX ADMIN — SENNOSIDES 8.6 MG: 8.6 TABLET, FILM COATED ORAL at 20:46

## 2024-10-12 RX ADMIN — INSULIN LISPRO 5 UNITS: 100 INJECTION, SOLUTION INTRAVENOUS; SUBCUTANEOUS at 09:20

## 2024-10-12 RX ADMIN — WATER 1000 MG: 1 INJECTION INTRAMUSCULAR; INTRAVENOUS; SUBCUTANEOUS at 13:16

## 2024-10-12 RX ADMIN — SODIUM CHLORIDE: 9 INJECTION, SOLUTION INTRAVENOUS at 13:17

## 2024-10-12 RX ADMIN — SENNOSIDES 8.6 MG: 8.6 TABLET, FILM COATED ORAL at 09:20

## 2024-10-12 RX ADMIN — HYDRALAZINE HYDROCHLORIDE 75 MG: 50 TABLET ORAL at 09:20

## 2024-10-12 RX ADMIN — ISOSORBIDE DINITRATE 60 MG: 10 TABLET ORAL at 20:46

## 2024-10-12 RX ADMIN — INSULIN GLARGINE 22 UNITS: 100 INJECTION, SOLUTION SUBCUTANEOUS at 20:44

## 2024-10-12 RX ADMIN — DICLOFENAC SODIUM 2 G: 10 GEL TOPICAL at 09:34

## 2024-10-12 RX ADMIN — INSULIN LISPRO 5 UNITS: 100 INJECTION, SOLUTION INTRAVENOUS; SUBCUTANEOUS at 13:42

## 2024-10-12 RX ADMIN — RANOLAZINE 500 MG: 500 TABLET, FILM COATED, EXTENDED RELEASE ORAL at 20:46

## 2024-10-12 RX ADMIN — CLOPIDOGREL BISULFATE 75 MG: 75 TABLET ORAL at 09:20

## 2024-10-12 RX ADMIN — INSULIN LISPRO 2 UNITS: 100 INJECTION, SOLUTION INTRAVENOUS; SUBCUTANEOUS at 09:21

## 2024-10-12 RX ADMIN — SODIUM CHLORIDE, PRESERVATIVE FREE 10 ML: 5 INJECTION INTRAVENOUS at 09:21

## 2024-10-12 RX ADMIN — INSULIN LISPRO 5 UNITS: 100 INJECTION, SOLUTION INTRAVENOUS; SUBCUTANEOUS at 17:26

## 2024-10-12 RX ADMIN — ASPIRIN 81 MG: 81 TABLET, COATED ORAL at 09:20

## 2024-10-12 RX ADMIN — INSULIN LISPRO 6 UNITS: 100 INJECTION, SOLUTION INTRAVENOUS; SUBCUTANEOUS at 13:42

## 2024-10-12 ASSESSMENT — PAIN SCALES - GENERAL
PAINLEVEL_OUTOF10: 2
PAINLEVEL_OUTOF10: 4
PAINLEVEL_OUTOF10: 0

## 2024-10-12 ASSESSMENT — PAIN DESCRIPTION - FREQUENCY: FREQUENCY: CONTINUOUS

## 2024-10-12 ASSESSMENT — PAIN DESCRIPTION - LOCATION: LOCATION: KNEE

## 2024-10-12 ASSESSMENT — PAIN DESCRIPTION - DESCRIPTORS: DESCRIPTORS: ACHING;NAGGING;TENDER

## 2024-10-12 ASSESSMENT — PAIN DESCRIPTION - ORIENTATION: ORIENTATION: LEFT

## 2024-10-12 ASSESSMENT — PAIN DESCRIPTION - PAIN TYPE: TYPE: CHRONIC PAIN

## 2024-10-12 ASSESSMENT — PAIN DESCRIPTION - ONSET: ONSET: ON-GOING

## 2024-10-12 ASSESSMENT — PAIN - FUNCTIONAL ASSESSMENT: PAIN_FUNCTIONAL_ASSESSMENT: PREVENTS OR INTERFERES SOME ACTIVE ACTIVITIES AND ADLS

## 2024-10-12 NOTE — CARE COORDINATION
10/12/2024sw notified of potential discharge to [facility]. Sw set up will call with [facility] [phone](will need to set up a time,if dc today). Rn to do N/ N if dc.  Electronically signed by MICK Cleveland on 10/12/2024 at 2:05 PM    Addendum: Facility liaison notified of possible dc over the weekend. Facility liaison requesting n/n be done at [place].  Electronically signed by MICK Cleveland on 10/12/2024 at 2:37 PM

## 2024-10-12 NOTE — DISCHARGE INSTR - COC
H&P    PHYSICIAN SIGNATURE:  Electronically signed by Glynn Espana MD on 10/15/24 at 8:40 AM EDTt

## 2024-10-13 ENCOUNTER — APPOINTMENT (OUTPATIENT)
Dept: GENERAL RADIOLOGY | Age: 84
DRG: 281 | End: 2024-10-13
Payer: MEDICARE

## 2024-10-13 LAB
ALBUMIN SERPL-MCNC: 3.1 G/DL (ref 3.5–5.2)
ALP SERPL-CCNC: 76 U/L (ref 40–129)
ALT SERPL-CCNC: 23 U/L (ref 0–40)
ANION GAP SERPL CALCULATED.3IONS-SCNC: 12 MMOL/L (ref 7–16)
AST SERPL-CCNC: 26 U/L (ref 0–39)
BASOPHILS # BLD: 0.06 K/UL (ref 0–0.2)
BASOPHILS NFR BLD: 1 % (ref 0–2)
BILIRUB SERPL-MCNC: 0.2 MG/DL (ref 0–1.2)
BUN SERPL-MCNC: 51 MG/DL (ref 6–23)
CALCIUM SERPL-MCNC: 8 MG/DL (ref 8.6–10.2)
CHLORIDE SERPL-SCNC: 103 MMOL/L (ref 98–107)
CO2 SERPL-SCNC: 20 MMOL/L (ref 22–29)
CREAT SERPL-MCNC: 3 MG/DL (ref 0.7–1.2)
EOSINOPHIL # BLD: 0.38 K/UL (ref 0.05–0.5)
EOSINOPHILS RELATIVE PERCENT: 5 % (ref 0–6)
ERYTHROCYTE [DISTWIDTH] IN BLOOD BY AUTOMATED COUNT: 13.3 % (ref 11.5–15)
GFR, ESTIMATED: 20 ML/MIN/1.73M2
GLUCOSE BLD-MCNC: 177 MG/DL (ref 74–99)
GLUCOSE BLD-MCNC: 213 MG/DL (ref 74–99)
GLUCOSE BLD-MCNC: 254 MG/DL (ref 74–99)
GLUCOSE SERPL-MCNC: 200 MG/DL (ref 74–99)
HCT VFR BLD AUTO: 26.3 % (ref 37–54)
HGB BLD-MCNC: 8.2 G/DL (ref 12.5–16.5)
LYMPHOCYTES NFR BLD: 0.51 K/UL (ref 1.5–4)
LYMPHOCYTES RELATIVE PERCENT: 7 % (ref 20–42)
MCH RBC QN AUTO: 31.4 PG (ref 26–35)
MCHC RBC AUTO-ENTMCNC: 31.2 G/DL (ref 32–34.5)
MCV RBC AUTO: 100.8 FL (ref 80–99.9)
MICROORGANISM SPEC CULT: ABNORMAL
MONOCYTES NFR BLD: 0.45 K/UL (ref 0.1–0.95)
MONOCYTES NFR BLD: 6 % (ref 2–12)
NEUTROPHILS NFR BLD: 81 % (ref 43–80)
NEUTS SEG NFR BLD: 5.89 K/UL (ref 1.8–7.3)
PHOSPHATE SERPL-MCNC: 3.5 MG/DL (ref 2.5–4.5)
PLATELET # BLD AUTO: 218 K/UL (ref 130–450)
PMV BLD AUTO: 10.4 FL (ref 7–12)
POTASSIUM SERPL-SCNC: 4.1 MMOL/L (ref 3.5–5)
PROT SERPL-MCNC: 6 G/DL (ref 6.4–8.3)
RBC # BLD AUTO: 2.61 M/UL (ref 3.8–5.8)
RBC # BLD: ABNORMAL 10*6/UL
SERVICE CMNT-IMP: ABNORMAL
SODIUM SERPL-SCNC: 135 MMOL/L (ref 132–146)
SPECIMEN DESCRIPTION: ABNORMAL
WBC OTHER # BLD: 7.3 K/UL (ref 4.5–11.5)

## 2024-10-13 PROCEDURE — 6370000000 HC RX 637 (ALT 250 FOR IP): Performed by: INTERNAL MEDICINE

## 2024-10-13 PROCEDURE — 6360000002 HC RX W HCPCS: Performed by: STUDENT IN AN ORGANIZED HEALTH CARE EDUCATION/TRAINING PROGRAM

## 2024-10-13 PROCEDURE — 71045 X-RAY EXAM CHEST 1 VIEW: CPT

## 2024-10-13 PROCEDURE — 2580000003 HC RX 258: Performed by: INTERNAL MEDICINE

## 2024-10-13 PROCEDURE — 6370000000 HC RX 637 (ALT 250 FOR IP): Performed by: STUDENT IN AN ORGANIZED HEALTH CARE EDUCATION/TRAINING PROGRAM

## 2024-10-13 PROCEDURE — 82962 GLUCOSE BLOOD TEST: CPT

## 2024-10-13 PROCEDURE — 85025 COMPLETE CBC W/AUTO DIFF WBC: CPT

## 2024-10-13 PROCEDURE — 36415 COLL VENOUS BLD VENIPUNCTURE: CPT

## 2024-10-13 PROCEDURE — 80053 COMPREHEN METABOLIC PANEL: CPT

## 2024-10-13 PROCEDURE — 2700000000 HC OXYGEN THERAPY PER DAY

## 2024-10-13 PROCEDURE — 84100 ASSAY OF PHOSPHORUS: CPT

## 2024-10-13 PROCEDURE — 2580000003 HC RX 258: Performed by: STUDENT IN AN ORGANIZED HEALTH CARE EDUCATION/TRAINING PROGRAM

## 2024-10-13 PROCEDURE — 2140000000 HC CCU INTERMEDIATE R&B

## 2024-10-13 RX ADMIN — METOPROLOL SUCCINATE 50 MG: 25 TABLET, EXTENDED RELEASE ORAL at 20:56

## 2024-10-13 RX ADMIN — HYDRALAZINE HYDROCHLORIDE 75 MG: 50 TABLET ORAL at 16:22

## 2024-10-13 RX ADMIN — INSULIN LISPRO 5 UNITS: 100 INJECTION, SOLUTION INTRAVENOUS; SUBCUTANEOUS at 12:45

## 2024-10-13 RX ADMIN — SODIUM CHLORIDE, PRESERVATIVE FREE 10 ML: 5 INJECTION INTRAVENOUS at 21:00

## 2024-10-13 RX ADMIN — METOPROLOL SUCCINATE 50 MG: 25 TABLET, EXTENDED RELEASE ORAL at 09:43

## 2024-10-13 RX ADMIN — HYDRALAZINE HYDROCHLORIDE 75 MG: 50 TABLET ORAL at 20:59

## 2024-10-13 RX ADMIN — ATORVASTATIN CALCIUM 20 MG: 20 TABLET, FILM COATED ORAL at 20:56

## 2024-10-13 RX ADMIN — WATER 1000 MG: 1 INJECTION INTRAMUSCULAR; INTRAVENOUS; SUBCUTANEOUS at 12:44

## 2024-10-13 RX ADMIN — INSULIN LISPRO 2 UNITS: 100 INJECTION, SOLUTION INTRAVENOUS; SUBCUTANEOUS at 09:44

## 2024-10-13 RX ADMIN — DICLOFENAC SODIUM 2 G: 10 GEL TOPICAL at 09:47

## 2024-10-13 RX ADMIN — SENNOSIDES 8.6 MG: 8.6 TABLET, FILM COATED ORAL at 20:56

## 2024-10-13 RX ADMIN — ISOSORBIDE DINITRATE 60 MG: 10 TABLET ORAL at 16:22

## 2024-10-13 RX ADMIN — RANOLAZINE 500 MG: 500 TABLET, FILM COATED, EXTENDED RELEASE ORAL at 20:55

## 2024-10-13 RX ADMIN — DICLOFENAC SODIUM 2 G: 10 GEL TOPICAL at 20:52

## 2024-10-13 RX ADMIN — RANOLAZINE 500 MG: 500 TABLET, FILM COATED, EXTENDED RELEASE ORAL at 09:43

## 2024-10-13 RX ADMIN — CALCITRIOL CAPSULES 0.25 MCG 0.25 MCG: 0.25 CAPSULE ORAL at 09:43

## 2024-10-13 RX ADMIN — HYDRALAZINE HYDROCHLORIDE 75 MG: 50 TABLET ORAL at 09:43

## 2024-10-13 RX ADMIN — INSULIN LISPRO 5 UNITS: 100 INJECTION, SOLUTION INTRAVENOUS; SUBCUTANEOUS at 16:22

## 2024-10-13 RX ADMIN — ISOSORBIDE DINITRATE 60 MG: 10 TABLET ORAL at 20:58

## 2024-10-13 RX ADMIN — ISOSORBIDE DINITRATE 60 MG: 10 TABLET ORAL at 09:43

## 2024-10-13 RX ADMIN — ASPIRIN 81 MG: 81 TABLET, COATED ORAL at 09:43

## 2024-10-13 RX ADMIN — INSULIN GLARGINE 22 UNITS: 100 INJECTION, SOLUTION SUBCUTANEOUS at 20:52

## 2024-10-13 RX ADMIN — SODIUM CHLORIDE, PRESERVATIVE FREE 10 ML: 5 INJECTION INTRAVENOUS at 09:44

## 2024-10-13 RX ADMIN — INSULIN LISPRO 4 UNITS: 100 INJECTION, SOLUTION INTRAVENOUS; SUBCUTANEOUS at 12:45

## 2024-10-13 RX ADMIN — CLOPIDOGREL BISULFATE 75 MG: 75 TABLET ORAL at 09:43

## 2024-10-13 RX ADMIN — INSULIN LISPRO 5 UNITS: 100 INJECTION, SOLUTION INTRAVENOUS; SUBCUTANEOUS at 09:42

## 2024-10-13 RX ADMIN — PANTOPRAZOLE SODIUM 40 MG: 40 INJECTION, POWDER, FOR SOLUTION INTRAVENOUS at 09:40

## 2024-10-13 ASSESSMENT — PAIN DESCRIPTION - ORIENTATION
ORIENTATION: LEFT
ORIENTATION: LEFT

## 2024-10-13 ASSESSMENT — PAIN - FUNCTIONAL ASSESSMENT: PAIN_FUNCTIONAL_ASSESSMENT: PREVENTS OR INTERFERES SOME ACTIVE ACTIVITIES AND ADLS

## 2024-10-13 ASSESSMENT — PAIN SCALES - GENERAL
PAINLEVEL_OUTOF10: 0
PAINLEVEL_OUTOF10: 1
PAINLEVEL_OUTOF10: 3
PAINLEVEL_OUTOF10: 1

## 2024-10-13 ASSESSMENT — PAIN DESCRIPTION - DESCRIPTORS
DESCRIPTORS: ACHING;DISCOMFORT
DESCRIPTORS: ACHING;DISCOMFORT;NAGGING

## 2024-10-13 ASSESSMENT — PAIN DESCRIPTION - ONSET: ONSET: ON-GOING

## 2024-10-13 ASSESSMENT — PAIN DESCRIPTION - LOCATION
LOCATION: KNEE
LOCATION: KNEE

## 2024-10-13 ASSESSMENT — PAIN DESCRIPTION - FREQUENCY: FREQUENCY: CONTINUOUS

## 2024-10-13 ASSESSMENT — PAIN DESCRIPTION - PAIN TYPE: TYPE: CHRONIC PAIN

## 2024-10-14 ENCOUNTER — APPOINTMENT (OUTPATIENT)
Dept: GENERAL RADIOLOGY | Age: 84
DRG: 281 | End: 2024-10-14
Payer: MEDICARE

## 2024-10-14 LAB
ALBUMIN SERPL-MCNC: 3 G/DL (ref 3.5–5.2)
ALP SERPL-CCNC: 69 U/L (ref 40–129)
ALT SERPL-CCNC: 18 U/L (ref 0–40)
ANION GAP SERPL CALCULATED.3IONS-SCNC: 13 MMOL/L (ref 7–16)
AST SERPL-CCNC: 18 U/L (ref 0–39)
BASOPHILS # BLD: 0.03 K/UL (ref 0–0.2)
BASOPHILS NFR BLD: 1 % (ref 0–2)
BILIRUB SERPL-MCNC: 0.2 MG/DL (ref 0–1.2)
BUN SERPL-MCNC: 43 MG/DL (ref 6–23)
CALCIUM SERPL-MCNC: 8.5 MG/DL (ref 8.6–10.2)
CHLORIDE SERPL-SCNC: 106 MMOL/L (ref 98–107)
CO2 SERPL-SCNC: 18 MMOL/L (ref 22–29)
CREAT SERPL-MCNC: 3.1 MG/DL (ref 0.7–1.2)
ECHO BSA: 2.22 M2
EOSINOPHIL # BLD: 0.49 K/UL (ref 0.05–0.5)
EOSINOPHILS RELATIVE PERCENT: 9 % (ref 0–6)
ERYTHROCYTE [DISTWIDTH] IN BLOOD BY AUTOMATED COUNT: 13.2 % (ref 11.5–15)
GFR, ESTIMATED: 19 ML/MIN/1.73M2
GLUCOSE BLD-MCNC: 128 MG/DL (ref 74–99)
GLUCOSE BLD-MCNC: 178 MG/DL (ref 74–99)
GLUCOSE BLD-MCNC: 195 MG/DL (ref 74–99)
GLUCOSE BLD-MCNC: 233 MG/DL (ref 74–99)
GLUCOSE SERPL-MCNC: 181 MG/DL (ref 74–99)
HCT VFR BLD AUTO: 25.8 % (ref 37–54)
HGB BLD-MCNC: 8.2 G/DL (ref 12.5–16.5)
IMM GRANULOCYTES # BLD AUTO: 0.03 K/UL (ref 0–0.58)
IMM GRANULOCYTES NFR BLD: 1 % (ref 0–5)
LYMPHOCYTES NFR BLD: 0.71 K/UL (ref 1.5–4)
LYMPHOCYTES RELATIVE PERCENT: 13 % (ref 20–42)
MCH RBC QN AUTO: 31.8 PG (ref 26–35)
MCHC RBC AUTO-ENTMCNC: 31.8 G/DL (ref 32–34.5)
MCV RBC AUTO: 100 FL (ref 80–99.9)
MONOCYTES NFR BLD: 0.68 K/UL (ref 0.1–0.95)
MONOCYTES NFR BLD: 12 % (ref 2–12)
NEUTROPHILS NFR BLD: 66 % (ref 43–80)
NEUTS SEG NFR BLD: 3.68 K/UL (ref 1.8–7.3)
PHOSPHATE SERPL-MCNC: 3.4 MG/DL (ref 2.5–4.5)
PLATELET # BLD AUTO: 250 K/UL (ref 130–450)
PMV BLD AUTO: 10.1 FL (ref 7–12)
POTASSIUM SERPL-SCNC: 4.3 MMOL/L (ref 3.5–5)
PROT SERPL-MCNC: 5.8 G/DL (ref 6.4–8.3)
RBC # BLD AUTO: 2.58 M/UL (ref 3.8–5.8)
SODIUM SERPL-SCNC: 137 MMOL/L (ref 132–146)
WBC OTHER # BLD: 5.6 K/UL (ref 4.5–11.5)

## 2024-10-14 PROCEDURE — 99222 1ST HOSP IP/OBS MODERATE 55: CPT

## 2024-10-14 PROCEDURE — 92610 EVALUATE SWALLOWING FUNCTION: CPT

## 2024-10-14 PROCEDURE — 6370000000 HC RX 637 (ALT 250 FOR IP): Performed by: STUDENT IN AN ORGANIZED HEALTH CARE EDUCATION/TRAINING PROGRAM

## 2024-10-14 PROCEDURE — 71045 X-RAY EXAM CHEST 1 VIEW: CPT

## 2024-10-14 PROCEDURE — 6360000002 HC RX W HCPCS: Performed by: STUDENT IN AN ORGANIZED HEALTH CARE EDUCATION/TRAINING PROGRAM

## 2024-10-14 PROCEDURE — 80053 COMPREHEN METABOLIC PANEL: CPT

## 2024-10-14 PROCEDURE — 6370000000 HC RX 637 (ALT 250 FOR IP): Performed by: INTERNAL MEDICINE

## 2024-10-14 PROCEDURE — 97530 THERAPEUTIC ACTIVITIES: CPT

## 2024-10-14 PROCEDURE — 99221 1ST HOSP IP/OBS SF/LOW 40: CPT | Performed by: INTERNAL MEDICINE

## 2024-10-14 PROCEDURE — 82962 GLUCOSE BLOOD TEST: CPT

## 2024-10-14 PROCEDURE — 2580000003 HC RX 258: Performed by: INTERNAL MEDICINE

## 2024-10-14 PROCEDURE — 84100 ASSAY OF PHOSPHORUS: CPT

## 2024-10-14 PROCEDURE — 97535 SELF CARE MNGMENT TRAINING: CPT

## 2024-10-14 PROCEDURE — 85025 COMPLETE CBC W/AUTO DIFF WBC: CPT

## 2024-10-14 PROCEDURE — 36415 COLL VENOUS BLD VENIPUNCTURE: CPT

## 2024-10-14 PROCEDURE — 2580000003 HC RX 258: Performed by: STUDENT IN AN ORGANIZED HEALTH CARE EDUCATION/TRAINING PROGRAM

## 2024-10-14 PROCEDURE — 2140000000 HC CCU INTERMEDIATE R&B

## 2024-10-14 RX ADMIN — INSULIN GLARGINE 22 UNITS: 100 INJECTION, SOLUTION SUBCUTANEOUS at 21:27

## 2024-10-14 RX ADMIN — SENNOSIDES 8.6 MG: 8.6 TABLET, FILM COATED ORAL at 21:33

## 2024-10-14 RX ADMIN — INSULIN LISPRO 2 UNITS: 100 INJECTION, SOLUTION INTRAVENOUS; SUBCUTANEOUS at 12:11

## 2024-10-14 RX ADMIN — HYDRALAZINE HYDROCHLORIDE 75 MG: 50 TABLET ORAL at 16:20

## 2024-10-14 RX ADMIN — PANTOPRAZOLE SODIUM 40 MG: 40 INJECTION, POWDER, FOR SOLUTION INTRAVENOUS at 08:19

## 2024-10-14 RX ADMIN — ISOSORBIDE DINITRATE 60 MG: 10 TABLET ORAL at 16:20

## 2024-10-14 RX ADMIN — ASPIRIN 81 MG: 81 TABLET, COATED ORAL at 08:19

## 2024-10-14 RX ADMIN — METOPROLOL SUCCINATE 50 MG: 25 TABLET, EXTENDED RELEASE ORAL at 08:17

## 2024-10-14 RX ADMIN — INSULIN LISPRO 5 UNITS: 100 INJECTION, SOLUTION INTRAVENOUS; SUBCUTANEOUS at 12:10

## 2024-10-14 RX ADMIN — INSULIN LISPRO 5 UNITS: 100 INJECTION, SOLUTION INTRAVENOUS; SUBCUTANEOUS at 17:13

## 2024-10-14 RX ADMIN — WATER 1000 MG: 1 INJECTION INTRAMUSCULAR; INTRAVENOUS; SUBCUTANEOUS at 14:00

## 2024-10-14 RX ADMIN — SODIUM CHLORIDE, PRESERVATIVE FREE 10 ML: 5 INJECTION INTRAVENOUS at 08:20

## 2024-10-14 RX ADMIN — ATORVASTATIN CALCIUM 20 MG: 20 TABLET, FILM COATED ORAL at 21:27

## 2024-10-14 RX ADMIN — HYDRALAZINE HYDROCHLORIDE 75 MG: 50 TABLET ORAL at 08:19

## 2024-10-14 RX ADMIN — SENNOSIDES 8.6 MG: 8.6 TABLET, FILM COATED ORAL at 08:19

## 2024-10-14 RX ADMIN — ISOSORBIDE DINITRATE 60 MG: 10 TABLET ORAL at 21:25

## 2024-10-14 RX ADMIN — METOPROLOL SUCCINATE 50 MG: 25 TABLET, EXTENDED RELEASE ORAL at 21:26

## 2024-10-14 RX ADMIN — RANOLAZINE 500 MG: 500 TABLET, FILM COATED, EXTENDED RELEASE ORAL at 21:26

## 2024-10-14 RX ADMIN — ISOSORBIDE DINITRATE 60 MG: 10 TABLET ORAL at 08:17

## 2024-10-14 RX ADMIN — RANOLAZINE 500 MG: 500 TABLET, FILM COATED, EXTENDED RELEASE ORAL at 08:19

## 2024-10-14 RX ADMIN — CLOPIDOGREL BISULFATE 75 MG: 75 TABLET ORAL at 08:18

## 2024-10-14 RX ADMIN — DICLOFENAC SODIUM 2 G: 10 GEL TOPICAL at 08:26

## 2024-10-14 RX ADMIN — DICLOFENAC SODIUM 2 G: 10 GEL TOPICAL at 21:34

## 2024-10-14 RX ADMIN — SODIUM CHLORIDE, PRESERVATIVE FREE 10 ML: 5 INJECTION INTRAVENOUS at 21:33

## 2024-10-14 RX ADMIN — HYDRALAZINE HYDROCHLORIDE 75 MG: 50 TABLET ORAL at 21:25

## 2024-10-14 RX ADMIN — INSULIN LISPRO 5 UNITS: 100 INJECTION, SOLUTION INTRAVENOUS; SUBCUTANEOUS at 08:14

## 2024-10-14 ASSESSMENT — PAIN SCALES - GENERAL
PAINLEVEL_OUTOF10: 5
PAINLEVEL_OUTOF10: 0

## 2024-10-14 ASSESSMENT — PAIN DESCRIPTION - LOCATION: LOCATION: KNEE

## 2024-10-14 ASSESSMENT — PAIN DESCRIPTION - ORIENTATION: ORIENTATION: LEFT

## 2024-10-14 ASSESSMENT — PAIN DESCRIPTION - DESCRIPTORS: DESCRIPTORS: ACHING

## 2024-10-14 NOTE — CONSULTS
10/11/2024 4:33 PM  Sunny Segal  [id number]     Chief Complaint:    Urinary retention      History of Present Illness:      The patient is a 84 y.o. male patient who presented to the hospital with complaints of chest pain.  He was admitted for NSTEMI.     Urology is asked to evaluate for urinary retention.  Family requested urology to place a Gracia catheter.  He was bladder scanned today for 408 mL.  He is known to our practice. He was recently seen last month as a new patient with Dr. Glynn Justice. He had previously followed with Dr. Cárdenas. He has a history of BPH and underwent HoLEP procedure at [facility] in 2023 with Dr. Gretta Kirk. 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 He is on active surveillance for this.     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    Diabetes mellitus (HCC)     Diverticulosis     mild    GERD (gastroesophageal reflux disease)     Hyperlipidemia     Hypertension          Past Surgical History:   Procedure Laterality Date    BACK SURGERY      CARDIAC PROCEDURE N/A 10/8/2024    Left heart cath / coronary angiography performed by Abner Washington MD at [facility] [place] [facility]    COLONOSCOPY      EYE SURGERY      PAROTIDECTOMY  9/11/12    left superficial       Medications Prior to Admission:    Medications Prior to Admission: Magnesium 300 MG CAPS, Take 1 capsule by mouth Daily  calcitRIOL (ROCALTROL) 0.25 MCG capsule, Take 1 capsule by mouth every other day Pt takes Monday Wed and Friday  insulin glargine (LANTUS) 100 UNIT/ML injection vial, Inject 22 Units into the skin nightly  insulin lispro (HUMALOG) 100 UNIT/ML SOLN injection vial, Inject 0-4 Units into the skin nightly (Patient taking differently: Inject 0-24 Units into the skin 3 times daily (before meals) 100-150mg/dL: 16 units  151-200mg/dL: 18 units  201-250mg/dL: 20 units  251+: 24 
  [facility]  Department of Internal Medicine  Division of Pulmonary, Critical Care and Sleep Medicine  Consult Note      Speedy Godinez, APRN-CNP        Patient: Snuny Segal  MRN: [id number]  : 1940    Encounter Time: 2:10 PM     Date of Admission: 10/6/2024 10:54 AM    Primary Care Physician: Gumaro Adams MD    Reason for Consultation: Choking e     HISTORY OF PRESENT ILLNESS : Sunny Segal 84 y.o. male was seen in consultation regarding the above chief compliant.    Sunny was admitted on 2024 after presenting to the ER with complaint of chest pain. During the coarse of his hospital stay he underwent cardiac catheterization which revealed multivessel disease. He is currently being medically managed by [facility]. Last night Sunny had an episode of choking on a piece of meatloaf and there was concern of possible retained foreign body. CXR imaging was reviewed and airways are clear. Sunny is currently comfortable on room air and has not been coughing and has no current discomfort.       PAST MEDICAL HISTORY:  has a past medical history of Anemia, CAD (coronary artery disease), Cancer (HCC), Diabetes mellitus (HCC), Diverticulosis, GERD (gastroesophageal reflux disease), Hyperlipidemia, and Hypertension.    SURGICAL HISTORY:  has a past surgical history that includes Colonoscopy; eye surgery; back surgery; Parotidectomy (12); and Cardiac procedure (N/A, 10/8/2024).     SOCIAL HISTORY:  reports that he has never smoked. He has never used smokeless tobacco. He reports that he does not drink alcohol and does not use drugs.     FAMILY  HISTORY: family history includes Cancer in his brother and father.     MEDICATIONS:    Prior to Admission medications    Medication Sig Start Date End Date Taking? Authorizing Provider   Magnesium 
  Palliative Care Department  [phone]  Palliative Care Initial Consult  Provider Maria Isabel Beasley, TITO - CNP      PATIENT: Sunny Segal  : 1940  MRN: [id number]  ADMISSION DATE: 10/6/2024 10:54 AM  Referring Provider:  Glynn Espana MD    Palliative Medicine was consulted on hospital day 8 for assistance with Goals of care    HPI:     Clinical Summary:Sunny Segal is a 84 y.o. y/o male with a history of CAD, left parotic cancer, diabetes, diverticulosis, GERD, hyperlipidemia, hypertension, who presented to [facility] on 10/6/2024 with chest pain, admitted with non-STEMI s/p heart catheterization with multivessel disease, cardiology following with plan for possible multivessel PCI.  However patient kidney function has continue to worsen, cardiology has spoken with family and discussed in depth patient's CAD, renal function, potential complications that could result from procedure.  Patient had an episode of choking last evening, chest x-ray showed low lung volume with likely subsegmental atelectasis, aspiration is not fully excluded.  Pulmonology consulted.  Cardiology discussed options of possible medical management instead of surgical intervention, with the patient, who is considering not pursuing heart catheterization however is going to discuss this with his wife.  Palliative medicine consulted for further goals of care    ASSESSMENT/PLAN:     Pertinent Hospital Diagnoses     Non-STEMI  Stage IV chronic kidney disease  Hypertension  Hyperlipidemia      Palliative Care Encounter / Counseling Regarding Goals of Care  Please see detailed goals of care discussion as below  At this time, Sunny Segal, Does have capacity for medical decision-making.  Capacity is time limited and situation/question specific  During encounter Quin was surrogate medical decision-maker  Outcome of goals of care meeting:  Plan is to pursue medical management to multivessel heart disease  Goal is to be discharged to SNF 
INPATIENT CONSULT-[facility] CARDIOLOGY    Name: Sunny Segal    Age: 84 y.o.    Date of Admission: 10/6/2024 10:54 AM    Date of Service: 10/7/2024    Reason for Consultation: Chest pain, non STEMI    Referring Physician: Dr. Espana    History of Present Illness: The patient is a 84 y.o. year old male who developed chest \"squeezing\" around 36 hours ago the lasted for 2 hours before he presented here on this basis and an ECG revealed diffuse ST-T wave abnormalities.  He was markedly hypertensive and was aggressively treated with intravenous nitroglycerin and beta-blockers which have essentially resolved his chest discomfort.  His initial troponin was 180 and a second level is pending.  Severe headache with intravenous nitroglycerin for which he was given tramadol (creatinine approximately 2), and also he underwent CT angiogram of the pulmonary arteries with dye which showed no PE.     Past Medical History:  Hypertension, hyperlipidemia, diabetes, stage IV chronic kidney disease (Dr. Borja), previous echo 2022 showing normal EF and valves.    Review of Systems:     Constitutional: No fever, chills, sweats  Cardiac: As per HPI  Pulmonary: As per HPI  HEENT: No visual disturbances or difficult swallowing  GI: No nausea, vomiting, diarrhea, abdominal pain, rectal bleeding  : No dysuria or hematuria  Endocrine: No excessive thirst, heat or cold intolerance.   Musculoskeletal: Joint pain but no muscle aches. No claudication  Skin: No skin breakdown or rashes  Neuro: No headache, confusion, or seizures  Psych: No depression, anxiety      Family History:  Family History   Problem Relation Age of Onset    Cancer Father         prostate    Cancer Brother         prostate       Social History:  Social History     Socioeconomic History    Marital status:      Spouse name: Not on file    Number of children: Not on file    Years of education: Not on file    Highest education level: Not on file   Occupational History 
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      Thank you Gumaro Alba MD for allowing us to participate in care of Sunny Pastor MD  1:00 PM  10/7/2024

## 2024-10-14 NOTE — CARE COORDINATION
10/14/24, FRANSICO spoke with Hola from [facility].  Bed is still available for patient when patient is medically cleared.  No precert is needed.  PAS/RR,f ace sheet, ambulance (will need completed on day of discharge) and envelope is on soft chart.  FRANSICO to follow.      MICK Gomez  [facility] Case Management  [phone]

## 2024-10-15 VITALS
BODY MASS INDEX: 31.79 KG/M2 | RESPIRATION RATE: 17 BRPM | SYSTOLIC BLOOD PRESSURE: 142 MMHG | TEMPERATURE: 97.9 F | WEIGHT: 227.07 LBS | HEART RATE: 85 BPM | OXYGEN SATURATION: 94 % | HEIGHT: 71 IN | DIASTOLIC BLOOD PRESSURE: 74 MMHG

## 2024-10-15 LAB
ALBUMIN SERPL-MCNC: 3 G/DL (ref 3.5–5.2)
ALP SERPL-CCNC: 71 U/L (ref 40–129)
ALT SERPL-CCNC: 16 U/L (ref 0–40)
ANION GAP SERPL CALCULATED.3IONS-SCNC: 11 MMOL/L (ref 7–16)
AST SERPL-CCNC: 17 U/L (ref 0–39)
BASOPHILS # BLD: 0.05 K/UL (ref 0–0.2)
BASOPHILS NFR BLD: 1 % (ref 0–2)
BILIRUB SERPL-MCNC: 0.2 MG/DL (ref 0–1.2)
BUN SERPL-MCNC: 42 MG/DL (ref 6–23)
CALCIUM SERPL-MCNC: 8.3 MG/DL (ref 8.6–10.2)
CHLORIDE SERPL-SCNC: 103 MMOL/L (ref 98–107)
CO2 SERPL-SCNC: 20 MMOL/L (ref 22–29)
CREAT SERPL-MCNC: 2.9 MG/DL (ref 0.7–1.2)
EOSINOPHIL # BLD: 0.42 K/UL (ref 0.05–0.5)
EOSINOPHILS RELATIVE PERCENT: 7 % (ref 0–6)
ERYTHROCYTE [DISTWIDTH] IN BLOOD BY AUTOMATED COUNT: 13.1 % (ref 11.5–15)
GFR, ESTIMATED: 20 ML/MIN/1.73M2
GLUCOSE SERPL-MCNC: 176 MG/DL (ref 74–99)
HCT VFR BLD AUTO: 27 % (ref 37–54)
HGB BLD-MCNC: 8.5 G/DL (ref 12.5–16.5)
IMM GRANULOCYTES # BLD AUTO: 0.04 K/UL (ref 0–0.58)
IMM GRANULOCYTES NFR BLD: 1 % (ref 0–5)
LYMPHOCYTES NFR BLD: 0.67 K/UL (ref 1.5–4)
LYMPHOCYTES RELATIVE PERCENT: 11 % (ref 20–42)
MCH RBC QN AUTO: 31.4 PG (ref 26–35)
MCHC RBC AUTO-ENTMCNC: 31.5 G/DL (ref 32–34.5)
MCV RBC AUTO: 99.6 FL (ref 80–99.9)
MONOCYTES NFR BLD: 0.82 K/UL (ref 0.1–0.95)
MONOCYTES NFR BLD: 13 % (ref 2–12)
NEUTROPHILS NFR BLD: 67 % (ref 43–80)
NEUTS SEG NFR BLD: 4.11 K/UL (ref 1.8–7.3)
PHOSPHATE SERPL-MCNC: 3.3 MG/DL (ref 2.5–4.5)
PLATELET # BLD AUTO: 300 K/UL (ref 130–450)
PMV BLD AUTO: 10 FL (ref 7–12)
POTASSIUM SERPL-SCNC: 4.5 MMOL/L (ref 3.5–5)
PROT SERPL-MCNC: 6.1 G/DL (ref 6.4–8.3)
RBC # BLD AUTO: 2.71 M/UL (ref 3.8–5.8)
SODIUM SERPL-SCNC: 134 MMOL/L (ref 132–146)
WBC OTHER # BLD: 6.1 K/UL (ref 4.5–11.5)

## 2024-10-15 PROCEDURE — 6360000002 HC RX W HCPCS: Performed by: STUDENT IN AN ORGANIZED HEALTH CARE EDUCATION/TRAINING PROGRAM

## 2024-10-15 PROCEDURE — 36415 COLL VENOUS BLD VENIPUNCTURE: CPT

## 2024-10-15 PROCEDURE — 84100 ASSAY OF PHOSPHORUS: CPT

## 2024-10-15 PROCEDURE — 6370000000 HC RX 637 (ALT 250 FOR IP): Performed by: INTERNAL MEDICINE

## 2024-10-15 PROCEDURE — 99231 SBSQ HOSP IP/OBS SF/LOW 25: CPT | Performed by: NURSE PRACTITIONER

## 2024-10-15 PROCEDURE — 6370000000 HC RX 637 (ALT 250 FOR IP): Performed by: STUDENT IN AN ORGANIZED HEALTH CARE EDUCATION/TRAINING PROGRAM

## 2024-10-15 PROCEDURE — 80053 COMPREHEN METABOLIC PANEL: CPT

## 2024-10-15 PROCEDURE — 85025 COMPLETE CBC W/AUTO DIFF WBC: CPT

## 2024-10-15 PROCEDURE — 2580000003 HC RX 258: Performed by: INTERNAL MEDICINE

## 2024-10-15 RX ORDER — HYDRALAZINE HYDROCHLORIDE 25 MG/1
75 TABLET, FILM COATED ORAL 3 TIMES DAILY
DISCHARGE
Start: 2024-10-15

## 2024-10-15 RX ORDER — SENNOSIDES A AND B 8.6 MG/1
1 TABLET, FILM COATED ORAL 2 TIMES DAILY
DISCHARGE
Start: 2024-10-15 | End: 2024-11-14

## 2024-10-15 RX ORDER — METOPROLOL SUCCINATE 50 MG/1
50 TABLET, EXTENDED RELEASE ORAL 2 TIMES DAILY
DISCHARGE
Start: 2024-10-15

## 2024-10-15 RX ORDER — CEFDINIR 300 MG/1
300 CAPSULE ORAL DAILY
Qty: 5 CAPSULE | Refills: 0 | DISCHARGE
Start: 2024-10-15 | End: 2024-10-20

## 2024-10-15 RX ORDER — INSULIN LISPRO 100 [IU]/ML
0-24 INJECTION, SOLUTION INTRAVENOUS; SUBCUTANEOUS
DISCHARGE
Start: 2024-10-15

## 2024-10-15 RX ORDER — RANOLAZINE 500 MG/1
500 TABLET, EXTENDED RELEASE ORAL 2 TIMES DAILY
DISCHARGE
Start: 2024-10-15

## 2024-10-15 RX ORDER — ISOSORBIDE DINITRATE 30 MG/1
60 TABLET ORAL 3 TIMES DAILY
DISCHARGE
Start: 2024-10-15

## 2024-10-15 RX ORDER — POLYETHYLENE GLYCOL 3350 17 G/17G
17 POWDER, FOR SOLUTION ORAL DAILY
DISCHARGE
Start: 2024-10-15 | End: 2024-11-14

## 2024-10-15 RX ORDER — CLOPIDOGREL BISULFATE 75 MG/1
75 TABLET ORAL DAILY
DISCHARGE
Start: 2024-10-15

## 2024-10-15 RX ORDER — ASPIRIN 81 MG/1
81 TABLET ORAL DAILY
DISCHARGE
Start: 2024-10-15

## 2024-10-15 RX ORDER — ATORVASTATIN CALCIUM 20 MG/1
20 TABLET, FILM COATED ORAL NIGHTLY
DISCHARGE
Start: 2024-10-15

## 2024-10-15 RX ADMIN — ISOSORBIDE DINITRATE 60 MG: 10 TABLET ORAL at 10:06

## 2024-10-15 RX ADMIN — METOPROLOL SUCCINATE 50 MG: 25 TABLET, EXTENDED RELEASE ORAL at 10:08

## 2024-10-15 RX ADMIN — RANOLAZINE 500 MG: 500 TABLET, FILM COATED, EXTENDED RELEASE ORAL at 10:07

## 2024-10-15 RX ADMIN — CLOPIDOGREL BISULFATE 75 MG: 75 TABLET ORAL at 10:07

## 2024-10-15 RX ADMIN — SENNOSIDES 8.6 MG: 8.6 TABLET, FILM COATED ORAL at 10:16

## 2024-10-15 RX ADMIN — PANTOPRAZOLE SODIUM 40 MG: 40 INJECTION, POWDER, FOR SOLUTION INTRAVENOUS at 10:14

## 2024-10-15 RX ADMIN — DICLOFENAC SODIUM 2 G: 10 GEL TOPICAL at 10:43

## 2024-10-15 RX ADMIN — POLYETHYLENE GLYCOL 3350 17 G: 17 POWDER, FOR SOLUTION ORAL at 10:11

## 2024-10-15 RX ADMIN — CALCITRIOL CAPSULES 0.25 MCG 0.25 MCG: 0.25 CAPSULE ORAL at 10:07

## 2024-10-15 RX ADMIN — INSULIN LISPRO 5 UNITS: 100 INJECTION, SOLUTION INTRAVENOUS; SUBCUTANEOUS at 10:07

## 2024-10-15 RX ADMIN — ASPIRIN 81 MG: 81 TABLET, COATED ORAL at 10:06

## 2024-10-15 RX ADMIN — SODIUM CHLORIDE, PRESERVATIVE FREE 10 ML: 5 INJECTION INTRAVENOUS at 10:08

## 2024-10-15 RX ADMIN — HYDRALAZINE HYDROCHLORIDE 75 MG: 50 TABLET ORAL at 10:06

## 2024-10-15 NOTE — PROGRESS NOTES
Palliative Care Department  [phone]  Palliative Care Progress Note  Provider Kelle Da Silva, APRN - CNP      PATIENT: Sunny Segal  : 1940  MRN: [id number]  ADMISSION DATE: 10/6/2024 10:54 AM  Referring Provider:  Glynn Espana MD    Palliative Medicine was consulted on hospital day 9 for assistance with Goals of care    HPI:     Clinical Summary:Sunny Segal is a 84 y.o. y/o male with a history of CAD, left parotic cancer, diabetes, diverticulosis, GERD, hyperlipidemia, hypertension, who presented to [facility] on 10/6/2024 with chest pain, admitted with non-STEMI s/p heart catheterization with multivessel disease, cardiology following with plan for possible multivessel PCI.  However patient kidney function has continue to worsen, cardiology has spoken with family and discussed in depth patient's CAD, renal function, potential complications that could result from procedure.  Patient had an episode of choking last evening, chest x-ray showed low lung volume with likely subsegmental atelectasis, aspiration is not fully excluded.  Pulmonology consulted.  Cardiology discussed options of possible medical management instead of surgical intervention, with the patient, who is considering not pursuing heart catheterization however is going to discuss this with his wife.  Palliative medicine consulted for further goals of care    ASSESSMENT/PLAN:     Pertinent Hospital Diagnoses     Non-STEMI  Stage IV chronic kidney disease  Hypertension  Hyperlipidemia      Palliative Care Encounter / Counseling Regarding Goals of Care  Please see detailed goals of care discussion as below  At this time, Sunny Segal, Does have capacity for medical decision-making.  Capacity is time limited and situation/question specific  During encounter Dave was surrogate medical decision-maker  Outcome of goals of care meeting:  Plan is to pursue medical management to multivessel heart disease  Goal is to be discharged to SNF with 
4 Eyes Skin Assessment     NAME:  Sunny Segal  YOB: 1940  MEDICAL RECORD NUMBER:  [id number]    The patient is being assessed for  Admission    I agree that at least one RN has performed a thorough Head to Toe Skin Assessment on the patient. ALL assessment sites listed below have been assessed.      Areas assessed by both nurses:    Head, Face, Ears, Shoulders, Back, Chest, Arms, Elbows, Hands, Sacrum. Buttock, Coccyx, Ischium, and Legs. Feet and Heels        Does the Patient have a Wound? No noted wound(s)       Pavel Prevention initiated by RN: Yes d/t patient wheelchair bound at home  Wound Care Orders initiated by RN: No    Pressure Injury (Stage 3,4, Unstageable, DTI, NWPT, and Complex wounds) if present, place Wound referral order by RN under : No    New Ostomies, if present place, Ostomy referral order under : No     Nurse 1 eSignature: Electronically signed by Agnes Fox RN on 10/6/24 at 4:46 PM EDT    **SHARE this note so that the co-signing nurse can place an eSignature**    Nurse 2 eSignature: Electronically signed by Mae Mauro RN on 10/6/24 at 4:48 PM EDT  
BHARAT Yoo NP  with Dr. Espana notified that patient has not had relief of headache with tylenol.   Patient is receiving IV Tridil.   Patient has several allergies, but has used ultram in the past and he does not believe he has an allergy to ultram. See new order for one time dose of ultram.  Tavia Roberson RN    
Bladder scan post void and 6 hrs post harrison removal showed 349 ml present in bladder.   
Cardio consult placed with Dr. Chi's answering service  
Cardiology signed off on patient. Perfect serve placed to Dr. Jarrett for coordination of care. Dr. Jarrett to see patient today. .  
Chest xray result sent to Merlyn Godinez np, thru perfect serve.  
Confirmed with Dr Boo that pt is to continue with IV nitro drip as well as oral isordil, but to hold isordil if SBP <100. Nitro drip currentlyu being weaned due to BP below goal. See flowsheets.  No c/o or distress.  
[place] [place] [facility]    SUBJECTIVE:    Admitted with non-STEMI and heart catheterization this admission showed multivessel disease.  Plans were made for multivessel PCI but his creatinine peaked at 3.4 and is now down to 3.1.  I had recommended to the patient and his wife to intensify medical therapy and they agreed, but Dr. Chi was reconsulted to discuss potential stent placement.  He feels fine and has had no chest discomfort.  Discussed with him the high risk of stenting and he states he does not wish to be subjected to the risk of further renal dysfunction but says he has to ask his wife.    Hypertension, hyperlipidemia, diabetes, stage IV chronic kidney disease, normal EF and valvular function on echocardiogram 2022.    OBJECTIVE:    His vital signs were reviewed today.    Vitals:    10/14/24 0748   BP: (!) 161/76   Pulse: 70   Resp: 15   Temp: 97.3 °F (36.3 °C)   SpO2: 94%       Scheduled Meds:   insulin lispro  5 Units SubCUTAneous TID WC    isosorbide dinitrate  60 mg Oral TID    ranolazine  500 mg Oral BID    cefTRIAXone (ROCEPHIN) IV  1,000 mg IntraVENous Q24H    diclofenac sodium  2 g Topical BID    atorvastatin  20 mg Oral Nightly    hydrALAZINE  75 mg Oral TID    metoprolol succinate  50 mg Oral BID    clopidogrel  75 mg Oral Daily    aspirin  81 mg Oral Daily    sodium chloride flush  5-40 mL IntraVENous 2 times per day    epoetin fernanda-epbx  5,000 Units SubCUTAneous Weekly    pantoprazole  40 mg IntraVENous Daily    polyethylene glycol  17 g Oral Daily    senna  1 tablet Oral BID    [Held by provider] sodium bicarbonate  650 mg Oral BID    calcitRIOL  0.25 mcg Oral Every Other Day    insulin glargine  22 Units SubCUTAneous Nightly    insulin lispro  0-8 Units SubCUTAneous TID     insulin lispro  0-4 Units SubCUTAneous Nightly     Continuous Infusions:   sodium chloride      sodium chloride      dextrose       PRN Meds:.lidocaine, acetaminophen, sodium chloride flush, sodium 
DAILY PROGRESS NOTE -[facility] CARDIOLOG[facility]    SUBJECTIVE:    Admitted with non-STEMI and heart catheterization this admission showed multivessel disease.  Plans were made for multivessel PCI but his creatinine is now over 3, up from 2.1 on presentation.  No chest pain, worsening dyspnea, palpitations, syncope, presyncope.  Blood pressure much better controlled but still elevated 135/90 at this time on the higher dose of medications.  Extensive discussion with his wife and him regarding the fact that would rather he not undergo heart catheterization and PCI and have planned intensified antianginal therapy.    Hypertension, hyperlipidemia, diabetes, stage IV chronic kidney disease, normal EF and valvular function on echocardiogram 2022.    OBJECTIVE:    His vital signs were reviewed today.  Blood pressure 135/93, pulse 90-95, respiratory 16.    Vitals:    10/11/24 0926   BP:    Pulse: 94   Resp:    Temp:    SpO2:        Scheduled Meds:   diclofenac sodium  2 g Topical BID    atorvastatin  20 mg Oral Nightly    hydrALAZINE  75 mg Oral TID    isosorbide dinitrate  40 mg Oral TID    metoprolol succinate  50 mg Oral BID    clopidogrel  75 mg Oral Daily    aspirin  81 mg Oral Daily    sodium chloride flush  5-40 mL IntraVENous 2 times per day    epoetin fernanda-epbx  5,000 Units SubCUTAneous Weekly    pantoprazole  40 mg IntraVENous Daily    polyethylene glycol  17 g Oral Daily    senna  1 tablet Oral BID    [Held by provider] sodium bicarbonate  650 mg Oral BID    calcitRIOL  0.25 mcg Oral Every Other Day    insulin glargine  22 Units SubCUTAneous Nightly    insulin lispro  0-8 Units SubCUTAneous TID WC    insulin lispro  0-4 Units SubCUTAneous Nightly     Continuous Infusions:   sodium chloride 100 mL/hr at 10/11/24 0651    sodium chloride      sodium chloride      dextrose       PRN Meds:.acetaminophen, sodium chloride flush, sodium chloride, acetaminophen, ondansetron, ondansetron, nitroGLYCERIN, sodium chloride, 
DAILY PROGRESS NOTE -[facility]    SUBJECTIVE:    Admitted with non-STEMI and heart catheterization yesterday showed multivessel disease  Sinus rhythm, first-degree AV block, right bundle branch block.  Plans were made for multivessel PCI but his creatinine is now 2.7.  No chest pain, worsening dyspnea, palpitations, syncope, presyncope.  Blood pressure much better controlled at this time on the higher dose of medications.    Hypertension, hyperlipidemia, diabetes, stage IV chronic kidney disease, normal EF and valvular function on echocardiogram 2022.    OBJECTIVE:    His vital signs were reviewed today.    Vitals:    10/10/24 0806   BP: (!) 130/55   Pulse: 92   Resp: 15   Temp: 97.9 °F (36.6 °C)   SpO2: 95%       Scheduled Meds:   diclofenac sodium  2 g Topical BID    atorvastatin  20 mg Oral Nightly    hydrALAZINE  75 mg Oral TID    isosorbide dinitrate  40 mg Oral TID    metoprolol succinate  50 mg Oral BID    clopidogrel  75 mg Oral Daily    aspirin  81 mg Oral Daily    sodium chloride flush  5-40 mL IntraVENous 2 times per day    epoetin fernanda-epbx  5,000 Units SubCUTAneous Weekly    pantoprazole  40 mg IntraVENous Daily    polyethylene glycol  17 g Oral Daily    senna  1 tablet Oral BID    [Held by provider] sodium bicarbonate  650 mg Oral BID    calcitRIOL  0.25 mcg Oral Every Other Day    insulin glargine  22 Units SubCUTAneous Nightly    insulin lispro  0-8 Units SubCUTAneous TID WC    insulin lispro  0-4 Units SubCUTAneous Nightly     Continuous Infusions:   sodium chloride      sodium chloride      dextrose       PRN Meds:.acetaminophen, sodium chloride flush, sodium chloride, acetaminophen, ondansetron, ondansetron, nitroGLYCERIN, sodium chloride, [DISCONTINUED] acetaminophen **OR** acetaminophen, glucose, dextrose bolus **OR** dextrose bolus, glucagon (rDNA), dextrose    REVIEW OF SYSTEMS:     No pruritus, rash, bruising.  Cardiac and pulmonary symptoms per HPI.  No nausea, vomiting, 
Dc med req faxed to [facility]  
Dr. Espana notified  via perfect serve that Dr. Horowitz has been consulted.  Dr. Boo requests medical to manage nausea.  After omeprazole this am, patient had 100 cc emesis undigested food from last evening meal.Patient had ultram x1 for headache through the night unresolved with tylenol.  Patient has several allergies.  Dr. Boo requests medical manage pain medications and consider his renal function. Patient ranks headache a 5 on scale 1-10.  He denies chest pain.  B/p  158/84  HR 75  Tavia Roberson RN     
Dr. Horowitz notified of consult via perfect serve.  
Dr. Horowitz, Dr. Boo, cath lab and LOTUS Benitez notified of bun/cr jump  
Gracia removed with no complications. Will continue to monitor pt for voiding.   
Has multivessel CAD, refused surgery, he opted for medical therapy yesterday, today decided to go with multivessel PCI, discussed with wife and son at bedside, medical therapy was strongly advised.  
Internal Medicine Progress Note    Patient's name: Sunny Segal  : 1940  Chief complaints (on day of admission): Chest Pain (Pt states he woke up with CP radiating to neck. Shoulder pain last night.  Pt given 324mg ASA by EMS however vomited after. )  Admission date: 10/6/2024  Date of service: 10/14/2024   Room: [place] [address]  Primary care physician: Gumaro Adams MD  Reason for visit: Follow-up for chest pain     Subjective  Sunny was seen and examined at bedside     Room air   Lungs clear  Had choking episode yesterday  Waiting for slp to see   Dc planning after this     Review of Systems  There are no new complaints of chest pain, shortness of breath, abdominal pain, nausea, vomiting, diarrhea, constipation unless otherwise mentioned above.     Hospital Medications  Current Facility-Administered Medications   Medication Dose Route Frequency Provider Last Rate Last Admin    insulin lispro (HUMALOG,ADMELOG) injection vial 5 Units  5 Units SubCUTAneous TID  Glynn Espana MD   5 Units at 10/14/24 1210    isosorbide dinitrate (ISORDIL) tablet 60 mg  60 mg Oral TID Roel Boo MD   60 mg at 10/14/24 0817    ranolazine (RANEXA) extended release tablet 500 mg  500 mg Oral BID Roel Boo MD   500 mg at 10/14/24 0819    cefTRIAXone (ROCEPHIN) 1,000 mg in sterile water 10 mL IV syringe  1,000 mg IntraVENous Q24H Glynn Espana MD   1,000 mg at 10/13/24 1244    lidocaine (XYLOCAINE) 2 % uro-jet   Topical PRN Jaida Kilpatrick APRN - CNP   Given at 10/11/24 1625    diclofenac sodium (VOLTAREN) 1 % gel 2 g  2 g Topical BID Armando Zaragoza PA-C   2 g at 10/14/24 0826    atorvastatin (LIPITOR) tablet 20 mg  20 mg Oral Nightly Roel Boo MD   20 mg at 10/13/24 2056    hydrALAZINE (APRESOLINE) tablet 75 mg  75 mg Oral TID Roel Boo MD   75 mg at 10/14/24 0819    metoprolol succinate (TOPROL XL) extended release tablet 50 mg  50 mg Oral BID Roel Boo MD   50 mg at 10/14/24 0817 
Internal Medicine Progress Note    Patient's name: Sunny Segal  : 1940  Chief complaints (on day of admission): Chest Pain (Pt states he woke up with CP radiating to neck. Shoulder pain last night.  Pt given 324mg ASA by EMS however vomited after. )  Admission date: 10/6/2024  Date of service: 10/9/2024   Room: [place] [address]  Primary care physician: Gumaro Adams MD  Reason for visit: Follow-up for chest pain     Subjective  Sunny was seen and examined at bedside     Doing well   Room air   No issues per patient   PT OT to see today   He does not want to go to rehab should he need it   Wants to go home for DC   Will await cardiology clearance     Review of Systems  There are no new complaints of chest pain, shortness of breath, abdominal pain, nausea, vomiting, diarrhea, constipation unless otherwise mentioned above.     Hospital Medications  Current Facility-Administered Medications   Medication Dose Route Frequency Provider Last Rate Last Admin    diclofenac sodium (VOLTAREN) 1 % gel 2 g  2 g Topical BID Armando Zaragoza PA-C   2 g at 10/09/24 0148    atorvastatin (LIPITOR) tablet 20 mg  20 mg Oral Nightly Roel Boo MD        hydrALAZINE (APRESOLINE) tablet 75 mg  75 mg Oral TID Roel Boo MD        isosorbide dinitrate (ISORDIL) tablet 40 mg  40 mg Oral TID Roel Boo MD        metoprolol succinate (TOPROL XL) extended release tablet 50 mg  50 mg Oral BID Roel Boo MD        clopidogrel (PLAVIX) tablet 75 mg  75 mg Oral Daily Roel Boo MD        sodium chloride flush 0.9 % injection 5-40 mL  5-40 mL IntraVENous 2 times per day Abner Washington MD   10 mL at 10/08/24 2035    sodium chloride flush 0.9 % injection 5-40 mL  5-40 mL IntraVENous PRN Abner Washington MD        0.9 % sodium chloride infusion   IntraVENous PRN Abner Washington MD        acetaminophen (TYLENOL) tablet 650 mg  650 mg Oral Q4H PRN Abner Washington MD   650 mg at 10/08/24 5139    epoetin fernanda-epbx 
Monitor removed, cleaned and placed in rack, report to [facility] ambulance  
Nephrology Attending   Inpatient Progress Note    Admit Date: 10/6/2024                                  PCP: Gumaro Adams MD    History of presenting illness: As per initial consult   The patient is a 84 y.o. male patient with PMH of Anemia, CAD, left parotid cancer in , Diabetes mellitus, Diverticulosis,  and Hypertension who presents with chest pain. Symptoms started 1 week ago. He also complains of increased fatigue.ECG revealed diffuse ST-T wave abnormalities. He also was found to have an elevated troponin at 1804 but up trended to 1570 on repeat. He has a history of CKD 4 2/2 diabetic nephropathy and chronic BPH and follows with . His baseline Cr in ~ 2-2.2 mg/dL Nephrology was consulted for CKD and pre contrast expose prophylaxis.      10/8/24: LHC showed multivessel disease not amenable to PCI. Deemed that CABG would be high risk     Clinical course:    10/9/2024: Seen later today. No complaints    Vitals:  VITALS:  BP (!) 179/83   Pulse (!) 103   Temp 97.3 °F (36.3 °C) (Temporal)   Resp 18   Ht 1.803 m (5' 11\")   Wt 98.4 kg (217 lb)   SpO2 95%   BMI 30.27 kg/m²   24HR INTAKE/OUTPUT:    Intake/Output Summary (Last 24 hours) at 10/9/2024 1250  Last data filed at 10/9/2024 0531  Gross per 24 hour   Intake --   Output 1500 ml   Net -1500 ml     CURRENT PULSE OXIMETRY:  SpO2: 95 %  24HR PULSE OXIMETRY RANGE:  SpO2  Av.9 %  Min: 88 %  Max: 95 %        I/O:      I/O last 3 completed shifts:  In: 2474.7 [I.V.:2474.7]  Out: 3820 [Urine:3800; Blood:20]  No intake/output data recorded.    Medications:    IV:   sodium chloride      sodium chloride      dextrose        diclofenac sodium  2 g Topical BID    atorvastatin  20 mg Oral Nightly    hydrALAZINE  75 mg Oral TID    isosorbide dinitrate  40 mg Oral TID    metoprolol succinate  50 mg Oral BID    clopidogrel  75 mg Oral Daily    calcium gluconate  2,000 mg IntraVENous Once    sodium chloride flush  5-40 mL IntraVENous 2 times per day 
Notified Dr Boo of pt pain 5/10 on nitro IV 50mcg/min and heparin at 10 units/kg/hr. Ordered metoprolol succinate 25mg now and 2x daily starting tomorrow.   Goal is to bring pain down and monitor kidney function.   
Nurse to nurse report to YOLANDA He  
Ok for heart cath per nephrology, Dr. Pastor. Cath lab notified.  
Passed on in report patient needs an order for UA C+S as urine is dark slimy with heavy sediment noted pus in external cath bag when removed for bathing and change this am  
Patient D/C, pulmonary to see in office in 2-4 weeks time.    Ke Blanton MD  10/15/2024    
Patient encouraged to reposition and turn himself to decrease risk of skin breakdown. Patient verbalized understanding.   
Patient unable to safely transfer to the chair with 3 RN's. Patient sitting on side of bed for dinner.  
Patient's wife called nurses station stating patient was choking. Upon entering room patient was in slight distress. He was able to cough up some of the meatloaf stuck in his throat. Daniel lung sounds wheezy (baseline). Oxygen 95% on room air. Patient states he feels like something is stuck in his throat still. Dr. Espana notified via perfect serve. Orders given. XR called for stat xr.  
Per Dr. Jarrett pt's cath cancelled today. Wife at bedside and aware patient will be transferred back to  [facility].  
Progress Note  10/12/2024 1:03 PM  Subjective:   Admit Date: 10/6/2024  PCP: Gumaro Adams MD  Interval History: patient examined doing well feels ok , harrison in place draining clear urine     Diet: ADULT DIET; Regular; 4 carb choices (60 gm/meal); Low Fat/Low Chol/High Fiber/2 gm Na    Data:   Scheduled Meds:   insulin lispro  5 Units SubCUTAneous TID WC    isosorbide dinitrate  60 mg Oral TID    ranolazine  500 mg Oral BID    cefTRIAXone (ROCEPHIN) IV  1,000 mg IntraVENous Q24H    diclofenac sodium  2 g Topical BID    atorvastatin  20 mg Oral Nightly    hydrALAZINE  75 mg Oral TID    metoprolol succinate  50 mg Oral BID    clopidogrel  75 mg Oral Daily    aspirin  81 mg Oral Daily    sodium chloride flush  5-40 mL IntraVENous 2 times per day    epoetin fernanda-epbx  5,000 Units SubCUTAneous Weekly    pantoprazole  40 mg IntraVENous Daily    polyethylene glycol  17 g Oral Daily    senna  1 tablet Oral BID    [Held by provider] sodium bicarbonate  650 mg Oral BID    calcitRIOL  0.25 mcg Oral Every Other Day    insulin glargine  22 Units SubCUTAneous Nightly    insulin lispro  0-8 Units SubCUTAneous TID WC    insulin lispro  0-4 Units SubCUTAneous Nightly     Continuous Infusions:   sodium chloride 100 mL/hr (10/12/24 0315)    sodium chloride      sodium chloride      dextrose       PRN Meds:lidocaine, acetaminophen, sodium chloride flush, sodium chloride, acetaminophen, ondansetron, ondansetron, nitroGLYCERIN, sodium chloride, [DISCONTINUED] acetaminophen **OR** acetaminophen, glucose, dextrose bolus **OR** dextrose bolus, glucagon (rDNA), dextrose  I/O last 3 completed shifts:  In: 240 [P.O.:240]  Out: 1802 [Urine:1800; Stool:2]  No intake/output data recorded.    Intake/Output Summary (Last 24 hours) at 10/12/2024 1303  Last data filed at 10/12/2024 0800  Gross per 24 hour   Intake 240 ml   Output 1301 ml   Net -1061 ml     CBC:   Recent Labs     10/10/24  0543 10/11/24  0527 10/12/24  0507   WBC 8.1 8.8 7.0   HGB 
Progress Note  10/13/2024 12:32 PM  Subjective:   Admit Date: 10/6/2024  PCP: Gumaro Adams MD  Interval History: patient examined doing well feels ok     Diet: ADULT DIET; Regular; 4 carb choices (60 gm/meal); Low Fat/Low Chol/High Fiber/2 gm Na    Data:   Scheduled Meds:   insulin lispro  5 Units SubCUTAneous TID WC    isosorbide dinitrate  60 mg Oral TID    ranolazine  500 mg Oral BID    cefTRIAXone (ROCEPHIN) IV  1,000 mg IntraVENous Q24H    diclofenac sodium  2 g Topical BID    atorvastatin  20 mg Oral Nightly    hydrALAZINE  75 mg Oral TID    metoprolol succinate  50 mg Oral BID    clopidogrel  75 mg Oral Daily    aspirin  81 mg Oral Daily    sodium chloride flush  5-40 mL IntraVENous 2 times per day    epoetin fernanda-epbx  5,000 Units SubCUTAneous Weekly    pantoprazole  40 mg IntraVENous Daily    polyethylene glycol  17 g Oral Daily    senna  1 tablet Oral BID    [Held by provider] sodium bicarbonate  650 mg Oral BID    calcitRIOL  0.25 mcg Oral Every Other Day    insulin glargine  22 Units SubCUTAneous Nightly    insulin lispro  0-8 Units SubCUTAneous TID WC    insulin lispro  0-4 Units SubCUTAneous Nightly     Continuous Infusions:   sodium chloride 100 mL/hr at 10/13/24 0555    sodium chloride      sodium chloride      dextrose       PRN Meds:lidocaine, acetaminophen, sodium chloride flush, sodium chloride, acetaminophen, ondansetron, ondansetron, nitroGLYCERIN, sodium chloride, [DISCONTINUED] acetaminophen **OR** acetaminophen, glucose, dextrose bolus **OR** dextrose bolus, glucagon (rDNA), dextrose  I/O last 3 completed shifts:  In: 6938.1 [P.O.:420; I.V.:6518.1]  Out: 2251 [Urine:2250; Stool:1]  I/O this shift:  In: 180 [P.O.:180]  Out: 225 [Urine:225]    Intake/Output Summary (Last 24 hours) at 10/13/2024 1232  Last data filed at 10/13/2024 0921  Gross per 24 hour   Intake 6938.06 ml   Output 1675 ml   Net 5263.06 ml     CBC:   Recent Labs     10/11/24  0527 10/12/24  0507 10/13/24  0537   WBC 8.8 7.0 
RN bladder scanned the patient as per renal, 408 mL. Patient could not void on command for me and he is incontinent.  
RN heard the heart monitor alarm and saw bradycardia on the monitor and went to check the patient. He was found slumping over on the side of the bed, HR as low as 38 and brief episode of lack of responsiveness. When RN came into the room, the patient answered to his name and was alert and oriented to person and place and disoriented to time and situation which is the patient's baseline. Vitals were done, /53, HR 76. RN called Dr. Mo who is on call for Dr. Boo to notify him.   
RN messaged Dr. Espana about patient's urinalysis results.  
RN messaged Dr. Espana about patient's urine odor and cloudiness, and that he had a low grade fever last night that broke with tylenol. Orders received.  
RN messaged Dr. Espana to ask if he wanted another urine culture since the harrison is now in.  
RN messaged Dr. Horowitz, as I could not message  about patient's wife reporting that the patient had work done at [facility] in the past and they informed him that if he were to ever need a catheter he would need to have it placed by a urologist. RN then spoke with Dr. Pastor on the phone, and he will consult urology.  
RN messaged Dr. Jarrett about creatinine level of 3.4 today. RN messaged Dr. Espana about creatinine and hemoglobin levels. Per Dr. Jarrett, patient cannot have cath today due to Creatinine level. RN called [facility] and notified.  
[facility]    INPATIENT CARDIOLOGY FOLLOW-UP    Name: Sunny Segal    Age: 84 y.o.    PCP: Gumaro Adams MD    Date of Admission: 10/6/2024 10:54 AM    Date of Service: 10/13/2024    Chief Complaint: Follow-up for NSTEMI    Admitted with non-STEMI and heart catheterization this admission showed multivessel disease.  Plans were made for multivessel PCI but his creatinine is now over 3, up from 2.1 on presentation.  No chest pain, worsening dyspnea, palpitations, syncope, presyncope.  Blood pressure much better controlled but still elevated 135/90 at this time on the higher dose of medications.  Extensive discussion with his wife and him regarding the fact that would rather he not undergo heart catheterization and PCI and have planned intensified antianginal therapy.     Hypertension, hyperlipidemia, diabetes, stage IV chronic kidney disease, normal EF and valvular function on echocardiogram 2022.      Above for continuity  Interim History 10/13/24:  Dr Boo signed off 10/11/24 - review of the notes indicate No plans for intervention -either CABG or PCI due to worsening renal function. This afternoon I was reconsulted. Son and wife present and we had a 40 minute conversation regarding the patient's CAD- personally reviewed with patient and family the cath results( with pictures), Reviewed renal function, potential complications resulting from cath. No new overnight cardiac complaints. Currently with no complaints of CP, SOB, palpitations, dizziness, or lightheadedness.    Telemetry personally reviewed and showed sinus      Review of Systems:   Cardiac: As per HPI  General: No fever, chills  Pulmonary: No cough, wheeze, or shortness of breath  GI: No nausea, vomiting,or abdominal pain  Neuro: No headache or confusion    Problem List:  Principal Problem:    NSTEMI (non-ST elevated myocardial infarction) (HCC)  Active Problems:    Syncope and collapse    Vasovagal syncope    Anemia of chronic 
Wife called nurse to room stating pt became lethargic and sleepy with some increased confusion. . VS stable. Upon assessment pt alert and verbalizing but could not identify date and time. Denies any discomfort. Oxycodone recently given for severe knee pain. Dr Espana notified. Received order for ultram prn.   
10/07/24 2127 New Bag at 10/07/24 2127    nitroGLYCERIN (NITROSTAT) SL tablet 0.4 mg  0.4 mg SubLINGual Q5 Min PRN Alexandro Zepeda MD   0.4 mg at 10/06/24 1331    nitroGLYCERIN 200 mcg/mL in dextrose 5%  5-200 mcg/min IntraVENous Continuous Yamilka Hernandez MD 24 mL/hr at 10/08/24 1254 80 mcg/min at 10/08/24 1254    heparin (porcine) injection 4,000 Units  4,000 Units IntraVENous PRN Yamilka Hernandez MD        heparin (porcine) injection 2,000 Units  2,000 Units IntraVENous PRN Yamilka Hernandez MD   2,000 Units at 10/07/24 0234    heparin 25,000 units in dextrose 5% 250 mL (premix) infusion  5-30 Units/kg/hr IntraVENous Continuous Yamilka Hernandez MD 12 mL/hr at 10/07/24 2332 12 Units/kg/hr at 10/07/24 2332    calcitRIOL (ROCALTROL) capsule 0.25 mcg  0.25 mcg Oral Every Other Day Robert Nguyen, DO   0.25 mcg at 10/07/24 1122    insulin glargine (LANTUS) injection vial 22 Units  22 Units SubCUTAneous Nightly Robert Nguyen, DO   22 Units at 10/07/24 2119    sodium chloride flush 0.9 % injection 5-40 mL  5-40 mL IntraVENous 2 times per day Robert Nguyen, DO   10 mL at 10/07/24 2120    sodium chloride flush 0.9 % injection 5-40 mL  5-40 mL IntraVENous PRN Robert Nguyen DO        0.9 % sodium chloride infusion   IntraVENous PRN Robert Nguyen,         acetaminophen (TYLENOL) tablet 650 mg  650 mg Oral Q6H PRN Robert Nguyen DO   650 mg at 10/06/24 2254    Or    acetaminophen (TYLENOL) suppository 650 mg  650 mg Rectal Q6H PRN Robert Nguyen DO        insulin lispro (HUMALOG,ADMELOG) injection vial 0-8 Units  0-8 Units SubCUTAneous TID WC Robert Nguyen, DO   2 Units at 10/07/24 9015    insulin lispro (HUMALOG,ADMELOG) injection vial 0-4 Units  0-4 Units SubCUTAneous Nightly Robert Nguyen, DO        glucose chewable tablet 16 g  4 tablet Oral PRN Robert Nguyen,         dextrose bolus 10% 125 mL  125 mL IntraVENous PRN Robert Nguyen, DO 
10/12/24 0934    atorvastatin (LIPITOR) tablet 20 mg  20 mg Oral Nightly Roel Boo MD   20 mg at 10/11/24 2152    hydrALAZINE (APRESOLINE) tablet 75 mg  75 mg Oral TID Roel Boo MD   75 mg at 10/12/24 0920    metoprolol succinate (TOPROL XL) extended release tablet 50 mg  50 mg Oral BID Roel Boo MD   50 mg at 10/12/24 0920    clopidogrel (PLAVIX) tablet 75 mg  75 mg Oral Daily Roel Boo MD   75 mg at 10/12/24 0920    acetaminophen (TYLENOL) tablet 1,000 mg  1,000 mg Oral Q6H PRN Glynn Espana MD        aspirin EC tablet 81 mg  81 mg Oral Daily Abner Washington MD   81 mg at 10/12/24 0920    sodium chloride flush 0.9 % injection 5-40 mL  5-40 mL IntraVENous 2 times per day Abner Washington MD   10 mL at 10/12/24 0921    sodium chloride flush 0.9 % injection 5-40 mL  5-40 mL IntraVENous PRN Abner Washington MD        0.9 % sodium chloride infusion   IntraVENous PRN Abner Washington MD        acetaminophen (TYLENOL) tablet 650 mg  650 mg Oral Q4H PRN Abner Washington MD   650 mg at 10/10/24 2042    epoetin fernanda-epbx (RETACRIT) injection 5,000 Units  5,000 Units SubCUTAneous Weekly Phillip Pastor MD   5,000 Units at 10/08/24 1757    ondansetron (ZOFRAN) injection 4 mg  4 mg IntraVENous Q6H PRN Glynn Espana MD        ondansetron (ZOFRAN-ODT) disintegrating tablet 4 mg  4 mg Oral Q8H PRN Glynn Espana MD        pantoprazole (PROTONIX) injection 40 mg  40 mg IntraVENous Daily Glynn Espana MD   40 mg at 10/12/24 0921    polyethylene glycol (GLYCOLAX) packet 17 g  17 g Oral Daily Glynn Espana MD   17 g at 10/11/24 0927    senna (SENOKOT) tablet 8.6 mg  1 tablet Oral BID Glynn Espana MD   8.6 mg at 10/12/24 0920    [Held by provider] sodium bicarbonate tablet 650 mg  650 mg Oral BID Glynn Espana MD   650 mg at 10/07/24 2119    nitroGLYCERIN (NITROSTAT) SL tablet 0.4 mg  0.4 mg SubLINGual Q5 Min PRN Alexandro Zepeda MD   0.4 mg at 10/06/24 1331    calcitRIOL (ROCALTROL) 
deficit.  No hemoptysis, epistaxis, easy bruising.  No anxiety, depression.  No symptoms of hypothyroidism, hyperthyroidism, diabetes, heat or cold intolerance.    FAMILY HISTORY: Negative for CAD in first-degree relatives.    SOCIAL HISTORY: Negative for alcohol, tobacco, or illicit drug use.      PHYSICAL EXAM:    General Appearance:  awake, alert, oriented, in no acute distress  Neck:  no bruits  Lungs:  Normal expansion.  Clear to auscultation.  No rales, rhonchi, or wheezing.  Heart:  Heart sounds are normal.  Regular rate and rhythm without murmur, gallop or rub.  Abdomen:  Soft, non-tender.  Extremities: Extremities warm to touch, pink, with no edema.  Neuro/musculosketal:  Unremarkable.    LABS:    Recent Labs     10/06/24  1133 10/07/24  0704 10/07/24  1751    138 137   K 4.4 4.2 4.3   BUN 46* 44* 39*   CREATININE 2.2* 2.2* 2.3*       Recent Labs     10/06/24  1448 10/07/24  0815 10/08/24  0526   HGB 10.5* 9.4* 8.8*    201 175       No results for input(s): \"INR\" in the last 72 hours.      IMPRESSION:    1.  Non-STEMI-no further chest pain on IV heparin infusion and nitroglycerin infusion.  Needs heart catheterization which has been scheduled for this morning.  AUC 3, score 9.  Maintain nothing by mouth.  No clopidogrel given with the aspirin as he may require CABG.    2.  Stage IV chronic kidney disease-creatinine 2.3.  Precatheterization hydration per nephrology which he is receiving at this time.  No tramadol which he received after admission.    3.  Hypertension-uncontrolled now and throughout hospitalization.  Goal blood pressure under long-term basis is below 130/80.  He claims an allergy to amlodipine so cannot use this.  BiDil (hydralazine/Isordil) 25/20 mg 3 times daily for blood pressure control, and may need to change metoprolol to carvedilol for better blood pressure control.  No ACE inhibitor.    4.  Hyperlipidemia and diabetes-LDL 50 which is controlled but have added low-dose 
functional mobility during hospital stay, safety with functional mobility    Patient response to education:   Pt verbalized understanding Pt demonstrated skill Pt requires further education in this area   partial partial yes     ASSESSMENT:    Conditions Requiring Skilled Therapeutic Intervention:    [x]Decreased strength     [x]Decreased ROM  [x]Decreased functional mobility  [x]Decreased balance   [x]Decreased endurance   [x]Decreased posture  [x]Decreased sensation  []Decreased coordination   []Decreased vision  [x]Decreased safety awareness   [x]Increased pain       Comments:  Pt sitting in bedside chair upon entering, pt agreeable to participate. OT present. Pt instructed to transfer from chair, cued for hand placement. Pt standing with poor balance with arm in arm A, achieving partial stand. Pt assisted to EOB, cueing provided for positioning, assist provided for eccentric control. Pt demonstrated limited tolerance to functional mobility, 2/2 L knee pain. Pt sat EOB briefly, prior to transfer to supine, demonstrating good sitting balance. Pt positioned for comfort in supine with all needs met and call bell in reach prior to exiting.    Treatment:  Patient practiced and was instructed in the following treatment:    Bed mobility training - pt given verbal and tactile cues to facilitate proper sequencing and safety during rolling and supine>sit as well as provided with physical assistance to complete task   Sitting EOB for >5 minutes for upright tolerance, postural awareness and BLE ROM  STS and pivot transfer training - pt educated on proper hand and foot placement, safety and sequencing, and use of verbal and tactile cues to safely complete sit<>stand and pivot transfers with physical assistance to complete task safely     Pt's/ family goals   1. Return home    Prognosis is fair for reaching above PT goals.    Patient and or family understand(s) diagnosis, prognosis, and plan of care.  yes    PHYSICAL THERAPY 
shows past weights of 220# no method on 7/16/24 and 220# no method on 2/12/24)                       BMI Categories: Obese Class 1 (BMI 30.0-34.9)    Estimated Daily Nutrient Needs:  Energy Requirements Based On: Formula  Weight Used for Energy Requirements: Admission  Energy (kcal/day): 9031-2020 (REE 1698 x 1.1 SF)  Weight Used for Protein Requirements: Ideal  Protein (g/day):  (1.2-1.4g/kg IBW ; noted ZION)  Method Used for Fluid Requirements: Standard Renal  Fluid (ml/day): per renal    Nutrition Diagnosis:   Inadequate oral intake related to cardiac dysfunction (s/p NSTEMI) as evidenced by poor intake prior to admission, intake 51-75%    Nutrition Interventions:   Food and/or Nutrient Delivery: Continue Current Diet, Start Oral Nutrition Supplement  Nutrition Education/Counseling: Education not indicated  Coordination of Nutrition Care: Continue to monitor while inpatient, Speech Therapy, Swallow Evaluation       Goals:  Previous Goal Met: Progressing toward Goal(s)  Goals: Meet at least 75% of estimated needs, by next RD assessment       Nutrition Monitoring and Evaluation:   Behavioral-Environmental Outcomes: None Identified  Food/Nutrient Intake Outcomes: Food and Nutrient Intake, Supplement Intake  Physical Signs/Symptoms Outcomes: Biochemical Data, Chewing or Swallowing, GI Status, Fluid Status or Edema, Hemodynamic Status, Meal Time Behavior, Weight, Skin, Nutrition Focused Physical Findings, Nausea or Vomiting    Discharge Planning:    Too soon to determine     Fan Breaux RD, LD  Contact: [phone]    
urgency, hematuria, flank pain.  Joint pain but no muscle soreness, stiffness, aching.  No headache, speech disturbance, lateralizing neurologic deficit.  No hemoptysis, epistaxis, easy bruising.  No anxiety, depression.  No symptoms of hypothyroidism, hyperthyroidism, diabetes, heat or cold intolerance.    FAMILY HISTORY: Negative for CAD in first-degree relatives.    SOCIAL HISTORY: Negative for alcohol, tobacco, or illicit drug use.      PHYSICAL EXAM:    General Appearance: in no acute distress  Neck:  no bruits  Lungs:  Normal expansion.  Clear to auscultation.  No rales, rhonchi, or wheezing.  Heart:  Heart sounds are normal.  Regular rate and rhythm without murmur, gallop or rub.  Abdomen:  Soft, non-tender.  Extremities: Extremities warm to touch, pink, with no edema.  Neuro/musculosketal:  Unremarkable.    LABS:    Recent Labs     10/07/24  1751 10/08/24  0526 10/09/24  0520    139 136   K 4.3 4.0 3.7   BUN 39* 34* 34*   CREATININE 2.3* 2.1* 2.1*       Recent Labs     10/07/24  0815 10/08/24  0526 10/09/24  0520   HGB 9.4* 8.8* 8.9*    175 159       No results for input(s): \"INR\" in the last 72 hours.      IMPRESSION:    1.  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  The goal is aggressive treatment of blood pressure to decrease anginal symptoms and have stopped IV nitroglycerin infusion while increasing oral Isordil and hydralazine.  Add clopidogrel to baby aspirin daily.      2.  Stage IV chronic kidney disease-creatinine is now 2.1, down from admission of 2.3.  Continues to receive post catheterization hydration per nephrology.  No tramadol which he received after admission.    3.  Hypertension-uncontrolled now and throughout hospitalization.  Goal blood pressure under long-term basis is below 130/80.  He claims an allergy to amlodipine so cannot use this.  Have 
       glucose chewable tablet 16 g  4 tablet Oral PRN Robert Nguyen, DO        dextrose bolus 10% 125 mL  125 mL IntraVENous PRN Robert Nguyen, DO        Or    dextrose bolus 10% 250 mL  250 mL IntraVENous PRN Robert Nguyen,         glucagon injection 1 mg  1 mg SubCUTAneous PRN Robert Nguyen, DO        dextrose 10 % infusion   IntraVENous Continuous PRN Robert Nguyen,            PRN Medications  acetaminophen, sodium chloride flush, sodium chloride, acetaminophen, ondansetron, ondansetron, nitroGLYCERIN, sodium chloride, [DISCONTINUED] acetaminophen **OR** acetaminophen, glucose, dextrose bolus **OR** dextrose bolus, glucagon (rDNA), dextrose    Objective  Most Recent Recorded Vitals  BP (!) 112/52   Pulse 85   Temp 97.9 °F (36.6 °C) (Temporal)   Resp 17   Ht 1.803 m (5' 11\")   Wt 101 kg (222 lb 10.6 oz)   SpO2 95%   BMI 31.06 kg/m²   I/O last 3 completed shifts:  In: 150 [P.O.:150]  Out: 2600 [Urine:2600]  I/O this shift:  In: -   Out: 300 [Urine:300]    Physical Exam:  General: AAO to person/place/time/purpose, NAD, no labored breathing  Eyes: conjunctivae/corneas clear, sclera non icteric  Skin: color/texture/turgor normal, no rashes or lesions  Lungs: CTAB, no retractions/use of accessory muscles, no vocal fremitus, no rhonchi, no crackle, no rales  Heart: regular rate, regular rhythm, no murmur  Abdomen: soft, NT, bowel sounds normal  Extremities: atraumatic, no edema  Neurologic: cranial nerves 2-12 grossly intact, no slurred speech    Most Recent Labs  Lab Results   Component Value Date    WBC 8.1 10/10/2024    HGB 9.0 (L) 10/10/2024    HCT 28.2 (L) 10/10/2024     10/10/2024     10/10/2024    K 3.9 10/10/2024     10/10/2024    CREATININE 2.7 (H) 10/10/2024    BUN 42 (H) 10/10/2024    CO2 23 10/10/2024    GLUCOSE 168 (H) 10/10/2024    ALT 12 10/10/2024    AST 29 10/10/2024    INR 1.1 01/05/2023    APTT 50.1 (H) 10/08/2024    TSH 0.804 01/06/2023    LABA1C 7.1 
Assessment: AM-PAC Inpatient Daily Activity Raw Score: 14 /24       Initial Eval Status  Date: 10/9/2024   Treatment Status  Date:10/14/24 STG=LTG  Time frame: 10-14 days   Feeding Set up     set up Independent   Grooming Min A   Seated in chair  Min A  Sitting EOB  Set up   UB Dressing Min A   Gown seated in chair  Min A  To don gown around back sitting EOB, min vc for sequencing Set up   LB Dressing Mod A   Socks seated in chair   MAX assist -simulated for pant management  Mod A  To don socks sitting EOB using figure 4 tech  Min A    Bathing Max A  Simulated  Max A  Per last tx  Min A    Toileting Total/Dependent  [place] in place   Simulated clothing management and kassandra care in partial stand  Dependent  Due to stand balance/tolerance deficits  Min A    Bed Mobility  Rolling L/R: NT   Supine to sit: NT   Sit to supine: Mod A   Rolling L/R: NT   Supine to sit: min A   Sit to supine: Min A    Supine to sit: Stand by assist   Sit to supine: Stand by assist    Functional Transfers Sit to stand: NT   Stand to sit: NT   Stand pivot: NT   Squat pivot chair>EOB to the R with max x2  Sit to stand: Mod Ax2   Stand to sit: mod Ax2   Stand pivot: NT   Min A    Functional Mobility NT  Max Ax2  Using ww to take side steps to HOB for 2 trials      Balance Sitting: Min A       Standing: Max x2  Sitting: Min A       Standing: Max x2   flexed posture at knees  Bilat knee buckling noted Sitting: Mod I       Standing: Min A    Activity Tolerance Fair -  pain   90% on room air  Fair-  O2 88-93% on RA with activity  Fair +   Visual/  Perceptual wears glasses                   BUE  ROM/Strength/  Fine motor Coordination Hand dominance: right      BUE: ROM impaired at shoulders      Strength: grossly 3+/5      Strength: WFL      Coordination:  impaired      Pt will participate in BUE exercise with supervision to increase strength, ROM, and coordination for increased independence in functional mobility and ADLs    Safety   Fair  fair 
Units SubCUTAneous TID WC Robert Nguyen, DO   4 Units at 10/11/24 1134    insulin lispro (HUMALOG,ADMELOG) injection vial 0-4 Units  0-4 Units SubCUTAneous Nightly Robert Nguyen,         glucose chewable tablet 16 g  4 tablet Oral PRN Robert Nguyen,         dextrose bolus 10% 125 mL  125 mL IntraVENous PRN Robert Nguyen,         Or    dextrose bolus 10% 250 mL  250 mL IntraVENous PRN Robert Nguyen DO        glucagon injection 1 mg  1 mg SubCUTAneous PRN Robert Nguyen,         dextrose 10 % infusion   IntraVENous Continuous PRN Robert Nguyen,            PRN Medications  acetaminophen, sodium chloride flush, sodium chloride, acetaminophen, ondansetron, ondansetron, nitroGLYCERIN, sodium chloride, [DISCONTINUED] acetaminophen **OR** acetaminophen, glucose, dextrose bolus **OR** dextrose bolus, glucagon (rDNA), dextrose    Objective  Most Recent Recorded Vitals  BP (!) 109/54   Pulse 77   Temp 98.1 °F (36.7 °C) (Temporal)   Resp 15   Ht 1.803 m (5' 11\")   Wt 99.8 kg (220 lb)   SpO2 97%   BMI 30.68 kg/m²   I/O last 3 completed shifts:  In: 210 [P.O.:210]  Out: 2826 [Urine:2825; Stool:1]  No intake/output data recorded.    Physical Exam:  General: AAO to person/place/time/purpose, NAD, no labored breathing  Eyes: conjunctivae/corneas clear, sclera non icteric  Skin: color/texture/turgor normal, no rashes or lesions  Lungs: CTAB, no retractions/use of accessory muscles, no vocal fremitus, no rhonchi, no crackle, no rales  Heart: regular rate, regular rhythm, no murmur  Abdomen: soft, NT, bowel sounds normal  Extremities: atraumatic, no edema  Neurologic: cranial nerves 2-12 grossly intact, no slurred speech    Most Recent Labs  Lab Results   Component Value Date    WBC 8.8 10/11/2024    HGB 8.0 (L) 10/11/2024    HCT 25.2 (L) 10/11/2024     10/11/2024     10/11/2024    K 3.8 10/11/2024     10/11/2024    CREATININE 3.4 (H) 10/11/2024    BUN 49 (H) 10/11/2024    
mg  50 mg Oral BID Roel Boo MD   50 mg at 10/14/24 2126    clopidogrel (PLAVIX) tablet 75 mg  75 mg Oral Daily Roel Boo MD   75 mg at 10/14/24 0818    acetaminophen (TYLENOL) tablet 1,000 mg  1,000 mg Oral Q6H PRN Glynn Espana MD        aspirin EC tablet 81 mg  81 mg Oral Daily Abner Washington MD   81 mg at 10/14/24 0819    sodium chloride flush 0.9 % injection 5-40 mL  5-40 mL IntraVENous 2 times per day Abner Washington MD   10 mL at 10/14/24 2133    sodium chloride flush 0.9 % injection 5-40 mL  5-40 mL IntraVENous PRN Abner Washington MD        0.9 % sodium chloride infusion   IntraVENous PRN Abner Washington MD        acetaminophen (TYLENOL) tablet 650 mg  650 mg Oral Q4H PRN Abner Washington MD   650 mg at 10/10/24 2042    epoetin fernanda-epbx (RETACRIT) injection 5,000 Units  5,000 Units SubCUTAneous Weekly Phillip Pastor MD   5,000 Units at 10/08/24 1757    ondansetron (ZOFRAN) injection 4 mg  4 mg IntraVENous Q6H PRN Glynn Espana MD        ondansetron (ZOFRAN-ODT) disintegrating tablet 4 mg  4 mg Oral Q8H PRN Glynn Espana MD        pantoprazole (PROTONIX) injection 40 mg  40 mg IntraVENous Daily Glynn Espana MD   40 mg at 10/14/24 0819    polyethylene glycol (GLYCOLAX) packet 17 g  17 g Oral Daily Glynn Espana MD   17 g at 10/11/24 0927    senna (SENOKOT) tablet 8.6 mg  1 tablet Oral BID Glynn Espana MD   8.6 mg at 10/14/24 2133    [Held by provider] sodium bicarbonate tablet 650 mg  650 mg Oral BID Glynn Espana MD   650 mg at 10/07/24 2119    nitroGLYCERIN (NITROSTAT) SL tablet 0.4 mg  0.4 mg SubLINGual Q5 Min PRN Alexandro Zepeda MD   0.4 mg at 10/06/24 1331    calcitRIOL (ROCALTROL) capsule 0.25 mcg  0.25 mcg Oral Every Other Day Robert Nguyen, DO   0.25 mcg at 10/13/24 0943    insulin glargine (LANTUS) injection vial 22 Units  22 Units SubCUTAneous Nightly Robert Nguyen,    22 Units at 10/14/24 2125    0.9 % sodium chloride infusion   IntraVENous PRN 
week  if warranted.      CPT code:  99277  bedside swallow eval      [x]The admitting diagnosis and active problem list, have been reviewed prior to initiation of this evaluation.        ACTIVE PROBLEM LIST:   Patient Active Problem List   Diagnosis    Syncope and collapse    Vasovagal syncope    Anemia of chronic renal failure    Anemia of chronic renal failure, stage 4 (severe) ([facility])    NSTEMI (non-ST elevated myocardial infarction) ([facility])               
  AST 33 29 24   ALT 12 12 11   BILITOT 0.4 0.5 0.4   ALKPHOS 65 69 60      Troponin: No results for input(s): \"TROPONINI\" in the last 72 hours.   BNP: No results for input(s): \"BNP\" in the last 72 hours.   Lipids:   No results for input(s): \"CHOL\", \"HDL\" in the last 72 hours.    Invalid input(s): \"LDLCALCU\"     ABGs: No results found for: \"PHART\", \"PO2ART\", \"CBI5VOM\"   INR: No results for input(s): \"INR\" in the last 72 hours.      Assessment and plan  Chronic kidney disease stage IV with ZION  CKD 2/2 diabetic nephropathy and chronic BPH with on going ZION likely 2/2 Contrast exposure on 10/8/24  Baseline Cr ~2-2.2 mg/dL. Renal function  continues to uptrend and reached a peak of 3.4 mg/dL today  Plan   Renal function and electrolytes daily   Agree with IVF at this time.  Continue  ml/hr. Consider stopping if O2 requirements increase  With regards to LHC: Recommend holding off for now if able given declining renal function  Continue to monitor I/Os   Bladder scan showed 400 ml  Will place harrison  Will get renal US  Avoid nephrotoxic medications including NSAID, iodinated contrast    Electrolytes  Potassium normal  Sodium at acceptable level   No changes    Mineral bone disease  Ionized Ca 1.17  Phosphorus level WNL  Would monitor phos and ionized Ca level every day   Continue calcitriol 0.25 mcg     Acid base  Bicarbonate WNL   Chloride level acceptable   Can continue to hold po bicarbonate       Hemoglobin/ Anemia in CKD   Hg 8 g/dL  Recent iron studies appear adequate    Transfuse as per primary team  Will give 5000 U of retacrit weekly     Blood Pressure/ HTN   BP normotensive over the last 24 hours  Currently on  Toprol 25 mg daily and hydralazine  75 mg TID   Would add procardia 60 mg if BP is still elevated     Care reviewed with the patient's family as available.    Phillip Pastor MD       
  Continue to monitor I/Os   Avoid nephrotoxic medications including NSAID, iodinated contrast    Electrolytes  Potassium normal  Sodium at acceptable level   No changes    Mineral bone disease  Corrected Calcium WNL  Phosphorus level slightly low  Would monitor phos level every day   Continue calcitriol 0.25 mcg     Acid base  Bicarbonate WNL   Chloride level acceptable   Can stop po bicarbonate   Can continue IVF as above     Hemoglobin/ Anemia in CKD   Hg <10 g/dL  Recent iron studies appear adequate    Transfuse as per primary team  Will give 5000 U of retacrit weekly      Blood Pressure/ HTN   BP borderline over the last 24 hours  Currently on  Toprol 25 mg daily, hydralazine 25 mg TID   No changes for now.    Care reviewed with the patient's family as available.    Phillip Pastor MD       
8.2-below target  HUGO  Transfuse for hemoglobin<7  Monitor H&H    4.  Secondary hyperparathyroidism of renal origin  .3 and vitamin D 23.6 on 10/8  Phosphorus 3.4 today  On calcitriol  Monitor labs    5.  Hypertension with CKD  BP goal<130/80  BP above goal  Monitor BPs on current regimen    Marguerite Bang, APRN - CNP  Pt seen and examined on rounds with marguerite hussein  Agree with above  Card following  For voiding trial kimi Cantu MD       
[id number]  8 1   [facility] [id number]       Neuro Re-Ed [id number]       Non-Billable Time          Evaluation Time includes thorough review of current medical information, gathering information on past medical history/social history and prior level of function, completion of standardized testing/informal observation of tasks, assessment of data and education on plan of care and goals.          Nj Jiménez OTR/L [id number]    
\"CHOL\", \"HDL\" in the last 72 hours.    Invalid input(s): \"LDLCALCU\"     ABGs: No results found for: \"PHART\", \"PO2ART\", \"HPI4UUF\"   INR: No results for input(s): \"INR\" in the last 72 hours.      Assessment and plan  Chronic kidney disease stage IV with ZION  CKD 2/2 diabetic nephropathy and chronic BPH with on going ZION likely 2/2 Contrast exposure on 10/8/24  Baseline Cr ~2-2.2 mg/dL. Renal function  elevated today at 2.7 mg/dL  Plan   Renal function and electrolytes daily   Agree with IVF at this time.  Continue  ml/hr  With regards to LHC, He continues to have risk of ZION with contrast but it appears to me that the risk of ACS/ CAD and its complications are more significant. Can proceed with cath if deemed necessary. Ideally we could wait til tomorrow for cath but it may not be a option.   Continue to monitor I/Os   Avoid nephrotoxic medications including NSAID, iodinated contrast    Electrolytes  Potassium normal  Sodium at acceptable level   No changes    Mineral bone disease  Ionized Ca 1.17  Phosphorus level WNL  Would monitor phos and ionized Ca level every day   Continue calcitriol 0.25 mcg     Acid base  Bicarbonate WNL   Chloride level acceptable   Can continue to hold po bicarbonate       Hemoglobin/ Anemia in CKD   Hg <10 g/dL  Recent iron studies appear adequate    Transfuse as per primary team  Will give 5000 U of retacrit weekly      Blood Pressure/ HTN   BP improved today and near goal over the last 24 hours  Currently on  Toprol 25 mg daily and Hydralazine  75 mg TID   Would add procardia 60 mg if BP is still elevated     Care reviewed with the patient's family as available.    Phillip Pastor MD       
to have cath due to risk of kidney injury / HD     Klebsiella UTI Pan sensitive   UA+  Ucx reviewed   Continue Rocephin     ZION on CKD  Baseline Cr around 2.2  Nephrology on board   CT abdomen pelvis to rule out obstructive process     Macrocytic anemia   B12 375 folate 12.1  Iron panel Ferritin 77, Iron 73, TIBC 219  Monitor CBC     DM  A1C - 7.1   ISS  Lantus   Titrate as needed     Emesis, resolved   KUB -- no evidence of obstruction or ileus   Bowel regimen, ordered   Anti emetics as needed     Code Status Full   Discharge plan:?Home when cleared by cardiology / nephrology plan needs finalized     Electronically signed by Glynn Espana MD on 10/13/2024 at 9:08 AM    I can be reached through [employer].

## 2024-10-15 NOTE — PLAN OF CARE
Problem: Chronic Conditions and Co-morbidities  Goal: Patient's chronic conditions and co-morbidity symptoms are monitored and maintained or improved  10/11/2024 1043 by Mae Mauro RN  Outcome: Progressing     Problem: Discharge Planning  Goal: Discharge to home or other facility with appropriate resources  10/11/2024 1043 by Mae Mauro RN  Outcome: Progressing     Problem: Pain  Goal: Verbalizes/displays adequate comfort level or baseline comfort level  10/11/2024 1043 by Mae Mauro RN  Outcome: Progressing     Problem: Skin/Tissue Integrity  Goal: Absence of new skin breakdown  Description: 1.  Monitor for areas of redness and/or skin breakdown  2.  Assess vascular access sites hourly  3.  Every 4-6 hours minimum:  Change oxygen saturation probe site  4.  Every 4-6 hours:  If on nasal continuous positive airway pressure, respiratory therapy assess nares and determine need for appliance change or resting period.  Outcome: Progressing     Problem: Safety - Adult  Goal: Free from fall injury  Outcome: Progressing     Problem: ABCDS Injury Assessment  Goal: Absence of physical injury  Outcome: Progressing     Problem: Cardiovascular - Adult  Goal: Maintains optimal cardiac output and hemodynamic stability  Outcome: Progressing     Problem: Cardiovascular - Adult  Goal: Absence of cardiac dysrhythmias or at baseline  Outcome: Progressing     Problem: Skin/Tissue Integrity - Adult  Goal: Skin integrity remains intact  Outcome: Progressing     Problem: Musculoskeletal - Adult  Goal: Return mobility to safest level of function  Outcome: Progressing     Problem: Metabolic/Fluid and Electrolytes - Adult  Goal: Electrolytes maintained within normal limits  Outcome: Progressing     Problem: Metabolic/Fluid and Electrolytes - Adult  Goal: Hemodynamic stability and optimal renal function maintained  Outcome: Progressing     Problem: Metabolic/Fluid and Electrolytes - Adult  Goal: Glucose maintained within 
  Problem: Chronic Conditions and Co-morbidities  Goal: Patient's chronic conditions and co-morbidity symptoms are monitored and maintained or improved  10/12/2024 1225 by Alissa Reaves RN  Outcome: Progressing  Flowsheets (Taken 10/12/2024 0755)  Care Plan - Patient's Chronic Conditions and Co-Morbidity Symptoms are Monitored and Maintained or Improved: Monitor and assess patient's chronic conditions and comorbid symptoms for stability, deterioration, or improvement  10/12/2024 0055 by Beth Edwards RN  Outcome: Progressing  Flowsheets (Taken 10/11/2024 2130)  Care Plan - Patient's Chronic Conditions and Co-Morbidity Symptoms are Monitored and Maintained or Improved: Monitor and assess patient's chronic conditions and comorbid symptoms for stability, deterioration, or improvement     Problem: Discharge Planning  Goal: Discharge to home or other facility with appropriate resources  10/12/2024 1225 by Alissa Reaves RN  Outcome: Progressing  Flowsheets (Taken 10/12/2024 0755)  Discharge to home or other facility with appropriate resources: Identify barriers to discharge with patient and caregiver  10/12/2024 0055 by Beth Edwards RN  Outcome: Progressing  Flowsheets (Taken 10/11/2024 2130)  Discharge to home or other facility with appropriate resources:   Identify barriers to discharge with patient and caregiver   Arrange for needed discharge resources and transportation as appropriate     Problem: Pain  Goal: Verbalizes/displays adequate comfort level or baseline comfort level  10/12/2024 1225 by Alissa Reaves RN  Outcome: Progressing  10/12/2024 0055 by Beth Edwards RN  Outcome: Progressing     
  Problem: Chronic Conditions and Co-morbidities  Goal: Patient's chronic conditions and co-morbidity symptoms are monitored and maintained or improved  10/12/2024 2101 by Beth Edwards RN  Outcome: Progressing  Flowsheets (Taken 10/12/2024 1931)  Care Plan - Patient's Chronic Conditions and Co-Morbidity Symptoms are Monitored and Maintained or Improved: Monitor and assess patient's chronic conditions and comorbid symptoms for stability, deterioration, or improvement     Problem: Discharge Planning  Goal: Discharge to home or other facility with appropriate resources  10/12/2024 2101 by Beth Edwards RN  Outcome: Progressing  Flowsheets (Taken 10/12/2024 1931)  Discharge to home or other facility with appropriate resources:   Identify barriers to discharge with patient and caregiver   Arrange for needed discharge resources and transportation as appropriate     Problem: Pain  Goal: Verbalizes/displays adequate comfort level or baseline comfort level  10/12/2024 2101 by Beth Edwards RN  Outcome: Progressing  Flowsheets  Taken 10/12/2024 1923 by Beth Edwards RN  Verbalizes/displays adequate comfort level or baseline comfort level:   Encourage patient to monitor pain and request assistance   Assess pain using appropriate pain scale  Taken 10/12/2024 1502 by Alissa Reaves RN  Verbalizes/displays adequate comfort level or baseline comfort level: Encourage patient to monitor pain and request assistance     Problem: Skin/Tissue Integrity  Goal: Absence of new skin breakdown  Description: 1.  Monitor for areas of redness and/or skin breakdown  2.  Assess vascular access sites hourly  3.  Every 4-6 hours minimum:  Change oxygen saturation probe site  4.  Every 4-6 hours:  If on nasal continuous positive airway pressure, respiratory therapy assess nares and determine need for appliance change or resting period.  10/12/2024 2101 by Beth Edwards RN  Outcome: Progressing     Problem: ABCDS Injury 
  Problem: Chronic Conditions and Co-morbidities  Goal: Patient's chronic conditions and co-morbidity symptoms are monitored and maintained or improved  10/13/2024 2219 by Beth Edwards RN  Outcome: Progressing     Problem: Discharge Planning  Goal: Discharge to home or other facility with appropriate resources  10/13/2024 2219 by Beth Edwards RN  Outcome: Progressing     Problem: Pain  Goal: Verbalizes/displays adequate comfort level or baseline comfort level  10/13/2024 2219 by Beth Edwards RN  Outcome: Progressing  Flowsheets (Taken 10/13/2024 1604 by Alissa Reaves RN)  Verbalizes/displays adequate comfort level or baseline comfort level: Encourage patient to monitor pain and request assistance     Problem: Skin/Tissue Integrity  Goal: Absence of new skin breakdown  Description: 1.  Monitor for areas of redness and/or skin breakdown  2.  Assess vascular access sites hourly  3.  Every 4-6 hours minimum:  Change oxygen saturation probe site  4.  Every 4-6 hours:  If on nasal continuous positive airway pressure, respiratory therapy assess nares and determine need for appliance change or resting period.  Outcome: Progressing     Problem: Safety - Adult  Goal: Free from fall injury  10/13/2024 2219 by Beth Edwards RN  Outcome: Progressing     Problem: ABCDS Injury Assessment  Goal: Absence of physical injury  Outcome: Progressing  Flowsheets (Taken 10/13/2024 1123 by Alissa Reaves RN)  Absence of Physical Injury: Implement safety measures based on patient assessment     Problem: Cardiovascular - Adult  Goal: Maintains optimal cardiac output and hemodynamic stability  Outcome: Progressing     Problem: Cardiovascular - Adult  Goal: Absence of cardiac dysrhythmias or at baseline  Outcome: Progressing     Problem: Skin/Tissue Integrity - Adult  Goal: Skin integrity remains intact  Outcome: Progressing  Flowsheets  Taken 10/13/2024 2218 by Beth Edwards RN  Skin Integrity Remains 
  Problem: Chronic Conditions and Co-morbidities  Goal: Patient's chronic conditions and co-morbidity symptoms are monitored and maintained or improved  10/6/2024 2144 by Tavia Roberson RN  Outcome: Progressing  Flowsheets (Taken 10/6/2024 1932)  Care Plan - Patient's Chronic Conditions and Co-Morbidity Symptoms are Monitored and Maintained or Improved: Monitor and assess patient's chronic conditions and comorbid symptoms for stability, deterioration, or improvement     Problem: Discharge Planning  Goal: Discharge to home or other facility with appropriate resources  10/6/2024 2144 by Tavia Roberson RN  Outcome: Progressing  Flowsheets (Taken 10/6/2024 1932)  Discharge to home or other facility with appropriate resources: Identify barriers to discharge with patient and caregiver     Problem: Pain  Goal: Verbalizes/displays adequate comfort level or baseline comfort level  10/6/2024 2144 by Tavia Roberson RN  Outcome: Progressing  Flowsheets (Taken 10/6/2024 1922)  Verbalizes/displays adequate comfort level or baseline comfort level: Encourage patient to monitor pain and request assistance     Problem: Skin/Tissue Integrity  Goal: Absence of new skin breakdown  10/6/2024 2144 by Tavia Roberson RN  Outcome: Progressing     Problem: Safety - Adult  Goal: Free from fall injury  10/6/2024 2144 by Tavia Roberson RN  Outcome: Progressing     Problem: ABCDS Injury Assessment  Goal: Absence of physical injury  10/6/2024 2144 by Tavia Roberson RN  Outcome: Progressing     Problem: Cardiovascular - Adult  Goal: Maintains optimal cardiac output and hemodynamic stability  10/6/2024 2144 by Tavia Roberson RN  Outcome: Progressing     Problem: Cardiovascular - Adult  Goal: Absence of cardiac dysrhythmias or at baseline  10/6/2024 2144 by Tavia Roberson RN  Outcome: Progressing     Problem: Skin/Tissue Integrity - Adult  Goal: Skin integrity remains intact  10/6/2024 2144 by Tavia Roberson RN  Outcome: Progressing     Problem: 
  Problem: Chronic Conditions and Co-morbidities  Goal: Patient's chronic conditions and co-morbidity symptoms are monitored and maintained or improved  Outcome: Progressing     Problem: Discharge Planning  Goal: Discharge to home or other facility with appropriate resources  Outcome: Progressing     Problem: Pain  Goal: Verbalizes/displays adequate comfort level or baseline comfort level  Outcome: Progressing     Problem: Skin/Tissue Integrity  Goal: Absence of new skin breakdown  Description: 1.  Monitor for areas of redness and/or skin breakdown  2.  Assess vascular access sites hourly  3.  Every 4-6 hours minimum:  Change oxygen saturation probe site  4.  Every 4-6 hours:  If on nasal continuous positive airway pressure, respiratory therapy assess nares and determine need for appliance change or resting period.  Outcome: Progressing     Problem: Safety - Adult  Goal: Free from fall injury  Outcome: Progressing     Problem: ABCDS Injury Assessment  Goal: Absence of physical injury  Outcome: Progressing     Problem: Cardiovascular - Adult  Goal: Maintains optimal cardiac output and hemodynamic stability  Outcome: Progressing  Goal: Absence of cardiac dysrhythmias or at baseline  Outcome: Progressing     Problem: Skin/Tissue Integrity - Adult  Goal: Skin integrity remains intact  Outcome: Progressing     Problem: Musculoskeletal - Adult  Goal: Return mobility to safest level of function  Outcome: Progressing     Problem: Metabolic/Fluid and Electrolytes - Adult  Goal: Electrolytes maintained within normal limits  Outcome: Progressing  Flowsheets (Taken 10/6/2024 1813)  Electrolytes maintained within normal limits: Monitor labs and assess patient for signs and symptoms of electrolyte imbalances  Goal: Hemodynamic stability and optimal renal function maintained  Outcome: Progressing  Flowsheets (Taken 10/6/2024 1813)  Hemodynamic stability and optimal renal function maintained:   Monitor labs and assess for signs and 
  Problem: Chronic Conditions and Co-morbidities  Goal: Patient's chronic conditions and co-morbidity symptoms are monitored and maintained or improved  Outcome: Progressing  Flowsheets (Taken 10/10/2024 0806)  Care Plan - Patient's Chronic Conditions and Co-Morbidity Symptoms are Monitored and Maintained or Improved: Monitor and assess patient's chronic conditions and comorbid symptoms for stability, deterioration, or improvement     Problem: Discharge Planning  Goal: Discharge to home or other facility with appropriate resources  Outcome: Progressing  Flowsheets (Taken 10/10/2024 0806)  Discharge to home or other facility with appropriate resources: Identify barriers to discharge with patient and caregiver     Problem: Pain  Goal: Verbalizes/displays adequate comfort level or baseline comfort level  Outcome: Progressing     Problem: Skin/Tissue Integrity  Goal: Absence of new skin breakdown  Description: 1.  Monitor for areas of redness and/or skin breakdown  2.  Assess vascular access sites hourly  3.  Every 4-6 hours minimum:  Change oxygen saturation probe site  4.  Every 4-6 hours:  If on nasal continuous positive airway pressure, respiratory therapy assess nares and determine need for appliance change or resting period.  Outcome: Progressing     
  Problem: Chronic Conditions and Co-morbidities  Goal: Patient's chronic conditions and co-morbidity symptoms are monitored and maintained or improved  Outcome: Progressing  Flowsheets (Taken 10/10/2024 2015)  Care Plan - Patient's Chronic Conditions and Co-Morbidity Symptoms are Monitored and Maintained or Improved:   Monitor and assess patient's chronic conditions and comorbid symptoms for stability, deterioration, or improvement   Collaborate with multidisciplinary team to address chronic and comorbid conditions and prevent exacerbation or deterioration   Update acute care plan with appropriate goals if chronic or comorbid symptoms are exacerbated and prevent overall improvement and discharge     Problem: Discharge Planning  Goal: Discharge to home or other facility with appropriate resources  Outcome: Progressing  Flowsheets (Taken 10/10/2024 2015)  Discharge to home or other facility with appropriate resources:   Identify barriers to discharge with patient and caregiver   Arrange for needed discharge resources and transportation as appropriate   Identify discharge learning needs (meds, wound care, etc)     Problem: Pain  Goal: Verbalizes/displays adequate comfort level or baseline comfort level  Outcome: Progressing  Flowsheets (Taken 10/10/2024 1519 by Alissa Reaves, RN)  Verbalizes/displays adequate comfort level or baseline comfort level: Encourage patient to monitor pain and request assistance     
  Problem: Chronic Conditions and Co-morbidities  Goal: Patient's chronic conditions and co-morbidity symptoms are monitored and maintained or improved  Outcome: Progressing  Flowsheets (Taken 10/11/2024 2130)  Care Plan - Patient's Chronic Conditions and Co-Morbidity Symptoms are Monitored and Maintained or Improved: Monitor and assess patient's chronic conditions and comorbid symptoms for stability, deterioration, or improvement     Problem: Discharge Planning  Goal: Discharge to home or other facility with appropriate resources  Outcome: Progressing  Flowsheets (Taken 10/11/2024 2130)  Discharge to home or other facility with appropriate resources:   Identify barriers to discharge with patient and caregiver   Arrange for needed discharge resources and transportation as appropriate     Problem: Pain  Goal: Verbalizes/displays adequate comfort level or baseline comfort level  Outcome: Progressing     Problem: Skin/Tissue Integrity  Goal: Absence of new skin breakdown  Description: 1.  Monitor for areas of redness and/or skin breakdown  2.  Assess vascular access sites hourly  3.  Every 4-6 hours minimum:  Change oxygen saturation probe site  4.  Every 4-6 hours:  If on nasal continuous positive airway pressure, respiratory therapy assess nares and determine need for appliance change or resting period.  Outcome: Progressing     Problem: Safety - Adult  Goal: Free from fall injury  Outcome: Progressing     Problem: ABCDS Injury Assessment  Goal: Absence of physical injury  Outcome: Progressing     Problem: Cardiovascular - Adult  Goal: Maintains optimal cardiac output and hemodynamic stability  Outcome: Progressing  Flowsheets (Taken 10/11/2024 2130)  Maintains optimal cardiac output and hemodynamic stability: Monitor blood pressure and heart rate     Problem: Cardiovascular - Adult  Goal: Absence of cardiac dysrhythmias or at baseline  Outcome: Progressing  Flowsheets (Taken 10/11/2024 2130)  Absence of cardiac 
  Problem: Chronic Conditions and Co-morbidities  Goal: Patient's chronic conditions and co-morbidity symptoms are monitored and maintained or improved  Outcome: Progressing  Flowsheets (Taken 10/13/2024 0742)  Care Plan - Patient's Chronic Conditions and Co-Morbidity Symptoms are Monitored and Maintained or Improved: Monitor and assess patient's chronic conditions and comorbid symptoms for stability, deterioration, or improvement     Problem: Discharge Planning  Goal: Discharge to home or other facility with appropriate resources  Outcome: Progressing  Flowsheets (Taken 10/13/2024 0742)  Discharge to home or other facility with appropriate resources: Identify barriers to discharge with patient and caregiver     Problem: Pain  Goal: Verbalizes/displays adequate comfort level or baseline comfort level  Outcome: Progressing  Flowsheets (Taken 10/12/2024 2418 by Beth Edwards RN)  Verbalizes/displays adequate comfort level or baseline comfort level:   Encourage patient to monitor pain and request assistance   Assess pain using appropriate pain scale     
  Problem: Chronic Conditions and Co-morbidities  Goal: Patient's chronic conditions and co-morbidity symptoms are monitored and maintained or improved  Outcome: Progressing  Flowsheets (Taken 10/7/2024 1925)  Care Plan - Patient's Chronic Conditions and Co-Morbidity Symptoms are Monitored and Maintained or Improved:   Monitor and assess patient's chronic conditions and comorbid symptoms for stability, deterioration, or improvement   Collaborate with multidisciplinary team to address chronic and comorbid conditions and prevent exacerbation or deterioration   Update acute care plan with appropriate goals if chronic or comorbid symptoms are exacerbated and prevent overall improvement and discharge     Problem: Discharge Planning  Goal: Discharge to home or other facility with appropriate resources  Outcome: Progressing  Flowsheets (Taken 10/7/2024 1925)  Discharge to home or other facility with appropriate resources:   Identify barriers to discharge with patient and caregiver   Arrange for needed discharge resources and transportation as appropriate   Identify discharge learning needs (meds, wound care, etc)     Problem: Cardiovascular - Adult  Goal: Maintains optimal cardiac output and hemodynamic stability  Outcome: Progressing  Flowsheets (Taken 10/7/2024 1925)  Maintains optimal cardiac output and hemodynamic stability:   Monitor blood pressure and heart rate   Monitor urine output and notify Licensed Independent Practitioner for values outside of normal range   Assess for signs of decreased cardiac output   Administer fluid and/or volume expanders as ordered   Administer vasoactive medications as ordered     
  Problem: Safety - Adult  Goal: Free from fall injury  Outcome: Progressing     
Will switch fluid to 75 meq bicarb with 1/2 NS at 100 ml/hr given bicarb level of 18 and presumed metabolic acidosis. Goal of 100 ml/hr for at least 6 hr and ideally 12 hours both before and after cath. Full consult to follow.     Phillip Pastor MD    
0959 by Agnes Fox RN  Outcome: Progressing  10/13/2024 2219 by Beth Edwards RN  Outcome: Progressing     Problem: ABCDS Injury Assessment  Goal: Absence of physical injury  10/14/2024 0959 by Agnes Fox RN  Outcome: Progressing  10/13/2024 2219 by Beth Edwards RN  Outcome: Progressing  Flowsheets (Taken 10/13/2024 1123 by Alissa Reaves RN)  Absence of Physical Injury: Implement safety measures based on patient assessment     Problem: Cardiovascular - Adult  Goal: Maintains optimal cardiac output and hemodynamic stability  10/14/2024 0959 by Agnes Fox RN  Outcome: Progressing  10/13/2024 2219 by Beth Edwards RN  Outcome: Progressing  Flowsheets (Taken 10/13/2024 1948)  Maintains optimal cardiac output and hemodynamic stability:   Monitor blood pressure and heart rate   Monitor urine output and notify Licensed Independent Practitioner for values outside of normal range  Goal: Absence of cardiac dysrhythmias or at baseline  10/14/2024 0959 by Agnes Fox RN  Outcome: Progressing  10/13/2024 2219 by Beth Edwards RN  Outcome: Progressing  Flowsheets (Taken 10/13/2024 1948)  Absence of cardiac dysrhythmias or at baseline:   Monitor cardiac rate and rhythm   Assess for signs of decreased cardiac output   Administer antiarrhythmia medication and electrolyte replacement as ordered     Problem: Skin/Tissue Integrity - Adult  Goal: Skin integrity remains intact  10/14/2024 0959 by Agnes Fox RN  Outcome: Progressing  10/13/2024 2219 by Beth Edwards RN  Outcome: Progressing  Flowsheets  Taken 10/13/2024 2218 by Beth Edwards RN  Skin Integrity Remains Intact: Monitor for areas of redness and/or skin breakdown  Taken 10/13/2024 1123 by Alissa Reaves RN  Skin Integrity Remains Intact: Monitor for areas of redness and/or skin breakdown     Problem: Musculoskeletal - Adult  Goal: Return mobility to safest level of function  10/14/2024 0959 by Ruddy 
blood pressure and heart rate   Monitor urine output and notify Licensed Independent Practitioner for values outside of normal range   Assess for signs of decreased cardiac output   Administer fluid and/or volume expanders as ordered   Administer vasoactive medications as ordered     Problem: Cardiovascular - Adult  Goal: Absence of cardiac dysrhythmias or at baseline  Recent Flowsheet Documentation  Taken 10/14/2024 2030 by Shanelle Costa RN  Absence of cardiac dysrhythmias or at baseline:   Monitor cardiac rate and rhythm   Assess for signs of decreased cardiac output   Administer antiarrhythmia medication and electrolyte replacement as ordered

## 2024-10-15 NOTE — CARE COORDINATION
10/15/24, Discharge Acknowledged.  [facility] for a 1 pm  for patient to go to [facility].  Precert is not needed and bed is available for patient.  Those informed of  time is:  Nursing, patient and son in room and Hola from [facility].    Son to let patient's wife know about  time.  PAS/RR, ambulance form, face sheet and envelope is on soft chart.  SW to follow.      Cathy Pineda, CHE  [facility] Case Management  [phone]

## 2024-10-15 NOTE — DISCHARGE SUMMARY
(PLAVIX) 75 MG tablet Take 1 tablet by mouth daily             INTERNAL MEDICINE FOLLOW UP/INSTRUCTIONS:  Follow-up with primary care physician within 1 week of discharge from hospital  Please review changes to pre-hospital admission medications and prescriptions for new medications upon discharge from the hospital with PCP  Please review results of labs and imaging studies with PCP  Follow-up with consultants as directed by them   If recurrence or worsening of symptoms please call primary care physician or return to the ER immediately  Diet: ADULT DIET; Dysphagia - Soft and Bite Sized; 4 carb choices (60 gm/meal); Low Fat/Low Chol/High Fiber/2 gm Na  ADULT ORAL NUTRITION SUPPLEMENT; Lunch; Frozen Oral Supplement  ADULT ORAL NUTRITION SUPPLEMENT; Dinner; Fortified Gelatin Oral Supplement    Preparing for this patient's discharge, including paperwork, orders, instructions, and meeting with patient did required >35 minutes.    Electronically signed by Glynn Espana MD on 10/15/2024 at 12:59 PM

## 2024-10-24 NOTE — PROGRESS NOTES
Received call from Mary at [facility] [place], states that Sunny is in skilled care at this time and they are giving him the aranesp there, will cancel appt for today.

## 2024-10-25 ENCOUNTER — HOSPITAL ENCOUNTER (OUTPATIENT)
Dept: INFUSION THERAPY | Age: 84
Setting detail: INFUSION SERIES
Discharge: HOME | End: 2024-10-25

## 2024-11-14 NOTE — PROGRESS NOTES
Called and spoke with wife she verbalizes he is still at [facility] I asked her to call us when he discharges we will need new orders for his aranesp she verbalizes understanding

## 2024-11-19 ENCOUNTER — HOSPITAL ENCOUNTER (INPATIENT)
Age: 84
LOS: 3 days | Discharge: NURSING, REHAB OR LONG-TERM CARE | End: 2024-11-22
Attending: EMERGENCY MEDICINE | Admitting: STUDENT IN AN ORGANIZED HEALTH CARE EDUCATION/TRAINING PROGRAM
Payer: MEDICARE

## 2024-11-19 ENCOUNTER — APPOINTMENT (OUTPATIENT)
Dept: GENERAL RADIOLOGY | Age: 84
End: 2024-11-19
Payer: MEDICARE

## 2024-11-19 DIAGNOSIS — N17.9 ACUTE RENAL FAILURE SUPERIMPOSED ON STAGE 4 CHRONIC KIDNEY DISEASE, UNSPECIFIED ACUTE RENAL FAILURE TYPE (HCC): Primary | ICD-10-CM

## 2024-11-19 DIAGNOSIS — N18.4 ACUTE RENAL FAILURE SUPERIMPOSED ON STAGE 4 CHRONIC KIDNEY DISEASE, UNSPECIFIED ACUTE RENAL FAILURE TYPE (HCC): Primary | ICD-10-CM

## 2024-11-19 DIAGNOSIS — R53.83 LETHARGY: ICD-10-CM

## 2024-11-19 PROBLEM — N18.9 ACUTE KIDNEY INJURY SUPERIMPOSED ON CHRONIC KIDNEY DISEASE (HCC): Status: ACTIVE | Noted: 2024-11-19

## 2024-11-19 LAB
ALBUMIN SERPL-MCNC: 3.6 G/DL (ref 3.5–5.2)
ALP SERPL-CCNC: 66 U/L (ref 40–129)
ALT SERPL-CCNC: 7 U/L (ref 0–40)
ANION GAP SERPL CALCULATED.3IONS-SCNC: 9 MMOL/L (ref 7–16)
AST SERPL-CCNC: 12 U/L (ref 0–39)
BASOPHILS # BLD: 0.02 K/UL (ref 0–0.2)
BASOPHILS NFR BLD: 0 % (ref 0–2)
BILIRUB DIRECT SERPL-MCNC: <0.2 MG/DL (ref 0–0.3)
BILIRUB INDIRECT SERPL-MCNC: ABNORMAL MG/DL (ref 0–1)
BILIRUB SERPL-MCNC: 0.3 MG/DL (ref 0–1.2)
BILIRUB UR QL STRIP: NEGATIVE
BUN SERPL-MCNC: 54 MG/DL (ref 6–23)
CALCIUM SERPL-MCNC: 8.5 MG/DL (ref 8.6–10.2)
CHLORIDE SERPL-SCNC: 100 MMOL/L (ref 98–107)
CLARITY UR: CLEAR
CO2 SERPL-SCNC: 26 MMOL/L (ref 22–29)
COLOR UR: YELLOW
CREAT SERPL-MCNC: 3.7 MG/DL (ref 0.7–1.2)
EOSINOPHIL # BLD: 0.07 K/UL (ref 0.05–0.5)
EOSINOPHILS RELATIVE PERCENT: 1 % (ref 0–6)
ERYTHROCYTE [DISTWIDTH] IN BLOOD BY AUTOMATED COUNT: 14.7 % (ref 11.5–15)
GFR, ESTIMATED: 15 ML/MIN/1.73M2
GLUCOSE SERPL-MCNC: 185 MG/DL (ref 74–99)
GLUCOSE UR STRIP-MCNC: NEGATIVE MG/DL
HCT VFR BLD AUTO: 25.1 % (ref 37–54)
HGB BLD-MCNC: 7.7 G/DL (ref 12.5–16.5)
HGB UR QL STRIP.AUTO: NEGATIVE
IMM GRANULOCYTES # BLD AUTO: 0.04 K/UL (ref 0–0.58)
IMM GRANULOCYTES NFR BLD: 1 % (ref 0–5)
KETONES UR STRIP-MCNC: NEGATIVE MG/DL
LEUKOCYTE ESTERASE UR QL STRIP: NEGATIVE
LYMPHOCYTES NFR BLD: 0.78 K/UL (ref 1.5–4)
LYMPHOCYTES RELATIVE PERCENT: 11 % (ref 20–42)
MAGNESIUM SERPL-MCNC: 1.8 MG/DL (ref 1.6–2.6)
MCH RBC QN AUTO: 32.2 PG (ref 26–35)
MCHC RBC AUTO-ENTMCNC: 30.7 G/DL (ref 32–34.5)
MCV RBC AUTO: 105 FL (ref 80–99.9)
MONOCYTES NFR BLD: 1.12 K/UL (ref 0.1–0.95)
MONOCYTES NFR BLD: 16 % (ref 2–12)
NEUTROPHILS NFR BLD: 72 % (ref 43–80)
NEUTS SEG NFR BLD: 5.21 K/UL (ref 1.8–7.3)
NITRITE UR QL STRIP: NEGATIVE
PH UR STRIP: 6 [PH] (ref 5–9)
PLATELET # BLD AUTO: 231 K/UL (ref 130–450)
PMV BLD AUTO: 9.9 FL (ref 7–12)
POTASSIUM SERPL-SCNC: 5 MMOL/L (ref 3.5–5)
PROT SERPL-MCNC: 6.3 G/DL (ref 6.4–8.3)
PROT UR STRIP-MCNC: ABNORMAL MG/DL
RBC # BLD AUTO: 2.39 M/UL (ref 3.8–5.8)
RBC #/AREA URNS HPF: ABNORMAL /HPF
SODIUM SERPL-SCNC: 135 MMOL/L (ref 132–146)
SP GR UR STRIP: 1.01 (ref 1–1.03)
UROBILINOGEN UR STRIP-ACNC: 0.2 EU/DL (ref 0–1)
WBC #/AREA URNS HPF: ABNORMAL /HPF
WBC OTHER # BLD: 7.2 K/UL (ref 4.5–11.5)

## 2024-11-19 PROCEDURE — 93005 ELECTROCARDIOGRAM TRACING: CPT

## 2024-11-19 PROCEDURE — 80053 COMPREHEN METABOLIC PANEL: CPT

## 2024-11-19 PROCEDURE — 2140000000 HC CCU INTERMEDIATE R&B

## 2024-11-19 PROCEDURE — 99285 EMERGENCY DEPT VISIT HI MDM: CPT

## 2024-11-19 PROCEDURE — 85025 COMPLETE CBC W/AUTO DIFF WBC: CPT

## 2024-11-19 PROCEDURE — 96365 THER/PROPH/DIAG IV INF INIT: CPT

## 2024-11-19 PROCEDURE — 6360000002 HC RX W HCPCS

## 2024-11-19 PROCEDURE — 71045 X-RAY EXAM CHEST 1 VIEW: CPT

## 2024-11-19 PROCEDURE — 83735 ASSAY OF MAGNESIUM: CPT

## 2024-11-19 PROCEDURE — 2580000003 HC RX 258: Performed by: PHYSICIAN ASSISTANT

## 2024-11-19 PROCEDURE — 81001 URINALYSIS AUTO W/SCOPE: CPT

## 2024-11-19 PROCEDURE — 6370000000 HC RX 637 (ALT 250 FOR IP): Performed by: PHYSICIAN ASSISTANT

## 2024-11-19 PROCEDURE — 82248 BILIRUBIN DIRECT: CPT

## 2024-11-19 RX ORDER — RANOLAZINE 500 MG/1
500 TABLET, EXTENDED RELEASE ORAL 2 TIMES DAILY
Status: DISCONTINUED | OUTPATIENT
Start: 2024-11-19 | End: 2024-11-22 | Stop reason: HOSPADM

## 2024-11-19 RX ORDER — ISOSORBIDE DINITRATE 10 MG/1
60 TABLET ORAL 3 TIMES DAILY
Status: DISCONTINUED | OUTPATIENT
Start: 2024-11-19 | End: 2024-11-22 | Stop reason: HOSPADM

## 2024-11-19 RX ORDER — MAGNESIUM SULFATE IN WATER 40 MG/ML
2000 INJECTION, SOLUTION INTRAVENOUS PRN
Status: ACTIVE | OUTPATIENT
Start: 2024-11-19 | End: 2024-11-19

## 2024-11-19 RX ORDER — INSULIN GLARGINE 100 [IU]/ML
22 INJECTION, SOLUTION SUBCUTANEOUS NIGHTLY
Status: DISCONTINUED | OUTPATIENT
Start: 2024-11-19 | End: 2024-11-22 | Stop reason: HOSPADM

## 2024-11-19 RX ORDER — ONDANSETRON 4 MG/1
4 TABLET, ORALLY DISINTEGRATING ORAL EVERY 8 HOURS PRN
Status: DISCONTINUED | OUTPATIENT
Start: 2024-11-19 | End: 2024-11-22 | Stop reason: HOSPADM

## 2024-11-19 RX ORDER — SODIUM CHLORIDE 0.9 % (FLUSH) 0.9 %
10 SYRINGE (ML) INJECTION PRN
Status: DISCONTINUED | OUTPATIENT
Start: 2024-11-19 | End: 2024-11-22 | Stop reason: HOSPADM

## 2024-11-19 RX ORDER — ASPIRIN 81 MG/1
81 TABLET ORAL DAILY
Status: DISCONTINUED | OUTPATIENT
Start: 2024-11-20 | End: 2024-11-22 | Stop reason: HOSPADM

## 2024-11-19 RX ORDER — ACETAMINOPHEN 650 MG/1
650 SUPPOSITORY RECTAL EVERY 6 HOURS PRN
Status: DISCONTINUED | OUTPATIENT
Start: 2024-11-19 | End: 2024-11-22 | Stop reason: HOSPADM

## 2024-11-19 RX ORDER — FERROUS SULFATE 325(65) MG
325 TABLET ORAL 2 TIMES DAILY WITH MEALS
Status: DISCONTINUED | OUTPATIENT
Start: 2024-11-20 | End: 2024-11-22 | Stop reason: HOSPADM

## 2024-11-19 RX ORDER — ACETAMINOPHEN 325 MG/1
650 TABLET ORAL EVERY 6 HOURS PRN
Status: DISCONTINUED | OUTPATIENT
Start: 2024-11-19 | End: 2024-11-22 | Stop reason: HOSPADM

## 2024-11-19 RX ORDER — GLUCAGON 1 MG/ML
1 KIT INJECTION PRN
Status: DISCONTINUED | OUTPATIENT
Start: 2024-11-19 | End: 2024-11-22 | Stop reason: HOSPADM

## 2024-11-19 RX ORDER — DEXTROSE MONOHYDRATE 100 MG/ML
INJECTION, SOLUTION INTRAVENOUS CONTINUOUS PRN
Status: DISCONTINUED | OUTPATIENT
Start: 2024-11-19 | End: 2024-11-22 | Stop reason: HOSPADM

## 2024-11-19 RX ORDER — ONDANSETRON 2 MG/ML
4 INJECTION INTRAMUSCULAR; INTRAVENOUS EVERY 6 HOURS PRN
Status: DISCONTINUED | OUTPATIENT
Start: 2024-11-19 | End: 2024-11-22 | Stop reason: HOSPADM

## 2024-11-19 RX ORDER — HEPARIN SODIUM 5000 [USP'U]/ML
5000 INJECTION, SOLUTION INTRAVENOUS; SUBCUTANEOUS EVERY 8 HOURS SCHEDULED
Status: DISCONTINUED | OUTPATIENT
Start: 2024-11-19 | End: 2024-11-22 | Stop reason: HOSPADM

## 2024-11-19 RX ORDER — INSULIN LISPRO 100 [IU]/ML
0-8 INJECTION, SOLUTION INTRAVENOUS; SUBCUTANEOUS
Status: DISCONTINUED | OUTPATIENT
Start: 2024-11-19 | End: 2024-11-22 | Stop reason: HOSPADM

## 2024-11-19 RX ORDER — CALCITRIOL 0.25 UG/1
0.25 CAPSULE, LIQUID FILLED ORAL EVERY OTHER DAY
Status: DISCONTINUED | OUTPATIENT
Start: 2024-11-20 | End: 2024-11-22 | Stop reason: HOSPADM

## 2024-11-19 RX ORDER — SODIUM CHLORIDE 9 MG/ML
INJECTION, SOLUTION INTRAVENOUS PRN
Status: DISCONTINUED | OUTPATIENT
Start: 2024-11-19 | End: 2024-11-22 | Stop reason: HOSPADM

## 2024-11-19 RX ORDER — METOPROLOL SUCCINATE 50 MG/1
50 TABLET, EXTENDED RELEASE ORAL 2 TIMES DAILY
Status: DISCONTINUED | OUTPATIENT
Start: 2024-11-19 | End: 2024-11-22 | Stop reason: HOSPADM

## 2024-11-19 RX ORDER — CLOPIDOGREL BISULFATE 75 MG/1
75 TABLET ORAL DAILY
Status: DISCONTINUED | OUTPATIENT
Start: 2024-11-20 | End: 2024-11-22 | Stop reason: HOSPADM

## 2024-11-19 RX ORDER — SENNOSIDES A AND B 8.6 MG/1
1 TABLET, FILM COATED ORAL DAILY PRN
Status: DISCONTINUED | OUTPATIENT
Start: 2024-11-19 | End: 2024-11-22 | Stop reason: HOSPADM

## 2024-11-19 RX ORDER — ATORVASTATIN CALCIUM 20 MG/1
20 TABLET, FILM COATED ORAL NIGHTLY
Status: DISCONTINUED | OUTPATIENT
Start: 2024-11-19 | End: 2024-11-22 | Stop reason: HOSPADM

## 2024-11-19 RX ORDER — SODIUM CHLORIDE 0.9 % (FLUSH) 0.9 %
10 SYRINGE (ML) INJECTION EVERY 12 HOURS SCHEDULED
Status: DISCONTINUED | OUTPATIENT
Start: 2024-11-19 | End: 2024-11-22 | Stop reason: HOSPADM

## 2024-11-19 RX ORDER — PANTOPRAZOLE SODIUM 40 MG/1
40 TABLET, DELAYED RELEASE ORAL
Status: DISCONTINUED | OUTPATIENT
Start: 2024-11-20 | End: 2024-11-22 | Stop reason: HOSPADM

## 2024-11-19 RX ORDER — POTASSIUM CHLORIDE 7.45 MG/ML
10 INJECTION INTRAVENOUS PRN
Status: ACTIVE | OUTPATIENT
Start: 2024-11-19 | End: 2024-11-19

## 2024-11-19 RX ORDER — POTASSIUM CHLORIDE 1500 MG/1
40 TABLET, EXTENDED RELEASE ORAL PRN
Status: ACTIVE | OUTPATIENT
Start: 2024-11-19 | End: 2024-11-19

## 2024-11-19 RX ORDER — ENOXAPARIN SODIUM 100 MG/ML
40 INJECTION SUBCUTANEOUS DAILY
Status: DISCONTINUED | OUTPATIENT
Start: 2024-11-19 | End: 2024-11-19 | Stop reason: CLARIF

## 2024-11-19 RX ORDER — CALCIUM GLUCONATE 20 MG/ML
1000 INJECTION, SOLUTION INTRAVENOUS ONCE
Status: COMPLETED | OUTPATIENT
Start: 2024-11-19 | End: 2024-11-19

## 2024-11-19 RX ADMIN — RANOLAZINE 500 MG: 500 TABLET, FILM COATED, EXTENDED RELEASE ORAL at 22:53

## 2024-11-19 RX ADMIN — ISOSORBIDE DINITRATE 60 MG: 10 TABLET ORAL at 22:52

## 2024-11-19 RX ADMIN — HYDRALAZINE HYDROCHLORIDE 75 MG: 50 TABLET ORAL at 22:53

## 2024-11-19 RX ADMIN — METOPROLOL SUCCINATE 50 MG: 50 TABLET, EXTENDED RELEASE ORAL at 22:52

## 2024-11-19 RX ADMIN — CALCIUM GLUCONATE 1000 MG: 20 INJECTION, SOLUTION INTRAVENOUS at 16:37

## 2024-11-19 RX ADMIN — ATORVASTATIN CALCIUM 20 MG: 20 TABLET, FILM COATED ORAL at 22:52

## 2024-11-19 RX ADMIN — SODIUM CHLORIDE, PRESERVATIVE FREE 10 ML: 5 INJECTION INTRAVENOUS at 23:55

## 2024-11-19 ASSESSMENT — LIFESTYLE VARIABLES
HOW MANY STANDARD DRINKS CONTAINING ALCOHOL DO YOU HAVE ON A TYPICAL DAY: PATIENT DOES NOT DRINK
HOW OFTEN DO YOU HAVE A DRINK CONTAINING ALCOHOL: NEVER

## 2024-11-19 NOTE — ED PROVIDER NOTES
Department of Emergency Medicine     Written by: Ro Sorenson MD  Patient Name: Sunny Segal  Admit Date: 2024  4:05 PM  MRN: [id number]                   : 1940    HPI     Chief Complaint   Patient presents with    abnormal labs     K= 5.8, lethargic, hx renal disease, 92% on room air       Sunny Segal is a 84 y.o. male that presents to the ED due to concerns for lethargy and elevated potassium.  Most of history obtained from wife.  She says that he recently had a heart cath and had a STEMI.  He was to be following with cardiac rehab.  He had blood work obtained today.  Potassium was 5.8.  She said he recently became lethargic a few hours prior to arrival to the ER.  He has been tired and fatigued.  He has no complaints.  He has been urinating normally.  He denies any chest pain or shortness of breath.    Review of systems   Pertinent positives and negatives mentioned in the HPI/MDM.      Physical   Physical Exam  Constitutional:       General: He is not in acute distress.     Appearance: Normal appearance.   HENT:      Mouth/Throat:      Mouth: Mucous membranes are moist.   Eyes:      Extraocular Movements: Extraocular movements intact.      Pupils: Pupils are equal, round, and reactive to light.   Cardiovascular:      Rate and Rhythm: Normal rate and regular rhythm.      Pulses: Normal pulses.      Heart sounds: Normal heart sounds.   Pulmonary:      Effort: Pulmonary effort is normal. No respiratory distress.   Abdominal:      General: Abdomen is flat. Bowel sounds are normal.      Palpations: Abdomen is soft.      Tenderness: There is no abdominal tenderness.   Musculoskeletal:         General: Swelling present.      Right lower leg: No edema.      Left lower leg: No edema.   Skin:     General: Skin is warm.      Capillary Refill: Capillary refill takes less than 2 seconds.      Coloration: Skin is not jaundiced or pale.   Neurological:      Mental Status: He is alert and oriented to

## 2024-11-20 ENCOUNTER — APPOINTMENT (OUTPATIENT)
Dept: ULTRASOUND IMAGING | Age: 84
End: 2024-11-20
Payer: MEDICARE

## 2024-11-20 LAB
ALBUMIN SERPL-MCNC: 3.6 G/DL (ref 3.5–5.2)
ALP SERPL-CCNC: 63 U/L (ref 40–129)
ALT SERPL-CCNC: 6 U/L (ref 0–40)
ANION GAP SERPL CALCULATED.3IONS-SCNC: 10 MMOL/L (ref 7–16)
AST SERPL-CCNC: 12 U/L (ref 0–39)
BASOPHILS # BLD: 0.05 K/UL (ref 0–0.2)
BASOPHILS NFR BLD: 1 % (ref 0–2)
BILIRUB SERPL-MCNC: 0.4 MG/DL (ref 0–1.2)
BUN SERPL-MCNC: 50 MG/DL (ref 6–23)
CALCIUM SERPL-MCNC: 8.6 MG/DL (ref 8.6–10.2)
CHLORIDE SERPL-SCNC: 99 MMOL/L (ref 98–107)
CO2 SERPL-SCNC: 25 MMOL/L (ref 22–29)
CREAT SERPL-MCNC: 3.3 MG/DL (ref 0.7–1.2)
EKG ATRIAL RATE: 69 BPM
EKG P AXIS: 45 DEGREES
EKG P-R INTERVAL: 194 MS
EKG Q-T INTERVAL: 402 MS
EKG QRS DURATION: 98 MS
EKG QTC CALCULATION (BAZETT): 430 MS
EKG R AXIS: 10 DEGREES
EKG T AXIS: 60 DEGREES
EKG VENTRICULAR RATE: 69 BPM
EOSINOPHIL # BLD: 0.21 K/UL (ref 0.05–0.5)
EOSINOPHILS RELATIVE PERCENT: 4 % (ref 0–6)
ERYTHROCYTE [DISTWIDTH] IN BLOOD BY AUTOMATED COUNT: 14.8 % (ref 11.5–15)
GFR, ESTIMATED: 18 ML/MIN/1.73M2
GLUCOSE BLD-MCNC: 154 MG/DL (ref 74–99)
GLUCOSE SERPL-MCNC: 146 MG/DL (ref 74–99)
HBA1C MFR BLD: 6.2 % (ref 4–5.6)
HCT VFR BLD AUTO: 26.8 % (ref 37–54)
HGB BLD-MCNC: 8.4 G/DL (ref 12.5–16.5)
IMM GRANULOCYTES # BLD AUTO: <0.03 K/UL (ref 0–0.58)
IMM GRANULOCYTES NFR BLD: 0 % (ref 0–5)
LYMPHOCYTES NFR BLD: 0.86 K/UL (ref 1.5–4)
LYMPHOCYTES RELATIVE PERCENT: 14 % (ref 20–42)
MCH RBC QN AUTO: 32.3 PG (ref 26–35)
MCHC RBC AUTO-ENTMCNC: 31.3 G/DL (ref 32–34.5)
MCV RBC AUTO: 103.1 FL (ref 80–99.9)
MONOCYTES NFR BLD: 0.89 K/UL (ref 0.1–0.95)
MONOCYTES NFR BLD: 15 % (ref 2–12)
NEUTROPHILS NFR BLD: 66 % (ref 43–80)
NEUTS SEG NFR BLD: 4 K/UL (ref 1.8–7.3)
PLATELET # BLD AUTO: 231 K/UL (ref 130–450)
PMV BLD AUTO: 9.8 FL (ref 7–12)
POTASSIUM SERPL-SCNC: 5.1 MMOL/L (ref 3.5–5)
PROT SERPL-MCNC: 6.2 G/DL (ref 6.4–8.3)
RBC # BLD AUTO: 2.6 M/UL (ref 3.8–5.8)
SODIUM SERPL-SCNC: 134 MMOL/L (ref 132–146)
WBC OTHER # BLD: 6 K/UL (ref 4.5–11.5)

## 2024-11-20 PROCEDURE — 84540 ASSAY OF URINE/UREA-N: CPT

## 2024-11-20 PROCEDURE — 84156 ASSAY OF PROTEIN URINE: CPT

## 2024-11-20 PROCEDURE — 6370000000 HC RX 637 (ALT 250 FOR IP): Performed by: PHYSICIAN ASSISTANT

## 2024-11-20 PROCEDURE — 93010 ELECTROCARDIOGRAM REPORT: CPT | Performed by: INTERNAL MEDICINE

## 2024-11-20 PROCEDURE — 97165 OT EVAL LOW COMPLEX 30 MIN: CPT

## 2024-11-20 PROCEDURE — 84300 ASSAY OF URINE SODIUM: CPT

## 2024-11-20 PROCEDURE — 82947 ASSAY GLUCOSE BLOOD QUANT: CPT

## 2024-11-20 PROCEDURE — 97530 THERAPEUTIC ACTIVITIES: CPT

## 2024-11-20 PROCEDURE — 97161 PT EVAL LOW COMPLEX 20 MIN: CPT

## 2024-11-20 PROCEDURE — 83036 HEMOGLOBIN GLYCOSYLATED A1C: CPT

## 2024-11-20 PROCEDURE — 2580000003 HC RX 258: Performed by: PHYSICIAN ASSISTANT

## 2024-11-20 PROCEDURE — 6360000002 HC RX W HCPCS: Performed by: PHYSICIAN ASSISTANT

## 2024-11-20 PROCEDURE — 85025 COMPLETE CBC W/AUTO DIFF WBC: CPT

## 2024-11-20 PROCEDURE — 80053 COMPREHEN METABOLIC PANEL: CPT

## 2024-11-20 PROCEDURE — 82570 ASSAY OF URINE CREATININE: CPT

## 2024-11-20 PROCEDURE — 2140000000 HC CCU INTERMEDIATE R&B

## 2024-11-20 PROCEDURE — 76770 US EXAM ABDO BACK WALL COMP: CPT

## 2024-11-20 PROCEDURE — 6370000000 HC RX 637 (ALT 250 FOR IP)

## 2024-11-20 PROCEDURE — 97535 SELF CARE MNGMENT TRAINING: CPT

## 2024-11-20 PROCEDURE — 36415 COLL VENOUS BLD VENIPUNCTURE: CPT

## 2024-11-20 RX ADMIN — HYDRALAZINE HYDROCHLORIDE 75 MG: 50 TABLET ORAL at 11:16

## 2024-11-20 RX ADMIN — HEPARIN SODIUM 5000 UNITS: 5000 INJECTION INTRAVENOUS; SUBCUTANEOUS at 20:17

## 2024-11-20 RX ADMIN — SODIUM CHLORIDE, PRESERVATIVE FREE 10 ML: 5 INJECTION INTRAVENOUS at 11:18

## 2024-11-20 RX ADMIN — FERROUS SULFATE TAB 325 MG (65 MG ELEMENTAL FE) 325 MG: 325 (65 FE) TAB at 13:33

## 2024-11-20 RX ADMIN — PANTOPRAZOLE SODIUM 40 MG: 40 TABLET, DELAYED RELEASE ORAL at 05:46

## 2024-11-20 RX ADMIN — HYDRALAZINE HYDROCHLORIDE 75 MG: 50 TABLET ORAL at 16:16

## 2024-11-20 RX ADMIN — INSULIN LISPRO 4 UNITS: 100 INJECTION, SOLUTION INTRAVENOUS; SUBCUTANEOUS at 13:32

## 2024-11-20 RX ADMIN — SODIUM ZIRCONIUM CYCLOSILICATE 10 G: 10 POWDER, FOR SUSPENSION ORAL at 11:16

## 2024-11-20 RX ADMIN — CALCITRIOL CAPSULES 0.25 MCG 0.25 MCG: 0.25 CAPSULE ORAL at 11:17

## 2024-11-20 RX ADMIN — ISOSORBIDE DINITRATE 60 MG: 10 TABLET ORAL at 11:17

## 2024-11-20 RX ADMIN — ISOSORBIDE DINITRATE 60 MG: 10 TABLET ORAL at 16:16

## 2024-11-20 RX ADMIN — ISOSORBIDE DINITRATE 60 MG: 10 TABLET ORAL at 20:18

## 2024-11-20 RX ADMIN — METOPROLOL SUCCINATE 50 MG: 50 TABLET, EXTENDED RELEASE ORAL at 20:18

## 2024-11-20 RX ADMIN — CLOPIDOGREL BISULFATE 75 MG: 75 TABLET ORAL at 11:16

## 2024-11-20 RX ADMIN — HEPARIN SODIUM 5000 UNITS: 5000 INJECTION INTRAVENOUS; SUBCUTANEOUS at 05:47

## 2024-11-20 RX ADMIN — RANOLAZINE 500 MG: 500 TABLET, FILM COATED, EXTENDED RELEASE ORAL at 11:17

## 2024-11-20 RX ADMIN — SODIUM CHLORIDE, PRESERVATIVE FREE 10 ML: 5 INJECTION INTRAVENOUS at 21:33

## 2024-11-20 RX ADMIN — ASPIRIN 81 MG: 81 TABLET, COATED ORAL at 11:16

## 2024-11-20 RX ADMIN — RANOLAZINE 500 MG: 500 TABLET, FILM COATED, EXTENDED RELEASE ORAL at 20:19

## 2024-11-20 RX ADMIN — FERROUS SULFATE TAB 325 MG (65 MG ELEMENTAL FE) 325 MG: 325 (65 FE) TAB at 16:16

## 2024-11-20 RX ADMIN — INSULIN GLARGINE 22 UNITS: 100 INJECTION, SOLUTION SUBCUTANEOUS at 21:32

## 2024-11-20 RX ADMIN — INSULIN LISPRO 2 UNITS: 100 INJECTION, SOLUTION INTRAVENOUS; SUBCUTANEOUS at 18:38

## 2024-11-20 RX ADMIN — HYDRALAZINE HYDROCHLORIDE 75 MG: 50 TABLET ORAL at 20:17

## 2024-11-20 RX ADMIN — HEPARIN SODIUM 5000 UNITS: 5000 INJECTION INTRAVENOUS; SUBCUTANEOUS at 13:32

## 2024-11-20 RX ADMIN — ATORVASTATIN CALCIUM 20 MG: 20 TABLET, FILM COATED ORAL at 20:17

## 2024-11-20 RX ADMIN — INSULIN LISPRO 2 UNITS: 100 INJECTION, SOLUTION INTRAVENOUS; SUBCUTANEOUS at 21:32

## 2024-11-20 RX ADMIN — METOPROLOL SUCCINATE 50 MG: 50 TABLET, EXTENDED RELEASE ORAL at 11:17

## 2024-11-20 NOTE — PROGRESS NOTES
Occupational Therapy  OCCUPATIONAL THERAPY INITIAL EVALUATION    [facility]  [address] [place], [place]         Date:2024                                                  Patient Name: Sunny Segal    MRN: [id number]    : 1940    Room: [address] [place] [address]      Evaluating OT: Kimberly Latham OTR/L [id number]       Referring Provider:Armando Zaragoza PA-C     Specific Provider Orders/Date: OT eval and treat 24       Diagnosis: Lethargy / Acute Kidney Injury     Surgery: none      Pertinent Medical History:       Past Medical History:   Diagnosis Date    Anemia     CAD (coronary artery disease)     Cancer (HCC)     left parotid    Diabetes mellitus (HCC)     Diverticulosis     mild    GERD (gastroesophageal reflux disease)     Hyperlipidemia     Hypertension          Past Surgical History:   Procedure Laterality Date    BACK SURGERY      CARDIAC PROCEDURE N/A 10/8/2024    Left heart cath / [place] [facility] [place]    COLONOSCOPY      EYE SURGERY      PAROTIDECTOMY  12    left superficial      Precautions:  Fall Risk,    Assessment of current deficits    [x] Functional mobility  [x]ADLs  [x] Strength               []Cognition    [x] Functional transfers   [x] IADLs         [x] Safety Awareness   [x]Endurance    [] Fine Coordination              [x] Balance      [] Vision/perception   []Sensation     []Gross Motor Coordination  [] ROM  [] Delirium                   [] Motor Control     OT PLAN OF CARE   OT POC based on physician orders, patient diagnosis and results of clinical assessment    Frequency/Duration 1-3 days/wk for 2 weeks PRN   Specific OT Treatment Interventions to include:   * Instruction/training on adapted ADL techniques and AE recommendations to increase functional independence within precautions       * Training on energy conservation strategies, correct breathing pattern  Eval Medium 56217      OT Eval High 21300      OT Re-Eval 32199       Therapeutic Ex 71314       Therapeutic Activities 21910  8  1   ADL/Self Care 81087       Orthotic Management 37235       Manual 49385     Neuro Re-Ed 33293       Non-Billable Time          Evaluation Time additionally includes thorough review of current medical information, gathering information on past medical history/social history and prior level of function, interpretation of standardized testing/informal observation of tasks, assessment of data and development of plan of care and goals.          Kimberly Latham OTR/L [id number]

## 2024-11-20 NOTE — CARE COORDINATION
Pt is out of room for a U/S of his retroperitoneal this a.m. I called wife to complete the assessment. Pt was at [facility] where he was skilled but was to be discharge and then he came in to hospital. The wife said they are not holding a bed as she cannot afford over $350 a day. She said that therapies came to the home with pt while at the [facility] to do a home assessment. Pt is w/c bound and was able to pivot but not ambulate and was able to transfer self. Wife said he has to be able to do both as she cannot. They couple has 2 sons that are not in the area and work. 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  The couple has a ranch home. Pt has a rollator, shower chair and electric w/c. He is diabetic and on insulin and wife fills the syringes for him due to his poor vision. Dr. Gumaro Tejada is his pcp and he saw him right before he went in to the [facility] about 3 weeks ago and they have a appt on 11/27 the day before thanksgiving. Pt requires assistance with bathing and dressing and wife does the meals and medications. If he goes home she said that [facility] has set up hhc for nsg and she thinks PT.  I have a call out to the [facility] to find out what hhc pt is setup with. If agustin is needed then wife prefers he return to [facility] and if they dont have a bed she is to come up with alternatives. Potassium is 5.1 , creatine 3.3 down from 3.7, hbg 8.4 up from 7.7. renal to see. PT and OT to eval. Will follow. MICK Cook  11/20/2024  Per [facility] pt was discharged and will need a 3 night medicare stay to return skilled since he came in for a different dx. He is not a bed hold but they will take pt back on 11/22 if he meets this criteria. .MICK Cook  11/20/2024      Case Management Assessment  Initial Evaluation    Date/Time of Evaluation: 11/20/2024 11:25 AM  Assessment Completed by: MICK Cook    If patient is discharged prior to next notation, then this note serves as note

## 2024-11-20 NOTE — H&P
Internal Medicine History & Physical     Name: Sunny Segal  : 1940  Chief Complaint: abnormal labs (K= 5.8, lethargic, hx renal disease, 92% on room air)  Primary Care Physician: Gumaro Adams MD  Admission date: 2024  Date of service: 2024     History of Present Illness  Sunny is a 84 y.o. year old male who presented with a chief complaint of abnormal labs. Found to have a K of 5.8 from facility. Was sent to the ED. Repeat K was WNL however his creatinine was not normal and he was admitted for ZION on CKD. He has a baseline Cr around 2.2 and his presenting Cr was 3.7 which has improved to 3.3 today. Nephrology has been consulted. A renal US has been performed but we are waiting for the read on this. UA negative on exam and CXR clear. Of importance he was recently admitted with an STEMI and declined cardiac cath due to his CKD and the associated risks. He is seen today in bed on room air. He has no complaints and states he felt like he was eating and drinking well at the facility as far as he could tell.     Past Medical History:   Diagnosis Date    Anemia     CAD (coronary artery disease)     Cancer (HCC)     left parotid    Diabetes mellitus (HCC)     Diverticulosis     mild    GERD (gastroesophageal reflux disease)     Hyperlipidemia     Hypertension        Past Surgical History:   Procedure Laterality Date    BACK SURGERY      CARDIAC PROCEDURE N/A 10/8/2024    Left heart cath / [place] [facility] [place]    COLONOSCOPY      EYE SURGERY      PAROTIDECTOMY  12    left superficial       Family History   Problem Relation Age of Onset    Cancer Father         prostate    Cancer Brother         prostate         Social History  Illicit drugs: Denies   TOBACCO:   reports that he has never smoked. He has never used smokeless tobacco.  ETOH:   reports no history of alcohol use.    Home Medications  Prior to Admission medications

## 2024-11-20 NOTE — CONSULTS
[facility]  Nephrology Consult Note    Patient's Name: Sunny Segal    Reason for Consult: ZION on CKD    Chief Complaint: Lethargy, elevated potassium  History Obtained From:  patient, past medical records, and EMR    History of Present Illness:    Sunny Segal is a 84 y.o. male with a past medical history of anemia, CAD, diabetes mellitus, hypertension, and hyperlipidemia.  He presented to the ED on 11/19 for concerns of lethargy and elevated potassium.  Vital signs on 11/19 includes temperature 98, respirations 18, pulse 77, /71, and he was 96% SpO2.  Lab data on 11/19 includes BUN 54, creatinine 3.7, calcium 8.5, and hemoglobin 7.7.  He had a UA on 11/19 which showed specific gravity 1.015, trace protein, negative nitrites.  He had a chest x-ray on 11/19 which showed no acute process.  Nephrology has been consulted to see the patient for ZION on CKD.  Historically, he has a baseline creatinine of 2-2.2 mg/dL.    Of note, he was recently inpatient and was seen by cardiology for concerns of NSTEMI; and he underwent a left heart cath which showed significant multivessel CAD.  He was followed by our service while inpatient in October for ZION on CKD 4; for which the ZION was presumed likely secondary to contrast exposure.  He has a history of CKD presumed secondary to diabetic nephropathy, chronic BPH.  He had an ECHO 10/2024 which showed EF 55-60%, grade 1 diastolic dysfunction.  He discharged on 10/15 with a creatinine of 2.9.  At present, patient was seen and examined.  He notes that he has been tired.  He denies any chest pain or shortness of breath.  He denies any nausea, vomiting, or diarrhea.  He denies any headache or dizziness.  He denies any cough or sore throat.    PMH:    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    Diabetes mellitus (HCC)     Diverticulosis     mild    GERD (gastroesophageal reflux disease)

## 2024-11-20 NOTE — PROGRESS NOTES
chronic kidney disease, unspecified acute renal failure type (HCC) [N17.9, N18.4]  Specific instructions for next treatment:  Functional mobility    Current Treatment Recommendations:     [x] Strengthening to improve independence with functional mobility   [x] ROM to improve independence with functional mobility   [x] Balance Training to improve static/dynamic balance and to reduce fall risk  [x] Endurance Training to improve activity tolerance during functional mobility   [x] Transfer Training to improve safety and independence with all functional transfers   [x] Gait Training to improve gait mechanics, endurance and assess need for appropriate assistive device  [x] Stair Training in preparation for safe discharge home and/or into the community   [x] Positioning to prevent skin breakdown and contractures  [x] Safety and Education Training   [x] Patient/Caregiver Education   [x] HEP  [] Other     PT long term treatment goals are located in above grid    Frequency of treatments: 2-5x/week x 1-2 weeks.    Time in  1315  Time out  1342    Total Treatment Time  8 minutes     Evaluation Time includes thorough review of current medical information, gathering information on past medical history/social history and prior level of function, completion of standardized testing/informal observation of tasks, assessment of data and education on plan of care and goals.    CPT codes:  [x] Low Complexity PT evaluation 48322  [] Moderate Complexity PT evaluation 68275  [] High Complexity PT evaluation 72017  [] PT Re-evaluation 79145  [] Gait training 15606 0 minutes  [] Manual therapy 79806 0 minutes  [x] Therapeutic activities 35268 8 minutes  [] Therapeutic exercises 64554 0 minutes  [] Neuromuscular reeducation 34816 0 minutes       Riley Michel PT, DPT   [id number]

## 2024-11-20 NOTE — CARE COORDINATION
Internal Medicine On-call Care Coordination Note    I was called by the ED physician because they recommended admission for this patient and I cover their PCP.  The history as I understand it after discussion with the ED physician is as follows:    Presents with lethargy, AMS  Outpatient labs demonstrated  elevated K at 5.8 and sent to ED  K normal in ED, however was given calcium   Creatinine elevated  Not back to baseline mental status  Decision made for admission    I placed admission orders.  Including:    Nephrology consult  Telemetry  Trend BMP    Dr. Espana or his coverage will see the patient tomorrow for H&P.    Electronically signed by Armando Zaragoza PA-C on 11/19/2024 at 7:52 PM     Addendum: reviewed     I agree with the above documentation and treatment plan.     Electronically signed by Glynn Espana MD on 11/20/2024 at 8:34 AM    I can be reached through [employer].

## 2024-11-20 NOTE — ACP (ADVANCE CARE PLANNING)
Advance Care Planning   Healthcare Decision Maker:    Primary Decision Maker: Quin Segal - Spouse - [phone]    Click here to complete Healthcare Decision Makers including selection of the Healthcare Decision Maker Relationship (ie \"Primary\").          wife said she has a copy and brought it in the last time. Wife is first and the son, georgina, is the alternative. MICK Cook  11/20/2024

## 2024-11-20 NOTE — PROGRESS NOTES
4 Eyes Skin Assessment     NAME:  Sunny Segal  YOB: 1940  MEDICAL RECORD NUMBER:  [id number]    The patient is being assessed for  Admission    I agree that at least one RN has performed a thorough Head to Toe Skin Assessment on the patient. ALL assessment sites listed below have been assessed.      Areas assessed by both nurses:    Head, Face, Ears, Shoulders, Back, Chest, Arms, Elbows, Hands, Sacrum. Buttock, Coccyx, Ischium, Legs. Feet and Heels, and Under Medical Devices         Does the Patient have a Wound? No noted wound(s)       Pavel Prevention initiated by RN: No  Wound Care Orders initiated by RN: No    Pressure Injury (Stage 3,4, Unstageable, DTI, NWPT, and Complex wounds) if present, place Wound referral order by RN under : No    New Ostomies, if present place, Ostomy referral order under : No     Nurse 1 eSignature: Electronically signed by Jonna Delacruz RN on 11/20/24 at 12:33 AM EST    **SHARE this note so that the co-signing nurse can place an eSignature**    Nurse 2 eSignature: {Esignature:[id number]}

## 2024-11-21 LAB
ALBUMIN SERPL-MCNC: 3.8 G/DL (ref 3.5–5.2)
ALP SERPL-CCNC: 71 U/L (ref 40–129)
ALT SERPL-CCNC: 6 U/L (ref 0–40)
ANION GAP SERPL CALCULATED.3IONS-SCNC: 12 MMOL/L (ref 7–16)
AST SERPL-CCNC: 13 U/L (ref 0–39)
BASOPHILS # BLD: 0.03 K/UL (ref 0–0.2)
BASOPHILS NFR BLD: 0 % (ref 0–2)
BILIRUB SERPL-MCNC: 0.4 MG/DL (ref 0–1.2)
BUN SERPL-MCNC: 50 MG/DL (ref 6–23)
CALCIUM SERPL-MCNC: 9.1 MG/DL (ref 8.6–10.2)
CHLORIDE SERPL-SCNC: 97 MMOL/L (ref 98–107)
CO2 SERPL-SCNC: 25 MMOL/L (ref 22–29)
CREAT SERPL-MCNC: 3.3 MG/DL (ref 0.7–1.2)
CREAT UR-MCNC: 65.6 MG/DL (ref 40–278)
CREAT UR-MCNC: 66.8 MG/DL (ref 40–278)
EOSINOPHIL # BLD: 0.12 K/UL (ref 0.05–0.5)
EOSINOPHILS RELATIVE PERCENT: 2 % (ref 0–6)
ERYTHROCYTE [DISTWIDTH] IN BLOOD BY AUTOMATED COUNT: 14.6 % (ref 11.5–15)
FERRITIN SERPL-MCNC: 43 NG/ML
FOLATE SERPL-MCNC: 11.5 NG/ML (ref 4.8–24.2)
GFR, ESTIMATED: 18 ML/MIN/1.73M2
GLUCOSE BLD-MCNC: 170 MG/DL (ref 74–99)
GLUCOSE BLD-MCNC: 202 MG/DL (ref 74–99)
GLUCOSE BLD-MCNC: 220 MG/DL (ref 74–99)
GLUCOSE SERPL-MCNC: 117 MG/DL (ref 74–99)
HCT VFR BLD AUTO: 27.6 % (ref 37–54)
HGB BLD-MCNC: 8.7 G/DL (ref 12.5–16.5)
IMM GRANULOCYTES # BLD AUTO: 0.03 K/UL (ref 0–0.58)
IMM GRANULOCYTES NFR BLD: 0 % (ref 0–5)
IRON SATN MFR SERPL: 15 % (ref 20–55)
IRON SERPL-MCNC: 39 UG/DL (ref 59–158)
LYMPHOCYTES NFR BLD: 0.77 K/UL (ref 1.5–4)
LYMPHOCYTES RELATIVE PERCENT: 10 % (ref 20–42)
MAGNESIUM SERPL-MCNC: 1.9 MG/DL (ref 1.6–2.6)
MCH RBC QN AUTO: 32.5 PG (ref 26–35)
MCHC RBC AUTO-ENTMCNC: 31.5 G/DL (ref 32–34.5)
MCV RBC AUTO: 103 FL (ref 80–99.9)
MONOCYTES NFR BLD: 1.01 K/UL (ref 0.1–0.95)
MONOCYTES NFR BLD: 14 % (ref 2–12)
NEUTROPHILS NFR BLD: 74 % (ref 43–80)
NEUTS SEG NFR BLD: 5.43 K/UL (ref 1.8–7.3)
PHOSPHATE SERPL-MCNC: 4.6 MG/DL (ref 2.5–4.5)
PLATELET # BLD AUTO: 234 K/UL (ref 130–450)
PMV BLD AUTO: 10.2 FL (ref 7–12)
POTASSIUM SERPL-SCNC: 4.8 MMOL/L (ref 3.5–5)
PROT SERPL-MCNC: 6.6 G/DL (ref 6.4–8.3)
RBC # BLD AUTO: 2.68 M/UL (ref 3.8–5.8)
SODIUM SERPL-SCNC: 134 MMOL/L (ref 132–146)
SODIUM UR-SCNC: 45 MMOL/L
TIBC SERPL-MCNC: 264 UG/DL (ref 250–450)
TOTAL PROTEIN, URINE: 25 MG/DL (ref 0–12)
URINE TOTAL PROTEIN CREATININE RATIO: 0.38 (ref 0–0.2)
UUN UR-MCNC: 435 MG/DL (ref 800–1666)
VIT B12 SERPL-MCNC: 441 PG/ML (ref 211–946)
WBC OTHER # BLD: 7.4 K/UL (ref 4.5–11.5)

## 2024-11-21 PROCEDURE — 2140000000 HC CCU INTERMEDIATE R&B

## 2024-11-21 PROCEDURE — 80053 COMPREHEN METABOLIC PANEL: CPT

## 2024-11-21 PROCEDURE — 82728 ASSAY OF FERRITIN: CPT

## 2024-11-21 PROCEDURE — 82607 VITAMIN B-12: CPT

## 2024-11-21 PROCEDURE — 82947 ASSAY GLUCOSE BLOOD QUANT: CPT

## 2024-11-21 PROCEDURE — 83735 ASSAY OF MAGNESIUM: CPT

## 2024-11-21 PROCEDURE — 85025 COMPLETE CBC W/AUTO DIFF WBC: CPT

## 2024-11-21 PROCEDURE — 36415 COLL VENOUS BLD VENIPUNCTURE: CPT

## 2024-11-21 PROCEDURE — 2580000003 HC RX 258: Performed by: PHYSICIAN ASSISTANT

## 2024-11-21 PROCEDURE — 84100 ASSAY OF PHOSPHORUS: CPT

## 2024-11-21 PROCEDURE — 6360000002 HC RX W HCPCS: Performed by: PHYSICIAN ASSISTANT

## 2024-11-21 PROCEDURE — 2580000003 HC RX 258

## 2024-11-21 PROCEDURE — 82746 ASSAY OF FOLIC ACID SERUM: CPT

## 2024-11-21 PROCEDURE — 83550 IRON BINDING TEST: CPT

## 2024-11-21 PROCEDURE — 83540 ASSAY OF IRON: CPT

## 2024-11-21 PROCEDURE — 6370000000 HC RX 637 (ALT 250 FOR IP): Performed by: PHYSICIAN ASSISTANT

## 2024-11-21 RX ORDER — SODIUM CHLORIDE 9 MG/ML
INJECTION, SOLUTION INTRAVENOUS CONTINUOUS
Status: ACTIVE | OUTPATIENT
Start: 2024-11-21 | End: 2024-11-21

## 2024-11-21 RX ADMIN — METOPROLOL SUCCINATE 50 MG: 50 TABLET, EXTENDED RELEASE ORAL at 09:06

## 2024-11-21 RX ADMIN — SODIUM CHLORIDE: 9 INJECTION, SOLUTION INTRAVENOUS at 09:19

## 2024-11-21 RX ADMIN — FERROUS SULFATE TAB 325 MG (65 MG ELEMENTAL FE) 325 MG: 325 (65 FE) TAB at 17:06

## 2024-11-21 RX ADMIN — HEPARIN SODIUM 5000 UNITS: 5000 INJECTION INTRAVENOUS; SUBCUTANEOUS at 13:57

## 2024-11-21 RX ADMIN — SODIUM CHLORIDE, PRESERVATIVE FREE 10 ML: 5 INJECTION INTRAVENOUS at 20:36

## 2024-11-21 RX ADMIN — INSULIN LISPRO 2 UNITS: 100 INJECTION, SOLUTION INTRAVENOUS; SUBCUTANEOUS at 17:05

## 2024-11-21 RX ADMIN — PANTOPRAZOLE SODIUM 40 MG: 40 TABLET, DELAYED RELEASE ORAL at 05:38

## 2024-11-21 RX ADMIN — INSULIN GLARGINE 22 UNITS: 100 INJECTION, SOLUTION SUBCUTANEOUS at 20:35

## 2024-11-21 RX ADMIN — SODIUM CHLORIDE, PRESERVATIVE FREE 10 ML: 5 INJECTION INTRAVENOUS at 09:04

## 2024-11-21 RX ADMIN — ISOSORBIDE DINITRATE 60 MG: 10 TABLET ORAL at 20:34

## 2024-11-21 RX ADMIN — INSULIN LISPRO 2 UNITS: 100 INJECTION, SOLUTION INTRAVENOUS; SUBCUTANEOUS at 20:36

## 2024-11-21 RX ADMIN — METOPROLOL SUCCINATE 50 MG: 50 TABLET, EXTENDED RELEASE ORAL at 20:35

## 2024-11-21 RX ADMIN — ISOSORBIDE DINITRATE 60 MG: 10 TABLET ORAL at 09:04

## 2024-11-21 RX ADMIN — HEPARIN SODIUM 5000 UNITS: 5000 INJECTION INTRAVENOUS; SUBCUTANEOUS at 05:38

## 2024-11-21 RX ADMIN — RANOLAZINE 500 MG: 500 TABLET, FILM COATED, EXTENDED RELEASE ORAL at 09:06

## 2024-11-21 RX ADMIN — ATORVASTATIN CALCIUM 20 MG: 20 TABLET, FILM COATED ORAL at 20:35

## 2024-11-21 RX ADMIN — HEPARIN SODIUM 5000 UNITS: 5000 INJECTION INTRAVENOUS; SUBCUTANEOUS at 20:36

## 2024-11-21 RX ADMIN — RANOLAZINE 500 MG: 500 TABLET, FILM COATED, EXTENDED RELEASE ORAL at 20:35

## 2024-11-21 RX ADMIN — FERROUS SULFATE TAB 325 MG (65 MG ELEMENTAL FE) 325 MG: 325 (65 FE) TAB at 09:06

## 2024-11-21 RX ADMIN — CLOPIDOGREL BISULFATE 75 MG: 75 TABLET ORAL at 09:05

## 2024-11-21 RX ADMIN — ASPIRIN 81 MG: 81 TABLET, COATED ORAL at 09:06

## 2024-11-21 RX ADMIN — HYDRALAZINE HYDROCHLORIDE 75 MG: 50 TABLET ORAL at 20:35

## 2024-11-21 RX ADMIN — HYDRALAZINE HYDROCHLORIDE 75 MG: 50 TABLET ORAL at 09:05

## 2024-11-21 RX ADMIN — ONDANSETRON 4 MG: 2 INJECTION INTRAMUSCULAR; INTRAVENOUS at 00:35

## 2024-11-21 NOTE — PROGRESS NOTES
Internal Medicine Progress Note    Patient's name: Sunny Segal  : 1940  Chief complaints (on day of admission): abnormal labs (K= 5.8, lethargic, hx renal disease, 92% on room air)  Admission date: 2024  Date of service: 2024   Room: [address]  Primary care physician: Gumaro Adams MD  Reason for visit: Follow-up for abnormal labs    Subjective  Sunny was seen and examined at bedside   Patient feels well, in no acute distress  States he feels at his baseline state of health  No acute concerns at this time      Current medications being prescribed discussed and patient expresses verbal understanding       Review of Systems  There are no new complaints of chest pain, shortness of breath, abdominal pain, nausea, vomiting, diarrhea, constipation unless otherwise mentioned above.     Hospital Medications  Current Facility-Administered Medications   Medication Dose Route Frequency Provider Last Rate Last Admin    sodium chloride flush 0.9 % injection 10 mL  10 mL IntraVENous 2 times per day Armando Zaragoza PA-C   10 mL at 243    sodium chloride flush 0.9 % injection 10 mL  10 mL IntraVENous PRN Armando Zaragoza, PA-C        0.9 % sodium chloride infusion   IntraVENous PRN Armando Zaragoza, PA-C        ondansetron (ZOFRAN-ODT) disintegrating tablet 4 mg  4 mg Oral Q8H PRN Armando Zaragoza PA-C        Or    ondansetron (ZOFRAN) injection 4 mg  4 mg IntraVENous Q6H PRN Armando Zaragoza, PA-C   4 mg at 24 0035    senna (SENOKOT) tablet 8.6 mg  1 tablet Oral Daily PRN Armando Zaragoza PA-C        acetaminophen (TYLENOL) tablet 650 mg  650 mg Oral Q6H PRN Armando Zaragoza, PA-C        Or    acetaminophen (TYLENOL) suppository 650 mg  650 mg Rectal Q6H PRN Armando Zaragoza, PA-C        heparin (porcine) injection 5,000 Units  5,000 Units SubCUTAneous 3 times per day Armando Zaragoza PA-C   5,000 Units at 24 0538    aspirin EC tablet 81 mg  81 mg Oral Daily Armando Zaragoza PA-C   81 mg at

## 2024-11-21 NOTE — DISCHARGE INSTR - COC
Continuity of Care Form    Patient Name: Sunny Segal   :  1940  MRN:  [id number]    Admit date:  2024  Discharge date:  ***    Code Status Order: Full Code   Advance Directives:   Advance Care Flowsheet Documentation             Admitting Physician:  Glynn Espana MD  PCP: Gumaro Adams MD    Discharging Nurse: ***  Discharging Hospital Unit/Room#: 6414/6414-B  Discharging Unit Phone Number: ***    Emergency Contact:   Extended Emergency Contact Information  Primary Emergency Contact: Quin Segal  Address: [address] [place] [address] [place] [address] [facility]  Home Phone: [phone]  Mobile Phone: [phone]  Relation: Spouse  Preferred language: English   needed? No  Secondary Emergency Contact: Dave Segal  Address: [address] [place] [address] [place] [address] [facility]  Home Phone: [phone]  Work Phone: [phone]  Mobile Phone: [phone]  Relation: Child    Past Surgical History:  Past Surgical History:   Procedure Laterality Date    BACK SURGERY      CARDIAC PROCEDURE N/A 10/8/2024    Left heart cath / coronary angiography performed by Abner Washington MD at [facility] [place]    COLONOSCOPY      EYE SURGERY      PAROTIDECTOMY  12    left superficial       Immunization History:   Immunization History   Administered Date(s) Administered    COVID-19, PFIZER PURPLE top, DILUTE for use, (age 12 y+), 30mcg/0.3mL 2021, 2021, 2021    COVID-19, PFIZER, (age 12y+), IM, 30mcg/0.3mL 2023       Active Problems:  Patient Active Problem List   Diagnosis Code    Syncope and collapse R55    Vasovagal syncope R55    Anemia of chronic renal failure N18.9, D63.1    Anemia of chronic renal failure, stage 4 (severe) (HCC) N18.4, D63.1    NSTEMI (non-ST elevated myocardial infarction) ([facility]) I21.4    Acute kidney injury superimposed on chronic kidney disease (HCC) N17.9, N18.9       Isolation/Infection:   Isolation

## 2024-11-21 NOTE — CARE COORDINATION
Social Work/ Case Management Transition of Care Planning (Madyson Carson, PARAMJITW [phone]):     Per report and chart review Pt is on room air. Pt is on IV fluids. Dietitian consulted. PT 12/24, OT 15/24. Discharge plan is for [facility], they can accept tomorrow 11/22/24. If needed family can transport. SW/CM to follow.  MICK Harrison  11/21/2024

## 2024-11-21 NOTE — PROGRESS NOTES
[facility]  Nephrology Progress Note    Patient's Name: Sunny Segal    History of Present Illness from 11/20 consult note:    \"Sunny Segal is a 84 y.o. male with a past medical history of anemia, CAD, diabetes mellitus, hypertension, and hyperlipidemia.  He presented to the ED on 11/19 for concerns of lethargy and elevated potassium.  Vital signs on 11/19 includes temperature 98, respirations 18, pulse 77, /71, and he was 96% SpO2.  Lab data on 11/19 includes BUN 54, creatinine 3.7, calcium 8.5, and hemoglobin 7.7.  He had a UA on 11/19 which showed specific gravity 1.015, trace protein, negative nitrites.  He had a chest x-ray on 11/19 which showed no acute process.  Nephrology has been consulted to see the patient for ZION on CKD.  Historically, he has a baseline creatinine of 2-2.2 mg/dL.    Of note, he was recently inpatient and was seen by cardiology for concerns of NSTEMI; and he underwent a left heart cath which showed significant multivessel CAD.  He was followed by our service while inpatient in October for ZION on CKD 4; for which the ZION was presumed likely secondary to contrast exposure.  He has a history of CKD presumed secondary to diabetic nephropathy, chronic BPH.  He had an ECHO 10/2024 which showed EF 55-60%, grade 1 diastolic dysfunction.  He discharged on 10/15 with a creatinine of 2.9.  At present, patient was seen and examined.  He notes that he has been tired.  He denies any chest pain or shortness of breath.  He denies any nausea, vomiting, or diarrhea.  He denies any headache or dizziness.  He denies any cough or sore throat.\"    Subjective:    11/21/2024: Patient was seen and examined.  He denies any chest pain or shortness of breath.  He denies any nausea.  Visitors at bedside.    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  CAD  S/p LHC 10/2024-significant multivessel CAD  ECHO 10/2024-EF 55-60%, grade 1 diastolic dysfunction  Strict I&O, daily weights  Monitor     TITO Yuen - CNP  Pt seen and examined on rounds with marguerite np  Agree with above  Encourage fluid intake  He has no appetite  On ivf  Follow cr  Ori Cantu MD

## 2024-11-21 NOTE — PLAN OF CARE
Problem: Chronic Conditions and Co-morbidities  Goal: Patient's chronic conditions and co-morbidity symptoms are monitored and maintained or improved  11/21/2024 1154 by Kassie Mcdaniel RN  Outcome: Progressing  11/20/2024 2307 by Blossom Marinelli RN  Outcome: Progressing     Problem: Discharge Planning  Goal: Discharge to home or other facility with appropriate resources  11/21/2024 1154 by Kassie Mcdaniel RN  Outcome: Progressing  11/20/2024 2307 by Blossom Marinelli RN  Outcome: Progressing     Problem: Skin/Tissue Integrity  Goal: Absence of new skin breakdown  Description: 1.  Monitor for areas of redness and/or skin breakdown  2.  Assess vascular access sites hourly  3.  Every 4-6 hours minimum:  Change oxygen saturation probe site  4.  Every 4-6 hours:  If on nasal continuous positive airway pressure, respiratory therapy assess nares and determine need for appliance change or resting period.  11/21/2024 1154 by Kassie Mcdaniel RN  Outcome: Progressing  11/20/2024 2307 by Blossom Marinelli RN  Outcome: Progressing     Problem: Safety - Adult  Goal: Free from fall injury  11/21/2024 1154 by Kassie Mcdaniel RN  Outcome: Progressing  11/20/2024 2307 by Blossom Marinelli RN  Outcome: Progressing

## 2024-11-21 NOTE — PROGRESS NOTES
Patient c/o nausea and had an emesis. PRN Zofran given per order. Patient states he is \"feeling better.\"

## 2024-11-21 NOTE — PROGRESS NOTES
Nutrition Education    [facility] dietetic students counseled patient and family on low potassium diet.    Also provided educational handouts and sample meal plans.    Recommend outpatient nutritional counseling for further education.      Fan Breaux RD, LD  Contact Number: [phone]

## 2024-11-22 ENCOUNTER — HOSPITAL ENCOUNTER (OUTPATIENT)
Dept: INFUSION THERAPY | Age: 84
Setting detail: INFUSION SERIES
Discharge: HOME | End: 2024-11-22

## 2024-11-22 VITALS
BODY MASS INDEX: 28.69 KG/M2 | DIASTOLIC BLOOD PRESSURE: 68 MMHG | SYSTOLIC BLOOD PRESSURE: 157 MMHG | OXYGEN SATURATION: 94 % | RESPIRATION RATE: 14 BRPM | TEMPERATURE: 98.2 F | HEART RATE: 88 BPM | HEIGHT: 72 IN | WEIGHT: 211.8 LBS

## 2024-11-22 LAB
ALBUMIN SERPL-MCNC: 3.8 G/DL (ref 3.5–5.2)
ALP SERPL-CCNC: 77 U/L (ref 40–129)
ALT SERPL-CCNC: 6 U/L (ref 0–40)
ANION GAP SERPL CALCULATED.3IONS-SCNC: 11 MMOL/L (ref 7–16)
AST SERPL-CCNC: 13 U/L (ref 0–39)
BASOPHILS # BLD: 0.04 K/UL (ref 0–0.2)
BASOPHILS NFR BLD: 1 % (ref 0–2)
BILIRUB SERPL-MCNC: 0.4 MG/DL (ref 0–1.2)
BUN SERPL-MCNC: 42 MG/DL (ref 6–23)
CALCIUM SERPL-MCNC: 9 MG/DL (ref 8.6–10.2)
CHLORIDE SERPL-SCNC: 98 MMOL/L (ref 98–107)
CO2 SERPL-SCNC: 24 MMOL/L (ref 22–29)
CREAT SERPL-MCNC: 2.9 MG/DL (ref 0.7–1.2)
EOSINOPHIL # BLD: 0.19 K/UL (ref 0.05–0.5)
EOSINOPHILS RELATIVE PERCENT: 3 % (ref 0–6)
ERYTHROCYTE [DISTWIDTH] IN BLOOD BY AUTOMATED COUNT: 14.6 % (ref 11.5–15)
GFR, ESTIMATED: 20 ML/MIN/1.73M2
GLUCOSE BLD-MCNC: 133 MG/DL (ref 74–99)
GLUCOSE BLD-MCNC: 223 MG/DL (ref 74–99)
GLUCOSE SERPL-MCNC: 129 MG/DL (ref 74–99)
HCT VFR BLD AUTO: 29.3 % (ref 37–54)
HGB BLD-MCNC: 9.2 G/DL (ref 12.5–16.5)
IMM GRANULOCYTES # BLD AUTO: <0.03 K/UL (ref 0–0.58)
IMM GRANULOCYTES NFR BLD: 0 % (ref 0–5)
LYMPHOCYTES NFR BLD: 0.77 K/UL (ref 1.5–4)
LYMPHOCYTES RELATIVE PERCENT: 12 % (ref 20–42)
MAGNESIUM SERPL-MCNC: 1.8 MG/DL (ref 1.6–2.6)
MCH RBC QN AUTO: 32.4 PG (ref 26–35)
MCHC RBC AUTO-ENTMCNC: 31.4 G/DL (ref 32–34.5)
MCV RBC AUTO: 103.2 FL (ref 80–99.9)
MONOCYTES NFR BLD: 0.98 K/UL (ref 0.1–0.95)
MONOCYTES NFR BLD: 15 % (ref 2–12)
NEUTROPHILS NFR BLD: 70 % (ref 43–80)
NEUTS SEG NFR BLD: 4.59 K/UL (ref 1.8–7.3)
PHOSPHATE SERPL-MCNC: 3.9 MG/DL (ref 2.5–4.5)
PLATELET # BLD AUTO: 248 K/UL (ref 130–450)
PMV BLD AUTO: 10 FL (ref 7–12)
POTASSIUM SERPL-SCNC: 4.8 MMOL/L (ref 3.5–5)
PROT SERPL-MCNC: 6.5 G/DL (ref 6.4–8.3)
RBC # BLD AUTO: 2.84 M/UL (ref 3.8–5.8)
SODIUM SERPL-SCNC: 133 MMOL/L (ref 132–146)
WBC OTHER # BLD: 6.6 K/UL (ref 4.5–11.5)

## 2024-11-22 PROCEDURE — 6370000000 HC RX 637 (ALT 250 FOR IP): Performed by: PHYSICIAN ASSISTANT

## 2024-11-22 PROCEDURE — 80053 COMPREHEN METABOLIC PANEL: CPT

## 2024-11-22 PROCEDURE — 2580000003 HC RX 258: Performed by: PHYSICIAN ASSISTANT

## 2024-11-22 PROCEDURE — 83735 ASSAY OF MAGNESIUM: CPT

## 2024-11-22 PROCEDURE — 82947 ASSAY GLUCOSE BLOOD QUANT: CPT

## 2024-11-22 PROCEDURE — 84100 ASSAY OF PHOSPHORUS: CPT

## 2024-11-22 PROCEDURE — 36415 COLL VENOUS BLD VENIPUNCTURE: CPT

## 2024-11-22 PROCEDURE — 85025 COMPLETE CBC W/AUTO DIFF WBC: CPT

## 2024-11-22 PROCEDURE — 6360000002 HC RX W HCPCS: Performed by: PHYSICIAN ASSISTANT

## 2024-11-22 RX ORDER — LANOLIN ALCOHOL/MO/W.PET/CERES
400 CREAM (GRAM) TOPICAL DAILY
Status: DISCONTINUED | OUTPATIENT
Start: 2024-11-22 | End: 2024-11-22 | Stop reason: HOSPADM

## 2024-11-22 RX ORDER — MIRTAZAPINE 15 MG/1
15 TABLET, ORALLY DISINTEGRATING ORAL NIGHTLY
DISCHARGE
Start: 2024-11-22

## 2024-11-22 RX ORDER — MIRTAZAPINE 15 MG/1
15 TABLET, ORALLY DISINTEGRATING ORAL NIGHTLY
Status: DISCONTINUED | OUTPATIENT
Start: 2024-11-22 | End: 2024-11-22 | Stop reason: HOSPADM

## 2024-11-22 RX ADMIN — ASPIRIN 81 MG: 81 TABLET, COATED ORAL at 08:36

## 2024-11-22 RX ADMIN — CALCITRIOL CAPSULES 0.25 MCG 0.25 MCG: 0.25 CAPSULE ORAL at 08:36

## 2024-11-22 RX ADMIN — PANTOPRAZOLE SODIUM 40 MG: 40 TABLET, DELAYED RELEASE ORAL at 06:09

## 2024-11-22 RX ADMIN — FERROUS SULFATE TAB 325 MG (65 MG ELEMENTAL FE) 325 MG: 325 (65 FE) TAB at 08:37

## 2024-11-22 RX ADMIN — ISOSORBIDE DINITRATE 60 MG: 10 TABLET ORAL at 08:36

## 2024-11-22 RX ADMIN — HYDRALAZINE HYDROCHLORIDE 75 MG: 50 TABLET ORAL at 08:36

## 2024-11-22 RX ADMIN — HEPARIN SODIUM 5000 UNITS: 5000 INJECTION INTRAVENOUS; SUBCUTANEOUS at 06:09

## 2024-11-22 RX ADMIN — RANOLAZINE 500 MG: 500 TABLET, FILM COATED, EXTENDED RELEASE ORAL at 08:37

## 2024-11-22 RX ADMIN — METOPROLOL SUCCINATE 50 MG: 50 TABLET, EXTENDED RELEASE ORAL at 08:37

## 2024-11-22 RX ADMIN — CLOPIDOGREL BISULFATE 75 MG: 75 TABLET ORAL at 08:36

## 2024-11-22 RX ADMIN — SODIUM CHLORIDE, PRESERVATIVE FREE 10 ML: 5 INJECTION INTRAVENOUS at 08:45

## 2024-11-22 NOTE — CARE COORDINATION
11/22:  Update CM Note:  Pt is dc today.  Pt's dc plan is to [facility].  Pt's family can transport.  ALEKS,HENS are completed & placed in envelope in soft chart.  Sw/CM will continue to follow.  Electronically signed by Matilde Adkins RN on 11/22/2024 at 12:20 PM

## 2024-11-22 NOTE — PROGRESS NOTES
dysfunction with normal LAP.    Mitral Valve: Mildly thickened, at the anterior and posterior leaflets.    Left Atrium: Left atrium is mildly dilated.    Image quality is adequate.    Assessment   Active Hospital Problems    Diagnosis     Syncope and collapse [R55]      Priority: Medium    Acute kidney injury superimposed on chronic kidney disease (HCC) [N17.9, N18.9]     NSTEMI (non-ST elevated myocardial infarction) (HCC) [I21.4]     Anemia of chronic renal failure, stage 4 (severe) (HCC) [N18.4, D63.1]     Anemia of chronic renal failure [N18.9, D63.1]        Renal US  1. No evidence of obstructive uropathy.  2. Bilateral renal cysts.  3. Solid-appearing region in the lower pole of the left kidney measuring 2.5  cm. This could represent a complex cyst or solid mass.  Considering the  patient's renal status, recommend MRI for further evaluation    Plan  ZION on CKD  Baseline Cr around 2.2 however previous admission his Cr stabilized around 2.9  Presenting Cr 3.7, downtrend to 2.9 today   Renal US as above  Nephrology consult     Recent admission for NSTEMI  Discharged 10/17/24   Cath 10/8 with MV CAD - Medical mgmt   No plans for interventional cath at this time patient does not want this in view of his CKD it has been discussed many times at this point by myself, nephrology and cardiology    Macrocytic anemia   B12 375 folate 12.1  Iron panel Ferritin 77, Iron 73, TIBC 219  Monitor CBC daily     DM  A1C - 7.1 --> 6.2   ISS  Lantus 22u nightly  Titrate as needed     Decreased PO intake   Order Ensure TID   Trial Remeron     Consults Nephrology  DVT prophylaxis Heparin  Code status FULL  Medications, labs and imaging reviewed   Discharge plan: SNF / [facility] today with script for BMP he needs to increase his PO intake     Electronically signed by Glynn Espana MD on 11/22/2024 at 9:34 AM    I can be reached through [email or web address].

## 2024-11-22 NOTE — PLAN OF CARE
1945)  Achieves stable or improved neurological status: Assess for and report changes in neurological status     Problem: Neurosensory - Adult  Goal: Achieves maximal functionality and self care  Outcome: Progressing  Flowsheets (Taken 11/21/2024 1945)  Achieves maximal functionality and self care: Encourage and assist patient to increase activity and self care with guidance from physical therapy/occupational therapy     Problem: Respiratory - Adult  Goal: Achieves optimal ventilation and oxygenation  Outcome: Progressing  Flowsheets (Taken 11/21/2024 1945)  Achieves optimal ventilation and oxygenation: Position to facilitate oxygenation and minimize respiratory effort     Problem: Cardiovascular - Adult  Goal: Maintains optimal cardiac output and hemodynamic stability  Outcome: Progressing  Flowsheets (Taken 11/21/2024 1945)  Maintains optimal cardiac output and hemodynamic stability: Monitor blood pressure and heart rate     Problem: Skin/Tissue Integrity - Adult  Goal: Skin integrity remains intact  Outcome: Progressing  Flowsheets  Taken 11/22/2024 0031  Skin Integrity Remains Intact: Monitor for areas of redness and/or skin breakdown  Taken 11/21/2024 1945  Skin Integrity Remains Intact: Monitor for areas of redness and/or skin breakdown     Problem: Musculoskeletal - Adult  Goal: Return mobility to safest level of function  Outcome: Progressing  Flowsheets (Taken 11/21/2024 1945)  Return Mobility to Safest Level of Function: Assist with transfers and ambulation using safe patient handling equipment as needed     Problem: Musculoskeletal - Adult  Goal: Return ADL status to a safe level of function  Outcome: Progressing  Flowsheets (Taken 11/21/2024 1945)  Return ADL Status to a Safe Level of Function: Administer medication as ordered     Problem: Genitourinary - Adult  Goal: Absence of urinary retention  Outcome: Progressing  Flowsheets (Taken 11/21/2024 1945)  Absence of urinary retention: Assess patient’s  ability to void and empty bladder     Problem: Infection - Adult  Goal: Absence of infection at discharge  Outcome: Progressing  Flowsheets (Taken 11/21/2024 1945)  Absence of infection at discharge: Assess and monitor for signs and symptoms of infection     Problem: Metabolic/Fluid and Electrolytes - Adult  Goal: Hemodynamic stability and optimal renal function maintained  Outcome: Progressing  Flowsheets (Taken 11/21/2024 1945)  Hemodynamic stability and optimal renal function maintained: Monitor labs and assess for signs and symptoms of volume excess or deficit     Problem: Metabolic/Fluid and Electrolytes - Adult  Goal: Glucose maintained within prescribed range  Outcome: Progressing  Flowsheets (Taken 11/21/2024 1945)  Glucose maintained within prescribed range: Monitor blood glucose as ordered     Problem: Hematologic - Adult  Goal: Maintains hematologic stability  Outcome: Progressing  Flowsheets (Taken 11/21/2024 1945)  Maintains hematologic stability: Assess for signs and symptoms of bleeding or hemorrhage

## 2024-11-22 NOTE — PLAN OF CARE
Problem: Chronic Conditions and Co-morbidities  Goal: Patient's chronic conditions and co-morbidity symptoms are monitored and maintained or improved  11/22/2024 0953 by Kassie Mcdaniel RN  Outcome: Progressing  11/22/2024 0032 by Yodit Mayo RN  Outcome: Progressing  Flowsheets (Taken 11/21/2024 1945)  Care Plan - Patient's Chronic Conditions and Co-Morbidity Symptoms are Monitored and Maintained or Improved: Monitor and assess patient's chronic conditions and comorbid symptoms for stability, deterioration, or improvement     Problem: Discharge Planning  Goal: Discharge to home or other facility with appropriate resources  11/22/2024 0953 by Kassie Mcdaniel RN  Outcome: Progressing  11/22/2024 0032 by Yodit Mayo RN  Outcome: Progressing  Flowsheets (Taken 11/21/2024 1945)  Discharge to home or other facility with appropriate resources:   Identify barriers to discharge with patient and caregiver   Arrange for needed discharge resources and transportation as appropriate   Identify discharge learning needs (meds, wound care, etc)   Refer to discharge planning if patient needs post-hospital services based on physician order or complex needs related to functional status, cognitive ability or social support system     Problem: Skin/Tissue Integrity  Goal: Absence of new skin breakdown  Description: 1.  Monitor for areas of redness and/or skin breakdown  2.  Assess vascular access sites hourly  3.  Every 4-6 hours minimum:  Change oxygen saturation probe site  4.  Every 4-6 hours:  If on nasal continuous positive airway pressure, respiratory therapy assess nares and determine need for appliance change or resting period.  11/22/2024 0953 by Kassie Mcdaniel RN  Outcome: Progressing  11/22/2024 0032 by Yodit Mayo RN  Outcome: Progressing     Problem: Safety - Adult  Goal: Free from fall injury  11/22/2024 0953 by Kassie Mcdaniel RN  Outcome: Progressing  11/22/2024 0032 by Yodit Mayo RN  Outcome:  symptoms of volume excess or deficit  Goal: Glucose maintained within prescribed range  11/22/2024 0953 by Kassie Mcdaniel RN  Outcome: Progressing  11/22/2024 0032 by Yodit Mayo RN  Outcome: Progressing  Flowsheets (Taken 11/21/2024 1945)  Glucose maintained within prescribed range: Monitor blood glucose as ordered     Problem: Hematologic - Adult  Goal: Maintains hematologic stability  11/22/2024 0953 by Kassie Mcdaniel RN  Outcome: Progressing  11/22/2024 0032 by Yodit Mayo RN  Outcome: Progressing  Flowsheets (Taken 11/21/2024 1945)  Maintains hematologic stability: Assess for signs and symptoms of bleeding or hemorrhage

## 2024-11-25 NOTE — DISCHARGE SUMMARY
Internal Medicine Discharge Summary    NAME: Sunny Segal :  1940  MRN:  [id number] PCP:Gumaro Adams MD    ADMITTED: 2024   DISCHARGED: 2024  3:30 PM    ADMITTING PHYSICIAN: Rowan att. providers found    PCP: Gumaro Adams MD    CONSULTANT(S):   IP CONSULT TO INTERNAL MEDICINE  IP CONSULT TO NEPHROLOGY  IP CONSULT TO DIETITIAN     ADMITTING DIAGNOSIS:   Lethargy [R53.83]  Acute kidney injury superimposed on chronic kidney disease (HCC) [N17.9, N18.9]  Acute renal failure superimposed on stage 4 chronic kidney disease, unspecified acute renal failure type (HCC) [N17.9, N18.4]     Please see H&P for further details    DISCHARGE DIAGNOSES:   Active Hospital Problems    Diagnosis     Syncope and collapse [R55]      Priority: Medium    Acute kidney injury superimposed on chronic kidney disease (HCC) [N17.9, N18.9]     NSTEMI (non-ST elevated myocardial infarction) (HCC) [I21.4]     Anemia of chronic renal failure, stage 4 (severe) (HCC) [N18.4, D63.1]     Anemia of chronic renal failure [N18.9, D63.1]        BRIEF HISTORY OF PRESENT ILLNESS: Sunny Segal is a 84 y.o. male patient of Gumaro Adams MD who  has a past medical history of Anemia, CAD (coronary artery disease), Cancer (HCC), Diabetes mellitus (HCC), Diverticulosis, GERD (gastroesophageal reflux disease), Hyperlipidemia, and Hypertension. who originally had concerns including abnormal labs (K= 5.8, lethargic, hx renal disease, 92% on room air). at presentation on 2024, and was found to have Lethargy [R53.83]  Acute kidney injury superimposed on chronic kidney disease (HCC) [N17.9, N18.9]  Acute renal failure superimposed on stage 4 chronic kidney disease, unspecified acute renal failure type (HCC) [N17.9, N18.4] after workup.    Please see H&P for further details.    HOSPITAL COURSE:   The patient presented to the hospital with the chief complaint of abnormal labs (K= 5.8, lethargic, hx renal disease, 92% on room air)  . The

## 2024-12-04 RX ORDER — SODIUM CHLORIDE 9 MG/ML
INJECTION, SOLUTION INTRAVENOUS CONTINUOUS
OUTPATIENT
Start: 2024-12-07

## 2024-12-04 RX ORDER — HYDROCORTISONE SODIUM SUCCINATE 100 MG/2ML
100 INJECTION INTRAMUSCULAR; INTRAVENOUS
OUTPATIENT
Start: 2024-12-07

## 2024-12-04 RX ORDER — ALBUTEROL SULFATE 90 UG/1
4 INHALANT RESPIRATORY (INHALATION) PRN
OUTPATIENT
Start: 2024-12-07

## 2024-12-04 RX ORDER — SODIUM CHLORIDE 9 MG/ML
5-250 INJECTION, SOLUTION INTRAVENOUS PRN
OUTPATIENT
Start: 2024-12-07

## 2024-12-04 RX ORDER — DIPHENHYDRAMINE HYDROCHLORIDE 50 MG/ML
50 INJECTION INTRAMUSCULAR; INTRAVENOUS
OUTPATIENT
Start: 2024-12-07

## 2024-12-04 RX ORDER — ONDANSETRON 2 MG/ML
8 INJECTION INTRAMUSCULAR; INTRAVENOUS
OUTPATIENT
Start: 2024-12-07

## 2024-12-04 RX ORDER — ACETAMINOPHEN 325 MG/1
650 TABLET ORAL
OUTPATIENT
Start: 2024-12-07

## 2024-12-04 RX ORDER — EPINEPHRINE 1 MG/ML
0.3 INJECTION, SOLUTION, CONCENTRATE INTRAVENOUS PRN
OUTPATIENT
Start: 2024-12-07

## 2024-12-04 RX ORDER — SODIUM CHLORIDE 0.9 % (FLUSH) 0.9 %
5-40 SYRINGE (ML) INJECTION PRN
OUTPATIENT
Start: 2024-12-07

## 2024-12-13 ENCOUNTER — HOSPITAL ENCOUNTER (INPATIENT)
Age: 84
LOS: 5 days | Discharge: HOME | DRG: 312 | End: 2024-12-18
Attending: STUDENT IN AN ORGANIZED HEALTH CARE EDUCATION/TRAINING PROGRAM | Admitting: STUDENT IN AN ORGANIZED HEALTH CARE EDUCATION/TRAINING PROGRAM
Payer: MEDICARE

## 2024-12-13 ENCOUNTER — APPOINTMENT (OUTPATIENT)
Dept: CT IMAGING | Age: 84
DRG: 312 | End: 2024-12-13
Attending: STUDENT IN AN ORGANIZED HEALTH CARE EDUCATION/TRAINING PROGRAM
Payer: MEDICARE

## 2024-12-13 ENCOUNTER — APPOINTMENT (OUTPATIENT)
Dept: GENERAL RADIOLOGY | Age: 84
DRG: 312 | End: 2024-12-13
Payer: MEDICARE

## 2024-12-13 DIAGNOSIS — R41.82 ALTERED MENTAL STATUS, UNSPECIFIED ALTERED MENTAL STATUS TYPE: ICD-10-CM

## 2024-12-13 DIAGNOSIS — N18.9 ACUTE KIDNEY INJURY SUPERIMPOSED ON CHRONIC KIDNEY DISEASE (HCC): Primary | ICD-10-CM

## 2024-12-13 DIAGNOSIS — E83.42 HYPOMAGNESEMIA: ICD-10-CM

## 2024-12-13 DIAGNOSIS — I95.9 HYPOTENSION, UNSPECIFIED HYPOTENSION TYPE: ICD-10-CM

## 2024-12-13 DIAGNOSIS — N17.9 ACUTE KIDNEY INJURY SUPERIMPOSED ON CHRONIC KIDNEY DISEASE (HCC): Primary | ICD-10-CM

## 2024-12-13 DIAGNOSIS — R55 SYNCOPE AND COLLAPSE: ICD-10-CM

## 2024-12-13 PROBLEM — R53.1 WEAKNESS: Status: ACTIVE | Noted: 2024-12-13

## 2024-12-13 LAB
ALBUMIN SERPL-MCNC: 3.7 G/DL (ref 3.5–5.2)
ALP SERPL-CCNC: 85 U/L (ref 40–129)
ALT SERPL-CCNC: 6 U/L (ref 0–40)
ANION GAP SERPL CALCULATED.3IONS-SCNC: 11 MMOL/L (ref 7–16)
AST SERPL-CCNC: 11 U/L (ref 0–39)
BACTERIA URNS QL MICRO: ABNORMAL
BASOPHILS # BLD: 0 K/UL (ref 0–0.2)
BASOPHILS NFR BLD: 0 % (ref 0–2)
BILIRUB SERPL-MCNC: 0.5 MG/DL (ref 0–1.2)
BILIRUB UR QL STRIP: NEGATIVE
BUN SERPL-MCNC: 50 MG/DL (ref 6–23)
CALCIUM SERPL-MCNC: 8.9 MG/DL (ref 8.6–10.2)
CHLORIDE SERPL-SCNC: 102 MMOL/L (ref 98–107)
CLARITY UR: CLEAR
CO2 SERPL-SCNC: 24 MMOL/L (ref 22–29)
COLOR UR: YELLOW
CREAT SERPL-MCNC: 2.6 MG/DL (ref 0.7–1.2)
EOSINOPHIL # BLD: 0 K/UL (ref 0.05–0.5)
EOSINOPHILS RELATIVE PERCENT: 0 % (ref 0–6)
ERYTHROCYTE [DISTWIDTH] IN BLOOD BY AUTOMATED COUNT: 13.2 % (ref 11.5–15)
GFR, ESTIMATED: 24 ML/MIN/1.73M2
GLUCOSE BLD-MCNC: 367 MG/DL (ref 74–99)
GLUCOSE SERPL-MCNC: 151 MG/DL (ref 74–99)
GLUCOSE UR STRIP-MCNC: NEGATIVE MG/DL
HCT VFR BLD AUTO: 29.2 % (ref 37–54)
HGB BLD-MCNC: 9.2 G/DL (ref 12.5–16.5)
HGB UR QL STRIP.AUTO: NEGATIVE
KETONES UR STRIP-MCNC: NEGATIVE MG/DL
LACTATE BLDV-SCNC: 1.1 MMOL/L (ref 0.5–2.2)
LEUKOCYTE ESTERASE UR QL STRIP: NEGATIVE
LIPASE SERPL-CCNC: 16 U/L (ref 13–60)
LYMPHOCYTES NFR BLD: 0.27 K/UL (ref 1.5–4)
LYMPHOCYTES RELATIVE PERCENT: 3 % (ref 20–42)
MAGNESIUM SERPL-MCNC: 1.4 MG/DL (ref 1.6–2.6)
MCH RBC QN AUTO: 31.5 PG (ref 26–35)
MCHC RBC AUTO-ENTMCNC: 31.5 G/DL (ref 32–34.5)
MCV RBC AUTO: 100 FL (ref 80–99.9)
MONOCYTES NFR BLD: 0.63 K/UL (ref 0.1–0.95)
MONOCYTES NFR BLD: 6 % (ref 2–12)
NEUTROPHILS NFR BLD: 91 % (ref 43–80)
NEUTS SEG NFR BLD: 9.5 K/UL (ref 1.8–7.3)
NITRITE UR QL STRIP: NEGATIVE
PH UR STRIP: 6 [PH] (ref 5–9)
PLATELET # BLD AUTO: 267 K/UL (ref 130–450)
PMV BLD AUTO: 9.7 FL (ref 7–12)
POTASSIUM SERPL-SCNC: 5 MMOL/L (ref 3.5–5)
PROT SERPL-MCNC: 6.7 G/DL (ref 6.4–8.3)
PROT UR STRIP-MCNC: 30 MG/DL
RBC # BLD AUTO: 2.92 M/UL (ref 3.8–5.8)
RBC # BLD: ABNORMAL 10*6/UL
RBC #/AREA URNS HPF: ABNORMAL /HPF
SODIUM SERPL-SCNC: 137 MMOL/L (ref 132–146)
SP GR UR STRIP: 1.01 (ref 1–1.03)
TROPONIN I SERPL HS-MCNC: 84 NG/L (ref 0–11)
UROBILINOGEN UR STRIP-ACNC: 0.2 EU/DL (ref 0–1)
WBC #/AREA URNS HPF: ABNORMAL /HPF
WBC OTHER # BLD: 10.4 K/UL (ref 4.5–11.5)

## 2024-12-13 PROCEDURE — 70450 CT HEAD/BRAIN W/O DYE: CPT

## 2024-12-13 PROCEDURE — 96374 THER/PROPH/DIAG INJ IV PUSH: CPT

## 2024-12-13 PROCEDURE — 85025 COMPLETE CBC W/AUTO DIFF WBC: CPT

## 2024-12-13 PROCEDURE — 84484 ASSAY OF TROPONIN QUANT: CPT

## 2024-12-13 PROCEDURE — 81001 URINALYSIS AUTO W/SCOPE: CPT

## 2024-12-13 PROCEDURE — 6360000002 HC RX W HCPCS: Performed by: STUDENT IN AN ORGANIZED HEALTH CARE EDUCATION/TRAINING PROGRAM

## 2024-12-13 PROCEDURE — 83735 ASSAY OF MAGNESIUM: CPT

## 2024-12-13 PROCEDURE — 83690 ASSAY OF LIPASE: CPT

## 2024-12-13 PROCEDURE — 1200000000 HC SEMI PRIVATE

## 2024-12-13 PROCEDURE — 71045 X-RAY EXAM CHEST 1 VIEW: CPT

## 2024-12-13 PROCEDURE — 83605 ASSAY OF LACTIC ACID: CPT

## 2024-12-13 PROCEDURE — 82947 ASSAY GLUCOSE BLOOD QUANT: CPT

## 2024-12-13 PROCEDURE — 99285 EMERGENCY DEPT VISIT HI MDM: CPT

## 2024-12-13 PROCEDURE — 80053 COMPREHEN METABOLIC PANEL: CPT

## 2024-12-13 PROCEDURE — 87086 URINE CULTURE/COLONY COUNT: CPT

## 2024-12-13 RX ORDER — RANOLAZINE 500 MG/1
500 TABLET, EXTENDED RELEASE ORAL 2 TIMES DAILY
Status: DISCONTINUED | OUTPATIENT
Start: 2024-12-13 | End: 2024-12-18 | Stop reason: HOSPADM

## 2024-12-13 RX ORDER — ACETAMINOPHEN 325 MG/1
650 TABLET ORAL EVERY 6 HOURS PRN
Status: DISCONTINUED | OUTPATIENT
Start: 2024-12-13 | End: 2024-12-18 | Stop reason: HOSPADM

## 2024-12-13 RX ORDER — INSULIN LISPRO 100 [IU]/ML
0-8 INJECTION, SOLUTION INTRAVENOUS; SUBCUTANEOUS
Status: DISCONTINUED | OUTPATIENT
Start: 2024-12-13 | End: 2024-12-18 | Stop reason: HOSPADM

## 2024-12-13 RX ORDER — FERROUS SULFATE 325(65) MG
325 TABLET ORAL 2 TIMES DAILY WITH MEALS
Status: DISCONTINUED | OUTPATIENT
Start: 2024-12-14 | End: 2024-12-18 | Stop reason: HOSPADM

## 2024-12-13 RX ORDER — ATORVASTATIN CALCIUM 20 MG/1
20 TABLET, FILM COATED ORAL NIGHTLY
Status: DISCONTINUED | OUTPATIENT
Start: 2024-12-13 | End: 2024-12-18 | Stop reason: HOSPADM

## 2024-12-13 RX ORDER — SODIUM CHLORIDE 0.9 % (FLUSH) 0.9 %
10 SYRINGE (ML) INJECTION EVERY 12 HOURS SCHEDULED
Status: DISCONTINUED | OUTPATIENT
Start: 2024-12-13 | End: 2024-12-18 | Stop reason: HOSPADM

## 2024-12-13 RX ORDER — MAGNESIUM SULFATE IN WATER 40 MG/ML
2000 INJECTION, SOLUTION INTRAVENOUS ONCE
Status: COMPLETED | OUTPATIENT
Start: 2024-12-13 | End: 2024-12-13

## 2024-12-13 RX ORDER — SODIUM CHLORIDE 0.9 % (FLUSH) 0.9 %
10 SYRINGE (ML) INJECTION PRN
Status: DISCONTINUED | OUTPATIENT
Start: 2024-12-13 | End: 2024-12-18 | Stop reason: HOSPADM

## 2024-12-13 RX ORDER — METOPROLOL SUCCINATE 25 MG/1
50 TABLET, EXTENDED RELEASE ORAL 2 TIMES DAILY
Status: DISCONTINUED | OUTPATIENT
Start: 2024-12-13 | End: 2024-12-18 | Stop reason: HOSPADM

## 2024-12-13 RX ORDER — ACETAMINOPHEN 650 MG/1
650 SUPPOSITORY RECTAL EVERY 6 HOURS PRN
Status: DISCONTINUED | OUTPATIENT
Start: 2024-12-13 | End: 2024-12-18 | Stop reason: HOSPADM

## 2024-12-13 RX ORDER — GLUCAGON 1 MG/ML
1 KIT INJECTION PRN
Status: DISCONTINUED | OUTPATIENT
Start: 2024-12-13 | End: 2024-12-18 | Stop reason: HOSPADM

## 2024-12-13 RX ORDER — ENOXAPARIN SODIUM 100 MG/ML
30 INJECTION SUBCUTANEOUS DAILY
Status: DISCONTINUED | OUTPATIENT
Start: 2024-12-14 | End: 2024-12-18 | Stop reason: HOSPADM

## 2024-12-13 RX ORDER — PANTOPRAZOLE SODIUM 40 MG/1
40 TABLET, DELAYED RELEASE ORAL
Status: DISCONTINUED | OUTPATIENT
Start: 2024-12-14 | End: 2024-12-18 | Stop reason: HOSPADM

## 2024-12-13 RX ORDER — CALCITRIOL 0.25 UG/1
0.25 CAPSULE, LIQUID FILLED ORAL EVERY OTHER DAY
Status: DISCONTINUED | OUTPATIENT
Start: 2024-12-14 | End: 2024-12-18 | Stop reason: HOSPADM

## 2024-12-13 RX ORDER — CLOPIDOGREL BISULFATE 75 MG/1
75 TABLET ORAL DAILY
Status: DISCONTINUED | OUTPATIENT
Start: 2024-12-14 | End: 2024-12-18 | Stop reason: HOSPADM

## 2024-12-13 RX ORDER — SENNOSIDES A AND B 8.6 MG/1
1 TABLET, FILM COATED ORAL DAILY PRN
Status: DISCONTINUED | OUTPATIENT
Start: 2024-12-13 | End: 2024-12-18 | Stop reason: HOSPADM

## 2024-12-13 RX ORDER — MIRTAZAPINE 15 MG/1
15 TABLET, ORALLY DISINTEGRATING ORAL NIGHTLY
Status: DISCONTINUED | OUTPATIENT
Start: 2024-12-13 | End: 2024-12-18 | Stop reason: HOSPADM

## 2024-12-13 RX ORDER — ASPIRIN 81 MG/1
81 TABLET ORAL DAILY
Status: DISCONTINUED | OUTPATIENT
Start: 2024-12-14 | End: 2024-12-18 | Stop reason: HOSPADM

## 2024-12-13 RX ORDER — SODIUM CHLORIDE 9 MG/ML
INJECTION, SOLUTION INTRAVENOUS PRN
Status: DISCONTINUED | OUTPATIENT
Start: 2024-12-13 | End: 2024-12-18 | Stop reason: HOSPADM

## 2024-12-13 RX ORDER — DEXTROSE MONOHYDRATE 100 MG/ML
INJECTION, SOLUTION INTRAVENOUS CONTINUOUS PRN
Status: DISCONTINUED | OUTPATIENT
Start: 2024-12-13 | End: 2024-12-18 | Stop reason: HOSPADM

## 2024-12-13 RX ORDER — ONDANSETRON 2 MG/ML
4 INJECTION INTRAMUSCULAR; INTRAVENOUS EVERY 6 HOURS PRN
Status: DISCONTINUED | OUTPATIENT
Start: 2024-12-13 | End: 2024-12-18 | Stop reason: HOSPADM

## 2024-12-13 RX ORDER — ONDANSETRON 4 MG/1
4 TABLET, ORALLY DISINTEGRATING ORAL EVERY 8 HOURS PRN
Status: DISCONTINUED | OUTPATIENT
Start: 2024-12-13 | End: 2024-12-18 | Stop reason: HOSPADM

## 2024-12-13 RX ORDER — ISOSORBIDE DINITRATE 10 MG/1
60 TABLET ORAL 3 TIMES DAILY
Status: DISCONTINUED | OUTPATIENT
Start: 2024-12-13 | End: 2024-12-14

## 2024-12-13 RX ORDER — INSULIN GLARGINE 100 [IU]/ML
22 INJECTION, SOLUTION SUBCUTANEOUS NIGHTLY
Status: DISCONTINUED | OUTPATIENT
Start: 2024-12-13 | End: 2024-12-18 | Stop reason: HOSPADM

## 2024-12-13 RX ADMIN — MAGNESIUM SULFATE HEPTAHYDRATE 2000 MG: 40 INJECTION, SOLUTION INTRAVENOUS at 19:56

## 2024-12-13 ASSESSMENT — PAIN SCALES - GENERAL
PAINLEVEL_OUTOF10: 4
PAINLEVEL_OUTOF10: 0

## 2024-12-13 ASSESSMENT — LIFESTYLE VARIABLES: HOW OFTEN DO YOU HAVE A DRINK CONTAINING ALCOHOL: NEVER

## 2024-12-13 ASSESSMENT — PAIN DESCRIPTION - ORIENTATION: ORIENTATION: RIGHT;POSTERIOR

## 2024-12-13 ASSESSMENT — PAIN DESCRIPTION - LOCATION: LOCATION: SHOULDER

## 2024-12-13 ASSESSMENT — PAIN - FUNCTIONAL ASSESSMENT: PAIN_FUNCTIONAL_ASSESSMENT: 0-10

## 2024-12-13 ASSESSMENT — PAIN DESCRIPTION - PAIN TYPE: TYPE: CHRONIC PAIN

## 2024-12-13 NOTE — ED PROVIDER NOTES
[facility]  EMERGENCY DEPARTMENT ENCOUNTER        Pt Name: Sunny Segal  MRN: [id number]  Birthdate 1940  Date of evaluation: 12/13/2024  Provider: Renita Eugene MD  PCP: Gumaro Adams MD  Note Started: 2:26 PM EST 12/13/24    HPI  84 y.o. male presenting for altered mental status.  Per report, around 11 AM this morning, patient was noted to have right facial droop bilateral arm weakness, was not acting right.  They called the wife and when she arrived, symptoms had resolved.  However, he was noted to be hypotensive.  This did recover.  On my evaluation, patient complaining of right shoulder pain.  He is alert and oriented to person, place, month, but not year or age.      --------------------------------------------- PAST HISTORY ---------------------------------------------  Past Medical History:  has a past medical history of Anemia, CAD (coronary artery disease), Cancer (HCC), Diabetes mellitus (HCC), Diverticulosis, GERD (gastroesophageal reflux disease), Hyperlipidemia, and Hypertension.    Past Surgical History:  has a past surgical history that includes Colonoscopy; eye surgery; back surgery; Parotidectomy (9/11/12); and Cardiac procedure (N/A, 10/8/2024).    Social History:  reports that he has never smoked. He has never used smokeless tobacco. He reports that he does not drink alcohol and does not use drugs.    Family History: family history includes Cancer in his brother and father.     The patient’s home medications have been reviewed.    Allergies: Amlodipine, Codeine, Darvocet [propoxyphene n-acetaminophen], Hydrocodone-acetaminophen, and Lisinopril      Review of Systems   Unable to perform ROS: Mental status change        Physical Exam  Constitutional:       General: He is not in acute distress.     Appearance: Normal appearance. He is not ill-appearing.   HENT:      Head: Normocephalic and atraumatic.      Right Ear: External ear normal.      Left Ear: External ear normal.

## 2024-12-14 LAB
ALBUMIN SERPL-MCNC: 3.6 G/DL (ref 3.5–5.2)
ALP SERPL-CCNC: 78 U/L (ref 40–129)
ALT SERPL-CCNC: 6 U/L (ref 0–40)
ANION GAP SERPL CALCULATED.3IONS-SCNC: 13 MMOL/L (ref 7–16)
AST SERPL-CCNC: 10 U/L (ref 0–39)
BASOPHILS # BLD: 0.02 K/UL (ref 0–0.2)
BASOPHILS NFR BLD: 0 % (ref 0–2)
BILIRUB SERPL-MCNC: 0.3 MG/DL (ref 0–1.2)
BUN SERPL-MCNC: 47 MG/DL (ref 6–23)
CALCIUM SERPL-MCNC: 9 MG/DL (ref 8.6–10.2)
CHLORIDE SERPL-SCNC: 101 MMOL/L (ref 98–107)
CO2 SERPL-SCNC: 23 MMOL/L (ref 22–29)
CREAT SERPL-MCNC: 2.5 MG/DL (ref 0.7–1.2)
EOSINOPHIL # BLD: 0.04 K/UL (ref 0.05–0.5)
EOSINOPHILS RELATIVE PERCENT: 1 % (ref 0–6)
ERYTHROCYTE [DISTWIDTH] IN BLOOD BY AUTOMATED COUNT: 13.3 % (ref 11.5–15)
GFR, ESTIMATED: 25 ML/MIN/1.73M2
GLUCOSE BLD-MCNC: 254 MG/DL (ref 74–99)
GLUCOSE BLD-MCNC: 285 MG/DL (ref 74–99)
GLUCOSE BLD-MCNC: 301 MG/DL (ref 74–99)
GLUCOSE SERPL-MCNC: 260 MG/DL (ref 74–99)
HBA1C MFR BLD: 6.3 % (ref 4–5.6)
HCT VFR BLD AUTO: 27.1 % (ref 37–54)
HGB BLD-MCNC: 8.6 G/DL (ref 12.5–16.5)
IMM GRANULOCYTES # BLD AUTO: 0.03 K/UL (ref 0–0.58)
IMM GRANULOCYTES NFR BLD: 0 % (ref 0–5)
LYMPHOCYTES NFR BLD: 0.64 K/UL (ref 1.5–4)
LYMPHOCYTES RELATIVE PERCENT: 8 % (ref 20–42)
MCH RBC QN AUTO: 31.7 PG (ref 26–35)
MCHC RBC AUTO-ENTMCNC: 31.7 G/DL (ref 32–34.5)
MCV RBC AUTO: 100 FL (ref 80–99.9)
MICROORGANISM SPEC CULT: NO GROWTH
MONOCYTES NFR BLD: 1.27 K/UL (ref 0.1–0.95)
MONOCYTES NFR BLD: 17 % (ref 2–12)
NEUTROPHILS NFR BLD: 74 % (ref 43–80)
NEUTS SEG NFR BLD: 5.71 K/UL (ref 1.8–7.3)
PLATELET # BLD AUTO: 243 K/UL (ref 130–450)
PMV BLD AUTO: 10.1 FL (ref 7–12)
POTASSIUM SERPL-SCNC: 5.1 MMOL/L (ref 3.5–5)
PROT SERPL-MCNC: 6.6 G/DL (ref 6.4–8.3)
RBC # BLD AUTO: 2.71 M/UL (ref 3.8–5.8)
SERVICE CMNT-IMP: NORMAL
SODIUM SERPL-SCNC: 137 MMOL/L (ref 132–146)
SPECIMEN DESCRIPTION: NORMAL
WBC OTHER # BLD: 7.7 K/UL (ref 4.5–11.5)

## 2024-12-14 PROCEDURE — 6370000000 HC RX 637 (ALT 250 FOR IP): Performed by: PHYSICIAN ASSISTANT

## 2024-12-14 PROCEDURE — 97530 THERAPEUTIC ACTIVITIES: CPT

## 2024-12-14 PROCEDURE — 1200000000 HC SEMI PRIVATE

## 2024-12-14 PROCEDURE — 97161 PT EVAL LOW COMPLEX 20 MIN: CPT

## 2024-12-14 PROCEDURE — 83036 HEMOGLOBIN GLYCOSYLATED A1C: CPT

## 2024-12-14 PROCEDURE — 97165 OT EVAL LOW COMPLEX 30 MIN: CPT

## 2024-12-14 PROCEDURE — 6370000000 HC RX 637 (ALT 250 FOR IP): Performed by: INTERNAL MEDICINE

## 2024-12-14 PROCEDURE — 6360000002 HC RX W HCPCS: Performed by: PHYSICIAN ASSISTANT

## 2024-12-14 PROCEDURE — 80053 COMPREHEN METABOLIC PANEL: CPT

## 2024-12-14 PROCEDURE — 85025 COMPLETE CBC W/AUTO DIFF WBC: CPT

## 2024-12-14 PROCEDURE — 82947 ASSAY GLUCOSE BLOOD QUANT: CPT

## 2024-12-14 PROCEDURE — 97535 SELF CARE MNGMENT TRAINING: CPT

## 2024-12-14 PROCEDURE — 36415 COLL VENOUS BLD VENIPUNCTURE: CPT

## 2024-12-14 PROCEDURE — 6370000000 HC RX 637 (ALT 250 FOR IP): Performed by: STUDENT IN AN ORGANIZED HEALTH CARE EDUCATION/TRAINING PROGRAM

## 2024-12-14 PROCEDURE — 2580000003 HC RX 258: Performed by: PHYSICIAN ASSISTANT

## 2024-12-14 RX ORDER — AMLODIPINE BESYLATE 10 MG/1
10 TABLET ORAL DAILY
Status: DISCONTINUED | OUTPATIENT
Start: 2024-12-14 | End: 2024-12-18 | Stop reason: HOSPADM

## 2024-12-14 RX ORDER — CAPSAICIN 0.025 %
CREAM (GRAM) TOPICAL 2 TIMES DAILY PRN
Status: DISCONTINUED | OUTPATIENT
Start: 2024-12-14 | End: 2024-12-18 | Stop reason: HOSPADM

## 2024-12-14 RX ORDER — HYDRALAZINE HYDROCHLORIDE 25 MG/1
75 TABLET, FILM COATED ORAL 3 TIMES DAILY
Status: DISCONTINUED | OUTPATIENT
Start: 2024-12-14 | End: 2024-12-14

## 2024-12-14 RX ORDER — ISOSORBIDE MONONITRATE 30 MG/1
60 TABLET, EXTENDED RELEASE ORAL DAILY
Status: DISCONTINUED | OUTPATIENT
Start: 2024-12-15 | End: 2024-12-18 | Stop reason: HOSPADM

## 2024-12-14 RX ADMIN — AMLODIPINE BESYLATE 10 MG: 10 TABLET ORAL at 16:21

## 2024-12-14 RX ADMIN — METOPROLOL SUCCINATE 50 MG: 25 TABLET, EXTENDED RELEASE ORAL at 08:07

## 2024-12-14 RX ADMIN — SODIUM CHLORIDE, PRESERVATIVE FREE 10 ML: 5 INJECTION INTRAVENOUS at 20:11

## 2024-12-14 RX ADMIN — MIRTAZAPINE 15 MG: 15 TABLET, ORALLY DISINTEGRATING ORAL at 20:12

## 2024-12-14 RX ADMIN — ASPIRIN 81 MG: 81 TABLET, COATED ORAL at 08:06

## 2024-12-14 RX ADMIN — INSULIN LISPRO 8 UNITS: 100 INJECTION, SOLUTION INTRAVENOUS; SUBCUTANEOUS at 00:03

## 2024-12-14 RX ADMIN — ATORVASTATIN CALCIUM 20 MG: 20 TABLET, FILM COATED ORAL at 20:11

## 2024-12-14 RX ADMIN — SODIUM CHLORIDE, PRESERVATIVE FREE 10 ML: 5 INJECTION INTRAVENOUS at 08:07

## 2024-12-14 RX ADMIN — FERROUS SULFATE TAB 325 MG (65 MG ELEMENTAL FE) 325 MG: 325 (65 FE) TAB at 08:07

## 2024-12-14 RX ADMIN — METOPROLOL SUCCINATE 50 MG: 25 TABLET, EXTENDED RELEASE ORAL at 00:04

## 2024-12-14 RX ADMIN — FERROUS SULFATE TAB 325 MG (65 MG ELEMENTAL FE) 325 MG: 325 (65 FE) TAB at 17:08

## 2024-12-14 RX ADMIN — ISOSORBIDE DINITRATE 60 MG: 10 TABLET ORAL at 00:04

## 2024-12-14 RX ADMIN — SODIUM CHLORIDE, PRESERVATIVE FREE 10 ML: 5 INJECTION INTRAVENOUS at 00:04

## 2024-12-14 RX ADMIN — CLOPIDOGREL BISULFATE 75 MG: 75 TABLET ORAL at 08:06

## 2024-12-14 RX ADMIN — METOPROLOL SUCCINATE 50 MG: 25 TABLET, EXTENDED RELEASE ORAL at 20:11

## 2024-12-14 RX ADMIN — ENOXAPARIN SODIUM 30 MG: 100 INJECTION SUBCUTANEOUS at 08:05

## 2024-12-14 RX ADMIN — INSULIN LISPRO 4 UNITS: 100 INJECTION, SOLUTION INTRAVENOUS; SUBCUTANEOUS at 20:11

## 2024-12-14 RX ADMIN — PANTOPRAZOLE SODIUM 40 MG: 40 TABLET, DELAYED RELEASE ORAL at 05:53

## 2024-12-14 RX ADMIN — CALCITRIOL CAPSULES 0.25 MCG 0.25 MCG: 0.25 CAPSULE ORAL at 08:06

## 2024-12-14 RX ADMIN — ISOSORBIDE DINITRATE 60 MG: 10 TABLET ORAL at 08:05

## 2024-12-14 RX ADMIN — RANOLAZINE 500 MG: 500 TABLET, FILM COATED, EXTENDED RELEASE ORAL at 20:13

## 2024-12-14 RX ADMIN — HYDRALAZINE HYDROCHLORIDE 75 MG: 25 TABLET ORAL at 13:49

## 2024-12-14 RX ADMIN — HYDRALAZINE HYDROCHLORIDE 75 MG: 25 TABLET ORAL at 08:06

## 2024-12-14 RX ADMIN — ATORVASTATIN CALCIUM 20 MG: 20 TABLET, FILM COATED ORAL at 00:04

## 2024-12-14 RX ADMIN — RANOLAZINE 500 MG: 500 TABLET, FILM COATED, EXTENDED RELEASE ORAL at 08:05

## 2024-12-14 RX ADMIN — CAPSAICIN: 0.25 CREAM TOPICAL at 22:02

## 2024-12-14 RX ADMIN — INSULIN GLARGINE 22 UNITS: 100 INJECTION, SOLUTION SUBCUTANEOUS at 00:03

## 2024-12-14 RX ADMIN — INSULIN GLARGINE 22 UNITS: 100 INJECTION, SOLUTION SUBCUTANEOUS at 20:11

## 2024-12-14 RX ADMIN — INSULIN LISPRO 6 UNITS: 100 INJECTION, SOLUTION INTRAVENOUS; SUBCUTANEOUS at 12:16

## 2024-12-14 RX ADMIN — INSULIN LISPRO 4 UNITS: 100 INJECTION, SOLUTION INTRAVENOUS; SUBCUTANEOUS at 06:32

## 2024-12-14 RX ADMIN — INSULIN LISPRO 4 UNITS: 100 INJECTION, SOLUTION INTRAVENOUS; SUBCUTANEOUS at 17:09

## 2024-12-14 ASSESSMENT — PAIN SCALES - GENERAL
PAINLEVEL_OUTOF10: 0
PAINLEVEL_OUTOF10: 0

## 2024-12-14 NOTE — PROGRESS NOTES
Physical Therapy    Physical Therapy Initial Assessment     Name: Sunny Segal  : 1940  MRN: [id number]      Date of Service: 2024    Evaluating PT:  Vidal Menjivar, PT, DPT  [id number]     Room #:  8401/8401-A  Diagnosis:  Weakness [R53.1]  PMHx/PSHx:   has a past medical history of Anemia, CAD (coronary artery disease), Cancer (HCC), Diabetes mellitus (HCC), Diverticulosis, GERD (gastroesophageal reflux disease), Hyperlipidemia, and Hypertension.   Procedure/Surgery:  None   Precautions:  Falls, Alarms, Cognition, B knee OA (R>L)  Equipment Needs:  TBD    SUBJECTIVE:    Pt is a mixed historian.  Per chart, pt was in process of transitioning from subacute rehab to home. Pt lives with spouse in 1 story home with no stair to enter.  Pt was performing bed to chair w/c transfers using no AD at SNF.     OBJECTIVE:   Initial Evaluation  Date: 24 Treatment Short Term/ Long Term   Goals   AM-PAC 6 Clicks      Was pt agreeable to Eval/treatment? Yes      Does pt have pain? No c/o pain      Bed Mobility  Rolling: SBA  Supine to sit: SBA  Sit to supine: SBA  Scooting: SBA  Rolling: Supervision   Supine to sit: Supervision   Sit to supine: Supervision   Scooting: Supervision    Transfers Sit to stand: Mod A x2  Stand to sit: Mod A  x2  Stand pivot: Unable   Sit to stand: Min A  Stand to sit: Min A  Stand pivot: Min A with FWW    Ambulation    Unable  >5 feet with FWW Min A   Stair negotiation: ascended and descended  NT  NA   ROM BUE:  Per OT eval  BLE:  WFL     Strength BUE:  Per OT eval   BLE:  grossly 3+/5     Balance Sitting EOB:  SBA  Static Standing:  Mod A x2 with FWW  Sitting EOB:  Supervision  Dynamic Standing:  Min A with FWW      Pt is A & O x 3 - pleasant confusion noted during conversation/session   Sensation:  Pt denies  numbness and tingling to extremities  Edema:  Unremarkable     Therapeutic Exercises:    STS x 2  BLE AROM    Vitals:  Supine:  /72, Pulse 89  Sitting:  /82,

## 2024-12-14 NOTE — PROGRESS NOTES
4 Eyes Skin Assessment     NAME:  Sunny Segal  YOB: 1940  MEDICAL RECORD NUMBER:  [id number]    The patient is being assessed for  Admission    I agree that at least one RN has performed a thorough Head to Toe Skin Assessment on the patient. ALL assessment sites listed below have been assessed.      Areas assessed by both nurses:    Head, Face, Ears, Shoulders, Back, Chest, Arms, Elbows, Hands, Sacrum. Buttock, Coccyx, Ischium, Legs. Feet and Heels, and Under Medical Devices         Does the Patient have a Wound? No noted wound(s)       Pavel Prevention initiated by RN: Yes  Wound Care Orders initiated by RN: No    Pressure Injury (Stage 3,4, Unstageable, DTI, NWPT, and Complex wounds) if present, place Wound referral order by RN under : No    New Ostomies, if present place, Ostomy referral order under : No     Nurse 1 eSignature: Electronically signed by Robbie Zhang RN on 12/14/24 at 6:00 AM EST    **SHARE this note so that the co-signing nurse can place an eSignature**    Nurse 2 eSignature: {Esignature:[id number]}

## 2024-12-14 NOTE — H&P
Tchol: 110, HDL: 48, LDL: 50, T  Concerns of syncope  Ultrasound carotids ordered    Hypotension?  Resolved  Found to be hypotensive in ECF, SBP 70s, improved with fluids  In the ED and inpatient patient has been hypertensive  Now this AM: /77    Hx of IDDM  A1c: 6.3%  Continue home Lantus 22 units nightly  Medium dose sliding scale  POCT glucose ACHS  Hypoglycemia protocol    CKD Stage IIIb  Creatinin 2.5 on admission, at baseline    History of GERD-Protonix 40 mg daily    PT/OT  Consults cardiology  Home medications to be reconciled and confirmed prior to being ordered  DVT prophylaxis Lovenox  Code Status full  Discharge plan: TBD pending clinical improvement     Jose Flores MD  Internal medicine   2024  7:37 AM    I can be reached through [employer].    NOTE:  This report was transcribed using voice recognition software.  Every effort was made to ensure accuracy; however, inadvertent computerized transcription errors may be present.

## 2024-12-14 NOTE — PROGRESS NOTES
FMC/dexterity noted during ADL tasks Improve overall LUE strength WFL for participation in functional tasks       Hearing: WFL  Sensation: No c/o numbness or tingling  Tone: WFL  Edema: Unremarkable    Comment: Cleared by RN to see pt. Upon arrival patient supine in bed and agreeable to OT session. At end of session, patient supine in bed with +bed alarm, call light and phone within reach, all lines and tubes intact.  Overall patient demonstrated decreased independence, activity tolerance, standing balance, and safety during completion of ADL/functional transfer/mobility tasks. Therapist facilitated ADL tasks, functional mobility, bed mobility to address safety awareness, implementation of fall prevention strategies, & functional engagement throughout daily activities. Pt would benefit from continued skilled OT to increase safety and independence with completion of ADL/IADL tasks for functional independence and quality of life.    Treatment: OT treatment provided this date includes:   ADL-  Instruction/training on safety and adapted techniques for completion of ADLs: Therapist facilitated & pt educated on activity modifications/adaptations to promote implementation of fall prevention strategies, EC/WS strategies, & safety awareness throughout ADLs.   Mobility-  Instruction/training on safety and improved independence with bed mobility/functional transfers. Pt completed x2 sit<>stand transfers w/ increased assist/cues to maintain proper body mechanics & safe transfer progressions.  Sitting/Standing Balance/Tolerance: to increase balance and activity tolerance during ADLs and facilitate proper posture and positioning. Pt seated EOB ~10 mins & stood ~20 seconds each trail (x2).   Activity tolerance- Instruction/training on energy conservation/work simplification for completion of ADLs.   Skilled positioning/alignment-  Therapist facilitated proper positioning/alignment throughout session to maintain skin/joint  integrity & proper body mechanics.   Skilled monitoring of vitals - To maximize safe participation throughout functional activities.    Rehab Potential: good  for established goals     LTG: maximize independence with ADLs to return to PLOF    Patient and/or family were instructed on functional diagnosis, prognosis/goals and OT plan of care. Demonstrated fair- understanding.     Eval Complexity: Low  History: Expanded chart review of medical records and additional review of physical, cognitive, or psychosocial history related to current functional performance  Exam: 3+ performance deficits  Assistance/Modification: Min/mod assistance or modifications required to perform tasks. May have comorbidities that affect occupational performance.    Time In: 9:59a  Time Out: 10:22a  Total Treatment Time: 8 minutes    Min Units   OT Eval Low 01905  x     OT Eval Medium 68177      OT Eval High 76823      OT Re-Eval 97194       Therapeutic Ex 12509       Therapeutic Activities 21183       ADL/Self Care 61905  8 1    Orthotic Management 15810       Manual 40536     Neuro Re-Ed 53627       Non-Billable Time          Evaluation time additionally includes thorough review of current medical information, gathering information on PMH/social history & PLOF, administration/interpretation of standardized testing/informal observation of tasks, assessment of data, consultation within the interdisciplinary team, & development of POC/goals.            Rosemarie Kendall OTR/L; [id number]

## 2024-12-14 NOTE — CARE COORDINATION
Internal Medicine On-call Care Coordination Note    I was called by the ED physician because they recommended admission for this patient and I cover their PCP.  The history as I understand it after discussion with the ED physician is as follows:    Presents from nursing home  On evaluation this morning he was found to have right sided facial droop, bilateral arm weakness  Symptoms resolved, however he was found to be hypotensive and sent to the ED  BP in 70s systolic in ED, improved with IVF  Decision made for admission for monitoring    I placed admission orders.  Including:    Hold home BP meds  PT/OT    Dr. Espana or his coverage will see the patient tomorrow for H&P.    Electronically signed by Armando Zaragoza PA-C on 12/13/2024 at 7:24 PM

## 2024-12-14 NOTE — CONSULTS
[facility] cardiology/[facility]    INPATIENT CARDIOLOGY CONSULT    Name: Sunny Segal    Age: 84 y.o.    Date of Admission: 12/13/2024  2:06 PM    Date of Service: 12/14/2024    Reason for Consultation: Episodes of unresponsiveness intermittently over the last 2 years that last anywhere from 3 to 7 minutes at a time    Referring Physician:     History of Present Illness: The patient is a 84 y.o. year old male who is evaluated with an white diet and they are both very helpful.  This very nice gentleman is awake and conversant but not always clear on the specifics when I asked him his age he and he is actually 84 years    He has a residing in extended-care facility couple of months due to a general debilitation that I suspect has been progressive as in a wheelchair for almost 3 years by his wife's report had knee surgery 2 years ago and really is less active subsequently.    Today he interacts appropriately but unclear why he is in the hospital.  Apparently yesterday at [facility] he became weak and generalized but and lift his arms bilateral.  Question of mental status change.  Question of a stroke.  He was sent to the hospital for further evaluation because of this consideration.    Past 2 years when he has these episodes of unresponsiveness he becomes somewhat clammy and sweaty.  No reported history of focal motor or sensory deficit.  No fall trauma.    Past Medical History: With creatinine around 3-3.5 and today creatinine 2.5.  Known history of diabetes for 40 years  Heart catheterization October 2024 mid 90% LAD stenosis OM1 80% stenosis right PDA branches 80% and 70% stenosis    Past surgical history: Prior back surgery, parotid surgery September 2012    Social history does not smoke.      Review of Systems:     Constitutional: No fever, chills, sweats  Cardiac: As per HPI  Pulmonary: No cough, wheeze, hemoptysis  HEENT: No visual disturbances or difficult swallowing  GI: No nausea, vomiting,  diarrhea, abdominal pain, rectal bleeding  : No dysuria or hematuria  Endocrine: No excessive thirst, heat or cold intolerance.   Musculoskeletal: No joint pain or muscle aches. No claudication  Skin: No skin breakdown or rashes  Neuro: No headache, confusion, or seizures  Psych: No depression, anxiety      Family History:  Family History   Problem Relation Age of Onset    Cancer Father         prostate    Cancer Brother         prostate       Social History:  Social History     Socioeconomic History    Marital status:      Spouse name: Not on file    Number of children: Not on file    Years of education: Not on file    Highest education level: Not on file   Occupational History    Not on file   Tobacco Use    Smoking status: Never    Smokeless tobacco: Never    Tobacco comments:     pipe  40 years ago  (smoked once daily)   Vaping Use    Vaping status: Never Used   Substance and Sexual Activity    Alcohol use: No    Drug use: No    Sexual activity: Not Currently   Other Topics Concern    Not on file   Social History Narrative    Not on file     Social Determinants of Health     Financial Resource Strain: Not on file   Food Insecurity: No Food Insecurity (12/14/2024)    Hunger Vital Sign     Worried About Running Out of Food in the Last Year: Never true     Ran Out of Food in the Last Year: Never true   Transportation Needs: No Transportation Needs (12/14/2024)    PRAPARE - Transportation     Lack of Transportation (Medical): No     Lack of Transportation (Non-Medical): No   Physical Activity: Not on file   Stress: Not on file   Social Connections: Not on file   Intimate Partner Violence: Not on file   Housing Stability: High Risk (12/14/2024)    Housing Stability Vital Sign     Unable to Pay for Housing in the Last Year: No     Number of Times Moved in the Last Year: 2     Homeless in the Last Year: No       Allergies:  Allergies   Allergen Reactions    Amlodipine Other (See Comments)     UNKNOWN REACTION

## 2024-12-15 LAB
ALBUMIN SERPL-MCNC: 3.6 G/DL (ref 3.5–5.2)
ALP SERPL-CCNC: 83 U/L (ref 40–129)
ALT SERPL-CCNC: 6 U/L (ref 0–40)
ANION GAP SERPL CALCULATED.3IONS-SCNC: 14 MMOL/L (ref 7–16)
AST SERPL-CCNC: 13 U/L (ref 0–39)
BASOPHILS # BLD: 0.02 K/UL (ref 0–0.2)
BASOPHILS NFR BLD: 0 % (ref 0–2)
BILIRUB SERPL-MCNC: 0.3 MG/DL (ref 0–1.2)
BUN SERPL-MCNC: 41 MG/DL (ref 6–23)
CALCIUM SERPL-MCNC: 9.3 MG/DL (ref 8.6–10.2)
CHLORIDE SERPL-SCNC: 100 MMOL/L (ref 98–107)
CO2 SERPL-SCNC: 21 MMOL/L (ref 22–29)
CREAT SERPL-MCNC: 2.2 MG/DL (ref 0.7–1.2)
EOSINOPHIL # BLD: 0.14 K/UL (ref 0.05–0.5)
EOSINOPHILS RELATIVE PERCENT: 2 % (ref 0–6)
ERYTHROCYTE [DISTWIDTH] IN BLOOD BY AUTOMATED COUNT: 13.2 % (ref 11.5–15)
GFR, ESTIMATED: 30 ML/MIN/1.73M2
GLUCOSE BLD-MCNC: 226 MG/DL (ref 74–99)
GLUCOSE BLD-MCNC: 271 MG/DL (ref 74–99)
GLUCOSE BLD-MCNC: 371 MG/DL (ref 74–99)
GLUCOSE BLD-MCNC: 400 MG/DL (ref 74–99)
GLUCOSE SERPL-MCNC: 225 MG/DL (ref 74–99)
HCT VFR BLD AUTO: 31.9 % (ref 37–54)
HGB BLD-MCNC: 9.9 G/DL (ref 12.5–16.5)
IMM GRANULOCYTES # BLD AUTO: 0.03 K/UL (ref 0–0.58)
IMM GRANULOCYTES NFR BLD: 0 % (ref 0–5)
LYMPHOCYTES NFR BLD: 0.74 K/UL (ref 1.5–4)
LYMPHOCYTES RELATIVE PERCENT: 10 % (ref 20–42)
MCH RBC QN AUTO: 31.7 PG (ref 26–35)
MCHC RBC AUTO-ENTMCNC: 31 G/DL (ref 32–34.5)
MCV RBC AUTO: 102.2 FL (ref 80–99.9)
MONOCYTES NFR BLD: 1.08 K/UL (ref 0.1–0.95)
MONOCYTES NFR BLD: 14 % (ref 2–12)
NEUTROPHILS NFR BLD: 74 % (ref 43–80)
NEUTS SEG NFR BLD: 5.79 K/UL (ref 1.8–7.3)
PLATELET # BLD AUTO: 254 K/UL (ref 130–450)
PMV BLD AUTO: 9.8 FL (ref 7–12)
POTASSIUM SERPL-SCNC: 4.9 MMOL/L (ref 3.5–5)
PROT SERPL-MCNC: 7.1 G/DL (ref 6.4–8.3)
RBC # BLD AUTO: 3.12 M/UL (ref 3.8–5.8)
SODIUM SERPL-SCNC: 135 MMOL/L (ref 132–146)
WBC OTHER # BLD: 7.8 K/UL (ref 4.5–11.5)

## 2024-12-15 PROCEDURE — 2580000003 HC RX 258: Performed by: PHYSICIAN ASSISTANT

## 2024-12-15 PROCEDURE — 36415 COLL VENOUS BLD VENIPUNCTURE: CPT

## 2024-12-15 PROCEDURE — 85025 COMPLETE CBC W/AUTO DIFF WBC: CPT

## 2024-12-15 PROCEDURE — 6370000000 HC RX 637 (ALT 250 FOR IP): Performed by: PHYSICIAN ASSISTANT

## 2024-12-15 PROCEDURE — 1200000000 HC SEMI PRIVATE

## 2024-12-15 PROCEDURE — 6360000002 HC RX W HCPCS: Performed by: PHYSICIAN ASSISTANT

## 2024-12-15 PROCEDURE — 6370000000 HC RX 637 (ALT 250 FOR IP): Performed by: INTERNAL MEDICINE

## 2024-12-15 PROCEDURE — 80053 COMPREHEN METABOLIC PANEL: CPT

## 2024-12-15 PROCEDURE — 82947 ASSAY GLUCOSE BLOOD QUANT: CPT

## 2024-12-15 PROCEDURE — 6370000000 HC RX 637 (ALT 250 FOR IP): Performed by: STUDENT IN AN ORGANIZED HEALTH CARE EDUCATION/TRAINING PROGRAM

## 2024-12-15 RX ORDER — ISOSORBIDE MONONITRATE 60 MG/1
60 TABLET, EXTENDED RELEASE ORAL DAILY
DISCHARGE
Start: 2024-12-16

## 2024-12-15 RX ORDER — LABETALOL HYDROCHLORIDE 5 MG/ML
10 INJECTION, SOLUTION INTRAVENOUS EVERY 6 HOURS PRN
Status: DISCONTINUED | OUTPATIENT
Start: 2024-12-15 | End: 2024-12-18 | Stop reason: HOSPADM

## 2024-12-15 RX ORDER — INSULIN GLARGINE 100 [IU]/ML
5 INJECTION, SOLUTION SUBCUTANEOUS NIGHTLY
Status: DISCONTINUED | OUTPATIENT
Start: 2024-12-15 | End: 2024-12-15

## 2024-12-15 RX ORDER — AMLODIPINE BESYLATE 10 MG/1
10 TABLET ORAL DAILY
DISCHARGE
Start: 2024-12-16

## 2024-12-15 RX ORDER — INSULIN GLARGINE 100 [IU]/ML
5 INJECTION, SOLUTION SUBCUTANEOUS ONCE
Status: COMPLETED | OUTPATIENT
Start: 2024-12-15 | End: 2024-12-15

## 2024-12-15 RX ADMIN — INSULIN LISPRO 4 UNITS: 100 INJECTION, SOLUTION INTRAVENOUS; SUBCUTANEOUS at 17:30

## 2024-12-15 RX ADMIN — FERROUS SULFATE TAB 325 MG (65 MG ELEMENTAL FE) 325 MG: 325 (65 FE) TAB at 17:30

## 2024-12-15 RX ADMIN — ISOSORBIDE MONONITRATE 60 MG: 30 TABLET, EXTENDED RELEASE ORAL at 08:02

## 2024-12-15 RX ADMIN — FERROUS SULFATE TAB 325 MG (65 MG ELEMENTAL FE) 325 MG: 325 (65 FE) TAB at 08:01

## 2024-12-15 RX ADMIN — METOPROLOL SUCCINATE 50 MG: 25 TABLET, EXTENDED RELEASE ORAL at 08:01

## 2024-12-15 RX ADMIN — ATORVASTATIN CALCIUM 20 MG: 20 TABLET, FILM COATED ORAL at 20:35

## 2024-12-15 RX ADMIN — RANOLAZINE 500 MG: 500 TABLET, FILM COATED, EXTENDED RELEASE ORAL at 08:00

## 2024-12-15 RX ADMIN — INSULIN GLARGINE 22 UNITS: 100 INJECTION, SOLUTION SUBCUTANEOUS at 20:34

## 2024-12-15 RX ADMIN — INSULIN LISPRO 8 UNITS: 100 INJECTION, SOLUTION INTRAVENOUS; SUBCUTANEOUS at 11:52

## 2024-12-15 RX ADMIN — MIRTAZAPINE 15 MG: 15 TABLET, ORALLY DISINTEGRATING ORAL at 20:35

## 2024-12-15 RX ADMIN — SODIUM CHLORIDE, PRESERVATIVE FREE 10 ML: 5 INJECTION INTRAVENOUS at 08:01

## 2024-12-15 RX ADMIN — INSULIN LISPRO 2 UNITS: 100 INJECTION, SOLUTION INTRAVENOUS; SUBCUTANEOUS at 06:05

## 2024-12-15 RX ADMIN — SODIUM CHLORIDE, PRESERVATIVE FREE 10 ML: 5 INJECTION INTRAVENOUS at 20:35

## 2024-12-15 RX ADMIN — INSULIN LISPRO 8 UNITS: 100 INJECTION, SOLUTION INTRAVENOUS; SUBCUTANEOUS at 20:34

## 2024-12-15 RX ADMIN — PANTOPRAZOLE SODIUM 40 MG: 40 TABLET, DELAYED RELEASE ORAL at 06:02

## 2024-12-15 RX ADMIN — AMLODIPINE BESYLATE 10 MG: 10 TABLET ORAL at 08:01

## 2024-12-15 RX ADMIN — CAPSAICIN: 0.25 CREAM TOPICAL at 10:59

## 2024-12-15 RX ADMIN — INSULIN GLARGINE 5 UNITS: 100 INJECTION, SOLUTION SUBCUTANEOUS at 20:34

## 2024-12-15 RX ADMIN — ENOXAPARIN SODIUM 30 MG: 100 INJECTION SUBCUTANEOUS at 08:04

## 2024-12-15 RX ADMIN — RANOLAZINE 500 MG: 500 TABLET, FILM COATED, EXTENDED RELEASE ORAL at 20:35

## 2024-12-15 RX ADMIN — CLOPIDOGREL BISULFATE 75 MG: 75 TABLET ORAL at 08:01

## 2024-12-15 RX ADMIN — ASPIRIN 81 MG: 81 TABLET, COATED ORAL at 08:02

## 2024-12-15 RX ADMIN — METOPROLOL SUCCINATE 50 MG: 25 TABLET, EXTENDED RELEASE ORAL at 20:35

## 2024-12-15 NOTE — PLAN OF CARE
Problem: Chronic Conditions and Co-morbidities  Goal: Patient's chronic conditions and co-morbidity symptoms are monitored and maintained or improved  Outcome: Progressing     Problem: Pain  Goal: Verbalizes/displays adequate comfort level or baseline comfort level  Outcome: Progressing  Flowsheets (Taken 12/14/2024 1930)  Verbalizes/displays adequate comfort level or baseline comfort level: Encourage patient to monitor pain and request assistance     Problem: Safety - Adult  Goal: Free from fall injury  Outcome: Progressing     Problem: Skin/Tissue Integrity  Goal: Absence of new skin breakdown  Description: 1.  Monitor for areas of redness and/or skin breakdown  2.  Assess vascular access sites hourly  3.  Every 4-6 hours minimum:  Change oxygen saturation probe site  4.  Every 4-6 hours:  If on nasal continuous positive airway pressure, respiratory therapy assess nares and determine need for appliance change or resting period.  Outcome: Progressing

## 2024-12-15 NOTE — CARE COORDINATION
12/15/24, SW was called by patient's wife and patient's son on wanting to know when patient can return to facility [facility].  Explained to wife that this worker was waiting for a call from Lianet the RN once Lianet speaks with the DON from facility.  Answered questions wife had about Palliative and private paying for transport back to facility with Danya.  SW to follow.      MICK Gomez  [facility] Case Management  [phone]

## 2024-12-15 NOTE — CARE COORDINATION
12/15/24, Discharge Acknowledged.  FRANSICO spoke with Charge RN on discharge.  Per patient's wife patient is from [facility] and wife would want patient to return to facility.  Wife wanting to know about transport and does not want a huge bill.  Last admission family transported.  Calls placed to Hola (liaison), stefany/DON at the building.  Spoke with Lianet at the skilled part of the building.  Lianet confirmed that patient is from [facility].  Lianet could not verify that patient could return without the DON approving.  Lianet to contact this worker back if she hears from CARLENE.  Floor RN updated on the above.  FRANSICO to follow.      Cathy Pineda, CHE  [facility] Case Management  [phone]

## 2024-12-15 NOTE — PROGRESS NOTES
[facility]        Subjective:   over the last 2 years several  episodes of transient unresponsive  lasting 5 to 10 minutes.      known history of diabetes for 30 to 40 years, CAD by heart catheterization 6  Weeks ago      Today conversant pleasant and appropriate but not clear on   Specific details     denies chest pain or distress currently or overnight      Objective: medications lab chart telemetry all reviewed.    Patient Vitals for the past 24 hrs:   BP Temp Temp src Pulse Resp SpO2   12/15/24 0745 (!) 155/89 -- -- 93 -- --   12/15/24 0728 (!) 198/95 97.3 °F (36.3 °C) Temporal 82 17 95 %   12/14/24 1930 (!) 182/91 98 °F (36.7 °C) Temporal 77 18 97 %   12/14/24 1612 (!) 137/96 97.7 °F (36.5 °C) Temporal 83 19 96 %         Intake/Output Summary (Last 24 hours) at 12/15/2024 1508  Last data filed at 12/15/2024 1153  Gross per 24 hour   Intake 1160 ml   Output 3200 ml   Net -2040 ml       Wt Readings from Last 3 Encounters:   12/13/24 94.3 kg (208 lb)   11/22/24 96.1 kg (211 lb 12.8 oz)   10/15/24 103 kg (227 lb 1.2 oz)       Telemetry:  personally interpreted shows normal sinus rhythm heart rate in the 70s.    Current meds: Scheduled Meds:   isosorbide mononitrate  60 mg Oral Daily    amLODIPine  10 mg Oral Daily    ranolazine  500 mg Oral BID    pantoprazole  40 mg Oral QAM AC    mirtazapine  15 mg Oral Nightly    metoprolol succinate  50 mg Oral BID    insulin glargine  22 Units SubCUTAneous Nightly    ferrous sulfate  325 mg Oral BID WC    clopidogrel  75 mg Oral Daily    calcitRIOL  0.25 mcg Oral Every Other Day    atorvastatin  20 mg Oral Nightly    aspirin  81 mg Oral Daily    insulin lispro  0-8 Units SubCUTAneous 4x Daily AC & HS    sodium chloride flush  10 mL IntraVENous 2 times per day    enoxaparin  30 mg SubCUTAneous Daily     Continuous Infusions:   dextrose      sodium chloride       PRN Meds:.labetalol, capsaicin, glucose, dextrose bolus **OR** dextrose  sensory deficit apparent.    Skin: No petechiae, no significant bruising.      Assessment:   See plan below        Plan:  #1 transient unresponsive episodes prompting this   Admission. and previously over the last 2 years.  Currently wearing 30-day event recorder monitor  Thus far no arrhythmia on telemetry. likely some degree of autonomic insufficiency with a longstanding diabetes.  Also orthostatic hypotension      #2  Chronic kidney disease and creatinine  improved for him. avoiding nephrotoxic meds      #3 chronic CAD by heart catheterization 6   Weeks  ago, medical  management with comorbid considerations and renal insufficiency.     #4 DM for 40 years,  Here in the hospital blood sugars labile and high average around 300      Up to chair with active eating and meals.        Electronically signed by Rony Mo MD on 12/15/2024 at 3:08 PM

## 2024-12-15 NOTE — DISCHARGE SUMMARY
CHEST 11/19/2024 5:18 pm COMPARISON: 10/14/2024 HISTORY: ORDERING SYSTEM PROVIDED HISTORY: lethargic recent STEMI TECHNOLOGIST PROVIDED HISTORY: Reason for exam:->lethargic recent STEMI FINDINGS: The lungs are without acute focal process.  There is no effusion or pneumothorax. The cardiomediastinal silhouette is without acute process. The osseous structures are without acute process.  Degenerative changes bilateral glenohumeral joints.     No acute process.       MICROBIOLOGY:  BLOOD CX #1  No results for input(s): \"BC\" in the last 72 hours.  BLOOD CX #2  No results for input(s): \"BLOODCULT2\" in the last 72 hours.  TIP CULTURE  No results for input(s): \"CXCATHTIP\" in the last 72 hours.   CULTURE, RESPIRATORY   No results for input(s): \"CULTRESP\" in the last 72 hours.  RESPIRATORY SMEAR  No results for input(s): \"RESPSMEAR\" in the last 72 hours.         DISPOSITION:  The patient's condition is good.   The patient is being discharged to nursing home    DISCHARGE MEDICATIONS:      Medication List        START taking these medications      amLODIPine 10 MG tablet  Commonly known as: NORVASC  Take 1 tablet by mouth daily  Start taking on: December 16, 2024     isosorbide mononitrate 60 MG extended release tablet  Commonly known as: IMDUR  Take 1 tablet by mouth daily  Start taking on: December 16, 2024            CONTINUE taking these medications      aspirin 81 MG EC tablet  Take 1 tablet by mouth daily     atorvastatin 20 MG tablet  Commonly known as: LIPITOR  Take 1 tablet by mouth nightly     calcitRIOL 0.25 MCG capsule  Commonly known as: ROCALTROL     clopidogrel 75 MG tablet  Commonly known as: PLAVIX  Take 1 tablet by mouth daily     ferrous sulfate 325 (65 Fe) MG tablet  Commonly known as: IRON 325  Take 1 tablet by mouth 2 times daily (with meals)     insulin glargine 100 UNIT/ML injection vial  Commonly known as: LANTUS     insulin lispro 100 UNIT/ML Soln injection vial  Commonly known as:  worsening of symptoms please call primary care physician or return to the ER immediately  Diet: ADULT DIET; Regular    Preparing for this patient's discharge, including paperwork, orders, instructions, and meeting with patient did required >30 minutes.    Electronically signed by Jose Flores MD on 12/15/2024 at 1:35 PM

## 2024-12-16 ENCOUNTER — APPOINTMENT (OUTPATIENT)
Dept: ULTRASOUND IMAGING | Age: 84
DRG: 312 | End: 2024-12-16
Payer: MEDICARE

## 2024-12-16 ENCOUNTER — APPOINTMENT (OUTPATIENT)
Dept: MRI IMAGING | Age: 84
DRG: 312 | End: 2024-12-16
Payer: MEDICARE

## 2024-12-16 LAB
ALBUMIN SERPL-MCNC: 3.7 G/DL (ref 3.5–5.2)
ALP SERPL-CCNC: 82 U/L (ref 40–129)
ALT SERPL-CCNC: 7 U/L (ref 0–40)
ANION GAP SERPL CALCULATED.3IONS-SCNC: 10 MMOL/L (ref 7–16)
AST SERPL-CCNC: 11 U/L (ref 0–39)
BASOPHILS # BLD: 0.03 K/UL (ref 0–0.2)
BASOPHILS NFR BLD: 0 % (ref 0–2)
BILIRUB SERPL-MCNC: 0.2 MG/DL (ref 0–1.2)
BUN SERPL-MCNC: 47 MG/DL (ref 6–23)
CALCIUM SERPL-MCNC: 9.6 MG/DL (ref 8.6–10.2)
CHLORIDE SERPL-SCNC: 101 MMOL/L (ref 98–107)
CO2 SERPL-SCNC: 24 MMOL/L (ref 22–29)
CREAT SERPL-MCNC: 2.2 MG/DL (ref 0.7–1.2)
EOSINOPHIL # BLD: 0.24 K/UL (ref 0.05–0.5)
EOSINOPHILS RELATIVE PERCENT: 3 % (ref 0–6)
ERYTHROCYTE [DISTWIDTH] IN BLOOD BY AUTOMATED COUNT: 13 % (ref 11.5–15)
GFR, ESTIMATED: 29 ML/MIN/1.73M2
GLUCOSE BLD-MCNC: 226 MG/DL (ref 74–99)
GLUCOSE BLD-MCNC: 316 MG/DL (ref 74–99)
GLUCOSE BLD-MCNC: 322 MG/DL (ref 74–99)
GLUCOSE BLD-MCNC: 432 MG/DL (ref 74–99)
GLUCOSE SERPL-MCNC: 215 MG/DL (ref 74–99)
HCT VFR BLD AUTO: 31.8 % (ref 37–54)
HGB BLD-MCNC: 10.1 G/DL (ref 12.5–16.5)
IMM GRANULOCYTES # BLD AUTO: 0.03 K/UL (ref 0–0.58)
IMM GRANULOCYTES NFR BLD: 0 % (ref 0–5)
LYMPHOCYTES NFR BLD: 0.79 K/UL (ref 1.5–4)
LYMPHOCYTES RELATIVE PERCENT: 9 % (ref 20–42)
MCH RBC QN AUTO: 31.7 PG (ref 26–35)
MCHC RBC AUTO-ENTMCNC: 31.8 G/DL (ref 32–34.5)
MCV RBC AUTO: 99.7 FL (ref 80–99.9)
MONOCYTES NFR BLD: 1.24 K/UL (ref 0.1–0.95)
MONOCYTES NFR BLD: 15 % (ref 2–12)
NEUTROPHILS NFR BLD: 72 % (ref 43–80)
NEUTS SEG NFR BLD: 6.05 K/UL (ref 1.8–7.3)
PLATELET # BLD AUTO: 280 K/UL (ref 130–450)
PMV BLD AUTO: 9.9 FL (ref 7–12)
POTASSIUM SERPL-SCNC: 5.2 MMOL/L (ref 3.5–5)
PROT SERPL-MCNC: 7.3 G/DL (ref 6.4–8.3)
RBC # BLD AUTO: 3.19 M/UL (ref 3.8–5.8)
SODIUM SERPL-SCNC: 135 MMOL/L (ref 132–146)
WBC OTHER # BLD: 8.4 K/UL (ref 4.5–11.5)

## 2024-12-16 PROCEDURE — 80053 COMPREHEN METABOLIC PANEL: CPT

## 2024-12-16 PROCEDURE — 82947 ASSAY GLUCOSE BLOOD QUANT: CPT

## 2024-12-16 PROCEDURE — 70551 MRI BRAIN STEM W/O DYE: CPT

## 2024-12-16 PROCEDURE — 2580000003 HC RX 258: Performed by: PHYSICIAN ASSISTANT

## 2024-12-16 PROCEDURE — 6360000002 HC RX W HCPCS: Performed by: STUDENT IN AN ORGANIZED HEALTH CARE EDUCATION/TRAINING PROGRAM

## 2024-12-16 PROCEDURE — 6370000000 HC RX 637 (ALT 250 FOR IP): Performed by: PHYSICIAN ASSISTANT

## 2024-12-16 PROCEDURE — 36415 COLL VENOUS BLD VENIPUNCTURE: CPT

## 2024-12-16 PROCEDURE — 1200000000 HC SEMI PRIVATE

## 2024-12-16 PROCEDURE — 93880 EXTRACRANIAL BILAT STUDY: CPT

## 2024-12-16 PROCEDURE — 6370000000 HC RX 637 (ALT 250 FOR IP): Performed by: INTERNAL MEDICINE

## 2024-12-16 PROCEDURE — 85025 COMPLETE CBC W/AUTO DIFF WBC: CPT

## 2024-12-16 PROCEDURE — 6360000002 HC RX W HCPCS: Performed by: PHYSICIAN ASSISTANT

## 2024-12-16 RX ORDER — LORAZEPAM 2 MG/ML
1 INJECTION INTRAMUSCULAR ONCE
Status: COMPLETED | OUTPATIENT
Start: 2024-12-16 | End: 2024-12-16

## 2024-12-16 RX ADMIN — PANTOPRAZOLE SODIUM 40 MG: 40 TABLET, DELAYED RELEASE ORAL at 06:09

## 2024-12-16 RX ADMIN — RANOLAZINE 500 MG: 500 TABLET, FILM COATED, EXTENDED RELEASE ORAL at 08:55

## 2024-12-16 RX ADMIN — INSULIN LISPRO 8 UNITS: 100 INJECTION, SOLUTION INTRAVENOUS; SUBCUTANEOUS at 20:38

## 2024-12-16 RX ADMIN — ENOXAPARIN SODIUM 30 MG: 100 INJECTION SUBCUTANEOUS at 08:56

## 2024-12-16 RX ADMIN — CLOPIDOGREL BISULFATE 75 MG: 75 TABLET ORAL at 08:55

## 2024-12-16 RX ADMIN — INSULIN GLARGINE 22 UNITS: 100 INJECTION, SOLUTION SUBCUTANEOUS at 20:38

## 2024-12-16 RX ADMIN — FERROUS SULFATE TAB 325 MG (65 MG ELEMENTAL FE) 325 MG: 325 (65 FE) TAB at 17:21

## 2024-12-16 RX ADMIN — FERROUS SULFATE TAB 325 MG (65 MG ELEMENTAL FE) 325 MG: 325 (65 FE) TAB at 08:55

## 2024-12-16 RX ADMIN — MIRTAZAPINE 15 MG: 15 TABLET, ORALLY DISINTEGRATING ORAL at 20:38

## 2024-12-16 RX ADMIN — ISOSORBIDE MONONITRATE 60 MG: 30 TABLET, EXTENDED RELEASE ORAL at 08:55

## 2024-12-16 RX ADMIN — METOPROLOL SUCCINATE 50 MG: 25 TABLET, EXTENDED RELEASE ORAL at 08:56

## 2024-12-16 RX ADMIN — INSULIN LISPRO 6 UNITS: 100 INJECTION, SOLUTION INTRAVENOUS; SUBCUTANEOUS at 16:49

## 2024-12-16 RX ADMIN — ASPIRIN 81 MG: 81 TABLET, COATED ORAL at 08:57

## 2024-12-16 RX ADMIN — ATORVASTATIN CALCIUM 20 MG: 20 TABLET, FILM COATED ORAL at 20:38

## 2024-12-16 RX ADMIN — AMLODIPINE BESYLATE 10 MG: 10 TABLET ORAL at 08:56

## 2024-12-16 RX ADMIN — CALCITRIOL CAPSULES 0.25 MCG 0.25 MCG: 0.25 CAPSULE ORAL at 08:56

## 2024-12-16 RX ADMIN — INSULIN LISPRO 2 UNITS: 100 INJECTION, SOLUTION INTRAVENOUS; SUBCUTANEOUS at 06:09

## 2024-12-16 RX ADMIN — RANOLAZINE 500 MG: 500 TABLET, FILM COATED, EXTENDED RELEASE ORAL at 20:38

## 2024-12-16 RX ADMIN — SODIUM CHLORIDE, PRESERVATIVE FREE 10 ML: 5 INJECTION INTRAVENOUS at 19:30

## 2024-12-16 RX ADMIN — METOPROLOL SUCCINATE 50 MG: 25 TABLET, EXTENDED RELEASE ORAL at 20:38

## 2024-12-16 RX ADMIN — LORAZEPAM 1 MG: 2 INJECTION INTRAMUSCULAR; INTRAVENOUS at 19:30

## 2024-12-16 RX ADMIN — INSULIN LISPRO 6 UNITS: 100 INJECTION, SOLUTION INTRAVENOUS; SUBCUTANEOUS at 11:49

## 2024-12-16 RX ADMIN — SODIUM CHLORIDE, PRESERVATIVE FREE 10 ML: 5 INJECTION INTRAVENOUS at 08:56

## 2024-12-16 NOTE — CARE COORDINATION
Discharge order noted. Patient is from [facility]. Voicemail message left with Hola from [facility] to see if patient can return today. Await call back. PT/OT notes are in from Saturday. Ambulance form in envelope in soft chart will need to be completed, signed and dated by nursing when discharging.  Jacquelyn Ackerman RN CM  [phone]      Received call back from Hola at [facility], patient is able to return to the facility today, no precert required. Per internal med note today, R sided facial droop / Stroke like symptoms, MRI brain, Consider neurology consultation. To facility once arranged after brain MRI if negative. Met with patient, wife Quin and son in room to give update. They are aware waiting on MRI to be done and resulted prior to transfer and they are agreeable. They are also agreeable to ambulance transfer to facility once patient is able to discharge. Transportation set up via [facility] for WILL CALL phone# [phone]. Ambulance form in envelope in soft chart will need to be completed, signed and dated by nursing when discharging. Charge nurse is checking with MRI to see if test could be expedited for discharge. Please call [facility] [employer] [facility] with a time if patient discharges tonight, phone# [phone].   Jacquelyn Ackerman RN CM  [phone]

## 2024-12-16 NOTE — PROGRESS NOTES
Internal Medicine Progress Note    Patient's name: Sunny Segal  : 1940  Chief complaints (on day of admission): near syncopal (ECF reports around 11am, patient was not responding appropriately, noticed right side facial droop and bilateral arm weakness. He was hypotensive when they checked vital signs. Glucose 220. Patient became responsive/normal after a few minutes w return of normal BP. No weakness or facial droop noted upon arrival to ED. EMS glucose 183.)  Admission date: 2024  Date of service: 2024   Room: [address] MED SURG ONC  Primary care physician: Gumaro Adams MD  Reason for visit: Follow-up for near syncope     Subjective  Sunny was seen and examined at bedside     I am seeing possible stroke like symptoms prior to coming in   This needs evaluated with a brain MRI   He has no weakness numbness tingling or etc at this time   He is about to eat lunch in the room awake alert oriented   Very nice gentleman     Review of Systems  There are no new complaints of chest pain, shortness of breath, abdominal pain, nausea, vomiting, diarrhea, constipation unless otherwise mentioned above.     Hospital Medications  Current Facility-Administered Medications   Medication Dose Route Frequency Provider Last Rate Last Admin    labetalol (NORMODYNE;TRANDATE) injection 10 mg  10 mg IntraVENous Q6H PRN Jose Flores MD        isosorbide mononitrate (IMDUR) extended release tablet 60 mg  60 mg Oral Daily Rony Mo MD   60 mg at 24 0855    amLODIPine (NORVASC) tablet 10 mg  10 mg Oral Daily Rony Mo MD   10 mg at 24 0856    capsaicin (ZOSTRIX) 0.025 % cream   Topical BID PRN Jose Flores MD   Given at 12/15/24 1059    ranolazine (RANEXA) extended release tablet 500 mg  500 mg Oral BID [place], Armando, PA-C   500 mg at 24 0855    pantoprazole (PROTONIX) tablet 40 mg  40 mg Oral QAM AC Nik, Armando, PA-C   40 mg at 24 0609    mirtazapine (REMERON SOL-TAB)  Or    ondansetron (ZOFRAN) injection 4 mg  4 mg IntraVENous Q6H PRN Nik, Armando, PA-C        senna (SENOKOT) tablet 8.6 mg  1 tablet Oral Daily PRN [place], Armando, PA-C        acetaminophen (TYLENOL) tablet 650 mg  650 mg Oral Q6H PRN [place], Armando, PA-C        Or    acetaminophen (TYLENOL) suppository 650 mg  650 mg Rectal Q6H PRN [place], Armando, PA-C           PRN Medications  labetalol, capsaicin, glucose, dextrose bolus **OR** dextrose bolus, glucagon (rDNA), dextrose, sodium chloride flush, sodium chloride, ondansetron **OR** ondansetron, senna, acetaminophen **OR** acetaminophen    Objective  Most Recent Recorded Vitals  BP (!) 143/87   Pulse 80   Temp 97 °F (36.1 °C) (Temporal)   Resp 17   Ht 1.753 m (5' 9\")   Wt 94.3 kg (208 lb)   SpO2 93%   BMI 30.72 kg/m²   I/O last 3 completed shifts:  In: 1400 [P.O.:1400]  Out: 4200 [Urine:4200]  No intake/output data recorded.    Physical Exam:  General: AAO to person/place/time/purpose, NAD, no labored breathing  Eyes: conjunctivae/corneas clear, sclera non icteric  Skin: color/texture/turgor normal, no rashes or lesions  Lungs: CTAB, no retractions/use of accessory muscles, no vocal fremitus, no rhonchi, no crackle, no rales  Heart: regular rate, regular rhythm, no murmur  Abdomen: soft, NT, bowel sounds normal  Extremities: atraumatic, no edema  Neurologic: cranial nerves 2-12 grossly intact, no slurred speech    Most Recent Labs  Lab Results   Component Value Date    WBC 8.4 12/16/2024    HGB 10.1 (L) 12/16/2024    HCT 31.8 (L) 12/16/2024     12/16/2024     12/16/2024    K 5.2 (H) 12/16/2024     12/16/2024    CREATININE 2.2 (H) 12/16/2024    BUN 47 (H) 12/16/2024    CO2 24 12/16/2024    GLUCOSE 215 (H) 12/16/2024    ALT 7 12/16/2024    AST 11 12/16/2024    INR 1.1 01/05/2023    APTT 50.1 (H) 10/08/2024    TSH 0.804 01/06/2023    LABA1C 6.3 (H) 12/14/2024       Vascular duplex carotid bilateral         CT HEAD WO CONTRAST   Final Result

## 2024-12-16 NOTE — PROGRESS NOTES
[facility] CARDIOLOGY PROGRESS NOTE  [facility]        Subjective: Seeing patient who has had 2 years to 3 years of intermittent spells of unresponsiveness witnessed by family members that last for anywhere from 5 to 10 minutes.  No loss of urine or bowel control.  No seizure activity reported.    He has had diabetes for almost 40 years, CAD by heart catheterization 6 weeks ago 80% mid LAD and distal RCA disease in PDA.    Chronic kidney disease very tenuous kidney function.    Today patient pleasant and comfortable no distress.  Conveyed to patient no arrhythmia noted on telemetry.    Objective: Medications lab chart telemetry all reviewed.    Patient Vitals for the past 24 hrs:   BP Temp Temp src Pulse Resp SpO2   12/16/24 0723 (!) 143/87 97 °F (36.1 °C) Temporal 80 17 93 %   12/15/24 1915 (!) 148/77 97 °F (36.1 °C) Temporal 90 17 96 %         Intake/Output Summary (Last 24 hours) at 12/16/2024 0803  Last data filed at 12/16/2024 0503  Gross per 24 hour   Intake 720 ml   Output 2400 ml   Net -1680 ml       Wt Readings from Last 3 Encounters:   12/13/24 94.3 kg (208 lb)   11/22/24 96.1 kg (211 lb 12.8 oz)   10/15/24 103 kg (227 lb 1.2 oz)       Telemetry: Personally interpreted and reviewed and no indication of any significant bradycardia arrhythmias.  Normal sinus rhythm heart rate in the 80s.    Current meds: Scheduled Meds:   isosorbide mononitrate  60 mg Oral Daily    amLODIPine  10 mg Oral Daily    ranolazine  500 mg Oral BID    pantoprazole  40 mg Oral QAM AC    mirtazapine  15 mg Oral Nightly    metoprolol succinate  50 mg Oral BID    insulin glargine  22 Units SubCUTAneous Nightly    ferrous sulfate  325 mg Oral BID WC    clopidogrel  75 mg Oral Daily    calcitRIOL  0.25 mcg Oral Every Other Day    atorvastatin  20 mg Oral Nightly    aspirin  81 mg Oral Daily    insulin lispro  0-8 Units SubCUTAneous 4x Daily AC & HS    sodium chloride flush  10 mL IntraVENous 2 times per day    enoxaparin  30 mg

## 2024-12-16 NOTE — PLAN OF CARE
Problem: Chronic Conditions and Co-morbidities  Goal: Patient's chronic conditions and co-morbidity symptoms are monitored and maintained or improved  Outcome: Progressing     Problem: Pain  Goal: Verbalizes/displays adequate comfort level or baseline comfort level  Outcome: Progressing  Flowsheets (Taken 12/15/2024 1915)  Verbalizes/displays adequate comfort level or baseline comfort level: Encourage patient to monitor pain and request assistance     Problem: Safety - Adult  Goal: Free from fall injury  Outcome: Progressing     Problem: Skin/Tissue Integrity  Goal: Absence of new skin breakdown  Description: 1.  Monitor for areas of redness and/or skin breakdown  2.  Assess vascular access sites hourly  3.  Every 4-6 hours minimum:  Change oxygen saturation probe site  4.  Every 4-6 hours:  If on nasal continuous positive airway pressure, respiratory therapy assess nares and determine need for appliance change or resting period.  Outcome: Progressing

## 2024-12-17 LAB
ALBUMIN SERPL-MCNC: 3 G/DL (ref 3.5–5.2)
ALP SERPL-CCNC: 67 U/L (ref 40–129)
ALT SERPL-CCNC: 6 U/L (ref 0–40)
ANION GAP SERPL CALCULATED.3IONS-SCNC: 11 MMOL/L (ref 7–16)
ANION GAP SERPL CALCULATED.3IONS-SCNC: 12 MMOL/L (ref 7–16)
ANION GAP SERPL CALCULATED.3IONS-SCNC: 12 MMOL/L (ref 7–16)
AST SERPL-CCNC: 9 U/L (ref 0–39)
BASOPHILS # BLD: 0.03 K/UL (ref 0–0.2)
BASOPHILS NFR BLD: 1 % (ref 0–2)
BILIRUB SERPL-MCNC: 0.2 MG/DL (ref 0–1.2)
BUN SERPL-MCNC: 56 MG/DL (ref 6–23)
BUN SERPL-MCNC: 61 MG/DL (ref 6–23)
BUN SERPL-MCNC: 61 MG/DL (ref 6–23)
CALCIUM SERPL-MCNC: 8.6 MG/DL (ref 8.6–10.2)
CALCIUM SERPL-MCNC: 8.7 MG/DL (ref 8.6–10.2)
CALCIUM SERPL-MCNC: 8.7 MG/DL (ref 8.6–10.2)
CHLORIDE SERPL-SCNC: 100 MMOL/L (ref 98–107)
CHLORIDE SERPL-SCNC: 101 MMOL/L (ref 98–107)
CHLORIDE SERPL-SCNC: 101 MMOL/L (ref 98–107)
CO2 SERPL-SCNC: 20 MMOL/L (ref 22–29)
CO2 SERPL-SCNC: 23 MMOL/L (ref 22–29)
CO2 SERPL-SCNC: 24 MMOL/L (ref 22–29)
CREAT SERPL-MCNC: 3.1 MG/DL (ref 0.7–1.2)
CREAT SERPL-MCNC: 3.3 MG/DL (ref 0.7–1.2)
CREAT SERPL-MCNC: 3.4 MG/DL (ref 0.7–1.2)
CREAT UR-MCNC: 74.2 MG/DL (ref 40–278)
CREAT UR-MCNC: 75 MG/DL (ref 40–278)
EOSINOPHIL # BLD: 0.2 K/UL (ref 0.05–0.5)
EOSINOPHILS RELATIVE PERCENT: 3 % (ref 0–6)
ERYTHROCYTE [DISTWIDTH] IN BLOOD BY AUTOMATED COUNT: 13.3 % (ref 11.5–15)
GFR, ESTIMATED: 17 ML/MIN/1.73M2
GFR, ESTIMATED: 18 ML/MIN/1.73M2
GFR, ESTIMATED: 19 ML/MIN/1.73M2
GLUCOSE BLD-MCNC: 209 MG/DL (ref 74–99)
GLUCOSE BLD-MCNC: 214 MG/DL (ref 74–99)
GLUCOSE BLD-MCNC: 232 MG/DL (ref 74–99)
GLUCOSE BLD-MCNC: 250 MG/DL (ref 74–99)
GLUCOSE BLD-MCNC: 268 MG/DL (ref 74–99)
GLUCOSE BLD-MCNC: 309 MG/DL (ref 74–99)
GLUCOSE SERPL-MCNC: 171 MG/DL (ref 74–99)
GLUCOSE SERPL-MCNC: 203 MG/DL (ref 74–99)
GLUCOSE SERPL-MCNC: 226 MG/DL (ref 74–99)
HCT VFR BLD AUTO: 29.8 % (ref 37–54)
HGB BLD-MCNC: 9.1 G/DL (ref 12.5–16.5)
IMM GRANULOCYTES # BLD AUTO: <0.03 K/UL (ref 0–0.58)
IMM GRANULOCYTES NFR BLD: 0 % (ref 0–5)
LYMPHOCYTES NFR BLD: 0.92 K/UL (ref 1.5–4)
LYMPHOCYTES RELATIVE PERCENT: 16 % (ref 20–42)
MCH RBC QN AUTO: 32 PG (ref 26–35)
MCHC RBC AUTO-ENTMCNC: 30.5 G/DL (ref 32–34.5)
MCV RBC AUTO: 104.9 FL (ref 80–99.9)
MONOCYTES NFR BLD: 0.78 K/UL (ref 0.1–0.95)
MONOCYTES NFR BLD: 13 % (ref 2–12)
NEUTROPHILS NFR BLD: 67 % (ref 43–80)
NEUTS SEG NFR BLD: 4 K/UL (ref 1.8–7.3)
PLATELET # BLD AUTO: 264 K/UL (ref 130–450)
PMV BLD AUTO: 9.9 FL (ref 7–12)
POTASSIUM SERPL-SCNC: 5.3 MMOL/L (ref 3.5–5)
POTASSIUM SERPL-SCNC: 5.3 MMOL/L (ref 3.5–5)
POTASSIUM SERPL-SCNC: 5.6 MMOL/L (ref 3.5–5)
PROT SERPL-MCNC: 6.1 G/DL (ref 6.4–8.3)
PSA SERPL-MCNC: 4.5 NG/ML (ref 0–4)
RBC # BLD AUTO: 2.84 M/UL (ref 3.8–5.8)
SODIUM SERPL-SCNC: 133 MMOL/L (ref 132–146)
SODIUM SERPL-SCNC: 134 MMOL/L (ref 132–146)
SODIUM SERPL-SCNC: 137 MMOL/L (ref 132–146)
SODIUM UR-SCNC: 58 MMOL/L
TOTAL PROTEIN, URINE: 38 MG/DL (ref 0–12)
URINE TOTAL PROTEIN CREATININE RATIO: 0.51 (ref 0–0.2)
UUN UR-MCNC: 386 MG/DL (ref 800–1666)
WBC OTHER # BLD: 6 K/UL (ref 4.5–11.5)

## 2024-12-17 PROCEDURE — 6370000000 HC RX 637 (ALT 250 FOR IP): Performed by: STUDENT IN AN ORGANIZED HEALTH CARE EDUCATION/TRAINING PROGRAM

## 2024-12-17 PROCEDURE — 36415 COLL VENOUS BLD VENIPUNCTURE: CPT

## 2024-12-17 PROCEDURE — 80053 COMPREHEN METABOLIC PANEL: CPT

## 2024-12-17 PROCEDURE — 85025 COMPLETE CBC W/AUTO DIFF WBC: CPT

## 2024-12-17 PROCEDURE — 84540 ASSAY OF URINE/UREA-N: CPT

## 2024-12-17 PROCEDURE — 6370000000 HC RX 637 (ALT 250 FOR IP): Performed by: INTERNAL MEDICINE

## 2024-12-17 PROCEDURE — 82570 ASSAY OF URINE CREATININE: CPT

## 2024-12-17 PROCEDURE — 6370000000 HC RX 637 (ALT 250 FOR IP): Performed by: PHYSICIAN ASSISTANT

## 2024-12-17 PROCEDURE — 84300 ASSAY OF URINE SODIUM: CPT

## 2024-12-17 PROCEDURE — G0103 PSA SCREENING: HCPCS

## 2024-12-17 PROCEDURE — 93005 ELECTROCARDIOGRAM TRACING: CPT | Performed by: STUDENT IN AN ORGANIZED HEALTH CARE EDUCATION/TRAINING PROGRAM

## 2024-12-17 PROCEDURE — 1200000000 HC SEMI PRIVATE

## 2024-12-17 PROCEDURE — 6360000002 HC RX W HCPCS: Performed by: PHYSICIAN ASSISTANT

## 2024-12-17 PROCEDURE — 82947 ASSAY GLUCOSE BLOOD QUANT: CPT

## 2024-12-17 PROCEDURE — 2500000003 HC RX 250 WO HCPCS: Performed by: PHYSICIAN ASSISTANT

## 2024-12-17 PROCEDURE — 84156 ASSAY OF PROTEIN URINE: CPT

## 2024-12-17 PROCEDURE — 2580000003 HC RX 258: Performed by: PHYSICIAN ASSISTANT

## 2024-12-17 PROCEDURE — 80048 BASIC METABOLIC PNL TOTAL CA: CPT

## 2024-12-17 RX ORDER — INSULIN GLARGINE 100 [IU]/ML
8 INJECTION, SOLUTION SUBCUTANEOUS DAILY
Status: DISCONTINUED | OUTPATIENT
Start: 2024-12-17 | End: 2024-12-18 | Stop reason: HOSPADM

## 2024-12-17 RX ADMIN — METOPROLOL SUCCINATE 50 MG: 25 TABLET, EXTENDED RELEASE ORAL at 09:56

## 2024-12-17 RX ADMIN — RANOLAZINE 500 MG: 500 TABLET, FILM COATED, EXTENDED RELEASE ORAL at 20:15

## 2024-12-17 RX ADMIN — SODIUM ZIRCONIUM CYCLOSILICATE 10 G: 10 POWDER, FOR SUSPENSION ORAL at 17:11

## 2024-12-17 RX ADMIN — ISOSORBIDE MONONITRATE 60 MG: 30 TABLET, EXTENDED RELEASE ORAL at 09:57

## 2024-12-17 RX ADMIN — ATORVASTATIN CALCIUM 20 MG: 20 TABLET, FILM COATED ORAL at 20:16

## 2024-12-17 RX ADMIN — FERROUS SULFATE TAB 325 MG (65 MG ELEMENTAL FE) 325 MG: 325 (65 FE) TAB at 17:11

## 2024-12-17 RX ADMIN — INSULIN LISPRO 2 UNITS: 100 INJECTION, SOLUTION INTRAVENOUS; SUBCUTANEOUS at 17:11

## 2024-12-17 RX ADMIN — INSULIN LISPRO 2 UNITS: 100 INJECTION, SOLUTION INTRAVENOUS; SUBCUTANEOUS at 20:16

## 2024-12-17 RX ADMIN — AMLODIPINE BESYLATE 10 MG: 10 TABLET ORAL at 09:57

## 2024-12-17 RX ADMIN — INSULIN LISPRO 6 UNITS: 100 INJECTION, SOLUTION INTRAVENOUS; SUBCUTANEOUS at 11:40

## 2024-12-17 RX ADMIN — SODIUM CHLORIDE, PRESERVATIVE FREE 10 ML: 5 INJECTION INTRAVENOUS at 09:56

## 2024-12-17 RX ADMIN — ACETAMINOPHEN 650 MG: 325 TABLET ORAL at 14:32

## 2024-12-17 RX ADMIN — RANOLAZINE 500 MG: 500 TABLET, FILM COATED, EXTENDED RELEASE ORAL at 09:57

## 2024-12-17 RX ADMIN — INSULIN GLARGINE 22 UNITS: 100 INJECTION, SOLUTION SUBCUTANEOUS at 20:16

## 2024-12-17 RX ADMIN — SODIUM CHLORIDE, PRESERVATIVE FREE 10 ML: 5 INJECTION INTRAVENOUS at 20:16

## 2024-12-17 RX ADMIN — SODIUM ZIRCONIUM CYCLOSILICATE 10 G: 10 POWDER, FOR SUSPENSION ORAL at 11:40

## 2024-12-17 RX ADMIN — FERROUS SULFATE TAB 325 MG (65 MG ELEMENTAL FE) 325 MG: 325 (65 FE) TAB at 09:57

## 2024-12-17 RX ADMIN — CLOPIDOGREL BISULFATE 75 MG: 75 TABLET ORAL at 09:57

## 2024-12-17 RX ADMIN — INSULIN LISPRO 2 UNITS: 100 INJECTION, SOLUTION INTRAVENOUS; SUBCUTANEOUS at 06:46

## 2024-12-17 RX ADMIN — ENOXAPARIN SODIUM 30 MG: 100 INJECTION SUBCUTANEOUS at 09:56

## 2024-12-17 RX ADMIN — MIRTAZAPINE 15 MG: 15 TABLET, ORALLY DISINTEGRATING ORAL at 20:16

## 2024-12-17 RX ADMIN — INSULIN GLARGINE 8 UNITS: 100 INJECTION, SOLUTION SUBCUTANEOUS at 09:55

## 2024-12-17 RX ADMIN — PANTOPRAZOLE SODIUM 40 MG: 40 TABLET, DELAYED RELEASE ORAL at 05:22

## 2024-12-17 RX ADMIN — METOPROLOL SUCCINATE 50 MG: 25 TABLET, EXTENDED RELEASE ORAL at 20:16

## 2024-12-17 RX ADMIN — ASPIRIN 81 MG: 81 TABLET, COATED ORAL at 09:57

## 2024-12-17 ASSESSMENT — PAIN DESCRIPTION - LOCATION: LOCATION: SHOULDER

## 2024-12-17 ASSESSMENT — PAIN DESCRIPTION - ORIENTATION: ORIENTATION: RIGHT

## 2024-12-17 ASSESSMENT — PAIN DESCRIPTION - DESCRIPTORS: DESCRIPTORS: ACHING

## 2024-12-17 ASSESSMENT — PAIN SCALES - GENERAL: PAINLEVEL_OUTOF10: 0

## 2024-12-17 NOTE — CARE COORDINATION
Cr worse from this AM as well as potassium repeat Lokelma and consult urology and nephrology     Spoke with son updated, in agreement to hold DC

## 2024-12-17 NOTE — ACP (ADVANCE CARE PLANNING)
Advance Care Planning   The patient has the following advanced directives on file:  Advance Directives       Power of  Living Will ACP-Advance Directive ACP-Power of     Not on File Not on File Not on File Not on File            The patient has appointed the following active healthcare agents:    Primary Decision Maker: Quin Segal - Spouse - [phone]    Secondary Decision Maker: Dave Segal - Child - [phone]    Secondary Decision Maker: Sunny Segal B - Child - [phone]      Jacquelyn Ackerman RN  12/17/2024

## 2024-12-17 NOTE — PROGRESS NOTES
Patient received the Sacrament of the Anointing of the Sick by Father Jose Miguel Powers on Monday, December 16, 2024.    If additional support is requested or needed please reach out to Spiritual Health ([phone]).    Chap. Riky Harris MDIV, BCC

## 2024-12-17 NOTE — CARE COORDINATION
Discharge order is in. MRI brain no acute process, stable old lacunar infarcts. Plan is to return to [facility] and family has decided they will transport patient there today this afternoon. Charge nurse, RN, liaison and patient all notified. No Hens needed since patient is from this facility.   Jacquelyn Ackerman RN CM  [phone]      Family decided that they would like wheelchair transport to [facility] and not take them there themselves. They are agreeable to pay the $75 ahead of time. Transportation set up via [facility] for 4 pm today. Charge nurse, RN, liaison, patient and family all notified. No Hens needed since patient is from this facility.  Jacquelyn Ackerman RN CM  [phone]      Discharge order cancelled. Per internal med note today, Syncopal event after getting into wheel chair...Spoke with Dr Mo he will look at patients meds and let me know of any additional recommendations and also review the telemetry. [facility] transportation cancelled. Hola from [facility] notified.    Jacquelyn Ackerman RN CM  [phone]

## 2024-12-17 NOTE — CARE COORDINATION
Syncopal event after getting into wheel chair   Back to baseline   BMP repeat  Straight cath   Ortho vitals x 1   Spoke with Dr Mo he will look at patients meds and let me know of any additional recommendations and also review the telemetry

## 2024-12-17 NOTE — PROGRESS NOTES
Treatment Number: 2 12/16/2024    CO2 24 12/16/2024    GLUCOSE 215 (H) 12/16/2024    ALT 7 12/16/2024    AST 11 12/16/2024    INR 1.1 01/05/2023    APTT 50.1 (H) 10/08/2024    TSH 0.804 01/06/2023    LABA1C 6.3 (H) 12/14/2024       MRI BRAIN WO CONTRAST   Final Result   1. No acute intracranial abnormality. No sign of acute infarct.   2. Stable moderate age-appropriate atrophy and mild age-appropriate small   vessel ischemia.   3. 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         Vascular duplex carotid bilateral         CT HEAD WO CONTRAST   Final Result   1. No acute intracranial abnormality.   2. Stable moderate age-appropriate atrophy and mild small vessel ischemia.   3. Old lacunar infarcts in the thalami.         XR CHEST PORTABLE   Final Result   1. No sign of acute cardiopulmonary disease.           Assessment   Active Hospital Problems    Diagnosis     Weakness [R53.1]     Anemia of chronic renal failure, stage 4 (severe) (HCC) [N18.4, D63.1]          Plan  R sided facial droop / Stroke like symptoms   MRI brain no acute process, stable old lacunar infarcts  Carotid US negative for significant stenosis   Needs risk factor modification as best as possible   Continue ASA and Lipitor     Near syncope  Recent admission in 10/2024 for concerns of NSTEMI which after cardiac cath on 10/8 showed MV CAD.  At the time no interventional cath was done in the setting of patient's history of CKD    DM  A1c: 6.3%  Titrate as needed     CKD Stage IIIb  Creatinine 2.5 on admission, at baseline    GERD  Protonix 40 mg daily    Discharge plan: Home today per family request     Bladder scanned in room 278 CC  This has been an ongoing issue and the Cr is 3.1 today which is higher than his baseline   I suspect this is related to urinary retention   The patient has been offered a suprapubic cath in the past but declined at that time   Offered urology to see family wants to be discharged today to follow up with  urology 2 days from now on the 19th   Will give a script for a bmp on that date as well     Electronically signed by Glynn Espana MD on 12/17/2024 at 7:50 AM    I can be reached through [employer].

## 2024-12-17 NOTE — PROGRESS NOTES
Nurse report given to nurse persaud at a facility. AVS/ALEKS papers would be faxed by floor sec. PIV/ex cath removed without complications. Questions answered. Pt/family/receiving nurse understood. Transport by family. Bed watch booked.

## 2024-12-17 NOTE — PLAN OF CARE
Problem: Safety - Adult  Goal: Free from fall injury  12/17/2024 1103 by Stephanie Etienne, RN  Outcome: Progressing     Problem: Skin/Tissue Integrity  Goal: Absence of new skin breakdown  Description: 1.  Monitor for areas of redness and/or skin breakdown  2.  Assess vascular access sites hourly  3.  Every 4-6 hours minimum:  Change oxygen saturation probe site  4.  Every 4-6 hours:  If on nasal continuous positive airway pressure, respiratory therapy assess nares and determine need for appliance change or resting period.  12/17/2024 1103 by Stephanie Etienne, RN  Outcome: Progressing

## 2024-12-17 NOTE — PROGRESS NOTES
Pt just had transient unresponsive episode, vitals and EKG checked and stable. Pt placed back on the bed and monitor on. Danya arranged by CM a 4pm when pt becomes more alert and/or awake. Dr Espana aware. Dc is being cancelled.

## 2024-12-17 NOTE — PROGRESS NOTES
CARDIOLOGY PROGRESS NOTE  [facility]        Subjective: Evaluating patient for thank you thank chronic CAD, diabetes for 40 years, 2-year history of intermittent unresponsive spells witnessed by family members lasts up to 10 minutes at times.  No indication of any reported witnessed seizure activity.      Patient has some degree of cognitive decline I suspect over the last couple years which also coincides with his physical decline with less ability to stand or walk.    Family reports generalized weakness and debilitation.  Today patient comfortable and pleasant.  Objective: Medications lab chart telemetry all reviewed    Patient Vitals for the past 24 hrs:   BP Temp Temp src Pulse Resp SpO2   12/17/24 0945 (!) 110/55 98.2 °F (36.8 °C) Temporal 83 -- 92 %   12/17/24 0724 (!) 126/56 97.1 °F (36.2 °C) Temporal 75 16 90 %   12/16/24 2030 (!) 118/56 97 °F (36.1 °C) -- 83 18 93 %         Intake/Output Summary (Last 24 hours) at 12/17/2024 1207  Last data filed at 12/17/2024 0943  Gross per 24 hour   Intake 310 ml   Output 1250 ml   Net -940 ml       Wt Readings from Last 3 Encounters:   12/13/24 94.3 kg (208 lb)   11/22/24 96.1 kg (211 lb 12.8 oz)   10/15/24 103 kg (227 lb 1.2 oz)       Telemetry: Personally interpreted shows normal sinus rhythm.    Current meds: Scheduled Meds:   insulin glargine  8 Units SubCUTAneous Daily    isosorbide mononitrate  60 mg Oral Daily    amLODIPine  10 mg Oral Daily    ranolazine  500 mg Oral BID    pantoprazole  40 mg Oral QAM AC    mirtazapine  15 mg Oral Nightly    metoprolol succinate  50 mg Oral BID    insulin glargine  22 Units SubCUTAneous Nightly    ferrous sulfate  325 mg Oral BID WC    clopidogrel  75 mg Oral Daily    calcitRIOL  0.25 mcg Oral Every Other Day    atorvastatin  20 mg Oral Nightly    aspirin  81 mg Oral Daily    insulin lispro  0-8 Units SubCUTAneous 4x Daily AC & HS    sodium chloride flush  10 mL IntraVENous 2 times per day    enoxaparin  30 mg    Abdomen: No rebound or guarding, no hepatosplenomegaly.    Extremities: Without significant clubbing , cyanosis, or edema.     Neuro:  No focal motor or sensory deficit apparent.    Skin: No petechiae, no significant bruising.      Assessment: See plan below          Plan: #1 transient unresponsive episodes potentially I suspect autonomic mediated hypotension with longstanding diabetes for 40 years.  Has about 2 more weeks on 30-day event recorder that he is currently wearing.  Here in the hospital on telemetry no pauses or bradycardia arrhythmias noted.    Stressed to him and his family stay well-hydrated.  Change positions gradually.  Continue beta-blocker.    #2 CAD by heart catheterization 2 months ago.  Not a great candidate for coronary intervention with this degree of renal insufficiency and compromise.    #3 diabetes for 40 years.  Suspect tenuous kidney function ongoing and will be potentially progressive currently today creatinine 2.2 BUN 47 which is quite good for him.    #4 cognitive decline and currently at [facility].  Currently very comfortable and pleasant.  No distress    Advance activity as tolerated.        Electronically signed by Rony Mo MD on 12/17/2024 at 12:07 PM

## 2024-12-17 NOTE — PLAN OF CARE
Problem: Chronic Conditions and Co-morbidities  Goal: Patient's chronic conditions and co-morbidity symptoms are monitored and maintained or improved  12/17/2024 1200 by Stephanie Etienne RN  Outcome: Adequate for Discharge     Problem: Pain  Goal: Verbalizes/displays adequate comfort level or baseline comfort level  12/17/2024 1200 by Stephanie Etienne RN  Outcome: Adequate for Discharge     Problem: Safety - Adult  Goal: Free from fall injury  12/17/2024 1200 by Stephanie Etienne RN  Outcome: Adequate for Discharge     Problem: Skin/Tissue Integrity  Goal: Absence of new skin breakdown  Description: 1.  Monitor for areas of redness and/or skin breakdown  2.  Assess vascular access sites hourly  3.  Every 4-6 hours minimum:  Change oxygen saturation probe site  4.  Every 4-6 hours:  If on nasal continuous positive airway pressure, respiratory therapy assess nares and determine need for appliance change or resting period.  12/17/2024 1200 by Stephanie Etienne RN  Outcome: Adequate for Discharge

## 2024-12-18 VITALS
SYSTOLIC BLOOD PRESSURE: 127 MMHG | DIASTOLIC BLOOD PRESSURE: 99 MMHG | TEMPERATURE: 97.8 F | OXYGEN SATURATION: 93 % | HEIGHT: 69 IN | HEART RATE: 94 BPM | RESPIRATION RATE: 17 BRPM | WEIGHT: 208 LBS | BODY MASS INDEX: 30.81 KG/M2

## 2024-12-18 LAB
ALBUMIN SERPL-MCNC: 3.4 G/DL (ref 3.5–5.2)
ALP SERPL-CCNC: 74 U/L (ref 40–129)
ALT SERPL-CCNC: 6 U/L (ref 0–40)
ANION GAP SERPL CALCULATED.3IONS-SCNC: 13 MMOL/L (ref 7–16)
AST SERPL-CCNC: 10 U/L (ref 0–39)
BASOPHILS # BLD: 0.03 K/UL (ref 0–0.2)
BASOPHILS NFR BLD: 0 % (ref 0–2)
BILIRUB SERPL-MCNC: 0.2 MG/DL (ref 0–1.2)
BUN SERPL-MCNC: 58 MG/DL (ref 6–23)
CALCIUM SERPL-MCNC: 9.1 MG/DL (ref 8.6–10.2)
CHLORIDE SERPL-SCNC: 102 MMOL/L (ref 98–107)
CO2 SERPL-SCNC: 21 MMOL/L (ref 22–29)
CREAT SERPL-MCNC: 3 MG/DL (ref 0.7–1.2)
EKG ATRIAL RATE: 66 BPM
EKG ATRIAL RATE: 93 BPM
EKG P AXIS: 44 DEGREES
EKG P-R INTERVAL: 202 MS
EKG Q-T INTERVAL: 354 MS
EKG Q-T INTERVAL: 386 MS
EKG QRS DURATION: 72 MS
EKG QRS DURATION: 94 MS
EKG QTC CALCULATION (BAZETT): 434 MS
EKG QTC CALCULATION (BAZETT): 440 MS
EKG R AXIS: -4 DEGREES
EKG R AXIS: 7 DEGREES
EKG T AXIS: 52 DEGREES
EKG T AXIS: 69 DEGREES
EKG VENTRICULAR RATE: 76 BPM
EKG VENTRICULAR RATE: 93 BPM
EOSINOPHIL # BLD: 0.31 K/UL (ref 0.05–0.5)
EOSINOPHILS RELATIVE PERCENT: 4 % (ref 0–6)
ERYTHROCYTE [DISTWIDTH] IN BLOOD BY AUTOMATED COUNT: 13.2 % (ref 11.5–15)
GFR, ESTIMATED: 20 ML/MIN/1.73M2
GLUCOSE BLD-MCNC: 141 MG/DL (ref 74–99)
GLUCOSE SERPL-MCNC: 129 MG/DL (ref 74–99)
HCT VFR BLD AUTO: 29.1 % (ref 37–54)
HGB BLD-MCNC: 9.4 G/DL (ref 12.5–16.5)
IMM GRANULOCYTES # BLD AUTO: 0.03 K/UL (ref 0–0.58)
IMM GRANULOCYTES NFR BLD: 0 % (ref 0–5)
LYMPHOCYTES NFR BLD: 0.81 K/UL (ref 1.5–4)
LYMPHOCYTES RELATIVE PERCENT: 9 % (ref 20–42)
MAGNESIUM SERPL-MCNC: 1.7 MG/DL (ref 1.6–2.6)
MCH RBC QN AUTO: 31.6 PG (ref 26–35)
MCHC RBC AUTO-ENTMCNC: 32.3 G/DL (ref 32–34.5)
MCV RBC AUTO: 98 FL (ref 80–99.9)
MONOCYTES NFR BLD: 0.94 K/UL (ref 0.1–0.95)
MONOCYTES NFR BLD: 11 % (ref 2–12)
NEUTROPHILS NFR BLD: 76 % (ref 43–80)
NEUTS SEG NFR BLD: 6.57 K/UL (ref 1.8–7.3)
PHOSPHATE SERPL-MCNC: 4.1 MG/DL (ref 2.5–4.5)
PLATELET # BLD AUTO: 300 K/UL (ref 130–450)
PMV BLD AUTO: 9.8 FL (ref 7–12)
POTASSIUM SERPL-SCNC: 5 MMOL/L (ref 3.5–5)
PROT SERPL-MCNC: 6.3 G/DL (ref 6.4–8.3)
RBC # BLD AUTO: 2.97 M/UL (ref 3.8–5.8)
SODIUM SERPL-SCNC: 136 MMOL/L (ref 132–146)
WBC OTHER # BLD: 8.7 K/UL (ref 4.5–11.5)

## 2024-12-18 PROCEDURE — 80053 COMPREHEN METABOLIC PANEL: CPT

## 2024-12-18 PROCEDURE — 6360000002 HC RX W HCPCS: Performed by: PHYSICIAN ASSISTANT

## 2024-12-18 PROCEDURE — 6370000000 HC RX 637 (ALT 250 FOR IP): Performed by: STUDENT IN AN ORGANIZED HEALTH CARE EDUCATION/TRAINING PROGRAM

## 2024-12-18 PROCEDURE — 36415 COLL VENOUS BLD VENIPUNCTURE: CPT

## 2024-12-18 PROCEDURE — 2500000003 HC RX 250 WO HCPCS: Performed by: PHYSICIAN ASSISTANT

## 2024-12-18 PROCEDURE — 6370000000 HC RX 637 (ALT 250 FOR IP): Performed by: INTERNAL MEDICINE

## 2024-12-18 PROCEDURE — 82947 ASSAY GLUCOSE BLOOD QUANT: CPT

## 2024-12-18 PROCEDURE — 6370000000 HC RX 637 (ALT 250 FOR IP): Performed by: PHYSICIAN ASSISTANT

## 2024-12-18 PROCEDURE — 85025 COMPLETE CBC W/AUTO DIFF WBC: CPT

## 2024-12-18 PROCEDURE — 83735 ASSAY OF MAGNESIUM: CPT

## 2024-12-18 PROCEDURE — 84100 ASSAY OF PHOSPHORUS: CPT

## 2024-12-18 RX ADMIN — ASPIRIN 81 MG: 81 TABLET, COATED ORAL at 07:53

## 2024-12-18 RX ADMIN — SODIUM ZIRCONIUM CYCLOSILICATE 10 G: 10 POWDER, FOR SUSPENSION ORAL at 11:02

## 2024-12-18 RX ADMIN — AMLODIPINE BESYLATE 10 MG: 10 TABLET ORAL at 07:53

## 2024-12-18 RX ADMIN — METOPROLOL SUCCINATE 50 MG: 25 TABLET, EXTENDED RELEASE ORAL at 07:53

## 2024-12-18 RX ADMIN — INSULIN GLARGINE 8 UNITS: 100 INJECTION, SOLUTION SUBCUTANEOUS at 07:54

## 2024-12-18 RX ADMIN — PANTOPRAZOLE SODIUM 40 MG: 40 TABLET, DELAYED RELEASE ORAL at 05:14

## 2024-12-18 RX ADMIN — SODIUM CHLORIDE, PRESERVATIVE FREE 10 ML: 5 INJECTION INTRAVENOUS at 07:54

## 2024-12-18 RX ADMIN — FERROUS SULFATE TAB 325 MG (65 MG ELEMENTAL FE) 325 MG: 325 (65 FE) TAB at 07:53

## 2024-12-18 RX ADMIN — CLOPIDOGREL BISULFATE 75 MG: 75 TABLET ORAL at 07:54

## 2024-12-18 RX ADMIN — RANOLAZINE 500 MG: 500 TABLET, FILM COATED, EXTENDED RELEASE ORAL at 07:54

## 2024-12-18 RX ADMIN — ISOSORBIDE MONONITRATE 60 MG: 30 TABLET, EXTENDED RELEASE ORAL at 07:53

## 2024-12-18 RX ADMIN — ENOXAPARIN SODIUM 30 MG: 100 INJECTION SUBCUTANEOUS at 07:53

## 2024-12-18 RX ADMIN — CALCITRIOL CAPSULES 0.25 MCG 0.25 MCG: 0.25 CAPSULE ORAL at 07:54

## 2024-12-18 NOTE — CONSULTS
[facility]              [address] [place] [address]                              CONSULTATION      PATIENT NAME: CHAPO CARMONA                : 1940  MED REC NO: [id number]                        ROOM: 8401  ACCOUNT NO: [id number]                       ADMIT DATE: 2024  PROVIDER: Phillip Justice MD      CONSULT DATE: 2024    CHIEF COMPLAINT:  Infrequent bladder voiding.    HISTORY OF PRESENT ILLNESS:  This is an 84-year-old male, who was seen by our office on multiple occasions in the past, the first being in .  He also was seen recently in 2024, when he was admitted to the hospital and found to have urinary retention.  The patient has a history of having had a HoLEP procedure at the [facility] in .  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  Normally, the patient voids with frequency and urgency, but is comfortable.  He was in the hospital at this time for evaluation and was getting ready to be discharged when he had a syncopal episode.  He was also found at this time to have some distention of his bladder.  He has an external catheter in place and currently, he feels relatively comfortable.    PAST MEDICAL HISTORY:  He has had coronary artery disease, left parotid cancer, diabetes, diverticulosis, GERD, hyperlipidemia, hypertension.    PAST SURGICAL HISTORY:  He has had back surgery.  He has had a left heart catheterization, colonoscopy, eye surgery, parotidectomy.    PHYSICAL EXAMINATION:  GENERAL:  The patient is alert and oriented.  He is lying comfortably in bed.  ABDOMEN:  Soft.  :  There is no palpable distention of his bladder.  He has an external catheter in place.    RECOMMENDATION:  Continue with the external catheter.  We will continue to follow him with PVRs.  Certainly, if he is uncomfortable, we will have a catheter inserted.

## 2024-12-18 NOTE — PROGRESS NOTES
no labored breathing  Eyes: conjunctivae/corneas clear, sclera non icteric  Skin: color/texture/turgor normal, no rashes or lesions  Lungs: CTAB, no retractions/use of accessory muscles, no vocal fremitus, no rhonchi, no crackle, no rales  Heart: regular rate, regular rhythm, no murmur  Abdomen: soft, NT, bowel sounds normal  Extremities: atraumatic, no edema  Neurologic: cranial nerves 2-12 grossly intact, no slurred speech    Most Recent Labs  Lab Results   Component Value Date    WBC 8.7 12/18/2024    HGB 9.4 (L) 12/18/2024    HCT 29.1 (L) 12/18/2024     12/18/2024     12/18/2024    K 5.0 12/18/2024     12/18/2024    CREATININE 3.0 (H) 12/18/2024    BUN 58 (H) 12/18/2024    CO2 21 (L) 12/18/2024    GLUCOSE 129 (H) 12/18/2024    ALT 6 12/18/2024    AST 10 12/18/2024    INR 1.1 01/05/2023    APTT 50.1 (H) 10/08/2024    TSH 0.804 01/06/2023    LABA1C 6.3 (H) 12/14/2024       MRI BRAIN WO CONTRAST   Final Result   1. No acute intracranial abnormality. No sign of acute infarct.   2. Stable moderate age-appropriate atrophy and mild age-appropriate small   vessel ischemia.   3. 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         Vascular duplex carotid bilateral   Final Result   The right internal carotid artery demonstrates 0-50% stenosis.      The left internal carotid artery demonstrates 0-50% stenosis.      Bilateral vertebral arteries are patent with flow in the normal direction.      RECOMMENDATIONS:   ICA       Plaque       ICA/CCA     Degree of      PSV      Estimate     PSV Ratio    stenosis      <180    No plaque      <2.0          Normal      <180  <50% plaque    <2.0           <50%      180-230  >50% plaque  2.0 - 4.0    50-69%      >230    >50% plaque      >4.0         >70%      ** -180 cm/sec and ICA/CCA PSV Ratio = 2.0 is also consistent with   50-69% stenosis.         CT HEAD WO CONTRAST   Final Result   1. No acute intracranial abnormality.   2.

## 2024-12-18 NOTE — CARE COORDINATION
Discharge order noted. Per internal med note today, ZION on CKD Stage III, Creatinine 2.5 on admission, at baseline. Up to 3.4. Down to 3.0 today. Family wants to leave and follow up as an op. Transportation set up via [facility] for 12 noon today to [facility]. Charge nurse, RN, liaison, patient and family all notified. No Hens needed since patient is from this facility.    Jacquelyn Ackerman RN   [phone]

## 2024-12-18 NOTE — PROGRESS NOTES
Nightly    metoprolol succinate  50 mg Oral BID    insulin glargine  22 Units SubCUTAneous Nightly    ferrous sulfate  325 mg Oral BID WC    clopidogrel  75 mg Oral Daily    calcitRIOL  0.25 mcg Oral Every Other Day    atorvastatin  20 mg Oral Nightly    aspirin  81 mg Oral Daily    insulin lispro  0-8 Units SubCUTAneous 4x Daily AC & HS    sodium chloride flush  10 mL IntraVENous 2 times per day    enoxaparin  30 mg SubCUTAneous Daily     Continuous Infusions:   dextrose      sodium chloride       PRN Meds:.labetalol, capsaicin, glucose, dextrose bolus **OR** dextrose bolus, glucagon (rDNA), dextrose, sodium chloride flush, sodium chloride, ondansetron **OR** ondansetron, senna, acetaminophen **OR** acetaminophen    Allergies: Amlodipine, Codeine, Darvocet [propoxyphene n-acetaminophen], Hydrocodone-acetaminophen, and Lisinopril      Labs:   Recent Labs     12/16/24  0620 12/17/24  0721 12/18/24  0514   WBC 8.4 6.0 8.7   HGB 10.1* 9.1* 9.4*   HCT 31.8* 29.8* 29.1*   MCV 99.7 104.9* 98.0    264 300       Labs:   Recent Labs     12/17/24  1436 12/17/24  1935 12/18/24  0514    134 136   K 5.6* 5.3* 5.0    100 102   CO2 24 23 21*   PHOS  --   --  4.1   BUN 61* 61* 58*   CREATININE 3.4* 3.3* 3.0*       Labs: No results for input(s): \"CKTOTAL\", \"CKMB\", \"CKMBINDEX\", \"TROPONINI\" in the last 72 hours.    Labs: No results for input(s): \"BNP\" in the last 72 hours.    Labs: No results for input(s): \"CHOL\", \"HDL\", \"TRIG\" in the last 72 hours.    Invalid input(s): \"CHOLHDLR\", \"LDLCALCU\"    Labs: No results for input(s): \"INR\" in the last 72 hours.    Invalid input(s): \"PROT\"    Review of systems: No reported significant weight gain or weight loss.  no dysuria or frequency, no dizziness, falls or trauma, no change in bowel or bladder habits, no hematochezia, hemoptysis or hematuria.  No fevers, chills, nausea or vomiting reported. No significant wheezing or sputum production.  No headache or visual changes.   The remainder of the 10 review of systems otherwise negative.                Exam      General: Patient comfortable in no distress and currently denies any chest pain.    HEENT: Face symmetrical and no apparent cranial nerve deficit.     Neck: No jugular venous distention, carotid bruit or thyromegaly.     Lungs: Clear bilaterally without rales, wheezes or dullness.      Cardiac: Regular rate and rhythm, no S3, S4, no rub or gallop.       Abdomen: No rebound or guarding, no hepatosplenomegaly.    Extremities: Without significant clubbing , cyanosis, or edema.     Neuro:  No focal motor or sensory deficit apparent.    Skin: No petechiae, no significant bruising.      Assessment: See plan below        Plan: #1 unresponsive episodes over the last 2 years likely vagal and hypotension related with some degree of autonomic dysfunction with longstanding history of diabetes for 40 years.    Would change positions slowly, stay well-hydrated.  Avoid shorter acting blood pressure meds which could predispose to additional labile blood pressure management.    #2 hypertension continue Norvasc 10 mg daily, isosorbide mononitrate 60 mg daily, metoprolol succinate 50 mg twice a day, Ranexa 500 mg twice a day.  Blood pressure last 24 hours systolic 1 10-1 43 diastolic 60s and 70s.    #3 chronic CAD by heart catheterization 2 months ago pretty extensive continue medical management, amlodipine 10 mg antianginal, aspirin 81 mg daily, atorvastatin 20 mg daily, Plavix 75 mg daily, isosorbide mononitrate 60 mg daily, metoprolol succinate 50 mg twice a day and Ranexa 500 mg twice a day.    #4 diabetes follow-up blood sugars currently on insulin and diabetic education reviewed and provided.    Can go to extended-care facility today from cardiology viewpoint.    Prior to this admission had been wearing a 2-week monitor for these episodes for further evaluation and my office has not been notified of any clear-cut significant long pauses on the

## 2024-12-20 ENCOUNTER — HOSPITAL ENCOUNTER (OUTPATIENT)
Dept: INFUSION THERAPY | Age: 84
Setting detail: INFUSION SERIES
Discharge: HOME | End: 2024-12-20
Payer: MEDICARE

## 2024-12-20 VITALS
BODY MASS INDEX: 30.81 KG/M2 | RESPIRATION RATE: 18 BRPM | HEIGHT: 69 IN | OXYGEN SATURATION: 97 % | TEMPERATURE: 97.8 F | HEART RATE: 92 BPM | WEIGHT: 208 LBS

## 2024-12-20 DIAGNOSIS — N18.4 ANEMIA OF CHRONIC RENAL FAILURE, STAGE 4 (SEVERE) (HCC): Primary | ICD-10-CM

## 2024-12-20 DIAGNOSIS — D63.1 ANEMIA OF CHRONIC RENAL FAILURE, STAGE 4 (SEVERE) (HCC): Primary | ICD-10-CM

## 2024-12-20 LAB
EKG ATRIAL RATE: 78 BPM
EKG P AXIS: 18 DEGREES
EKG P-R INTERVAL: 174 MS
EKG Q-T INTERVAL: 386 MS
EKG QRS DURATION: 86 MS
EKG QTC CALCULATION (BAZETT): 440 MS
EKG R AXIS: 18 DEGREES
EKG T AXIS: 58 DEGREES
EKG VENTRICULAR RATE: 78 BPM

## 2024-12-20 PROCEDURE — 6360000002 HC RX W HCPCS: Performed by: INTERNAL MEDICINE

## 2024-12-20 PROCEDURE — 96372 THER/PROPH/DIAG INJ SC/IM: CPT

## 2024-12-20 PROCEDURE — 93010 ELECTROCARDIOGRAM REPORT: CPT | Performed by: INTERNAL MEDICINE

## 2024-12-20 RX ORDER — BISACODYL 10 MG
10 SUPPOSITORY, RECTAL RECTAL DAILY
COMMUNITY

## 2024-12-20 RX ORDER — ACETAMINOPHEN 325 MG/1
650 TABLET ORAL EVERY 4 HOURS PRN
COMMUNITY

## 2024-12-20 RX ADMIN — DARBEPOETIN ALFA 100 MCG: 100 INJECTION, SOLUTION INTRAVENOUS; SUBCUTANEOUS at 12:00

## 2025-01-06 ENCOUNTER — HOSPITAL ENCOUNTER (OUTPATIENT)
Dept: INFUSION THERAPY | Age: 85
Setting detail: INFUSION SERIES
Discharge: HOME | End: 2025-01-06
Payer: MEDICARE

## 2025-01-06 VITALS
RESPIRATION RATE: 18 BRPM | BODY MASS INDEX: 30.81 KG/M2 | HEART RATE: 71 BPM | HEIGHT: 69 IN | OXYGEN SATURATION: 100 % | TEMPERATURE: 97.2 F | SYSTOLIC BLOOD PRESSURE: 127 MMHG | WEIGHT: 208 LBS | DIASTOLIC BLOOD PRESSURE: 66 MMHG

## 2025-01-06 DIAGNOSIS — D63.1 ANEMIA OF CHRONIC RENAL FAILURE, STAGE 4 (SEVERE) (HCC): Primary | ICD-10-CM

## 2025-01-06 DIAGNOSIS — N18.4 ANEMIA OF CHRONIC RENAL FAILURE, STAGE 4 (SEVERE) (HCC): Primary | ICD-10-CM

## 2025-01-06 PROCEDURE — 6360000002 HC RX W HCPCS: Performed by: INTERNAL MEDICINE

## 2025-01-06 PROCEDURE — 2500000003 HC RX 250 WO HCPCS: Performed by: INTERNAL MEDICINE

## 2025-01-06 PROCEDURE — 96365 THER/PROPH/DIAG IV INF INIT: CPT

## 2025-01-06 PROCEDURE — 2580000003 HC RX 258: Performed by: INTERNAL MEDICINE

## 2025-01-06 RX ORDER — ONDANSETRON 2 MG/ML
8 INJECTION INTRAMUSCULAR; INTRAVENOUS
OUTPATIENT
Start: 2025-01-13

## 2025-01-06 RX ORDER — SODIUM CHLORIDE 9 MG/ML
INJECTION, SOLUTION INTRAVENOUS CONTINUOUS
OUTPATIENT
Start: 2025-01-13

## 2025-01-06 RX ORDER — SODIUM CHLORIDE 9 MG/ML
5-250 INJECTION, SOLUTION INTRAVENOUS PRN
Status: DISCONTINUED | OUTPATIENT
Start: 2025-01-06 | End: 2025-01-07 | Stop reason: HOSPADM

## 2025-01-06 RX ORDER — DIPHENHYDRAMINE HYDROCHLORIDE 50 MG/ML
50 INJECTION INTRAMUSCULAR; INTRAVENOUS
OUTPATIENT
Start: 2025-01-13

## 2025-01-06 RX ORDER — SODIUM CHLORIDE 0.9 % (FLUSH) 0.9 %
5-40 SYRINGE (ML) INJECTION PRN
Status: DISCONTINUED | OUTPATIENT
Start: 2025-01-06 | End: 2025-01-07 | Stop reason: HOSPADM

## 2025-01-06 RX ORDER — SODIUM CHLORIDE 9 MG/ML
5-250 INJECTION, SOLUTION INTRAVENOUS PRN
Status: CANCELLED | OUTPATIENT
Start: 2025-01-13

## 2025-01-06 RX ORDER — ACETAMINOPHEN 325 MG/1
650 TABLET ORAL
OUTPATIENT
Start: 2025-01-13

## 2025-01-06 RX ORDER — EPINEPHRINE 1 MG/ML
0.3 INJECTION, SOLUTION, CONCENTRATE INTRAVENOUS PRN
OUTPATIENT
Start: 2025-01-13

## 2025-01-06 RX ORDER — HYDROCORTISONE SODIUM SUCCINATE 100 MG/2ML
100 INJECTION INTRAMUSCULAR; INTRAVENOUS
OUTPATIENT
Start: 2025-01-13

## 2025-01-06 RX ORDER — ALBUTEROL SULFATE 90 UG/1
4 INHALANT RESPIRATORY (INHALATION) PRN
OUTPATIENT
Start: 2025-01-13

## 2025-01-06 RX ORDER — SODIUM CHLORIDE 0.9 % (FLUSH) 0.9 %
5-40 SYRINGE (ML) INJECTION PRN
Status: CANCELLED | OUTPATIENT
Start: 2025-01-13

## 2025-01-06 RX ADMIN — SODIUM CHLORIDE, PRESERVATIVE FREE 20 ML: 5 INJECTION INTRAVENOUS at 13:55

## 2025-01-06 RX ADMIN — SODIUM CHLORIDE 125 MG: 900 INJECTION INTRAVENOUS at 12:54

## 2025-01-06 RX ADMIN — SODIUM CHLORIDE, PRESERVATIVE FREE 10 ML: 5 INJECTION INTRAVENOUS at 12:49

## 2025-01-17 ENCOUNTER — HOSPITAL ENCOUNTER (OUTPATIENT)
Dept: INFUSION THERAPY | Age: 85
Setting detail: INFUSION SERIES
Discharge: HOME | End: 2025-01-17
Payer: MEDICARE

## 2025-01-17 VITALS
SYSTOLIC BLOOD PRESSURE: 147 MMHG | RESPIRATION RATE: 16 BRPM | TEMPERATURE: 97.7 F | DIASTOLIC BLOOD PRESSURE: 70 MMHG | HEART RATE: 64 BPM

## 2025-01-17 VITALS — HEART RATE: 78 BPM | RESPIRATION RATE: 16 BRPM | DIASTOLIC BLOOD PRESSURE: 65 MMHG | SYSTOLIC BLOOD PRESSURE: 135 MMHG

## 2025-01-17 DIAGNOSIS — N18.4 ANEMIA OF CHRONIC RENAL FAILURE, STAGE 4 (SEVERE) (HCC): Primary | ICD-10-CM

## 2025-01-17 DIAGNOSIS — D63.1 ANEMIA OF CHRONIC RENAL FAILURE, STAGE 4 (SEVERE) (HCC): Primary | ICD-10-CM

## 2025-01-17 PROCEDURE — 96365 THER/PROPH/DIAG IV INF INIT: CPT

## 2025-01-17 PROCEDURE — 2580000003 HC RX 258: Performed by: INTERNAL MEDICINE

## 2025-01-17 PROCEDURE — 96372 THER/PROPH/DIAG INJ SC/IM: CPT

## 2025-01-17 PROCEDURE — 6360000002 HC RX W HCPCS: Performed by: INTERNAL MEDICINE

## 2025-01-17 PROCEDURE — 2500000003 HC RX 250 WO HCPCS: Performed by: INTERNAL MEDICINE

## 2025-01-17 RX ORDER — ACETAMINOPHEN 325 MG/1
650 TABLET ORAL
Status: CANCELLED | OUTPATIENT
Start: 2025-01-20

## 2025-01-17 RX ORDER — SODIUM CHLORIDE 9 MG/ML
INJECTION, SOLUTION INTRAVENOUS CONTINUOUS
Status: CANCELLED | OUTPATIENT
Start: 2025-01-20

## 2025-01-17 RX ORDER — DIPHENHYDRAMINE HYDROCHLORIDE 50 MG/ML
50 INJECTION INTRAMUSCULAR; INTRAVENOUS
Status: CANCELLED | OUTPATIENT
Start: 2025-01-20

## 2025-01-17 RX ORDER — SODIUM CHLORIDE 9 MG/ML
5-250 INJECTION, SOLUTION INTRAVENOUS PRN
Status: CANCELLED | OUTPATIENT
Start: 2025-01-20

## 2025-01-17 RX ORDER — SODIUM CHLORIDE 9 MG/ML
5-250 INJECTION, SOLUTION INTRAVENOUS PRN
Status: DISCONTINUED | OUTPATIENT
Start: 2025-01-17 | End: 2025-01-18 | Stop reason: HOSPADM

## 2025-01-17 RX ORDER — SODIUM CHLORIDE 0.9 % (FLUSH) 0.9 %
5-40 SYRINGE (ML) INJECTION PRN
Status: CANCELLED | OUTPATIENT
Start: 2025-01-20

## 2025-01-17 RX ORDER — EPINEPHRINE 1 MG/ML
0.3 INJECTION, SOLUTION, CONCENTRATE INTRAVENOUS PRN
Status: CANCELLED | OUTPATIENT
Start: 2025-01-20

## 2025-01-17 RX ORDER — ONDANSETRON 2 MG/ML
8 INJECTION INTRAMUSCULAR; INTRAVENOUS
Status: CANCELLED | OUTPATIENT
Start: 2025-01-20

## 2025-01-17 RX ORDER — HYDROCORTISONE SODIUM SUCCINATE 100 MG/2ML
100 INJECTION INTRAMUSCULAR; INTRAVENOUS
Status: CANCELLED | OUTPATIENT
Start: 2025-01-20

## 2025-01-17 RX ORDER — ALBUTEROL SULFATE 90 UG/1
4 INHALANT RESPIRATORY (INHALATION) PRN
Status: CANCELLED | OUTPATIENT
Start: 2025-01-20

## 2025-01-17 RX ORDER — SODIUM CHLORIDE 0.9 % (FLUSH) 0.9 %
5-40 SYRINGE (ML) INJECTION PRN
Status: DISCONTINUED | OUTPATIENT
Start: 2025-01-17 | End: 2025-01-18 | Stop reason: HOSPADM

## 2025-01-17 RX ADMIN — SODIUM CHLORIDE, PRESERVATIVE FREE 20 ML: 5 INJECTION INTRAVENOUS at 14:18

## 2025-01-17 RX ADMIN — DARBEPOETIN ALFA 100 MCG: 100 INJECTION, SOLUTION INTRAVENOUS; SUBCUTANEOUS at 14:04

## 2025-01-17 RX ADMIN — SODIUM CHLORIDE, PRESERVATIVE FREE 10 ML: 5 INJECTION INTRAVENOUS at 13:11

## 2025-01-17 RX ADMIN — SODIUM CHLORIDE 125 MG: 900 INJECTION INTRAVENOUS at 13:10

## 2025-01-17 NOTE — PROGRESS NOTES
Tolerated infusion   ferriliocit well.  Reviewed therapy plan, offered education material and/or discharge material, reviewed medication information and signs and symptoms  and educated on possible side effects, verbalizes good knowledge of current plan patient verbalizes understanding, and has no signs or symptoms to report at this time.   Patient discharged. Patient alert and oriented x3.   No distress noted.   Vital signs stable.   Patient denies any new or worsening pain.  Patient denies any needs.  All questions answered.  Next appointment scheduled. Declines copy of AVS. Patient stayed for 30 minute observation after completion of infusion.

## 2025-01-17 NOTE — PROGRESS NOTES
Called to Dr. Borja office and left message on nurse line to please fax over current lab orders for the patient he is here now and had been getting labs at facility but he was discharged and will need lab orders await return call/orders

## 2025-01-17 NOTE — PROGRESS NOTES
Tolerated injection well.  Reviewed therapy plan, offered education material and/or discharge material, reviewed medication information and signs and symptoms  and educated on possible side effects, verbalizes good knowledge of current plan patient verbalizes understanding, and has no signs or symptoms to report at this time.   Patient discharged. Patient alert and oriented x3.   No distress noted.   Vital signs stable.   Patient denies any new or worsening pain.  Patient denies any needs.  All questions answered.  Next appointment scheduleddeclines  copy of AVS. Instructed on lab draw for next injection.

## 2025-01-24 ENCOUNTER — HOSPITAL ENCOUNTER (OUTPATIENT)
Dept: INFUSION THERAPY | Age: 85
Setting detail: INFUSION SERIES
Discharge: HOME | End: 2025-01-24
Payer: MEDICARE

## 2025-01-24 VITALS
WEIGHT: 208 LBS | RESPIRATION RATE: 18 BRPM | TEMPERATURE: 96.7 F | BODY MASS INDEX: 30.81 KG/M2 | SYSTOLIC BLOOD PRESSURE: 147 MMHG | HEIGHT: 69 IN | DIASTOLIC BLOOD PRESSURE: 67 MMHG | OXYGEN SATURATION: 94 % | HEART RATE: 74 BPM

## 2025-01-24 DIAGNOSIS — N18.4 ANEMIA OF CHRONIC RENAL FAILURE, STAGE 4 (SEVERE) (HCC): Primary | ICD-10-CM

## 2025-01-24 DIAGNOSIS — D63.1 ANEMIA OF CHRONIC RENAL FAILURE, STAGE 4 (SEVERE) (HCC): Primary | ICD-10-CM

## 2025-01-24 LAB
ANION GAP SERPL CALCULATED.3IONS-SCNC: 13 MMOL/L (ref 7–16)
BUN SERPL-MCNC: 62 MG/DL (ref 6–23)
CALCIUM SERPL-MCNC: 8.4 MG/DL (ref 8.6–10.2)
CHLORIDE SERPL-SCNC: 100 MMOL/L (ref 98–107)
CO2 SERPL-SCNC: 25 MMOL/L (ref 22–29)
CREAT SERPL-MCNC: 2.7 MG/DL (ref 0.7–1.2)
GFR, ESTIMATED: 23 ML/MIN/1.73M2
GLUCOSE SERPL-MCNC: 203 MG/DL (ref 74–99)
PHOSPHATE SERPL-MCNC: 3.8 MG/DL (ref 2.5–4.5)
POTASSIUM SERPL-SCNC: 4.2 MMOL/L (ref 3.5–5)
SODIUM SERPL-SCNC: 138 MMOL/L (ref 132–146)

## 2025-01-24 PROCEDURE — 80048 BASIC METABOLIC PNL TOTAL CA: CPT

## 2025-01-24 PROCEDURE — 84100 ASSAY OF PHOSPHORUS: CPT

## 2025-01-24 PROCEDURE — 6360000002 HC RX W HCPCS: Performed by: INTERNAL MEDICINE

## 2025-01-24 PROCEDURE — 2500000003 HC RX 250 WO HCPCS: Performed by: INTERNAL MEDICINE

## 2025-01-24 PROCEDURE — 96365 THER/PROPH/DIAG IV INF INIT: CPT

## 2025-01-24 PROCEDURE — 2580000003 HC RX 258: Performed by: INTERNAL MEDICINE

## 2025-01-24 RX ORDER — DIPHENHYDRAMINE HYDROCHLORIDE 50 MG/ML
50 INJECTION INTRAMUSCULAR; INTRAVENOUS
OUTPATIENT
Start: 2025-01-31

## 2025-01-24 RX ORDER — SODIUM CHLORIDE 9 MG/ML
INJECTION, SOLUTION INTRAVENOUS CONTINUOUS
OUTPATIENT
Start: 2025-01-31

## 2025-01-24 RX ORDER — SODIUM CHLORIDE 0.9 % (FLUSH) 0.9 %
5-40 SYRINGE (ML) INJECTION PRN
Status: DISCONTINUED | OUTPATIENT
Start: 2025-01-24 | End: 2025-01-25 | Stop reason: HOSPADM

## 2025-01-24 RX ORDER — SODIUM CHLORIDE 9 MG/ML
5-250 INJECTION, SOLUTION INTRAVENOUS PRN
Status: DISCONTINUED | OUTPATIENT
Start: 2025-01-24 | End: 2025-01-25 | Stop reason: HOSPADM

## 2025-01-24 RX ORDER — HYDROCORTISONE SODIUM SUCCINATE 100 MG/2ML
100 INJECTION INTRAMUSCULAR; INTRAVENOUS
OUTPATIENT
Start: 2025-01-31

## 2025-01-24 RX ORDER — ACETAMINOPHEN 325 MG/1
650 TABLET ORAL
OUTPATIENT
Start: 2025-01-31

## 2025-01-24 RX ORDER — SODIUM CHLORIDE 9 MG/ML
5-250 INJECTION, SOLUTION INTRAVENOUS PRN
Status: CANCELLED | OUTPATIENT
Start: 2025-01-31

## 2025-01-24 RX ORDER — EPINEPHRINE 1 MG/ML
0.3 INJECTION, SOLUTION, CONCENTRATE INTRAVENOUS PRN
OUTPATIENT
Start: 2025-01-31

## 2025-01-24 RX ORDER — ALBUTEROL SULFATE 90 UG/1
4 INHALANT RESPIRATORY (INHALATION) PRN
OUTPATIENT
Start: 2025-01-31

## 2025-01-24 RX ORDER — SODIUM CHLORIDE 0.9 % (FLUSH) 0.9 %
5-40 SYRINGE (ML) INJECTION PRN
Status: CANCELLED | OUTPATIENT
Start: 2025-01-31

## 2025-01-24 RX ORDER — ONDANSETRON 2 MG/ML
8 INJECTION INTRAMUSCULAR; INTRAVENOUS
OUTPATIENT
Start: 2025-01-31

## 2025-01-24 RX ADMIN — SODIUM CHLORIDE 125 MG: 900 INJECTION INTRAVENOUS at 11:58

## 2025-01-24 RX ADMIN — SODIUM CHLORIDE, PRESERVATIVE FREE 10 ML: 5 INJECTION INTRAVENOUS at 11:55

## 2025-01-24 RX ADMIN — SODIUM CHLORIDE, PRESERVATIVE FREE 20 ML: 5 INJECTION INTRAVENOUS at 13:00

## 2025-01-31 ENCOUNTER — HOSPITAL ENCOUNTER (OUTPATIENT)
Dept: INFUSION THERAPY | Age: 85
Setting detail: INFUSION SERIES
Discharge: HOME | End: 2025-01-31
Payer: MEDICARE

## 2025-01-31 VITALS
OXYGEN SATURATION: 98 % | HEART RATE: 74 BPM | TEMPERATURE: 97.7 F | RESPIRATION RATE: 16 BRPM | DIASTOLIC BLOOD PRESSURE: 75 MMHG | SYSTOLIC BLOOD PRESSURE: 161 MMHG

## 2025-01-31 DIAGNOSIS — D63.1 ANEMIA OF CHRONIC RENAL FAILURE, STAGE 4 (SEVERE) (HCC): Primary | ICD-10-CM

## 2025-01-31 DIAGNOSIS — N18.4 ANEMIA OF CHRONIC RENAL FAILURE, STAGE 4 (SEVERE) (HCC): Primary | ICD-10-CM

## 2025-01-31 PROCEDURE — 2580000003 HC RX 258: Performed by: INTERNAL MEDICINE

## 2025-01-31 PROCEDURE — 96366 THER/PROPH/DIAG IV INF ADDON: CPT

## 2025-01-31 PROCEDURE — 6360000002 HC RX W HCPCS: Performed by: INTERNAL MEDICINE

## 2025-01-31 PROCEDURE — 96365 THER/PROPH/DIAG IV INF INIT: CPT

## 2025-01-31 PROCEDURE — 2500000003 HC RX 250 WO HCPCS: Performed by: INTERNAL MEDICINE

## 2025-01-31 RX ORDER — HYDROCORTISONE SODIUM SUCCINATE 100 MG/2ML
100 INJECTION INTRAMUSCULAR; INTRAVENOUS
OUTPATIENT
Start: 2025-02-07

## 2025-01-31 RX ORDER — SODIUM CHLORIDE 0.9 % (FLUSH) 0.9 %
5-40 SYRINGE (ML) INJECTION PRN
OUTPATIENT
Start: 2025-02-07

## 2025-01-31 RX ORDER — ACETAMINOPHEN 325 MG/1
650 TABLET ORAL
OUTPATIENT
Start: 2025-02-07

## 2025-01-31 RX ORDER — EPINEPHRINE 1 MG/ML
0.3 INJECTION, SOLUTION, CONCENTRATE INTRAVENOUS PRN
OUTPATIENT
Start: 2025-02-07

## 2025-01-31 RX ORDER — ALBUTEROL SULFATE 90 UG/1
4 INHALANT RESPIRATORY (INHALATION) PRN
OUTPATIENT
Start: 2025-02-07

## 2025-01-31 RX ORDER — ONDANSETRON 2 MG/ML
8 INJECTION INTRAMUSCULAR; INTRAVENOUS
OUTPATIENT
Start: 2025-02-07

## 2025-01-31 RX ORDER — SODIUM CHLORIDE 9 MG/ML
5-250 INJECTION, SOLUTION INTRAVENOUS PRN
OUTPATIENT
Start: 2025-02-07

## 2025-01-31 RX ORDER — DIPHENHYDRAMINE HYDROCHLORIDE 50 MG/ML
50 INJECTION INTRAMUSCULAR; INTRAVENOUS
OUTPATIENT
Start: 2025-02-07

## 2025-01-31 RX ORDER — SODIUM CHLORIDE 0.9 % (FLUSH) 0.9 %
5-40 SYRINGE (ML) INJECTION PRN
Status: DISCONTINUED | OUTPATIENT
Start: 2025-01-31 | End: 2025-02-01 | Stop reason: HOSPADM

## 2025-01-31 RX ORDER — SODIUM CHLORIDE 9 MG/ML
INJECTION, SOLUTION INTRAVENOUS CONTINUOUS
OUTPATIENT
Start: 2025-02-07

## 2025-01-31 RX ORDER — SODIUM CHLORIDE 9 MG/ML
5-250 INJECTION, SOLUTION INTRAVENOUS PRN
Status: DISCONTINUED | OUTPATIENT
Start: 2025-01-31 | End: 2025-02-01 | Stop reason: HOSPADM

## 2025-01-31 RX ADMIN — SODIUM CHLORIDE 125 MG: 900 INJECTION INTRAVENOUS at 12:13

## 2025-01-31 RX ADMIN — SODIUM CHLORIDE, PRESERVATIVE FREE 10 ML: 5 INJECTION INTRAVENOUS at 12:14

## 2025-01-31 NOTE — PROGRESS NOTES
Tolerated infusion ferrlicit  well.  Reviewed therapy plan, offered education material and/or discharge material, reviewed medication information and signs and symptoms  and educated on possible side effects, verbalizes good knowledge of current plan patient verbalizes understanding, and has no signs or symptoms to report at this time.   Patient discharged. Patient alert and oriented x3.   No distress noted.   Vital signs stable.   Patient denies any new or worsening pain.  Patient denies any needs.  All questions answered.  Next appointment scheduled. declinescopy of AVS. Patient stayed for 30 minute observation after completion of infusion.

## 2025-02-04 ENCOUNTER — OFFICE VISIT (OUTPATIENT)
Dept: PODIATRY | Age: 85
End: 2025-02-04
Payer: MEDICARE

## 2025-02-04 VITALS — BODY MASS INDEX: 30.81 KG/M2 | HEIGHT: 69 IN | WEIGHT: 208 LBS

## 2025-02-04 DIAGNOSIS — G60.8 HEREDITARY SENSORY NEUROPATHY: ICD-10-CM

## 2025-02-04 DIAGNOSIS — B35.1 TINEA UNGUIUM: ICD-10-CM

## 2025-02-04 DIAGNOSIS — Z79.01 ENCOUNTER FOR CURRENT LONG-TERM USE OF ANTICOAGULANTS: ICD-10-CM

## 2025-02-04 DIAGNOSIS — L84 CORNS AND CALLOSITIES: ICD-10-CM

## 2025-02-04 DIAGNOSIS — Z79.4 TYPE 2 DIABETES MELLITUS WITHOUT COMPLICATION, WITH LONG-TERM CURRENT USE OF INSULIN (HCC): Primary | ICD-10-CM

## 2025-02-04 DIAGNOSIS — E11.9 TYPE 2 DIABETES MELLITUS WITHOUT COMPLICATION, WITH LONG-TERM CURRENT USE OF INSULIN (HCC): Primary | ICD-10-CM

## 2025-02-04 PROCEDURE — 3044F HG A1C LEVEL LT 7.0%: CPT | Performed by: PODIATRIST

## 2025-02-04 PROCEDURE — 1036F TOBACCO NON-USER: CPT | Performed by: PODIATRIST

## 2025-02-04 PROCEDURE — 1123F ACP DISCUSS/DSCN MKR DOCD: CPT | Performed by: PODIATRIST

## 2025-02-04 PROCEDURE — G8417 CALC BMI ABV UP PARAM F/U: HCPCS | Performed by: PODIATRIST

## 2025-02-04 PROCEDURE — 99213 OFFICE O/P EST LOW 20 MIN: CPT | Performed by: PODIATRIST

## 2025-02-04 PROCEDURE — 11056 PARNG/CUTG B9 HYPRKR LES 2-4: CPT | Performed by: PODIATRIST

## 2025-02-04 PROCEDURE — G8427 DOCREV CUR MEDS BY ELIG CLIN: HCPCS | Performed by: PODIATRIST

## 2025-02-04 PROCEDURE — 11721 DEBRIDE NAIL 6 OR MORE: CPT | Performed by: PODIATRIST

## 2025-02-04 NOTE — PROGRESS NOTES
mouth in the morning and at bedtime, Disp: , Rfl:     clopidogrel (PLAVIX) 75 MG tablet, Take 1 tablet by mouth daily, Disp: , Rfl:     Magnesium 300 MG CAPS, Take 1 capsule by mouth Daily, Disp: , Rfl:     calcitRIOL (ROCALTROL) 0.25 MCG capsule, Take 1 capsule by mouth every other day Pt takes Monday Wed and Friday, Disp: , Rfl:     insulin glargine (LANTUS) 100 UNIT/ML injection vial, Inject 22 Units into the skin nightly, Disp: , Rfl:     ferrous sulfate (IRON 325) 325 (65 Fe) MG tablet, Take 1 tablet by mouth 2 times daily (with meals), Disp: 30 tablet, Rfl: 3    omeprazole 20 MG EC tablet, Take 1 tablet by mouth daily, Disp: , Rfl:   Allergies   Allergen Reactions    Amlodipine Other (See Comments)     UNKNOWN REACTION AS OF 01/13/2023    Codeine Other (See Comments)     UNKNOWN REACTION AS OF 01/13/2023    Darvocet [Propoxyphene N-Acetaminophen] Nausea And Vomiting    Hydrocodone-Acetaminophen Nausea And Vomiting    Lisinopril Palpitations and Cough       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    Diabetes mellitus (HCC)     Diverticulosis     mild    GERD (gastroesophageal reflux disease)     Hyperlipidemia     Hypertension      There were no vitals filed for this visit.     Work History/Social History:   Foot and ankle history:     Focused Lower Extremity Physical Exam:    Neurovascular examination:    Dorsalis Pedis palpable bilateral.  Posterior tibialis palpable bilateral.    Capillary Refill Time:  Immediate return  Hair growth:  Symmetrical and bilateral   Skin:  Not atrophic  Edema: Mild edema bilateral feet.   Mild edema bilateral ankles.  Neurologic:  Light touch diminished bilateral.  Warm to coolness bilateral distal toes  Decreased epicritic sensation    Musculoskeletal/ Orthopedic examination:    Equinis: present bilateral  Dorsiflexion, plantarflexion, inversion, eversion bilateral 5 out of 5 muscle

## 2025-02-07 ENCOUNTER — HOSPITAL ENCOUNTER (OUTPATIENT)
Dept: INFUSION THERAPY | Age: 85
Setting detail: INFUSION SERIES
Discharge: HOME | End: 2025-02-07
Payer: MEDICARE

## 2025-02-07 VITALS
DIASTOLIC BLOOD PRESSURE: 68 MMHG | SYSTOLIC BLOOD PRESSURE: 147 MMHG | OXYGEN SATURATION: 98 % | HEART RATE: 71 BPM | TEMPERATURE: 97.4 F | RESPIRATION RATE: 16 BRPM

## 2025-02-07 DIAGNOSIS — N18.4 ANEMIA OF CHRONIC RENAL FAILURE, STAGE 4 (SEVERE) (HCC): Primary | ICD-10-CM

## 2025-02-07 DIAGNOSIS — D63.1 ANEMIA OF CHRONIC RENAL FAILURE, STAGE 4 (SEVERE) (HCC): Primary | ICD-10-CM

## 2025-02-07 PROCEDURE — 2580000003 HC RX 258: Performed by: INTERNAL MEDICINE

## 2025-02-07 PROCEDURE — 6360000002 HC RX W HCPCS: Performed by: INTERNAL MEDICINE

## 2025-02-07 PROCEDURE — 96365 THER/PROPH/DIAG IV INF INIT: CPT

## 2025-02-07 PROCEDURE — 2500000003 HC RX 250 WO HCPCS: Performed by: INTERNAL MEDICINE

## 2025-02-07 RX ORDER — SODIUM CHLORIDE 9 MG/ML
5-250 INJECTION, SOLUTION INTRAVENOUS PRN
Status: CANCELLED | OUTPATIENT
Start: 2025-02-14

## 2025-02-07 RX ORDER — SODIUM CHLORIDE 9 MG/ML
5-250 INJECTION, SOLUTION INTRAVENOUS PRN
Status: DISCONTINUED | OUTPATIENT
Start: 2025-02-07 | End: 2025-02-08 | Stop reason: HOSPADM

## 2025-02-07 RX ORDER — ONDANSETRON 2 MG/ML
8 INJECTION INTRAMUSCULAR; INTRAVENOUS
Status: CANCELLED | OUTPATIENT
Start: 2025-02-14

## 2025-02-07 RX ORDER — EPINEPHRINE 1 MG/ML
0.3 INJECTION, SOLUTION, CONCENTRATE INTRAVENOUS PRN
Status: CANCELLED | OUTPATIENT
Start: 2025-02-14

## 2025-02-07 RX ORDER — SODIUM CHLORIDE 9 MG/ML
INJECTION, SOLUTION INTRAVENOUS CONTINUOUS
Status: CANCELLED | OUTPATIENT
Start: 2025-02-14

## 2025-02-07 RX ORDER — ACETAMINOPHEN 325 MG/1
650 TABLET ORAL
Status: CANCELLED | OUTPATIENT
Start: 2025-02-14

## 2025-02-07 RX ORDER — DIPHENHYDRAMINE HYDROCHLORIDE 50 MG/ML
50 INJECTION INTRAMUSCULAR; INTRAVENOUS
Status: CANCELLED | OUTPATIENT
Start: 2025-02-14

## 2025-02-07 RX ORDER — HYDROCORTISONE SODIUM SUCCINATE 100 MG/2ML
100 INJECTION INTRAMUSCULAR; INTRAVENOUS
Status: CANCELLED | OUTPATIENT
Start: 2025-02-14

## 2025-02-07 RX ORDER — ALBUTEROL SULFATE 90 UG/1
4 INHALANT RESPIRATORY (INHALATION) PRN
Status: CANCELLED | OUTPATIENT
Start: 2025-02-14

## 2025-02-07 RX ORDER — SODIUM CHLORIDE 0.9 % (FLUSH) 0.9 %
5-40 SYRINGE (ML) INJECTION PRN
Status: CANCELLED | OUTPATIENT
Start: 2025-02-14

## 2025-02-07 RX ORDER — SODIUM CHLORIDE 0.9 % (FLUSH) 0.9 %
5-40 SYRINGE (ML) INJECTION PRN
Status: DISCONTINUED | OUTPATIENT
Start: 2025-02-07 | End: 2025-02-08 | Stop reason: HOSPADM

## 2025-02-07 RX ADMIN — SODIUM CHLORIDE 125 MG: 900 INJECTION INTRAVENOUS at 12:17

## 2025-02-07 RX ADMIN — SODIUM CHLORIDE, PRESERVATIVE FREE 10 ML: 5 INJECTION INTRAVENOUS at 12:16

## 2025-02-07 RX ADMIN — SODIUM CHLORIDE, PRESERVATIVE FREE 10 ML: 5 INJECTION INTRAVENOUS at 13:22

## 2025-02-14 ENCOUNTER — HOSPITAL ENCOUNTER (OUTPATIENT)
Dept: INFUSION THERAPY | Age: 85
Setting detail: INFUSION SERIES
Discharge: HOME | End: 2025-02-14
Payer: MEDICARE

## 2025-02-14 VITALS
TEMPERATURE: 97.3 F | SYSTOLIC BLOOD PRESSURE: 153 MMHG | WEIGHT: 208 LBS | RESPIRATION RATE: 16 BRPM | OXYGEN SATURATION: 100 % | BODY MASS INDEX: 30.72 KG/M2 | DIASTOLIC BLOOD PRESSURE: 71 MMHG | HEART RATE: 74 BPM

## 2025-02-14 DIAGNOSIS — D63.1 ANEMIA OF CHRONIC RENAL FAILURE, STAGE 4 (SEVERE) (HCC): Primary | ICD-10-CM

## 2025-02-14 DIAGNOSIS — N18.4 ANEMIA OF CHRONIC RENAL FAILURE, STAGE 4 (SEVERE) (HCC): Primary | ICD-10-CM

## 2025-02-14 PROCEDURE — 6360000002 HC RX W HCPCS: Performed by: INTERNAL MEDICINE

## 2025-02-14 PROCEDURE — 2500000003 HC RX 250 WO HCPCS: Performed by: INTERNAL MEDICINE

## 2025-02-14 PROCEDURE — 96372 THER/PROPH/DIAG INJ SC/IM: CPT

## 2025-02-14 PROCEDURE — 96365 THER/PROPH/DIAG IV INF INIT: CPT

## 2025-02-14 PROCEDURE — 2580000003 HC RX 258: Performed by: INTERNAL MEDICINE

## 2025-02-14 RX ORDER — DIPHENHYDRAMINE HYDROCHLORIDE 50 MG/ML
50 INJECTION INTRAMUSCULAR; INTRAVENOUS
Status: CANCELLED | OUTPATIENT
Start: 2025-02-21

## 2025-02-14 RX ORDER — SODIUM CHLORIDE 0.9 % (FLUSH) 0.9 %
5-40 SYRINGE (ML) INJECTION PRN
Status: CANCELLED | OUTPATIENT
Start: 2025-02-21

## 2025-02-14 RX ORDER — ACETAMINOPHEN 325 MG/1
650 TABLET ORAL
Status: CANCELLED | OUTPATIENT
Start: 2025-02-21

## 2025-02-14 RX ORDER — EPINEPHRINE 1 MG/ML
0.3 INJECTION, SOLUTION, CONCENTRATE INTRAVENOUS PRN
Status: CANCELLED | OUTPATIENT
Start: 2025-02-21

## 2025-02-14 RX ORDER — SODIUM CHLORIDE 9 MG/ML
5-250 INJECTION, SOLUTION INTRAVENOUS PRN
Status: CANCELLED | OUTPATIENT
Start: 2025-02-21

## 2025-02-14 RX ORDER — ALBUTEROL SULFATE 90 UG/1
4 INHALANT RESPIRATORY (INHALATION) PRN
Status: CANCELLED | OUTPATIENT
Start: 2025-02-21

## 2025-02-14 RX ORDER — SODIUM CHLORIDE 0.9 % (FLUSH) 0.9 %
5-40 SYRINGE (ML) INJECTION PRN
Status: DISCONTINUED | OUTPATIENT
Start: 2025-02-14 | End: 2025-02-14

## 2025-02-14 RX ORDER — SODIUM CHLORIDE 9 MG/ML
5-250 INJECTION, SOLUTION INTRAVENOUS PRN
Status: DISCONTINUED | OUTPATIENT
Start: 2025-02-14 | End: 2025-02-14

## 2025-02-14 RX ORDER — HYDROCORTISONE SODIUM SUCCINATE 100 MG/2ML
100 INJECTION INTRAMUSCULAR; INTRAVENOUS
Status: CANCELLED | OUTPATIENT
Start: 2025-02-21

## 2025-02-14 RX ORDER — SODIUM CHLORIDE 9 MG/ML
INJECTION, SOLUTION INTRAVENOUS CONTINUOUS
Status: CANCELLED | OUTPATIENT
Start: 2025-02-21

## 2025-02-14 RX ORDER — ONDANSETRON 2 MG/ML
8 INJECTION INTRAMUSCULAR; INTRAVENOUS
Status: CANCELLED | OUTPATIENT
Start: 2025-02-21

## 2025-02-14 RX ADMIN — DARBEPOETIN ALFA 100 MCG: 100 INJECTION, SOLUTION INTRAVENOUS; SUBCUTANEOUS at 12:20

## 2025-02-14 RX ADMIN — SODIUM CHLORIDE, PRESERVATIVE FREE 10 ML: 5 INJECTION INTRAVENOUS at 12:14

## 2025-02-14 RX ADMIN — SODIUM CHLORIDE 125 MG: 900 INJECTION INTRAVENOUS at 12:31

## 2025-02-18 ENCOUNTER — OFFICE VISIT (OUTPATIENT)
Dept: PODIATRY | Age: 85
End: 2025-02-18
Payer: MEDICARE

## 2025-02-18 VITALS — BODY MASS INDEX: 30.81 KG/M2 | WEIGHT: 208 LBS | HEIGHT: 69 IN

## 2025-02-18 DIAGNOSIS — Z79.01 ENCOUNTER FOR CURRENT LONG-TERM USE OF ANTICOAGULANTS: ICD-10-CM

## 2025-02-18 DIAGNOSIS — Z79.4 TYPE 2 DIABETES MELLITUS WITHOUT COMPLICATION, WITH LONG-TERM CURRENT USE OF INSULIN (HCC): Primary | ICD-10-CM

## 2025-02-18 DIAGNOSIS — E11.9 TYPE 2 DIABETES MELLITUS WITHOUT COMPLICATION, WITH LONG-TERM CURRENT USE OF INSULIN (HCC): Primary | ICD-10-CM

## 2025-02-18 PROCEDURE — 99213 OFFICE O/P EST LOW 20 MIN: CPT | Performed by: PODIATRIST

## 2025-02-18 PROCEDURE — 1123F ACP DISCUSS/DSCN MKR DOCD: CPT | Performed by: PODIATRIST

## 2025-02-18 PROCEDURE — 1159F MED LIST DOCD IN RCRD: CPT | Performed by: PODIATRIST

## 2025-02-18 PROCEDURE — G8417 CALC BMI ABV UP PARAM F/U: HCPCS | Performed by: PODIATRIST

## 2025-02-18 PROCEDURE — 1036F TOBACCO NON-USER: CPT | Performed by: PODIATRIST

## 2025-02-18 PROCEDURE — 3044F HG A1C LEVEL LT 7.0%: CPT | Performed by: PODIATRIST

## 2025-02-18 PROCEDURE — G8427 DOCREV CUR MEDS BY ELIG CLIN: HCPCS | Performed by: PODIATRIST

## 2025-02-18 NOTE — PROGRESS NOTES
CC:   Follow-up diabetic exam follow-up blister left heel    HPI  Presents follow-up diabetic foot ankle exam.  Does have a healed blister on the back of the left heel.  Pain-free today no open wounds.  Does present with his wife.  Has been using lotion daily and has not had any issues foot or ankle.  Does state that has been pleased with progression and no open wounds.      ROS:  Const: Denies constitutional symptoms  Musculo: Denies symptoms other than stated above  Skin: Denies symptoms other than stated above      Current Outpatient Medications:     magnesium hydroxide (MILK OF MAGNESIA) 400 MG/5ML suspension, Take 30 mLs by mouth daily as needed for Constipation, Disp: , Rfl:     bisacodyl (DULCOLAX) 10 MG suppository, Place 1 suppository rectally daily, Disp: , Rfl:     acetaminophen (TYLENOL) 325 MG tablet, Take 2 tablets by mouth every 4 hours as needed for Pain, Disp: , Rfl:     isosorbide mononitrate (IMDUR) 60 MG extended release tablet, Take 1 tablet by mouth daily, Disp: , Rfl:     amLODIPine (NORVASC) 10 MG tablet, Take 1 tablet by mouth daily, Disp: , Rfl:     mirtazapine (REMERON SOL-TAB) 15 MG disintegrating tablet, Take 1 tablet by mouth nightly, Disp: , Rfl:     aspirin 81 MG EC tablet, Take 1 tablet by mouth daily, Disp: , Rfl:     ranolazine (RANEXA) 500 MG extended release tablet, Take 1 tablet by mouth 2 times daily, Disp: , Rfl:     insulin lispro (HUMALOG,ADMELOG) 100 UNIT/ML SOLN injection vial, Inject 0-24 Units into the skin 3 times daily (before meals) 100-150mg/dL: 16 units  151-200mg/dL: 18 units  201-250mg/dL: 20 units  251+: 24 units, Disp: , Rfl:     atorvastatin (LIPITOR) 20 MG tablet, Take 1 tablet by mouth nightly, Disp: , Rfl:     metoprolol succinate (TOPROL XL) 50 MG extended release tablet, Take 1 tablet by mouth in the morning and at bedtime, Disp: , Rfl:     clopidogrel (PLAVIX) 75 MG tablet, Take 1 tablet by mouth daily, Disp: , Rfl:     Magnesium 300 MG CAPS, Take 1

## 2025-02-21 LAB
ANION GAP SERPL CALCULATED.3IONS-SCNC: 13 MMOL/L (ref 7–16)
BASOPHILS # BLD: 0.04 K/UL (ref 0–0.2)
BASOPHILS NFR BLD: 1 % (ref 0–2)
BUN SERPL-MCNC: 64 MG/DL (ref 6–23)
CALCIUM SERPL-MCNC: 9 MG/DL (ref 8.6–10.2)
CHLORIDE SERPL-SCNC: 103 MMOL/L (ref 98–107)
CO2 SERPL-SCNC: 21 MMOL/L (ref 22–29)
CREAT SERPL-MCNC: 2.7 MG/DL (ref 0.7–1.2)
EOSINOPHIL # BLD: 0.29 K/UL (ref 0.05–0.5)
EOSINOPHILS RELATIVE PERCENT: 4 % (ref 0–6)
ERYTHROCYTE [DISTWIDTH] IN BLOOD BY AUTOMATED COUNT: 17.5 % (ref 11.5–15)
GFR, ESTIMATED: 22 ML/MIN/1.73M2
GLUCOSE SERPL-MCNC: 168 MG/DL (ref 74–99)
HCT VFR BLD AUTO: 27.7 % (ref 37–54)
HGB BLD-MCNC: 8.4 G/DL (ref 12.5–16.5)
IMM GRANULOCYTES # BLD AUTO: 0.03 K/UL (ref 0–0.58)
IMM GRANULOCYTES NFR BLD: 0 % (ref 0–5)
IRON SERPL-MCNC: 56 UG/DL (ref 59–158)
LYMPHOCYTES NFR BLD: 0.78 K/UL (ref 1.5–4)
LYMPHOCYTES RELATIVE PERCENT: 11 % (ref 20–42)
MCH RBC QN AUTO: 32.8 PG (ref 26–35)
MCHC RBC AUTO-ENTMCNC: 30.3 G/DL (ref 32–34.5)
MCV RBC AUTO: 108.2 FL (ref 80–99.9)
MONOCYTES NFR BLD: 0.89 K/UL (ref 0.1–0.95)
MONOCYTES NFR BLD: 12 % (ref 2–12)
NEUTROPHILS NFR BLD: 72 % (ref 43–80)
NEUTS SEG NFR BLD: 5.29 K/UL (ref 1.8–7.3)
PHOSPHATE SERPL-MCNC: 3.6 MG/DL (ref 2.5–4.5)
PLATELET # BLD AUTO: 300 K/UL (ref 130–450)
PMV BLD AUTO: 9.8 FL (ref 7–12)
POTASSIUM SERPL-SCNC: 5.4 MMOL/L (ref 3.5–5)
RBC # BLD AUTO: 2.56 M/UL (ref 3.8–5.8)
SODIUM SERPL-SCNC: 137 MMOL/L (ref 132–146)
WBC OTHER # BLD: 7.3 K/UL (ref 4.5–11.5)

## 2025-03-14 ENCOUNTER — HOSPITAL ENCOUNTER (OUTPATIENT)
Dept: INFUSION THERAPY | Age: 85
Setting detail: INFUSION SERIES
Discharge: HOME | End: 2025-03-14
Payer: MEDICARE

## 2025-03-14 DIAGNOSIS — D63.1 ANEMIA OF CHRONIC RENAL FAILURE, STAGE 4 (SEVERE): Primary | ICD-10-CM

## 2025-03-14 DIAGNOSIS — N18.4 ANEMIA OF CHRONIC RENAL FAILURE, STAGE 4 (SEVERE): Primary | ICD-10-CM

## 2025-03-14 LAB
ANION GAP SERPL CALCULATED.3IONS-SCNC: 11 MMOL/L (ref 7–16)
BUN SERPL-MCNC: 60 MG/DL (ref 6–23)
CALCIUM SERPL-MCNC: 9.2 MG/DL (ref 8.6–10.2)
CHLORIDE SERPL-SCNC: 105 MMOL/L (ref 98–107)
CO2 SERPL-SCNC: 22 MMOL/L (ref 22–29)
CREAT SERPL-MCNC: 2.8 MG/DL (ref 0.7–1.2)
ERYTHROCYTE [DISTWIDTH] IN BLOOD BY AUTOMATED COUNT: 14.5 % (ref 11.5–15)
GFR, ESTIMATED: 21 ML/MIN/1.73M2
GLUCOSE SERPL-MCNC: 176 MG/DL (ref 74–99)
HCT VFR BLD AUTO: 29.8 % (ref 37–54)
HGB BLD-MCNC: 9.1 G/DL (ref 12.5–16.5)
IRON SATN MFR SERPL: 26 % (ref 20–55)
IRON SERPL-MCNC: 80 UG/DL (ref 59–158)
MCH RBC QN AUTO: 33 PG (ref 26–35)
MCHC RBC AUTO-ENTMCNC: 30.5 G/DL (ref 32–34.5)
MCV RBC AUTO: 108 FL (ref 80–99.9)
PHOSPHATE SERPL-MCNC: 3.6 MG/DL (ref 2.5–4.5)
PLATELET # BLD AUTO: 257 K/UL (ref 130–450)
PMV BLD AUTO: 10 FL (ref 7–12)
POTASSIUM SERPL-SCNC: 4.8 MMOL/L (ref 3.5–5)
RBC # BLD AUTO: 2.76 M/UL (ref 3.8–5.8)
SODIUM SERPL-SCNC: 138 MMOL/L (ref 132–146)
TIBC SERPL-MCNC: 309 UG/DL (ref 250–450)
WBC OTHER # BLD: 6.8 K/UL (ref 4.5–11.5)

## 2025-03-14 PROCEDURE — 83540 ASSAY OF IRON: CPT

## 2025-03-14 PROCEDURE — 80048 BASIC METABOLIC PNL TOTAL CA: CPT

## 2025-03-14 PROCEDURE — 36415 COLL VENOUS BLD VENIPUNCTURE: CPT

## 2025-03-14 PROCEDURE — 96372 THER/PROPH/DIAG INJ SC/IM: CPT

## 2025-03-14 PROCEDURE — 85027 COMPLETE CBC AUTOMATED: CPT

## 2025-03-14 PROCEDURE — 83550 IRON BINDING TEST: CPT

## 2025-03-14 PROCEDURE — 84100 ASSAY OF PHOSPHORUS: CPT

## 2025-03-14 PROCEDURE — 6360000002 HC RX W HCPCS: Performed by: INTERNAL MEDICINE

## 2025-03-14 RX ADMIN — DARBEPOETIN ALFA 100 MCG: 100 INJECTION, SOLUTION INTRAVENOUS; SUBCUTANEOUS at 12:18

## 2025-03-14 NOTE — PROGRESS NOTES
Venipuncture left alteral antecub  pressure applied post  band aid applied  and labeled specimen and transported  to lab

## 2025-03-14 NOTE — PROGRESS NOTES
Tolerated injection well.  Reviewed therapy plan, offered education material and/or discharge material, reviewed medication information and signs and symptoms  and educated on possible side effects, verbalizes good knowledge of current plan patient verbalizes understanding, and has no signs or symptoms to report at this time.   Patient discharged. Patient alert and oriented x3.   No distress noted.   Vital signs stable.   Patient denies any new or worsening pain.  Patient denies any needs.  All questions answered.  Next appointment scheduled.declines  copy of AVS. Instructed on lab draw for next injection.

## 2025-04-14 ENCOUNTER — HOSPITAL ENCOUNTER (OUTPATIENT)
Dept: INFUSION THERAPY | Age: 85
Setting detail: INFUSION SERIES
Discharge: HOME | End: 2025-04-14
Payer: MEDICARE

## 2025-04-14 VITALS
HEART RATE: 73 BPM | BODY MASS INDEX: 30.81 KG/M2 | OXYGEN SATURATION: 100 % | DIASTOLIC BLOOD PRESSURE: 77 MMHG | WEIGHT: 208 LBS | HEIGHT: 69 IN | SYSTOLIC BLOOD PRESSURE: 149 MMHG | RESPIRATION RATE: 16 BRPM | TEMPERATURE: 97.4 F

## 2025-04-14 DIAGNOSIS — N18.4 ANEMIA OF CHRONIC RENAL FAILURE, STAGE 4 (SEVERE) (HCC): Primary | ICD-10-CM

## 2025-04-14 DIAGNOSIS — D63.1 ANEMIA OF CHRONIC RENAL FAILURE, STAGE 4 (SEVERE) (HCC): Primary | ICD-10-CM

## 2025-04-14 LAB
ERYTHROCYTE [DISTWIDTH] IN BLOOD BY AUTOMATED COUNT: 13.8 % (ref 11.5–15)
HCT VFR BLD AUTO: 31 % (ref 37–54)
HGB BLD-MCNC: 9.9 G/DL (ref 12.5–16.5)
MCH RBC QN AUTO: 33.1 PG (ref 26–35)
MCHC RBC AUTO-ENTMCNC: 31.9 G/DL (ref 32–34.5)
MCV RBC AUTO: 103.7 FL (ref 80–99.9)
PHOSPHATE SERPL-MCNC: 3.6 MG/DL (ref 2.5–4.5)
PLATELET # BLD AUTO: 279 K/UL (ref 130–450)
PMV BLD AUTO: 10.3 FL (ref 7–12)
RBC # BLD AUTO: 2.99 M/UL (ref 3.8–5.8)
WBC OTHER # BLD: 7.3 K/UL (ref 4.5–11.5)

## 2025-04-14 PROCEDURE — 6360000002 HC RX W HCPCS: Performed by: INTERNAL MEDICINE

## 2025-04-14 PROCEDURE — 85027 COMPLETE CBC AUTOMATED: CPT

## 2025-04-14 PROCEDURE — 36415 COLL VENOUS BLD VENIPUNCTURE: CPT

## 2025-04-14 PROCEDURE — 96372 THER/PROPH/DIAG INJ SC/IM: CPT

## 2025-04-14 PROCEDURE — 84100 ASSAY OF PHOSPHORUS: CPT

## 2025-04-14 RX ADMIN — DARBEPOETIN ALFA 100 MCG: 100 INJECTION, SOLUTION INTRAVENOUS; SUBCUTANEOUS at 13:21

## 2025-04-14 ASSESSMENT — PAIN DESCRIPTION - ORIENTATION: ORIENTATION: LEFT

## 2025-04-14 ASSESSMENT — PAIN DESCRIPTION - FREQUENCY: FREQUENCY: CONTINUOUS

## 2025-04-14 ASSESSMENT — PAIN DESCRIPTION - DESCRIPTORS: DESCRIPTORS: ACHING

## 2025-04-14 ASSESSMENT — PAIN DESCRIPTION - PAIN TYPE: TYPE: CHRONIC PAIN

## 2025-04-14 ASSESSMENT — PAIN DESCRIPTION - LOCATION: LOCATION: LEG

## 2025-04-14 ASSESSMENT — PAIN SCALES - GENERAL: PAINLEVEL_OUTOF10: 10

## 2025-04-15 ENCOUNTER — OFFICE VISIT (OUTPATIENT)
Dept: PODIATRY | Age: 85
End: 2025-04-15
Payer: MEDICARE

## 2025-04-15 VITALS — WEIGHT: 208 LBS | BODY MASS INDEX: 30.7 KG/M2

## 2025-04-15 DIAGNOSIS — G60.8 HEREDITARY SENSORY NEUROPATHY: ICD-10-CM

## 2025-04-15 DIAGNOSIS — E11.9 TYPE 2 DIABETES MELLITUS WITHOUT COMPLICATION, WITH LONG-TERM CURRENT USE OF INSULIN: Primary | ICD-10-CM

## 2025-04-15 DIAGNOSIS — L84 CORNS AND CALLOSITIES: ICD-10-CM

## 2025-04-15 DIAGNOSIS — Z79.4 TYPE 2 DIABETES MELLITUS WITHOUT COMPLICATION, WITH LONG-TERM CURRENT USE OF INSULIN: Primary | ICD-10-CM

## 2025-04-15 DIAGNOSIS — Z79.01 ENCOUNTER FOR CURRENT LONG-TERM USE OF ANTICOAGULANTS: ICD-10-CM

## 2025-04-15 DIAGNOSIS — B35.1 TINEA UNGUIUM: ICD-10-CM

## 2025-04-15 PROCEDURE — 99999 PR OFFICE/OUTPT VISIT,PROCEDURE ONLY: CPT | Performed by: PODIATRIST

## 2025-04-15 PROCEDURE — 11056 PARNG/CUTG B9 HYPRKR LES 2-4: CPT | Performed by: PODIATRIST

## 2025-04-15 PROCEDURE — 11721 DEBRIDE NAIL 6 OR MORE: CPT | Performed by: PODIATRIST

## 2025-04-15 NOTE — PROGRESS NOTES
Patient in for nail and callous care  Type 2 diabetic  Gumaro Adams MD  LOV 3/6/25      Electronically signed by Camilla Box LPN on 4/15/2025 at 11:35 AM    CC:   Diabetic foot ankle exam    HPI  Follow-up diabetic foot and ankle exam.  History of a blister left heel no blisters or any open wounds today.  Pain-free foot and ankle.  Denies any additional pedal complaints.      ROS:  Const: Denies constitutional symptoms  Musculo: Denies symptoms other than stated above  Skin: Denies symptoms other than stated above      Current Outpatient Medications:     magnesium hydroxide (MILK OF MAGNESIA) 400 MG/5ML suspension, Take 30 mLs by mouth daily as needed for Constipation, Disp: , Rfl:     bisacodyl (DULCOLAX) 10 MG suppository, Place 1 suppository rectally daily, Disp: , Rfl:     acetaminophen (TYLENOL) 325 MG tablet, Take 2 tablets by mouth every 4 hours as needed for Pain, Disp: , Rfl:     isosorbide mononitrate (IMDUR) 60 MG extended release tablet, Take 1 tablet by mouth daily, Disp: , Rfl:     amLODIPine (NORVASC) 10 MG tablet, Take 1 tablet by mouth daily, Disp: , Rfl:     mirtazapine (REMERON SOL-TAB) 15 MG disintegrating tablet, Take 1 tablet by mouth nightly, Disp: , Rfl:     aspirin 81 MG EC tablet, Take 1 tablet by mouth daily, Disp: , Rfl:     ranolazine (RANEXA) 500 MG extended release tablet, Take 1 tablet by mouth 2 times daily, Disp: , Rfl:     insulin lispro (HUMALOG,ADMELOG) 100 UNIT/ML SOLN injection vial, Inject 0-24 Units into the skin 3 times daily (before meals) 100-150mg/dL: 16 units  151-200mg/dL: 18 units  201-250mg/dL: 20 units  251+: 24 units, Disp: , Rfl:     atorvastatin (LIPITOR) 20 MG tablet, Take 1 tablet by mouth nightly, Disp: , Rfl:     metoprolol succinate (TOPROL XL) 50 MG extended release tablet, Take 1 tablet by mouth in the morning and at bedtime, Disp: , Rfl:     clopidogrel (PLAVIX) 75 MG tablet, Take 1 tablet by mouth daily, Disp: , Rfl:     Magnesium 300 MG CAPS,

## 2025-05-14 ENCOUNTER — HOSPITAL ENCOUNTER (OUTPATIENT)
Dept: INFUSION THERAPY | Age: 85
Setting detail: INFUSION SERIES
Discharge: HOME | End: 2025-05-14
Payer: MEDICARE

## 2025-05-14 VITALS
DIASTOLIC BLOOD PRESSURE: 74 MMHG | BODY MASS INDEX: 30.81 KG/M2 | SYSTOLIC BLOOD PRESSURE: 131 MMHG | HEIGHT: 69 IN | TEMPERATURE: 97.6 F | RESPIRATION RATE: 16 BRPM | HEART RATE: 77 BPM | WEIGHT: 208 LBS | OXYGEN SATURATION: 98 %

## 2025-05-14 DIAGNOSIS — N18.4 ANEMIA OF CHRONIC RENAL FAILURE, STAGE 4 (SEVERE) (HCC): Primary | ICD-10-CM

## 2025-05-14 DIAGNOSIS — D63.1 ANEMIA OF CHRONIC RENAL FAILURE, STAGE 4 (SEVERE) (HCC): Primary | ICD-10-CM

## 2025-05-14 PROCEDURE — 6360000002 HC RX W HCPCS: Performed by: INTERNAL MEDICINE

## 2025-05-14 PROCEDURE — 96372 THER/PROPH/DIAG INJ SC/IM: CPT

## 2025-05-14 RX ADMIN — DARBEPOETIN ALFA 100 MCG: 100 INJECTION, SOLUTION INTRAVENOUS; SUBCUTANEOUS at 12:24

## 2025-06-19 ENCOUNTER — TRANSCRIBE ORDERS (OUTPATIENT)
Dept: ADMINISTRATIVE | Age: 85
End: 2025-06-19

## 2025-06-19 DIAGNOSIS — D63.1 ANEMIA IN CHRONIC KIDNEY DISEASE, UNSPECIFIED CKD STAGE: ICD-10-CM

## 2025-06-19 DIAGNOSIS — N18.4 CHRONIC KIDNEY DISEASE, STAGE IV (SEVERE) (HCC): Primary | ICD-10-CM

## 2025-06-19 DIAGNOSIS — E13.21 DIABETIC NEPHROPATHY ASSOCIATED WITH OTHER SPECIFIED DIABETES MELLITUS (HCC): ICD-10-CM

## 2025-06-19 DIAGNOSIS — N25.81 HYPERPARATHYROIDISM DUE TO RENAL INSUFFICIENCY: ICD-10-CM

## 2025-06-19 DIAGNOSIS — E87.5 HYPERKALEMIA: ICD-10-CM

## 2025-06-19 DIAGNOSIS — I12.9 CHRONIC KIDNEY DISEASE DUE TO HYPERTENSION: ICD-10-CM

## 2025-06-19 DIAGNOSIS — E55.9 VITAMIN D DEFICIENCY: ICD-10-CM

## 2025-06-19 DIAGNOSIS — E87.22 CHRONIC METABOLIC ACIDOSIS: ICD-10-CM

## 2025-06-19 DIAGNOSIS — N18.9 ANEMIA IN CHRONIC KIDNEY DISEASE, UNSPECIFIED CKD STAGE: ICD-10-CM

## 2025-06-20 ENCOUNTER — HOSPITAL ENCOUNTER (OUTPATIENT)
Dept: INFUSION THERAPY | Age: 85
Setting detail: INFUSION SERIES
Discharge: HOME | End: 2025-06-20
Payer: MEDICARE

## 2025-06-20 VITALS
OXYGEN SATURATION: 98 % | HEIGHT: 69 IN | TEMPERATURE: 97.5 F | WEIGHT: 208 LBS | DIASTOLIC BLOOD PRESSURE: 70 MMHG | RESPIRATION RATE: 18 BRPM | SYSTOLIC BLOOD PRESSURE: 171 MMHG | HEART RATE: 72 BPM | BODY MASS INDEX: 30.81 KG/M2

## 2025-06-20 DIAGNOSIS — D63.1 ANEMIA OF CHRONIC RENAL FAILURE, STAGE 4 (SEVERE) (HCC): Primary | ICD-10-CM

## 2025-06-20 DIAGNOSIS — N18.4 ANEMIA OF CHRONIC RENAL FAILURE, STAGE 4 (SEVERE) (HCC): Primary | ICD-10-CM

## 2025-06-20 PROCEDURE — 96372 THER/PROPH/DIAG INJ SC/IM: CPT

## 2025-06-20 PROCEDURE — 6360000002 HC RX W HCPCS: Performed by: INTERNAL MEDICINE

## 2025-06-20 RX ADMIN — DARBEPOETIN ALFA 100 MCG: 100 INJECTION, SOLUTION INTRAVENOUS; SUBCUTANEOUS at 12:35

## 2025-06-20 ASSESSMENT — PAIN SCALES - GENERAL: PAINLEVEL_OUTOF10: 10

## 2025-06-20 ASSESSMENT — PAIN DESCRIPTION - PAIN TYPE: TYPE: CHRONIC PAIN

## 2025-06-20 ASSESSMENT — PAIN DESCRIPTION - ORIENTATION: ORIENTATION: LEFT

## 2025-06-20 ASSESSMENT — PAIN DESCRIPTION - LOCATION: LOCATION: LEG

## 2025-06-20 ASSESSMENT — PAIN DESCRIPTION - FREQUENCY: FREQUENCY: CONTINUOUS

## 2025-06-24 ENCOUNTER — PROCEDURE VISIT (OUTPATIENT)
Dept: PODIATRY | Age: 85
End: 2025-06-24
Payer: MEDICARE

## 2025-06-24 VITALS — BODY MASS INDEX: 30.7 KG/M2 | WEIGHT: 208 LBS

## 2025-06-24 DIAGNOSIS — G60.8 HEREDITARY SENSORY NEUROPATHY: ICD-10-CM

## 2025-06-24 DIAGNOSIS — E11.9 TYPE 2 DIABETES MELLITUS WITHOUT COMPLICATION, WITH LONG-TERM CURRENT USE OF INSULIN (HCC): Primary | ICD-10-CM

## 2025-06-24 DIAGNOSIS — B35.1 TINEA UNGUIUM: ICD-10-CM

## 2025-06-24 DIAGNOSIS — Z79.4 TYPE 2 DIABETES MELLITUS WITHOUT COMPLICATION, WITH LONG-TERM CURRENT USE OF INSULIN (HCC): Primary | ICD-10-CM

## 2025-06-24 DIAGNOSIS — L84 CORNS AND CALLOSITIES: ICD-10-CM

## 2025-06-24 DIAGNOSIS — Z79.01 ENCOUNTER FOR CURRENT LONG-TERM USE OF ANTICOAGULANTS: ICD-10-CM

## 2025-06-24 PROCEDURE — 11056 PARNG/CUTG B9 HYPRKR LES 2-4: CPT | Performed by: PODIATRIST

## 2025-06-24 PROCEDURE — 11721 DEBRIDE NAIL 6 OR MORE: CPT | Performed by: PODIATRIST

## 2025-06-24 NOTE — PROGRESS NOTES
Patient in for nail and callous care  Type 2 diabetic  Gumaro Adams MD  LOV 5/17/25      Electronically signed by Suresh Bonilla DPM on 6/24/2025 at 1:03 PM      CC:   Follow-up diabetic foot ankle exam    HPI  Follow-up diabetic foot ankle exam  No open wounds foot or ankle.  History anticoagulant therapy with Plavix.  No foot or ankle pain today.      ROS:  Const: Denies constitutional symptoms  Musculo: Denies symptoms other than stated above  Skin: Denies symptoms other than stated above      Current Outpatient Medications:     magnesium hydroxide (MILK OF MAGNESIA) 400 MG/5ML suspension, Take 30 mLs by mouth daily as needed for Constipation, Disp: , Rfl:     bisacodyl (DULCOLAX) 10 MG suppository, Place 1 suppository rectally daily, Disp: , Rfl:     acetaminophen (TYLENOL) 325 MG tablet, Take 2 tablets by mouth every 4 hours as needed for Pain, Disp: , Rfl:     isosorbide mononitrate (IMDUR) 60 MG extended release tablet, Take 1 tablet by mouth daily, Disp: , Rfl:     amLODIPine (NORVASC) 10 MG tablet, Take 1 tablet by mouth daily, Disp: , Rfl:     mirtazapine (REMERON SOL-TAB) 15 MG disintegrating tablet, Take 1 tablet by mouth nightly, Disp: , Rfl:     aspirin 81 MG EC tablet, Take 1 tablet by mouth daily, Disp: , Rfl:     ranolazine (RANEXA) 500 MG extended release tablet, Take 1 tablet by mouth 2 times daily, Disp: , Rfl:     insulin lispro (HUMALOG,ADMELOG) 100 UNIT/ML SOLN injection vial, Inject 0-24 Units into the skin 3 times daily (before meals) 100-150mg/dL: 16 units  151-200mg/dL: 18 units  201-250mg/dL: 20 units  251+: 24 units, Disp: , Rfl:     atorvastatin (LIPITOR) 20 MG tablet, Take 1 tablet by mouth nightly, Disp: , Rfl:     metoprolol succinate (TOPROL XL) 50 MG extended release tablet, Take 1 tablet by mouth in the morning and at bedtime, Disp: , Rfl:     clopidogrel (PLAVIX) 75 MG tablet, Take 1 tablet by mouth daily, Disp: , Rfl:     Magnesium 300 MG CAPS, Take 1 capsule by mouth

## 2025-07-18 ENCOUNTER — HOSPITAL ENCOUNTER (OUTPATIENT)
Dept: INFUSION THERAPY | Age: 85
Setting detail: INFUSION SERIES
Discharge: HOME | End: 2025-07-18
Payer: MEDICARE

## 2025-07-18 VITALS
SYSTOLIC BLOOD PRESSURE: 118 MMHG | RESPIRATION RATE: 18 BRPM | TEMPERATURE: 98.1 F | HEART RATE: 71 BPM | DIASTOLIC BLOOD PRESSURE: 57 MMHG | OXYGEN SATURATION: 98 % | HEIGHT: 69 IN | BODY MASS INDEX: 30.81 KG/M2 | WEIGHT: 208 LBS

## 2025-07-18 DIAGNOSIS — N18.4 ANEMIA OF CHRONIC RENAL FAILURE, STAGE 4 (SEVERE) (HCC): Primary | ICD-10-CM

## 2025-07-18 DIAGNOSIS — D63.1 ANEMIA OF CHRONIC RENAL FAILURE, STAGE 4 (SEVERE) (HCC): Primary | ICD-10-CM

## 2025-07-18 LAB
IRON SATN MFR SERPL: 23 % (ref 20–55)
IRON SERPL-MCNC: 72 UG/DL (ref 61–157)
PHOSPHATE SERPL-MCNC: 3.9 MG/DL (ref 2.5–4.5)
TIBC SERPL-MCNC: 307 UG/DL (ref 250–450)

## 2025-07-18 PROCEDURE — 6360000002 HC RX W HCPCS: Performed by: INTERNAL MEDICINE

## 2025-07-18 PROCEDURE — 83550 IRON BINDING TEST: CPT

## 2025-07-18 PROCEDURE — 36415 COLL VENOUS BLD VENIPUNCTURE: CPT

## 2025-07-18 PROCEDURE — 96372 THER/PROPH/DIAG INJ SC/IM: CPT

## 2025-07-18 PROCEDURE — 84100 ASSAY OF PHOSPHORUS: CPT

## 2025-07-18 PROCEDURE — 83540 ASSAY OF IRON: CPT

## 2025-07-18 RX ADMIN — DARBEPOETIN ALFA 100 MCG: 100 INJECTION, SOLUTION INTRAVENOUS; SUBCUTANEOUS at 11:54

## 2025-07-25 ENCOUNTER — HOSPITAL ENCOUNTER (OUTPATIENT)
Dept: ULTRASOUND IMAGING | Age: 85
Discharge: HOME | End: 2025-07-27
Attending: INTERNAL MEDICINE
Payer: MEDICARE

## 2025-07-25 ENCOUNTER — HOSPITAL ENCOUNTER (OUTPATIENT)
Age: 85
Discharge: HOME | End: 2025-07-25
Payer: MEDICARE

## 2025-07-25 DIAGNOSIS — N18.9 ANEMIA IN CHRONIC KIDNEY DISEASE, UNSPECIFIED CKD STAGE: ICD-10-CM

## 2025-07-25 DIAGNOSIS — E55.9 VITAMIN D DEFICIENCY: ICD-10-CM

## 2025-07-25 DIAGNOSIS — E87.5 HYPERKALEMIA: ICD-10-CM

## 2025-07-25 DIAGNOSIS — D63.1 ANEMIA IN CHRONIC KIDNEY DISEASE, UNSPECIFIED CKD STAGE: ICD-10-CM

## 2025-07-25 DIAGNOSIS — E13.21 DIABETIC NEPHROPATHY ASSOCIATED WITH OTHER SPECIFIED DIABETES MELLITUS (HCC): ICD-10-CM

## 2025-07-25 DIAGNOSIS — I12.9 CHRONIC KIDNEY DISEASE DUE TO HYPERTENSION: ICD-10-CM

## 2025-07-25 DIAGNOSIS — E87.22 CHRONIC METABOLIC ACIDOSIS: ICD-10-CM

## 2025-07-25 DIAGNOSIS — N18.4 CHRONIC KIDNEY DISEASE, STAGE IV (SEVERE) (HCC): ICD-10-CM

## 2025-07-25 DIAGNOSIS — N25.81 HYPERPARATHYROIDISM DUE TO RENAL INSUFFICIENCY: ICD-10-CM

## 2025-07-25 LAB
ANION GAP SERPL CALCULATED.3IONS-SCNC: 13 MMOL/L (ref 7–16)
BUN SERPL-MCNC: 58 MG/DL (ref 8–23)
CALCIUM SERPL-MCNC: 9.5 MG/DL (ref 8.8–10.2)
CHLORIDE SERPL-SCNC: 104 MMOL/L (ref 98–107)
CO2 SERPL-SCNC: 22 MMOL/L (ref 22–29)
CREAT SERPL-MCNC: 3.5 MG/DL (ref 0.7–1.2)
GFR, ESTIMATED: 17 ML/MIN/1.73M2
GLUCOSE SERPL-MCNC: 125 MG/DL (ref 74–99)
POTASSIUM SERPL-SCNC: 4.9 MMOL/L (ref 3.5–5.1)
SODIUM SERPL-SCNC: 139 MMOL/L (ref 136–145)

## 2025-07-25 PROCEDURE — 76770 US EXAM ABDO BACK WALL COMP: CPT

## 2025-07-25 PROCEDURE — 36415 COLL VENOUS BLD VENIPUNCTURE: CPT

## 2025-07-25 PROCEDURE — 80048 BASIC METABOLIC PNL TOTAL CA: CPT

## 2025-08-22 ENCOUNTER — HOSPITAL ENCOUNTER (OUTPATIENT)
Dept: INFUSION THERAPY | Age: 85
Setting detail: INFUSION SERIES
Discharge: HOME | End: 2025-08-22

## 2025-08-22 VITALS
OXYGEN SATURATION: 96 % | WEIGHT: 208 LBS | DIASTOLIC BLOOD PRESSURE: 67 MMHG | SYSTOLIC BLOOD PRESSURE: 144 MMHG | TEMPERATURE: 96.8 F | HEART RATE: 80 BPM | HEIGHT: 69 IN | RESPIRATION RATE: 18 BRPM | BODY MASS INDEX: 30.81 KG/M2

## 2025-08-22 ASSESSMENT — PAIN SCALES - GENERAL: PAINLEVEL_OUTOF10: 0

## 2025-09-02 ENCOUNTER — PROCEDURE VISIT (OUTPATIENT)
Dept: PODIATRY | Age: 85
End: 2025-09-02
Payer: MEDICARE

## 2025-09-02 VITALS — WEIGHT: 208 LBS | BODY MASS INDEX: 30.7 KG/M2

## 2025-09-02 DIAGNOSIS — Z79.4 TYPE 2 DIABETES MELLITUS WITHOUT COMPLICATION, WITH LONG-TERM CURRENT USE OF INSULIN (HCC): Primary | ICD-10-CM

## 2025-09-02 DIAGNOSIS — G60.8 HEREDITARY SENSORY NEUROPATHY: ICD-10-CM

## 2025-09-02 DIAGNOSIS — B35.1 TINEA UNGUIUM: ICD-10-CM

## 2025-09-02 DIAGNOSIS — L84 CORNS AND CALLOSITIES: ICD-10-CM

## 2025-09-02 DIAGNOSIS — E11.9 TYPE 2 DIABETES MELLITUS WITHOUT COMPLICATION, WITH LONG-TERM CURRENT USE OF INSULIN (HCC): Primary | ICD-10-CM

## 2025-09-02 DIAGNOSIS — Z79.01 ENCOUNTER FOR CURRENT LONG-TERM USE OF ANTICOAGULANTS: ICD-10-CM

## 2025-09-02 PROCEDURE — 11721 DEBRIDE NAIL 6 OR MORE: CPT | Performed by: PODIATRIST

## 2025-09-02 PROCEDURE — 11056 PARNG/CUTG B9 HYPRKR LES 2-4: CPT | Performed by: PODIATRIST

## (undated) DEVICE — INFLATION DEVICE KIT: Brand: ENCORE™ 26 ADVANTAGE KIT

## (undated) DEVICE — TUBING PRSS MON L12IN PVC RIG NONEXPANDING M TO FEM CONN

## (undated) DEVICE — COPILOT BLEEDBACK CONTROL VALVE: Brand: COPILOT

## (undated) DEVICE — GUIDEWIRE VASC L260CM 0.035IN J TIP L3MM PTFE FIX COR NAMIC

## (undated) DEVICE — BAND COMPR L24CM REG CLR PLAS HEMSTAT EXT HK AND LOOP RETEN

## (undated) DEVICE — GLIDESHEATH NITINOL HYDROPHILIC COATED INTRODUCER SHEATH: Brand: GLIDESHEATH

## (undated) DEVICE — CATHETER DIAG 6FR L100CM LUMN ID0.056IN JR4 CRV 0 SIDE H

## (undated) DEVICE — DEVICE TORQ FLRESCNT PNK FOR HEMSTAS VLV

## (undated) DEVICE — CATHETER COR DIAG JUDKINS L 3.5 CRV 6FR 100CM 0 SIDE H

## (undated) DEVICE — ANGIOPLASTY ADD-ON PACK: Brand: MEDLINE INDUSTRIES, INC.

## (undated) DEVICE — CANNULA NSL CANN NSL L25FT TBNG AD O2 SUP SFT UC

## (undated) DEVICE — PAD, DEFIB, ADULT, RADIOTRAN, PHYSIO, LO: Brand: MEDLINE

## (undated) DEVICE — SURGICAL PROCEDURE KIT 3 W

## (undated) DEVICE — Device